# Patient Record
Sex: FEMALE | Race: BLACK OR AFRICAN AMERICAN | Employment: PART TIME | URBAN - METROPOLITAN AREA
[De-identification: names, ages, dates, MRNs, and addresses within clinical notes are randomized per-mention and may not be internally consistent; named-entity substitution may affect disease eponyms.]

---

## 2021-10-01 ENCOUNTER — APPOINTMENT (OUTPATIENT)
Dept: GENERAL RADIOLOGY | Age: 36
DRG: 137 | End: 2021-10-01
Payer: MEDICAID

## 2021-10-01 ENCOUNTER — HOSPITAL ENCOUNTER (INPATIENT)
Age: 36
LOS: 11 days | Discharge: HOME | DRG: 137 | End: 2021-10-12
Attending: EMERGENCY MEDICINE | Admitting: INTERNAL MEDICINE
Payer: MEDICAID

## 2021-10-01 DIAGNOSIS — U07.1 PNEUMONIA DUE TO COVID-19 VIRUS: Primary | ICD-10-CM

## 2021-10-01 DIAGNOSIS — R09.02 HYPOXIA: ICD-10-CM

## 2021-10-01 DIAGNOSIS — J12.82 PNEUMONIA DUE TO COVID-19 VIRUS: Primary | ICD-10-CM

## 2021-10-01 LAB
ALBUMIN SERPL-MCNC: 3 G/DL (ref 3.4–5)
ALP BLD-CCNC: 69 U/L (ref 40–129)
ALT SERPL-CCNC: 30 U/L (ref 10–40)
ANION GAP SERPL CALCULATED.3IONS-SCNC: 15 MMOL/L (ref 3–16)
ANION GAP SERPL CALCULATED.3IONS-SCNC: 16 MMOL/L (ref 3–16)
ANION GAP SERPL CALCULATED.3IONS-SCNC: 17 MMOL/L (ref 3–16)
APTT: 126 SEC (ref 26.2–38.6)
APTT: 26.6 SEC (ref 26.2–38.6)
APTT: 27.5 SEC (ref 26.2–38.6)
APTT: >248 SEC (ref 26.2–38.6)
AST SERPL-CCNC: 72 U/L (ref 15–37)
BASOPHILS ABSOLUTE: 0.1 K/UL (ref 0–0.2)
BASOPHILS RELATIVE PERCENT: 1.4 %
BILIRUB SERPL-MCNC: 0.3 MG/DL (ref 0–1)
BILIRUBIN DIRECT: <0.2 MG/DL (ref 0–0.3)
BILIRUBIN URINE: ABNORMAL
BILIRUBIN, INDIRECT: ABNORMAL MG/DL (ref 0–1)
BLOOD, URINE: ABNORMAL
BUN BLDV-MCNC: 66 MG/DL (ref 7–20)
BUN BLDV-MCNC: 70 MG/DL (ref 7–20)
BUN BLDV-MCNC: 76 MG/DL (ref 7–20)
C-REACTIVE PROTEIN: 100.1 MG/L (ref 0–5.1)
CALCIUM SERPL-MCNC: 9 MG/DL (ref 8.3–10.6)
CALCIUM SERPL-MCNC: 9 MG/DL (ref 8.3–10.6)
CALCIUM SERPL-MCNC: 9.2 MG/DL (ref 8.3–10.6)
CHLORIDE BLD-SCNC: 98 MMOL/L (ref 99–110)
CHLORIDE BLD-SCNC: 98 MMOL/L (ref 99–110)
CHLORIDE BLD-SCNC: 99 MMOL/L (ref 99–110)
CLARITY: ABNORMAL
CO2: 20 MMOL/L (ref 21–32)
CO2: 20 MMOL/L (ref 21–32)
CO2: 22 MMOL/L (ref 21–32)
COARSE CASTS, UA: ABNORMAL /LPF (ref 0–2)
COLOR: ABNORMAL
COMMENT UA: ABNORMAL
CREAT SERPL-MCNC: 4 MG/DL (ref 0.6–1.1)
CREAT SERPL-MCNC: 4.3 MG/DL (ref 0.6–1.1)
CREAT SERPL-MCNC: 4.7 MG/DL (ref 0.6–1.1)
CREATININE URINE: 508.6 MG/DL (ref 28–259)
D DIMER: 4401 NG/ML DDU (ref 0–229)
EKG ATRIAL RATE: 92 BPM
EKG DIAGNOSIS: NORMAL
EKG P AXIS: 38 DEGREES
EKG P-R INTERVAL: 136 MS
EKG Q-T INTERVAL: 348 MS
EKG QRS DURATION: 84 MS
EKG QTC CALCULATION (BAZETT): 430 MS
EKG R AXIS: -14 DEGREES
EKG T AXIS: 46 DEGREES
EKG VENTRICULAR RATE: 92 BPM
EOSINOPHILS ABSOLUTE: 0 K/UL (ref 0–0.6)
EOSINOPHILS RELATIVE PERCENT: 0.1 %
EPITHELIAL CELLS, UA: 15 /HPF (ref 0–5)
FERRITIN: 686.2 NG/ML (ref 15–150)
FIBRINOGEN: 627 MG/DL (ref 200–397)
GFR AFRICAN AMERICAN: 13
GFR AFRICAN AMERICAN: 14
GFR AFRICAN AMERICAN: 15
GFR NON-AFRICAN AMERICAN: 10
GFR NON-AFRICAN AMERICAN: 12
GFR NON-AFRICAN AMERICAN: 13
GLUCOSE BLD-MCNC: 130 MG/DL (ref 70–99)
GLUCOSE BLD-MCNC: 132 MG/DL (ref 70–99)
GLUCOSE BLD-MCNC: 144 MG/DL (ref 70–99)
GLUCOSE URINE: NEGATIVE MG/DL
HCT VFR BLD CALC: 47.6 % (ref 36–48)
HCT VFR BLD CALC: 47.6 % (ref 36–48)
HEMOGLOBIN: 16.1 G/DL (ref 12–16)
HEMOGLOBIN: 16.3 G/DL (ref 12–16)
HYALINE CASTS: ABNORMAL /LPF (ref 0–2)
INR BLD: 1.05 (ref 0.88–1.12)
KETONES, URINE: ABNORMAL MG/DL
LACTATE DEHYDROGENASE: 1191 U/L (ref 100–190)
LACTIC ACID: 1.6 MMOL/L (ref 0.4–2)
LEUKOCYTE ESTERASE, URINE: NEGATIVE
LYMPHOCYTES ABSOLUTE: 0.4 K/UL (ref 1–5.1)
LYMPHOCYTES RELATIVE PERCENT: 5 %
MCH RBC QN AUTO: 31.9 PG (ref 26–34)
MCH RBC QN AUTO: 32.1 PG (ref 26–34)
MCHC RBC AUTO-ENTMCNC: 33.8 G/DL (ref 31–36)
MCHC RBC AUTO-ENTMCNC: 34.2 G/DL (ref 31–36)
MCV RBC AUTO: 93.4 FL (ref 80–100)
MCV RBC AUTO: 95.1 FL (ref 80–100)
MICROSCOPIC EXAMINATION: YES
MONOCYTES ABSOLUTE: 0.3 K/UL (ref 0–1.3)
MONOCYTES RELATIVE PERCENT: 4.2 %
NEUTROPHILS ABSOLUTE: 6.9 K/UL (ref 1.7–7.7)
NEUTROPHILS RELATIVE PERCENT: 89.3 %
NITRITE, URINE: NEGATIVE
PDW BLD-RTO: 13.2 % (ref 12.4–15.4)
PDW BLD-RTO: 13.3 % (ref 12.4–15.4)
PH UA: 5 (ref 5–8)
PLATELET # BLD: 209 K/UL (ref 135–450)
PLATELET # BLD: 218 K/UL (ref 135–450)
PMV BLD AUTO: 9.3 FL (ref 5–10.5)
PMV BLD AUTO: 9.7 FL (ref 5–10.5)
POTASSIUM REFLEX MAGNESIUM: 4.4 MMOL/L (ref 3.5–5.1)
POTASSIUM REFLEX MAGNESIUM: 4.5 MMOL/L (ref 3.5–5.1)
POTASSIUM SERPL-SCNC: 4.7 MMOL/L (ref 3.5–5.1)
PROCALCITONIN: 0.78 NG/ML (ref 0–0.15)
PROTEIN UA: >300 MG/DL
PROTHROMBIN TIME: 11.9 SEC (ref 9.9–12.7)
RBC # BLD: 5 M/UL (ref 4–5.2)
RBC # BLD: 5.1 M/UL (ref 4–5.2)
RBC UA: 4 /HPF (ref 0–4)
SODIUM BLD-SCNC: 135 MMOL/L (ref 136–145)
SODIUM URINE: <20 MMOL/L
SPECIFIC GRAVITY UA: >1.03 (ref 1–1.03)
TOTAL PROTEIN: 7.6 G/DL (ref 6.4–8.2)
TROPONIN: <0.01 NG/ML
URINE TYPE: ABNORMAL
UROBILINOGEN, URINE: 0.2 E.U./DL
VITAMIN D 25-HYDROXY: 7.9 NG/ML
WBC # BLD: 7.7 K/UL (ref 4–11)
WBC # BLD: 7.7 K/UL (ref 4–11)
WBC UA: 16 /HPF (ref 0–5)

## 2021-10-01 PROCEDURE — 93005 ELECTROCARDIOGRAM TRACING: CPT | Performed by: EMERGENCY MEDICINE

## 2021-10-01 PROCEDURE — 71045 X-RAY EXAM CHEST 1 VIEW: CPT

## 2021-10-01 PROCEDURE — 84484 ASSAY OF TROPONIN QUANT: CPT

## 2021-10-01 PROCEDURE — 85384 FIBRINOGEN ACTIVITY: CPT

## 2021-10-01 PROCEDURE — 99223 1ST HOSP IP/OBS HIGH 75: CPT | Performed by: INTERNAL MEDICINE

## 2021-10-01 PROCEDURE — 82570 ASSAY OF URINE CREATININE: CPT

## 2021-10-01 PROCEDURE — 81001 URINALYSIS AUTO W/SCOPE: CPT

## 2021-10-01 PROCEDURE — 6360000002 HC RX W HCPCS: Performed by: INTERNAL MEDICINE

## 2021-10-01 PROCEDURE — 99284 EMERGENCY DEPT VISIT MOD MDM: CPT

## 2021-10-01 PROCEDURE — 6360000002 HC RX W HCPCS: Performed by: EMERGENCY MEDICINE

## 2021-10-01 PROCEDURE — 82728 ASSAY OF FERRITIN: CPT

## 2021-10-01 PROCEDURE — 2580000003 HC RX 258: Performed by: INTERNAL MEDICINE

## 2021-10-01 PROCEDURE — 87040 BLOOD CULTURE FOR BACTERIA: CPT

## 2021-10-01 PROCEDURE — 86140 C-REACTIVE PROTEIN: CPT

## 2021-10-01 PROCEDURE — 2060000000 HC ICU INTERMEDIATE R&B

## 2021-10-01 PROCEDURE — 36415 COLL VENOUS BLD VENIPUNCTURE: CPT

## 2021-10-01 PROCEDURE — 80048 BASIC METABOLIC PNL TOTAL CA: CPT

## 2021-10-01 PROCEDURE — 6370000000 HC RX 637 (ALT 250 FOR IP): Performed by: INTERNAL MEDICINE

## 2021-10-01 PROCEDURE — 85027 COMPLETE CBC AUTOMATED: CPT

## 2021-10-01 PROCEDURE — 80076 HEPATIC FUNCTION PANEL: CPT

## 2021-10-01 PROCEDURE — 94761 N-INVAS EAR/PLS OXIMETRY MLT: CPT

## 2021-10-01 PROCEDURE — 85379 FIBRIN DEGRADATION QUANT: CPT

## 2021-10-01 PROCEDURE — 83615 LACTATE (LD) (LDH) ENZYME: CPT

## 2021-10-01 PROCEDURE — 87449 NOS EACH ORGANISM AG IA: CPT

## 2021-10-01 PROCEDURE — 84300 ASSAY OF URINE SODIUM: CPT

## 2021-10-01 PROCEDURE — 2700000000 HC OXYGEN THERAPY PER DAY

## 2021-10-01 PROCEDURE — 96365 THER/PROPH/DIAG IV INF INIT: CPT

## 2021-10-01 PROCEDURE — U0003 INFECTIOUS AGENT DETECTION BY NUCLEIC ACID (DNA OR RNA); SEVERE ACUTE RESPIRATORY SYNDROME CORONAVIRUS 2 (SARS-COV-2) (CORONAVIRUS DISEASE [COVID-19]), AMPLIFIED PROBE TECHNIQUE, MAKING USE OF HIGH THROUGHPUT TECHNOLOGIES AS DESCRIBED BY CMS-2020-01-R: HCPCS

## 2021-10-01 PROCEDURE — 83605 ASSAY OF LACTIC ACID: CPT

## 2021-10-01 PROCEDURE — 84145 PROCALCITONIN (PCT): CPT

## 2021-10-01 PROCEDURE — U0005 INFEC AGEN DETEC AMPLI PROBE: HCPCS

## 2021-10-01 PROCEDURE — 85025 COMPLETE CBC W/AUTO DIFF WBC: CPT

## 2021-10-01 PROCEDURE — XW033H5 INTRODUCTION OF TOCILIZUMAB INTO PERIPHERAL VEIN, PERCUTANEOUS APPROACH, NEW TECHNOLOGY GROUP 5: ICD-10-PCS | Performed by: INTERNAL MEDICINE

## 2021-10-01 PROCEDURE — 93010 ELECTROCARDIOGRAM REPORT: CPT | Performed by: INTERNAL MEDICINE

## 2021-10-01 PROCEDURE — 82306 VITAMIN D 25 HYDROXY: CPT

## 2021-10-01 PROCEDURE — 85730 THROMBOPLASTIN TIME PARTIAL: CPT

## 2021-10-01 PROCEDURE — 2580000003 HC RX 258: Performed by: EMERGENCY MEDICINE

## 2021-10-01 PROCEDURE — 85610 PROTHROMBIN TIME: CPT

## 2021-10-01 RX ORDER — PROMETHAZINE HYDROCHLORIDE 25 MG/ML
6.25 INJECTION, SOLUTION INTRAMUSCULAR; INTRAVENOUS EVERY 6 HOURS PRN
Status: DISCONTINUED | OUTPATIENT
Start: 2021-10-01 | End: 2021-10-12 | Stop reason: HOSPADM

## 2021-10-01 RX ORDER — ACETAMINOPHEN 650 MG/1
650 SUPPOSITORY RECTAL EVERY 6 HOURS PRN
Status: DISCONTINUED | OUTPATIENT
Start: 2021-10-01 | End: 2021-10-12 | Stop reason: HOSPADM

## 2021-10-01 RX ORDER — HEPARIN SODIUM 1000 [USP'U]/ML
10000 INJECTION, SOLUTION INTRAVENOUS; SUBCUTANEOUS PRN
Status: DISCONTINUED | OUTPATIENT
Start: 2021-10-01 | End: 2021-10-12 | Stop reason: HOSPADM

## 2021-10-01 RX ORDER — HEPARIN SODIUM 1000 [USP'U]/ML
10000 INJECTION, SOLUTION INTRAVENOUS; SUBCUTANEOUS ONCE
Status: COMPLETED | OUTPATIENT
Start: 2021-10-01 | End: 2021-10-01

## 2021-10-01 RX ORDER — GUAIFENESIN/DEXTROMETHORPHAN 100-10MG/5
5 SYRUP ORAL EVERY 4 HOURS PRN
Status: DISCONTINUED | OUTPATIENT
Start: 2021-10-01 | End: 2021-10-12 | Stop reason: HOSPADM

## 2021-10-01 RX ORDER — ALBUTEROL SULFATE 90 UG/1
2 AEROSOL, METERED RESPIRATORY (INHALATION) EVERY 6 HOURS PRN
Status: DISCONTINUED | OUTPATIENT
Start: 2021-10-01 | End: 2021-10-07

## 2021-10-01 RX ORDER — SODIUM CHLORIDE 0.9 % (FLUSH) 0.9 %
5-40 SYRINGE (ML) INJECTION PRN
Status: DISCONTINUED | OUTPATIENT
Start: 2021-10-01 | End: 2021-10-12 | Stop reason: HOSPADM

## 2021-10-01 RX ORDER — VITAMIN B COMPLEX
2000 TABLET ORAL DAILY
Status: DISCONTINUED | OUTPATIENT
Start: 2021-10-01 | End: 2021-10-12 | Stop reason: HOSPADM

## 2021-10-01 RX ORDER — FAMOTIDINE 20 MG/1
20 TABLET, FILM COATED ORAL 2 TIMES DAILY
Status: DISCONTINUED | OUTPATIENT
Start: 2021-10-01 | End: 2021-10-03

## 2021-10-01 RX ORDER — HEPARIN SODIUM 1000 [USP'U]/ML
5000 INJECTION, SOLUTION INTRAVENOUS; SUBCUTANEOUS PRN
Status: DISCONTINUED | OUTPATIENT
Start: 2021-10-01 | End: 2021-10-12 | Stop reason: HOSPADM

## 2021-10-01 RX ORDER — HEPARIN SODIUM 10000 [USP'U]/100ML
5-30 INJECTION, SOLUTION INTRAVENOUS CONTINUOUS
Status: DISCONTINUED | OUTPATIENT
Start: 2021-10-01 | End: 2021-10-12 | Stop reason: HOSPADM

## 2021-10-01 RX ORDER — ACETAMINOPHEN 325 MG/1
650 TABLET ORAL EVERY 6 HOURS PRN
Status: DISCONTINUED | OUTPATIENT
Start: 2021-10-01 | End: 2021-10-12 | Stop reason: HOSPADM

## 2021-10-01 RX ORDER — MAGNESIUM HYDROXIDE/ALUMINUM HYDROXICE/SIMETHICONE 120; 1200; 1200 MG/30ML; MG/30ML; MG/30ML
30 SUSPENSION ORAL EVERY 6 HOURS PRN
Status: DISCONTINUED | OUTPATIENT
Start: 2021-10-01 | End: 2021-10-12 | Stop reason: HOSPADM

## 2021-10-01 RX ORDER — POLYETHYLENE GLYCOL 3350 17 G/17G
17 POWDER, FOR SOLUTION ORAL DAILY PRN
Status: DISCONTINUED | OUTPATIENT
Start: 2021-10-01 | End: 2021-10-12 | Stop reason: HOSPADM

## 2021-10-01 RX ORDER — SODIUM CHLORIDE 9 MG/ML
25 INJECTION, SOLUTION INTRAVENOUS PRN
Status: DISCONTINUED | OUTPATIENT
Start: 2021-10-01 | End: 2021-10-12 | Stop reason: HOSPADM

## 2021-10-01 RX ORDER — PROCHLORPERAZINE EDISYLATE 5 MG/ML
10 INJECTION INTRAMUSCULAR; INTRAVENOUS EVERY 6 HOURS PRN
Status: CANCELLED | OUTPATIENT
Start: 2021-10-01

## 2021-10-01 RX ORDER — SODIUM CHLORIDE 0.9 % (FLUSH) 0.9 %
5-40 SYRINGE (ML) INJECTION EVERY 12 HOURS SCHEDULED
Status: DISCONTINUED | OUTPATIENT
Start: 2021-10-01 | End: 2021-10-12 | Stop reason: HOSPADM

## 2021-10-01 RX ORDER — SODIUM CHLORIDE 9 MG/ML
INJECTION, SOLUTION INTRAVENOUS CONTINUOUS
Status: DISCONTINUED | OUTPATIENT
Start: 2021-10-01 | End: 2021-10-02

## 2021-10-01 RX ORDER — DEXAMETHASONE SODIUM PHOSPHATE 10 MG/ML
6 INJECTION, SOLUTION INTRAMUSCULAR; INTRAVENOUS DAILY
Status: DISCONTINUED | OUTPATIENT
Start: 2021-10-01 | End: 2021-10-01

## 2021-10-01 RX ADMIN — Medication 20 ML: at 19:58

## 2021-10-01 RX ADMIN — FAMOTIDINE 20 MG: 20 TABLET ORAL at 08:18

## 2021-10-01 RX ADMIN — HEPARIN SODIUM 10000 UNITS: 1000 INJECTION, SOLUTION INTRAVENOUS; SUBCUTANEOUS at 08:18

## 2021-10-01 RX ADMIN — FAMOTIDINE 20 MG: 20 TABLET ORAL at 19:57

## 2021-10-01 RX ADMIN — AZITHROMYCIN MONOHYDRATE 500 MG: 500 INJECTION, POWDER, LYOPHILIZED, FOR SOLUTION INTRAVENOUS at 18:17

## 2021-10-01 RX ADMIN — Medication 10 ML: at 08:21

## 2021-10-01 RX ADMIN — SODIUM CHLORIDE: 9 INJECTION, SOLUTION INTRAVENOUS at 07:04

## 2021-10-01 RX ADMIN — Medication 1000 MG: at 18:11

## 2021-10-01 RX ADMIN — HEPARIN SODIUM 13 UNITS/KG/HR: 10000 INJECTION, SOLUTION INTRAVENOUS at 08:33

## 2021-10-01 RX ADMIN — Medication 10 ML: at 17:48

## 2021-10-01 RX ADMIN — SODIUM CHLORIDE: 9 INJECTION, SOLUTION INTRAVENOUS at 17:51

## 2021-10-01 RX ADMIN — HEPARIN SODIUM 10 UNITS/KG/HR: 10000 INJECTION, SOLUTION INTRAVENOUS at 21:15

## 2021-10-01 RX ADMIN — Medication 2000 UNITS: at 08:18

## 2021-10-01 RX ADMIN — DEXAMETHASONE SODIUM PHOSPHATE 20 MG: 4 INJECTION, SOLUTION INTRAMUSCULAR; INTRAVENOUS at 01:52

## 2021-10-01 ASSESSMENT — PAIN SCALES - GENERAL
PAINLEVEL_OUTOF10: 0
PAINLEVEL_OUTOF10: 1

## 2021-10-01 NOTE — ED PROVIDER NOTES
Emergency Department Encounter    Patient: Akila Dangelo  MRN: [id number]  : 1985  Date of Evaluation: 10/5/2021  ED Provider:  Raul Swanson MD    Triage Chief Complaint:   Shortness of Breath (pt. with sob, cough, fever and weakness that started on .  pt. with increased SOB tonight. pt. was 84% on room air. pt. pt. arrived by [facility]. )    [place]:  Akila Dangelo is a 43 y.o. female that presents to the ER for evaluation of suspected COVID-19 with severe hypoxia on arrival she is day 12 of symptoms, nonvaccinated. Afebrile on arrival normal smell and taste, severe dyspnea and air hunger. Subjective fever at home cough diffuse myalgias. Positive sick contacts. ROS - see HPI, below listed is current ROS at time of my eval:  Unable to fully obtained given patient's clinical condition    History reviewed. No pertinent past medical history. History reviewed. No pertinent surgical history. Family History   Family history unknown: Yes     Social History     Socioeconomic History    Marital status: Single     Spouse name: Not on file    Number of children: Not on file    Years of education: Not on file    Highest education level: Not on file   Occupational History    Not on file   Tobacco Use    Smoking status: Never Smoker   Substance and Sexual Activity    Alcohol use: Not Currently    Drug use: Not on file    Sexual activity: Not on file   Other Topics Concern    Not on file   Social History Narrative    Not on file     Social Determinants of Health     Financial Resource Strain:     Difficulty of Paying Living Expenses:    Food Insecurity:     Worried About Running Out of Food in the Last Year:     [address] in the Last Year:    Transportation Needs:     Lack of Transportation (Medical):      Lack of Transportation (Non-Medical):    Physical Activity:     Days of Exercise per Week:     Minutes of Exercise per Session:    Stress:     Feeling of Stress :    Social Connections:     Frequency of Communication with Friends and Family:     Frequency of Social Gatherings with Friends and Family:     Attends [employer] Services:     Active Member of Clubs or Organizations:     Attends Club or Organization Meetings:     Marital Status:    Intimate Partner Violence:     Fear of Current or Ex-Partner:     Emotionally Abused:     Physically Abused:     Sexually Abused:      Current Facility-Administered Medications   Medication Dose Route Frequency Provider Last Rate Last Admin    sodium chloride (OCEAN, BABY AYR) 0.65 % nasal spray 1 spray  1 spray Each Nostril Q4H PRN Abdiaziz Smith MD        famotidine (PEPCID) tablet 20 mg  20 mg Oral Daily Geroldine MD Luis   20 mg at 10/04/21 0846    sodium bicarbonate 75 mEq in sodium chloride 0.45 % 1,000 mL infusion   IntraVENous Continuous Brenda Nguyen MD 75 mL/hr at 10/03/21 2058 New Bag at 10/03/21 2058    sodium chloride flush 0.9 % injection 5-40 mL  5-40 mL IntraVENous 2 times per day Shiela Hensley MD   10 mL at 10/04/21 2106    sodium chloride flush 0.9 % injection 5-40 mL  5-40 mL IntraVENous PRN Shiela Hensley MD   10 mL at 10/03/21 0606    0.9 % sodium chloride infusion  25 mL IntraVENous PRN Shiela Hensley MD        aluminum & magnesium hydroxide-simethicone (MAALOX) 200-200-20 MG/5ML suspension 30 mL  30 mL Oral Q6H PRN Shiela Hensley MD   30 mL at 10/02/21 1259    polyethylene glycol (GLYCOLAX) packet 17 g  17 g Oral Daily PRN Shiela Hensley MD        acetaminophen (TYLENOL) tablet 650 mg  650 mg Oral Q6H PRN Shiela Hensley MD        Or    acetaminophen (TYLENOL) suppository 650 mg  650 mg Rectal Q6H PRN Shiela Hensley MD        guaiFENesin-dextromethorphan (ROBITUSSIN DM) 100-10 MG/5ML syrup 5 mL  5 mL Oral Q4H PRN Shiela Hensley MD   5 mL at 10/03/21 1855    Vitamin D (CHOLECALCIFEROL) tablet 2,000 Units  2,000 Units Oral Daily Shiela Hensley MD   2,000 Units at 10/04/21 0846    this visit (if applicable):  Results for orders placed or performed during the hospital encounter of 10/01/21   Culture, Blood 1    Specimen: Blood   Result Value Ref Range    Blood Culture, Routine No Growth after 4 days of incubation. Culture, Blood 2    Specimen: Blood   Result Value Ref Range    Culture, Blood 2 No Growth after 4 days of incubation. Legionella antigen, urine    Specimen: Urine, clean catch   Result Value Ref Range    L. pneumophila Serogp 1 Ur Ag       Presumptive Negative  No Legionella pneumophila serogroup 1 antigens detected. A negative result does not exclude infection with  Legionella pneumophila serogroup 1 nor does it rule out  other microbial-caused respiratory infections or  disease caused by other serogroups of  Legionella pneumophila. Normal Range: Presumptive Negative     Strep Pneumoniae Antigen    Specimen: Urine, clean catch   Result Value Ref Range    STREP PNEUMONIAE ANTIGEN, URINE       Presumptive Negative  Presumptive negative suggests no current or recent  pneumococcal infection. Infection due to Strep pneumoniae  cannot be ruled out since the antigen present in the sample  may be below the detection limit of the test.  Normal Range:Presumptive Negative     Respiratory Panel, Molecular, with COVID-19 (Restricted: peds pts or suitable admitted adults)    Specimen: Nasopharyngeal   Result Value Ref Range    Organism SARS CoV 2  by PCR (A)     Respiratory Panel PCR       POSITIVE FOR  See additional report for complete Respiratory Pathogen Panel  This test has been authorized by FDA under an  Emergency Use Authorization (EUA). This test is only authorized for the duration of the  time of declaration that circumstances exist justifying the  authorization of the emergency use of in vitro diagnostic  testing for detection of the SARS-CoV-2 virus  and/or diagnosis of COVID-19 infection under  section 564 (b)(1) of the Act, 21 U. S.C. 283TXA-0 (b) (1),  unless the authorization is terminated or revoked sooner.   Patient Fact SettlementContracts.gl  Provider Fact SettlementContracts.  METHODOLOGY: Multiplex PCR     Respiratory Panel Film Array Report   Result Value Ref Range    Report SEE IMAGE    CBC Auto Differential   Result Value Ref Range    WBC 7.7 4.0 - 11.0 K/uL    RBC 5.10 4.00 - 5.20 M/uL    Hemoglobin 16.3 (H) 12.0 - 16.0 g/dL    Hematocrit 47.6 36.0 - 48.0 %    MCV 93.4 80.0 - 100.0 fL    MCH 31.9 26.0 - 34.0 pg    MCHC 34.2 31.0 - 36.0 g/dL    RDW 13.3 12.4 - 15.4 %    Platelets 624 429 - 228 K/uL    MPV 9.7 5.0 - 10.5 fL    Neutrophils % 89.3 %    Lymphocytes % 5.0 %    Monocytes % 4.2 %    Eosinophils % 0.1 %    Basophils % 1.4 %    Neutrophils Absolute 6.9 1.7 - 7.7 K/uL    Lymphocytes Absolute 0.4 (L) 1.0 - 5.1 K/uL    Monocytes Absolute 0.3 0.0 - 1.3 K/uL    Eosinophils Absolute 0.0 0.0 - 0.6 K/uL    Basophils Absolute 0.1 0.0 - 0.2 K/uL   Basic Metabolic Panel w/ Reflex to MG   Result Value Ref Range    Sodium 135 (L) 136 - 145 mmol/L    Potassium reflex Magnesium 4.5 3.5 - 5.1 mmol/L    Chloride 98 (L) 99 - 110 mmol/L    CO2 22 21 - 32 mmol/L    Anion Gap 15 3 - 16    Glucose 132 (H) 70 - 99 mg/dL    BUN 66 (H) 7 - 20 mg/dL    CREATININE 4.0 (H) 0.6 - 1.1 mg/dL    GFR Non-African American 13 (A) >60    GFR  15 (A) >60    Calcium 9.0 8.3 - 10.6 mg/dL   Troponin   Result Value Ref Range    Troponin <0.01 <0.01 ng/mL   C-Reactive Protein   Result Value Ref Range    .1 (H) 0.0 - 5.1 mg/L   COVID-19   Result Value Ref Range    SARS-CoV-2 Detected (A) Not detected   APTT   Result Value Ref Range    aPTT 26.6 26.2 - 38.6 sec   Ferritin   Result Value Ref Range    Ferritin 686.2 (H) 15.0 - 150.0 ng/mL   Fibrinogen   Result Value Ref Range    Fibrinogen 627 (H) 200 - 397 mg/dL   Vitamin D 25 Hydroxy   Result Value Ref Range    Vit D, 25-Hydroxy 7.9 (L) >=30 ng/mL   D-Dimer, Quantitative Result Value Ref Range    D-Dimer, Quant 4401 (H) 0 - 229 ng/mL DDU   Lactate Dehydrogenase   Result Value Ref Range    LD 1,191 (H) 100 - 190 U/L   Lactic Acid, Plasma   Result Value Ref Range    Lactic Acid 1.6 0.4 - 2.0 mmol/L   Procalcitonin   Result Value Ref Range    Procalcitonin 0.78 (H) 0.00 - 0.15 ng/mL   Protime-INR   Result Value Ref Range    Protime 11.9 9.9 - 12.7 sec    INR 1.05 0.88 - 1.12   Hepatic function panel   Result Value Ref Range    Total Protein 7.6 6.4 - 8.2 g/dL    Albumin 3.0 (L) 3.4 - 5.0 g/dL    Alkaline Phosphatase 69 40 - 129 U/L    ALT 30 10 - 40 U/L    AST 72 (H) 15 - 37 U/L    Total Bilirubin 0.3 0.0 - 1.0 mg/dL    Bilirubin, Direct <0.2 0.0 - 0.3 mg/dL    Bilirubin, Indirect see below 0.0 - 1.0 mg/dL   APTT   Result Value Ref Range    aPTT 27.5 26.2 - 38.6 sec   APTT   Result Value Ref Range    aPTT >248.0 (HH) 26.2 - 38.6 sec   Urinalysis with microscopic   Result Value Ref Range    Color, UA DK YELLOW Straw/Yellow    Clarity, UA TURBID (A) Clear    Glucose, Ur Negative Negative mg/dL    Bilirubin Urine SMALL (A) Negative    Ketones, Urine TRACE (A) Negative mg/dL    Specific Gravity, UA >1.030 1.005 - 1.030    Blood, Urine MODERATE (A) Negative    pH, UA 5.0 5.0 - 8.0    Protein, UA >300 Negative mg/dL    Urobilinogen, Urine 0.2 <2.0 E.U./dL    Nitrite, Urine Negative Negative    Leukocyte Esterase, Urine Negative Negative    Microscopic Examination YES     Urine Type Voided     Hyaline Casts, UA 6-10 (A) 0 - 2 /LPF    Coarse Casts, UA 3-5 (A) 0 - 2 /LPF    Urinalysis Comments see below     WBC, UA 16 (H) 0 - 5 /HPF    RBC, UA 4 0 - 4 /HPF    Epithelial Cells, UA 15 (H) 0 - 5 /HPF   Basic metabolic panel   Result Value Ref Range    Sodium 135 (L) 136 - 145 mmol/L    Potassium 4.7 3.5 - 5.1 mmol/L    Chloride 98 (L) 99 - 110 mmol/L    CO2 20 (L) 21 - 32 mmol/L    Anion Gap 17 (H) 3 - 16    Glucose 130 (H) 70 - 99 mg/dL    BUN 70 (H) 7 - 20 mg/dL    CREATININE 4.3 (H) 0.6 - 1.1 mg/dL    GFR Non- 12 (A) >60    GFR  14 (A) >60    Calcium 9.2 8.3 - 10.6 mg/dL   CBC   Result Value Ref Range    WBC 7.7 4.0 - 11.0 K/uL    RBC 5.00 4.00 - 5.20 M/uL    Hemoglobin 16.1 (H) 12.0 - 16.0 g/dL    Hematocrit 47.6 36.0 - 48.0 %    MCV 95.1 80.0 - 100.0 fL    MCH 32.1 26.0 - 34.0 pg    MCHC 33.8 31.0 - 36.0 g/dL    RDW 13.2 12.4 - 15.4 %    Platelets 377 788 - 017 K/uL    MPV 9.3 5.0 - 10.5 fL   Basic Metabolic Panel w/ Reflex to MG   Result Value Ref Range    Sodium 135 (L) 136 - 145 mmol/L    Potassium reflex Magnesium 4.4 3.5 - 5.1 mmol/L    Chloride 99 99 - 110 mmol/L    CO2 20 (L) 21 - 32 mmol/L    Anion Gap 16 3 - 16    Glucose 144 (H) 70 - 99 mg/dL    BUN 76 (H) 7 - 20 mg/dL    CREATININE 4.7 (H) 0.6 - 1.1 mg/dL    GFR Non-African American 10 (A) >60    GFR  13 (A) >60    Calcium 9.0 8.3 - 10.6 mg/dL   Sodium, urine, random   Result Value Ref Range    Sodium, Ur <20 Not Established mmol/L   Creatinine, Random Urine   Result Value Ref Range    Creatinine, Ur 508.6 (H) 28.0 - 259.0 mg/dL   APTT   Result Value Ref Range    aPTT 126.0 (HH) 26.2 - 38.6 sec   C-Reactive Protein   Result Value Ref Range    CRP 54.6 (H) 0.0 - 5.1 mg/L   CBC Auto Differential   Result Value Ref Range    WBC 9.5 4.0 - 11.0 K/uL    RBC 4.79 4.00 - 5.20 M/uL    Hemoglobin 15.4 12.0 - 16.0 g/dL    Hematocrit 46.1 36.0 - 48.0 %    MCV 96.3 80.0 - 100.0 fL    MCH 32.2 26.0 - 34.0 pg    MCHC 33.4 31.0 - 36.0 g/dL    RDW 13.0 12.4 - 15.4 %    Platelets 147 266 - 248 K/uL    MPV 9.4 5.0 - 10.5 fL    PLATELET SLIDE REVIEW Adequate     SLIDE REVIEW see below     Neutrophils % 87.0 %    Lymphocytes % 5.0 %    Monocytes % 8.0 %    Eosinophils % 0.0 %    Basophils % 0.0 %    Neutrophils Absolute 8.3 (H) 1.7 - 7.7 K/uL    Lymphocytes Absolute 0.5 (L) 1.0 - 5.1 K/uL    Monocytes Absolute 0.8 0.0 - 1.3 K/uL    Eosinophils Absolute 0.0 0.0 - 0.6 K/uL    Basophils Absolute 0.0 0.0 - 0.2 K/uL Polychromasia Occasional (A)    Comprehensive Metabolic Panel w/ Reflex to MG   Result Value Ref Range    Sodium 138 136 - 145 mmol/L    Potassium reflex Magnesium 4.1 3.5 - 5.1 mmol/L    Chloride 100 99 - 110 mmol/L    CO2 19 (L) 21 - 32 mmol/L    Anion Gap 19 (H) 3 - 16    Glucose 117 (H) 70 - 99 mg/dL    BUN 83 (HH) 7 - 20 mg/dL    CREATININE 5.6 (HH) 0.6 - 1.1 mg/dL    GFR Non-African American 8 (A) >60    GFR  10 (A) >60    Calcium 9.0 8.3 - 10.6 mg/dL    Total Protein 7.1 6.4 - 8.2 g/dL    Albumin 2.7 (L) 3.4 - 5.0 g/dL    Albumin/Globulin Ratio 0.6 (L) 1.1 - 2.2    Total Bilirubin <0.2 0.0 - 1.0 mg/dL    Alkaline Phosphatase 60 40 - 129 U/L    ALT 21 10 - 40 U/L    AST 39 (H) 15 - 37 U/L    Globulin 4.4 g/dL   Protime-INR   Result Value Ref Range    Protime 11.9 9.9 - 12.7 sec    INR 1.05 0.88 - 1.12   APTT   Result Value Ref Range    aPTT 77.4 (H) 26.2 - 38.6 sec   Fibrinogen   Result Value Ref Range    Fibrinogen 528 (H) 200 - 397 mg/dL   Procalcitonin   Result Value Ref Range    Procalcitonin 1.14 (H) 0.00 - 0.15 ng/mL   Lactate Dehydrogenase   Result Value Ref Range    LD 1,061 (H) 100 - 190 U/L   Ferritin   Result Value Ref Range    Ferritin 696.4 (H) 15.0 - 150.0 ng/mL   D-Dimer, Quantitative   Result Value Ref Range    D-Dimer, Quant 4680 (H) 0 - 229 ng/mL DDU   Troponin   Result Value Ref Range    Troponin <0.01 <0.01 ng/mL   Lactic Acid, Plasma   Result Value Ref Range    Lactic Acid 1.5 0.4 - 2.0 mmol/L   Vitamin D 25 Hydroxy   Result Value Ref Range    Vit D, 25-Hydroxy 6.7 (L) >=30 ng/mL   Magnesium   Result Value Ref Range    Magnesium 2.60 (H) 1.80 - 2.40 mg/dL   Phosphorus   Result Value Ref Range    Phosphorus 3.7 2.5 - 4.9 mg/dL   Basic metabolic panel   Result Value Ref Range    Sodium 137 136 - 145 mmol/L    Potassium 4.2 3.5 - 5.1 mmol/L    Chloride 101 99 - 110 mmol/L    CO2 19 (L) 21 - 32 mmol/L    Anion Gap 17 (H) 3 - 16    Glucose 120 (H) 70 - 99 mg/dL    BUN 85 (HH) 7 - 20 mg/dL    CREATININE 5.9 (HH) 0.6 - 1.1 mg/dL    GFR Non-African American 8 (A) >60    GFR  10 (A) >60    Calcium 8.6 8.3 - 10.6 mg/dL   APTT   Result Value Ref Range    aPTT 50.4 (H) 26.2 - 38.6 sec   APTT   Result Value Ref Range    aPTT >248.0 (HH) 26.2 - 38.6 sec   C-Reactive Protein   Result Value Ref Range    CRP 46.4 (H) 0.0 - 5.1 mg/L   CBC Auto Differential   Result Value Ref Range    WBC 7.2 4.0 - 11.0 K/uL    RBC 4.52 4.00 - 5.20 M/uL    Hemoglobin 14.5 12.0 - 16.0 g/dL    Hematocrit 42.2 36.0 - 48.0 %    MCV 93.3 80.0 - 100.0 fL    MCH 32.1 26.0 - 34.0 pg    MCHC 34.4 31.0 - 36.0 g/dL    RDW 13.3 12.4 - 15.4 %    Platelets 291 046 - 092 K/uL    MPV 9.5 5.0 - 10.5 fL    PLATELET SLIDE REVIEW Adequate     SLIDE REVIEW see below     Neutrophils % 76.0 %    Lymphocytes % 15.0 %    Monocytes % 6.0 %    Eosinophils % 0.0 %    Basophils % 0.0 %    Neutrophils Absolute 5.5 1.7 - 7.7 K/uL    Lymphocytes Absolute 1.3 1.0 - 5.1 K/uL    Monocytes Absolute 0.4 0.0 - 1.3 K/uL    Eosinophils Absolute 0.0 0.0 - 0.6 K/uL    Basophils Absolute 0.0 0.0 - 0.2 K/uL    Atypical Lymphocytes Relative 3 0 - 6 %    Anisocytosis Occasional (A)    Comprehensive Metabolic Panel w/ Reflex to MG   Result Value Ref Range    Sodium 139 136 - 145 mmol/L    Potassium reflex Magnesium 4.1 3.5 - 5.1 mmol/L    Chloride 101 99 - 110 mmol/L    CO2 22 21 - 32 mmol/L    Anion Gap 16 3 - 16    Glucose 117 (H) 70 - 99 mg/dL    BUN 97 (HH) 7 - 20 mg/dL    CREATININE 6.5 (HH) 0.6 - 1.1 mg/dL    GFR Non-African American 7 (A) >60    GFR  9 (A) >60    Calcium 8.8 8.3 - 10.6 mg/dL    Total Protein 6.7 6.4 - 8.2 g/dL    Albumin 2.7 (L) 3.4 - 5.0 g/dL    Albumin/Globulin Ratio 0.7 (L) 1.1 - 2.2    Total Bilirubin <0.2 0.0 - 1.0 mg/dL    Alkaline Phosphatase 60 40 - 129 U/L    ALT 17 10 - 40 U/L    AST 33 15 - 37 U/L    Globulin 4.0 g/dL   Procalcitonin   Result Value Ref Range    Procalcitonin 1.21 (H) 0.00 - 0.15 ng/mL   Magnesium   Result Value Ref Range    Magnesium 2.60 (H) 1.80 - 2.40 mg/dL   Phosphorus   Result Value Ref Range    Phosphorus 3.9 2.5 - 4.9 mg/dL   APTT   Result Value Ref Range    aPTT 102.8 (H) 26.2 - 38.6 sec   APTT   Result Value Ref Range    aPTT 59.6 (H) 26.2 - 38.6 sec   D-Dimer, Quantitative   Result Value Ref Range    D-Dimer, Quant 3001 (H) 0 - 229 ng/mL DDU   APTT   Result Value Ref Range    aPTT 106.4 (HH) 26.2 - 38.6 sec   APTT   Result Value Ref Range    aPTT 42.5 (H) 26.2 - 38.6 sec   CBC Auto Differential   Result Value Ref Range    WBC 9.9 4.0 - 11.0 K/uL    RBC 4.32 4.00 - 5.20 M/uL    Hemoglobin 13.9 12.0 - 16.0 g/dL    Hematocrit 39.8 36.0 - 48.0 %    MCV 92.1 80.0 - 100.0 fL    MCH 32.1 26.0 - 34.0 pg    MCHC 34.9 31.0 - 36.0 g/dL    RDW 13.3 12.4 - 15.4 %    Platelets 434 766 - 371 K/uL    MPV 9.3 5.0 - 10.5 fL    Neutrophils % 84.4 %    Lymphocytes % 8.6 %    Monocytes % 6.7 %    Eosinophils % 0.0 %    Basophils % 0.3 %    Neutrophils Absolute 8.4 (H) 1.7 - 7.7 K/uL    Lymphocytes Absolute 0.8 (L) 1.0 - 5.1 K/uL    Monocytes Absolute 0.7 0.0 - 1.3 K/uL    Eosinophils Absolute 0.0 0.0 - 0.6 K/uL    Basophils Absolute 0.0 0.0 - 0.2 K/uL   Comprehensive Metabolic Panel w/ Reflex to MG   Result Value Ref Range    Sodium 141 136 - 145 mmol/L    Potassium reflex Magnesium 3.8 3.5 - 5.1 mmol/L    Chloride 103 99 - 110 mmol/L    CO2 22 21 - 32 mmol/L    Anion Gap 16 3 - 16    Glucose 127 (H) 70 - 99 mg/dL     (HH) 7 - 20 mg/dL    CREATININE 5.6 (HH) 0.6 - 1.1 mg/dL    GFR Non-African American 8 (A) >60    GFR  10 (A) >60    Calcium 8.9 8.3 - 10.6 mg/dL    Total Protein 6.3 (L) 6.4 - 8.2 g/dL    Albumin 2.7 (L) 3.4 - 5.0 g/dL    Albumin/Globulin Ratio 0.8 (L) 1.1 - 2.2    Total Bilirubin <0.2 0.0 - 1.0 mg/dL    Alkaline Phosphatase 62 40 - 129 U/L    ALT 27 10 - 40 U/L    AST 57 (H) 15 - 37 U/L    Globulin 3.6 g/dL   C-Reactive Protein   Result Value Ref Range    CRP 21. 3 (H) 0.0 - 5.1 mg/L   D-Dimer, Quantitative   Result Value Ref Range    D-Dimer, Quant 2177 (H) 0 - 229 ng/mL DDU   Magnesium   Result Value Ref Range    Magnesium 2.60 (H) 1.80 - 2.40 mg/dL   Phosphorus   Result Value Ref Range    Phosphorus 4.6 2.5 - 4.9 mg/dL   APTT   Result Value Ref Range    aPTT 160.5 (HH) 26.2 - 38.6 sec   APTT   Result Value Ref Range    aPTT 67.5 (H) 26.2 - 38.6 sec   APTT   Result Value Ref Range    aPTT 43.3 (H) 26.2 - 38.6 sec   CBC Auto Differential   Result Value Ref Range    WBC 12.9 (H) 4.0 - 11.0 K/uL    RBC 4.66 4.00 - 5.20 M/uL    Hemoglobin 14.9 12.0 - 16.0 g/dL    Hematocrit 43.8 36.0 - 48.0 %    MCV 94.1 80.0 - 100.0 fL    MCH 32.1 26.0 - 34.0 pg    MCHC 34.1 31.0 - 36.0 g/dL    RDW 13.1 12.4 - 15.4 %    Platelets 017 282 - 286 K/uL    MPV 9.0 5.0 - 10.5 fL   Comprehensive Metabolic Panel w/ Reflex to MG   Result Value Ref Range    Sodium 142 136 - 145 mmol/L    Potassium reflex Magnesium 3.9 3.5 - 5.1 mmol/L    Chloride 102 99 - 110 mmol/L    CO2 24 21 - 32 mmol/L    Anion Gap 16 3 - 16    Glucose 96 70 - 99 mg/dL    BUN 95 (HH) 7 - 20 mg/dL    CREATININE 4.0 (H) 0.6 - 1.1 mg/dL    GFR Non-African American 13 (A) >60    GFR  15 (A) >60    Calcium 9.0 8.3 - 10.6 mg/dL    Total Protein 6.6 6.4 - 8.2 g/dL    Albumin 2.7 (L) 3.4 - 5.0 g/dL    Albumin/Globulin Ratio 0.7 (L) 1.1 - 2.2    Total Bilirubin 0.3 0.0 - 1.0 mg/dL    Alkaline Phosphatase 76 40 - 129 U/L    ALT 45 (H) 10 - 40 U/L    AST 86 (H) 15 - 37 U/L    Globulin 3.9 g/dL   APTT   Result Value Ref Range    aPTT 183.4 (HH) 26.2 - 38.6 sec   EKG 12 Lead   Result Value Ref Range    Ventricular Rate 92 BPM    Atrial Rate 92 BPM    P-R Interval 136 ms    QRS Duration 84 ms    Q-T Interval 348 ms    QTc Calculation (Bazett) 430 ms    P Axis 38 degrees    R Axis -14 degrees    T Axis 46 degrees    Diagnosis       Normal sinus rhythmNormal ECGConfirmed by Galileo Danielle MD, Tiara Barreto ([id number]) on 10/1/2021 3:26:47 PM Radiographs (if obtained):  Radiologist's Report Reviewed:  Multifocal opacities within both lungs suggesting multifocal pneumonia. Reported positive COVID    EKG (if obtained): (All EKG's are interpreted by myself in the absence of a cardiologist)  Sinus Rhythm normal axis normal intervals no acute ST segment abnormalities    MDM:  Patient presents ER for evaluation of suspected COVID-19 with marked hypoxia oxygen saturations of 70% on arrival requiring 10 to 15 L of oxygen to obtain oxygen saturations of 90%. Patient will be admitted hospital for hypoxia associated with Covid 19 pneumonia, further supplemental oxygen dependence and possible increasing pressurized oxygen. Total critical care time provided today was 35 minutes. This excludes seperately billable procedures and family discussion time. Critical care time provided for obtaining history, conducting a physical exam, performing and monitoring interventions, ordering, collecting and interpreting tests, and establishing medical decision-making. There was a potential for life/limb threatening pathology requiring close evaluation and intervention with concern for patient decompensation. Clinical Impression:  1. Pneumonia due to COVID-19 virus    2. Hypoxia      Disposition referral (if applicable):  No follow-up provider specified. Disposition medications (if applicable): There are no discharge medications for this patient. Comment: Please note this report has been produced using speech recognition software and may contain errors related to that system including errors in grammar, punctuation, and spelling, as well as words and phrases that may be inappropriate. Efforts were made to edit the dictations.       Wendi Quiros MD  50/49/00 [id number]

## 2021-10-01 NOTE — CONSULTS
[facility] Pulmonary and Critical Care   Consult Note      Reason for Consult: Acute hypoxemic respiratory failure, COVID-19 pneumonia  Requesting Physician: Dr. Jerzy Warren  Subjective:   Stephanie Mcmillan / HPI:                The patient is a 43 y.o. female with significant past medical history of:  History reviewed. No pertinent past medical history. Patient presents with a 3-week history of increasingly severe shortness of breath and cough. She states initially she had fevers and chills but those seem to have resolved. She is pretty short of breath and having trouble even turning from one side to the other in the bed. Past Surgical History:    History reviewed. No pertinent surgical history.   Current Medications:    Current Facility-Administered Medications: sodium chloride flush 0.9 % injection 5-40 mL, 5-40 mL, IntraVENous, 2 times per day  sodium chloride flush 0.9 % injection 5-40 mL, 5-40 mL, IntraVENous, PRN  0.9 % sodium chloride infusion, 25 mL, IntraVENous, PRN  famotidine (PEPCID) tablet 20 mg, 20 mg, Oral, BID  aluminum & magnesium hydroxide-simethicone (MAALOX) 200-200-20 MG/5ML suspension 30 mL, 30 mL, Oral, Q6H PRN  polyethylene glycol (GLYCOLAX) packet 17 g, 17 g, Oral, Daily PRN  acetaminophen (TYLENOL) tablet 650 mg, 650 mg, Oral, Q6H PRN **OR** acetaminophen (TYLENOL) suppository 650 mg, 650 mg, Rectal, Q6H PRN  guaiFENesin-dextromethorphan (ROBITUSSIN DM) 100-10 MG/5ML syrup 5 mL, 5 mL, Oral, Q4H PRN  Vitamin D (CHOLECALCIFEROL) tablet 2,000 Units, 2,000 Units, Oral, Daily  albuterol sulfate  (90 Base) MCG/ACT inhaler 2 puff, 2 puff, Inhalation, Q6H PRN  promethazine (PHENERGAN) injection 6.25 mg, 6.25 mg, IntraMUSCular, Q6H PRN  0.9 % sodium chloride infusion, , IntraVENous, Continuous  heparin (porcine) injection 10,000 Units, 10,000 Units, IntraVENous, PRN  heparin (porcine) injection 5,000 Units, 5,000 Units, IntraVENous, PRN  heparin 25,000 units in dextrose 5% 250 mL (premix) infusion, 5-30 Units/kg/hr, IntraVENous, Continuous  [START ON 10/2/2021] dexamethasone (DECADRON) 20 mg in sodium chloride 0.9 % IVPB, 20 mg, IntraVENous, Daily  tocilizumab (ACTEMRA) 1,200 mg in sodium chloride 0.9 % 100 mL IVPB, 8 mg/kg, IntraVENous, Once  cefTRIAXone (ROCEPHIN) 1000 mg in sterile water 10 mL IV syringe, 1,000 mg, IntraVENous, Q24H  azithromycin (ZITHROMAX) 500 mg in D5W 250ml Vial Mate, 500 mg, IntraVENous, Q24H    Allergies   Allergen Reactions    Shellfish-Derived Products Anaphylaxis    Strawberry (Diagnostic) Anaphylaxis       Social History:    TOBACCO:   reports that she has never smoked. She does not have any smokeless tobacco history on file. ETOH:   reports previous alcohol use. Patient currently lives independently  Environmental/chemical exposure: None known    Family History:       Family history unknown: Yes     REVIEW OF SYSTEMS:    CONSTITUTIONAL:  negative for fevers, chills, diaphoresis, activity change, appetite change, fatigue, night sweats and unexpected weight change.    EYES:  negative for blurred vision, eye discharge, visual disturbance and icterus  HEENT:  negative for hearing loss, tinnitus, ear drainage, sinus pressure, nasal congestion, epistaxis and snoring  RESPIRATORY:  See HPI  CARDIOVASCULAR:  negative for chest pain, palpitations, exertional chest pressure/discomfort, edema, syncope  GASTROINTESTINAL:  negative for nausea, vomiting, diarrhea, constipation, blood in stool and abdominal pain  GENITOURINARY:  negative for frequency, dysuria, urinary incontinence, decreased urine volume, and hematuria  HEMATOLOGIC/LYMPHATIC:  negative for easy bruising, bleeding and lymphadenopathy  ALLERGIC/IMMUNOLOGIC:  negative for recurrent infections, angioedema, anaphylaxis and drug reactions  ENDOCRINE:  negative for weight changes and diabetic symptoms including polyuria, polydipsia and polyphagia  MUSCULOSKELETAL:  negative for  pain, joint swelling, decreased range of input(s): PHART, HNM2NLP, PO2ART, CAQ2VMN, [id number], BEART in the last 72 hours. Procalcitonin  Recent Labs     10/01/21  0150   PROCAL 0.78*       No results found for: BNP  Lab Results   Component Value Date    TROPONINI <0.01 10/01/2021           Radiology Review:  All pertinent images / reports were reviewed as a part of this visit. Assessment:     1. Acute hypoxemic respiratory failure  2. COVID-19 pneumonia  3. Acute kidney injury      Plan:     I have reviewed laboratories, medical records and images for this visit  Chest x-ray reveals impressive multifocal infiltrate. Patient is worsening hypoxemic respiratory failure with increasing need for supplemental oxygen. Procalcitonin is elevated  She does not have a leukocytosis  Covid PCR is pending  This looks most like Covid pneumonia  She is on Decadron 20 mg daily  Creatinine is too high to give remdesivir  Give her a dose of Actemra  Her procalcitonin is elevated in the presence of renal failure but will go ahead and give her azithromycin and Rocephin until the sole source itself out.   She looks pretty sick

## 2021-10-01 NOTE — H&P
Hospital Medicine History and Physical    10/1/2021    Date of Admission: 10/1/2021    Date of Service: Pt seen/examined on 10/1/2021 and admitted to inpatient. Assessment/plan:  1. COVID-19 pneumonia (in unvaccinated patient), suspected. Continue IV Decadron. Patient is currently outside of window for treatment with remdesivir. Robitussin for cough. 2. Acute respiratory failure with hypoxia. Likely secondary to underlying COVID-19 infection. Patient has significantly elevated D-dimer; however, unable to undergo CT-PE protocol due to acute kidney injury. Will start full dose anticoagulation with heparin. Continue supplemental oxygen with plan to wean as tolerated. Monitor pulse oximeter closely. 3. Acute kidney injury. Likely secondary to GI losses/diarrhea. Continue intravenous fluid. Avoid nephrotoxic medications. Consult has been initiated to nephrology to assist with management. 4. Significant elevated D-dimer greater than 4000. Likely secondary to underlying COVID-19. Unable to undergo CT-PE protocol due to STEVE. Currently on full dose heparin infusion as noted above. 5. Acute gastroenteritis, likely viral, likely secondary to COVID-19 infection. Check GI bacterial pathogen. Monitor stool count closely; if significant, consider starting Imodium. Continue intravenous fluids, antiemetics. 6. Elevated procalcitonin level. Low utility in the setting of underlying acute kidney injury. My suspicion for bacterial pneumonia is low; will not start antibiotics. 7. Other comorbidities: Obesity due to excess calories. Activities: Up with assist  Prophylaxis: Heparin infusion  Code status: Full code    ==========================================================  Chief complaint:  Chief Complaint   Patient presents with    Shortness of Breath     pt. with sob, cough, fever and weakness that started on 9/19.  pt. with increased SOB tonight. pt. was 84% on room air.  pt. pt. arrived by [employer]. History of Presenting Illness: This is a pleasant 43 y.o. female who is unvaccinated for COVID-19, with history of obesity due to excess calories, who presents to the emergency room with complaints of cough, fever, chills, shortness of breath, diarrhea, generalized body aches, generalized weakness, ongoing since 9/19/2021. She was noted to be hypoxic with oxygen saturation of 84% on room air, requiring as much as 5 L/min supplemental oxygen at the time of my evaluation in the emergency room. She does not have chest pain. Chest x-ray is consistent with multifocal opacities in both lung bases. She has no leukocytosis. Abnormal labs include sodium of 135, BUN 66, creatinine 4.0, LDH 1191, calcitonin level of 0.78, D-dimer 4401, fibrinogen of 627. Past Medical History:  History reviewed. No pertinent past medical history. Past Surgical History:  History reviewed. No pertinent surgical history. Medications (prior to admission):  Prior to Admission medications    Not on File       Allergy(ies):  Patient has no known allergies. Social History:  TOBACCO:  reports that she has never smoked. She does not have any smokeless tobacco history on file. ETOH:  reports previous alcohol use. Family History:      Family history unknown: Yes       Review of Systems:  Pertinent positives are listed in HPI. At least 10-point ROS reviewed and were negative. Vitals and physical examination:  /71   Pulse 99   Temp 99.1 °F (37.3 °C) (Oral)   Resp 18   SpO2 95%   Gen/overall appearance: Not in acute distress. Alert. Oriented x3. Head: Normocephalic, atraumatic  Eyes: EOMI, good acuity  ENT: Oral mucosa dry  Neck: No JVD, thyromegaly  CVS: Nml S1S2, no MRG. Tachycardic. Pulm: Crackles in lung bases. No wheezes. Gastrointestinal: Soft, NT/ND, +BS  Musculoskeletal: No edema. Warm  Neuro: No focal deficit. Moves extremity spontaneously. Psychiatry: Appropriate affect.  Not agitated. Skin: Warm, dry with normal turgor. No rash  Capillary refill: Brisk,< 3 seconds   Peripheral Pulses: +2 palpable, equal bilaterally       Labs/imaging/EKG:  CBC:   Recent Labs     10/01/21  0150   WBC 7.7   HGB 16.3*        BMP:    Recent Labs     10/01/21  0150   *   K 4.5   CL 98*   CO2 22   BUN 66*   CREATININE 4.0*   GLUCOSE 132*       XR CHEST 1 VIEW    Result Date: 10/1/2021  EXAMINATION: ONE XRAY VIEW OF THE CHEST 10/1/2021 1:27 am COMPARISON: None. HISTORY: ORDERING SYSTEM PROVIDED HISTORY: sob, covid, TECHNOLOGIST PROVIDED HISTORY: Reason for exam:->sob, covid, Reason for Exam: sob, covid, Acuity: Acute Type of Exam: Initial FINDINGS: Multifocal opacities are present within both lungs including the periphery. No sign of pleural effusion or pneumothorax. Cardiac silhouette is exaggerated by the technique. Central vessels are not well evaluated. Large body habitus. No fracture or intrathoracic lines are seen. Multifocal opacities within both lungs suggesting multifocal pneumonia. Reported positive COVID. EKG: Normal sinus rhythm, rate 93 beats per minutes. No acute ST/T changes. QTc 430. I reviewed EKG. Discussed with ER provider. Thank you No primary care provider on file.  for the opportunity to be involved in this patient's care.    -----------------------------  Ainsley Ken MD  [facility] hospitalist

## 2021-10-01 NOTE — CARE COORDINATION
Discharge planning note:    Chart reviewed and it appears that patient has minimal needs for discharge at this time. Discussed with patients nurse and requested that case management be notified if discharge needs are identified. Currently requiring 3.5 L O2, Heparin gtt & IV Decadron. Being followed by Nephrology for STEVE    12% re-admission risk    No PCP listed - will need one at dc    No insurance listed - Financial Counselor to see her. Case management will continue to follow progress and update discharge plan as needed.     Shameka Martin RN BSN  Case Management  [phone]

## 2021-10-01 NOTE — PROGRESS NOTES
Bill placed with Nursing Student Mo using sterile technique. 100 ml returned and urine specimen sent to lab. Pt also noted to have vaginal bleeding. Pt states she has very irregular periods and can sometimes bleed for 1 month. She said she is being followed by GYN out patient.

## 2021-10-01 NOTE — PROGRESS NOTES
Made hospitalist aware of vaginal bleeding since the patient is on a heparin gtt, ill pass on for nurses to keep an eye on.

## 2021-10-01 NOTE — PROGRESS NOTES
Shift assessment completed. Routine vitals stable. Scheduled medications given. Patient is awake, alert and oriented. Respirations are easy and unlabored. Patient does not appear to be in distress, resting comfortably at this time. Call light within reach. Steri strips used on nasal canula to hold in place.

## 2021-10-01 NOTE — CONSULTS
Office : [phone]     Fax :[phone]       Nephrology Consult Note      Patient's Name: Amy Robles  11:52 AM  10/1/2021    Reason for Consult:  Samantha Bustos      Requesting Physician:  Dr. Anthony St    Chief Complaint:    Chief Complaint   Patient presents with    Shortness of Breath     pt. with sob, cough, fever and weakness that started on 9/19.  pt. with increased SOB tonight. pt. was 84% on room air. pt. pt. arrived by [employer]. History of Present iIlness:      Amy Robles is a 43 y.o. female with  history of obesity due to excess calories, who presents to the emergency room with complaints of cough, fever, chills, shortness of breath, diarrhea, generalized body aches, generalized weakness, ongoing since 9/19/2021. She was noted to be hypoxic with oxygen saturation of 84% on room air, requiring as much as 5 L/min supplemental oxygen at the time of my evaluation in the emergency room. She does not have chest pain. Chest x-ray is consistent with multifocal opacities in both lung bases. She has no leukocytosis. Abnormal labs include sodium of 135, BUN 66, creatinine 4.0, LDH 1191, calcitonin level of 0.78, D-dimer 4401, fibrinogen of 627. Has not voided since last night. No intake/output data recorded. History reviewed. No pertinent past medical history. History reviewed. No pertinent surgical history. Family History   Family history unknown: Yes        reports that she has never smoked. She does not have any smokeless tobacco history on file. She reports previous alcohol use.         Allergies:  Shellfish-derived products and Strawberry (diagnostic)    Current Medications:    sodium chloride flush 0.9 % injection 5-40 mL, 2 times per day  sodium chloride flush 0.9 % injection 5-40 mL, PRN  0.9 % sodium chloride infusion, PRN  famotidine (PEPCID) tablet 20 mg, BID  aluminum & magnesium hydroxide-simethicone (MAALOX) 200-200-20 MG/5ML suspension 30 mL, Q6H PRN  polyethylene glycol (GLYCOLAX) packet 17 g, Daily PRN  acetaminophen (TYLENOL) tablet 650 mg, Q6H PRN   Or  acetaminophen (TYLENOL) suppository 650 mg, Q6H PRN  guaiFENesin-dextromethorphan (ROBITUSSIN DM) 100-10 MG/5ML syrup 5 mL, Q4H PRN  Vitamin D (CHOLECALCIFEROL) tablet 2,000 Units, Daily  albuterol sulfate  (90 Base) MCG/ACT inhaler 2 puff, Q6H PRN  promethazine (PHENERGAN) injection 6.25 mg, Q6H PRN  0.9 % sodium chloride infusion, Continuous  heparin (porcine) injection 10,000 Units, PRN  heparin (porcine) injection 5,000 Units, PRN  heparin 25,000 units in dextrose 5% 250 mL (premix) infusion, Continuous  [START ON 10/2/2021] dexamethasone (DECADRON) 20 mg in sodium chloride 0.9 % IVPB, Daily        Review of Systems:   14 point ROS obtained but were negative except mentioned in HPI      Physical exam:     Vitals:  /81   Pulse 89   Temp 98.6 °F (37 °C) (Oral)   Resp 18   Ht 5' 6\" (1.676 m)   Wt (!) 339 lb (153.8 kg)   SpO2 95%   BMI 54.72 kg/m²   Constitutional:  OAA X3 NAD  Skin: no rash, turgor wnl  Heent:  eomi, mmm  Neck: no bruits or jvd noted  Cardiovascular:  S1, S2 without m/r/g  Respiratory: decreased air entry b/l bases   Abdomen:  +bs, soft, nt, nd  Ext: 1 +  lower extremity edema  Psychiatric: mood and affect appropriate  Musculoskeletal:  Rom, muscular strength intact    Labs:  CBC:   Recent Labs     10/01/21  0150   WBC 7.7   HGB 16.3*        BMP:    Recent Labs     10/01/21  0150   *   K 4.5   CL 98*   CO2 22   BUN 66*   CREATININE 4.0*   GLUCOSE 132*     Ca/Mg/Phos:   Recent Labs     10/01/21  0150   CALCIUM 9.0     Hepatic:   Recent Labs     10/01/21  0437   AST 72*   ALT 30   BILITOT 0.3   ALKPHOS 69     Troponin:   Recent Labs 10/01/21  0150   TROPONINI <0.01     BNP: No results for input(s): BNP in the last 72 hours. Lipids: No results for input(s): CHOL, TRIG, HDL, LDLCALC, LABVLDL in the last 72 hours. ABGs: No results for input(s): PHART, PO2ART, SHU2OAB in the last 72 hours. INR:   Recent Labs     10/01/21  0151   INR 1.05     UA:No results for input(s): Delwin Minion, GLUCOSEU, BILIRUBINUR, Frankey Raker, BLOODU, PHUR, PROTEINU, UROBILINOGEN, NITRU, LEUKOCYTESUR, Erica Corti in the last 72 hours. Urine Microscopic: No results for input(s): LABCAST, BACTERIA, COMU, HYALCAST, WBCUA, RBCUA, EPIU in the last 72 hours. Urine Culture: No results for input(s): LABURIN in the last 72 hours. Urine Chemistry: No results for input(s): Volney Ly, PROTEINUR, NAUR in the last 72 hours. IMAGING:  XR CHEST 1 VIEW   Final Result   Multifocal opacities within both lungs suggesting multifocal pneumonia. Reported positive COVID. Assessment/Plan :      1. STEVE   2/2 ATN from hypotension/ sepsis   Recommend to dose adjust all medications  based on renal functions  Maintain SBP> 90 mmHg   Daily weights   AVOID NSAIDs  Avoid Nephrotoxins  Monitor Intake/Output  Call if significant decrease in urine output     2. Hypotension 2/2 sepsis   Continue iv fluids     3. Hypoxia 2/2 most likley covid 19 pneumonia   On dexamethasone   On 5 L oxygen       4. Acid- base disorder. Metabolic acidosis     5. Electrolytes. monitor       Insert adele   D/w RN   Rpt HECTOR           D/w primary team      Thank you for allowing us to participate in care of Sarah Méndez         Electronically signed by: Diane Chamorro MD, 10/1/2021, 11:52 AM      [facility] [address]  Office : [phone]  Fax :[phone]

## 2021-10-02 ENCOUNTER — APPOINTMENT (OUTPATIENT)
Dept: ULTRASOUND IMAGING | Age: 36
DRG: 137 | End: 2021-10-02
Payer: MEDICAID

## 2021-10-02 LAB
A/G RATIO: 0.6 (ref 1.1–2.2)
ALBUMIN SERPL-MCNC: 2.7 G/DL (ref 3.4–5)
ALP BLD-CCNC: 60 U/L (ref 40–129)
ALT SERPL-CCNC: 21 U/L (ref 10–40)
ANION GAP SERPL CALCULATED.3IONS-SCNC: 17 MMOL/L (ref 3–16)
ANION GAP SERPL CALCULATED.3IONS-SCNC: 19 MMOL/L (ref 3–16)
APTT: 102.8 SEC (ref 26.2–38.6)
APTT: 50.4 SEC (ref 26.2–38.6)
APTT: 77.4 SEC (ref 26.2–38.6)
APTT: >248 SEC (ref 26.2–38.6)
AST SERPL-CCNC: 39 U/L (ref 15–37)
BASOPHILS ABSOLUTE: 0 K/UL (ref 0–0.2)
BASOPHILS RELATIVE PERCENT: 0 %
BILIRUB SERPL-MCNC: <0.2 MG/DL (ref 0–1)
BUN BLDV-MCNC: 83 MG/DL (ref 7–20)
BUN BLDV-MCNC: 85 MG/DL (ref 7–20)
C-REACTIVE PROTEIN: 54.6 MG/L (ref 0–5.1)
CALCIUM SERPL-MCNC: 8.6 MG/DL (ref 8.3–10.6)
CALCIUM SERPL-MCNC: 9 MG/DL (ref 8.3–10.6)
CHLORIDE BLD-SCNC: 100 MMOL/L (ref 99–110)
CHLORIDE BLD-SCNC: 101 MMOL/L (ref 99–110)
CO2: 19 MMOL/L (ref 21–32)
CO2: 19 MMOL/L (ref 21–32)
CREAT SERPL-MCNC: 5.6 MG/DL (ref 0.6–1.1)
CREAT SERPL-MCNC: 5.9 MG/DL (ref 0.6–1.1)
D DIMER: 4680 NG/ML DDU (ref 0–229)
EOSINOPHILS ABSOLUTE: 0 K/UL (ref 0–0.6)
EOSINOPHILS RELATIVE PERCENT: 0 %
FERRITIN: 696.4 NG/ML (ref 15–150)
FIBRINOGEN: 528 MG/DL (ref 200–397)
GFR AFRICAN AMERICAN: 10
GFR AFRICAN AMERICAN: 10
GFR NON-AFRICAN AMERICAN: 8
GFR NON-AFRICAN AMERICAN: 8
GLOBULIN: 4.4 G/DL
GLUCOSE BLD-MCNC: 117 MG/DL (ref 70–99)
GLUCOSE BLD-MCNC: 120 MG/DL (ref 70–99)
HCT VFR BLD CALC: 46.1 % (ref 36–48)
HEMOGLOBIN: 15.4 G/DL (ref 12–16)
INR BLD: 1.05 (ref 0.88–1.12)
L. PNEUMOPHILA SEROGP 1 UR AG: NORMAL
LACTATE DEHYDROGENASE: 1061 U/L (ref 100–190)
LACTIC ACID: 1.5 MMOL/L (ref 0.4–2)
LYMPHOCYTES ABSOLUTE: 0.5 K/UL (ref 1–5.1)
LYMPHOCYTES RELATIVE PERCENT: 5 %
MAGNESIUM: 2.6 MG/DL (ref 1.8–2.4)
MCH RBC QN AUTO: 32.2 PG (ref 26–34)
MCHC RBC AUTO-ENTMCNC: 33.4 G/DL (ref 31–36)
MCV RBC AUTO: 96.3 FL (ref 80–100)
MONOCYTES ABSOLUTE: 0.8 K/UL (ref 0–1.3)
MONOCYTES RELATIVE PERCENT: 8 %
NEUTROPHILS ABSOLUTE: 8.3 K/UL (ref 1.7–7.7)
NEUTROPHILS RELATIVE PERCENT: 87 %
PDW BLD-RTO: 13 % (ref 12.4–15.4)
PHOSPHORUS: 3.7 MG/DL (ref 2.5–4.9)
PLATELET # BLD: 222 K/UL (ref 135–450)
PLATELET SLIDE REVIEW: ADEQUATE
PMV BLD AUTO: 9.4 FL (ref 5–10.5)
POLYCHROMASIA: ABNORMAL
POTASSIUM REFLEX MAGNESIUM: 4.1 MMOL/L (ref 3.5–5.1)
POTASSIUM SERPL-SCNC: 4.2 MMOL/L (ref 3.5–5.1)
PROCALCITONIN: 1.14 NG/ML (ref 0–0.15)
PROTHROMBIN TIME: 11.9 SEC (ref 9.9–12.7)
RBC # BLD: 4.79 M/UL (ref 4–5.2)
SARS-COV-2: DETECTED
SLIDE REVIEW: ABNORMAL
SODIUM BLD-SCNC: 137 MMOL/L (ref 136–145)
SODIUM BLD-SCNC: 138 MMOL/L (ref 136–145)
STREP PNEUMONIAE ANTIGEN, URINE: NORMAL
TOTAL PROTEIN: 7.1 G/DL (ref 6.4–8.2)
TROPONIN: <0.01 NG/ML
VITAMIN D 25-HYDROXY: 6.7 NG/ML
WBC # BLD: 9.5 K/UL (ref 4–11)

## 2021-10-02 PROCEDURE — 94761 N-INVAS EAR/PLS OXIMETRY MLT: CPT

## 2021-10-02 PROCEDURE — 0202U NFCT DS 22 TRGT SARS-COV-2: CPT

## 2021-10-02 PROCEDURE — 84145 PROCALCITONIN (PCT): CPT

## 2021-10-02 PROCEDURE — 82728 ASSAY OF FERRITIN: CPT

## 2021-10-02 PROCEDURE — 84484 ASSAY OF TROPONIN QUANT: CPT

## 2021-10-02 PROCEDURE — 99233 SBSQ HOSP IP/OBS HIGH 50: CPT | Performed by: INTERNAL MEDICINE

## 2021-10-02 PROCEDURE — 99223 1ST HOSP IP/OBS HIGH 75: CPT | Performed by: INTERNAL MEDICINE

## 2021-10-02 PROCEDURE — 85610 PROTHROMBIN TIME: CPT

## 2021-10-02 PROCEDURE — 2580000003 HC RX 258: Performed by: INTERNAL MEDICINE

## 2021-10-02 PROCEDURE — 85025 COMPLETE CBC W/AUTO DIFF WBC: CPT

## 2021-10-02 PROCEDURE — 83615 LACTATE (LD) (LDH) ENZYME: CPT

## 2021-10-02 PROCEDURE — 86140 C-REACTIVE PROTEIN: CPT

## 2021-10-02 PROCEDURE — 85730 THROMBOPLASTIN TIME PARTIAL: CPT

## 2021-10-02 PROCEDURE — 85384 FIBRINOGEN ACTIVITY: CPT

## 2021-10-02 PROCEDURE — 6360000002 HC RX W HCPCS: Performed by: INTERNAL MEDICINE

## 2021-10-02 PROCEDURE — 84100 ASSAY OF PHOSPHORUS: CPT

## 2021-10-02 PROCEDURE — 80053 COMPREHEN METABOLIC PANEL: CPT

## 2021-10-02 PROCEDURE — 85379 FIBRIN DEGRADATION QUANT: CPT

## 2021-10-02 PROCEDURE — 2060000000 HC ICU INTERMEDIATE R&B

## 2021-10-02 PROCEDURE — 2700000000 HC OXYGEN THERAPY PER DAY

## 2021-10-02 PROCEDURE — 36415 COLL VENOUS BLD VENIPUNCTURE: CPT

## 2021-10-02 PROCEDURE — 2500000003 HC RX 250 WO HCPCS: Performed by: INTERNAL MEDICINE

## 2021-10-02 PROCEDURE — 83605 ASSAY OF LACTIC ACID: CPT

## 2021-10-02 PROCEDURE — 76770 US EXAM ABDO BACK WALL COMP: CPT

## 2021-10-02 PROCEDURE — 6370000000 HC RX 637 (ALT 250 FOR IP): Performed by: INTERNAL MEDICINE

## 2021-10-02 PROCEDURE — 83735 ASSAY OF MAGNESIUM: CPT

## 2021-10-02 PROCEDURE — 82306 VITAMIN D 25 HYDROXY: CPT

## 2021-10-02 RX ORDER — FUROSEMIDE 10 MG/ML
60 INJECTION INTRAMUSCULAR; INTRAVENOUS ONCE
Status: COMPLETED | OUTPATIENT
Start: 2021-10-02 | End: 2021-10-02

## 2021-10-02 RX ADMIN — Medication 2000 UNITS: at 08:04

## 2021-10-02 RX ADMIN — SODIUM BICARBONATE: 84 INJECTION, SOLUTION INTRAVENOUS at 14:56

## 2021-10-02 RX ADMIN — Medication 10 ML: at 21:08

## 2021-10-02 RX ADMIN — Medication 1000 MG: at 17:15

## 2021-10-02 RX ADMIN — FUROSEMIDE 60 MG: 10 INJECTION, SOLUTION INTRAMUSCULAR; INTRAVENOUS at 15:49

## 2021-10-02 RX ADMIN — HEPARIN SODIUM 10 UNITS/KG/HR: 10000 INJECTION, SOLUTION INTRAVENOUS at 08:15

## 2021-10-02 RX ADMIN — FAMOTIDINE 20 MG: 20 TABLET ORAL at 21:08

## 2021-10-02 RX ADMIN — TOCILIZUMAB 810 MG: 180 INJECTION, SOLUTION SUBCUTANEOUS at 13:01

## 2021-10-02 RX ADMIN — DEXAMETHASONE SODIUM PHOSPHATE 20 MG: 4 INJECTION, SOLUTION INTRA-ARTICULAR; INTRALESIONAL; INTRAMUSCULAR; INTRAVENOUS; SOFT TISSUE at 10:04

## 2021-10-02 RX ADMIN — Medication 10 ML: at 10:00

## 2021-10-02 RX ADMIN — SODIUM CHLORIDE: 9 INJECTION, SOLUTION INTRAVENOUS at 06:22

## 2021-10-02 RX ADMIN — HEPARIN SODIUM 5000 UNITS: 1000 INJECTION INTRAVENOUS; SUBCUTANEOUS at 11:03

## 2021-10-02 RX ADMIN — ALUMINUM HYDROXIDE, MAGNESIUM HYDROXIDE, AND SIMETHICONE 30 ML: 200; 200; 20 SUSPENSION ORAL at 12:59

## 2021-10-02 RX ADMIN — FAMOTIDINE 20 MG: 20 TABLET ORAL at 08:11

## 2021-10-02 RX ADMIN — AZITHROMYCIN MONOHYDRATE 500 MG: 500 INJECTION, POWDER, LYOPHILIZED, FOR SOLUTION INTRAVENOUS at 17:25

## 2021-10-02 ASSESSMENT — PAIN SCALES - GENERAL
PAINLEVEL_OUTOF10: 0

## 2021-10-02 NOTE — FLOWSHEET NOTE
Patient's oxygen increased to 8L at this time due to desaturations. Will continue to monitor.         10/02/21 0108   Oxygen Therapy   SpO2 90 %   Pulse Oximeter Device Mode Continuous   Pulse Oximeter Device Location Finger   O2 Device High flow nasal cannula   O2 Flow Rate (L/min) 8 L/min

## 2021-10-02 NOTE — PLAN OF CARE
Problem: Skin Integrity:  Goal: Will show no infection signs and symptoms  Description: Will show no infection signs and symptoms  Outcome: Ongoing     Problem: Skin Integrity:  Goal: Absence of new skin breakdown  Description: Absence of new skin breakdown  Outcome: Ongoing     Problem: Falls - Risk of:  Goal: Will remain free from falls  Description: Will remain free from falls  Outcome: Ongoing   Pt in bed, fall precaution on place, call light within a reach.    Problem: Gas Exchange - Impaired  Goal: Absence of hypoxia  Outcome: Ongoing   On 10L via HF NC   Problem: Fatigue  Goal: Verbalize increase energy and improved vitality  Outcome: Ongoing

## 2021-10-02 NOTE — PROGRESS NOTES
Pt A&O x4, VSS , increase oxygen to 10 L due to desaturation . Resting in bed. Bill in place with low urine out put. Will continue to monitor. 1111 aPTT 50.4, gave 5 mL of bolus and increase heparin drip to 12 units/kg/hours, will recheck at 1715.

## 2021-10-02 NOTE — CONSULTS
PULMONARY AND CRITICAL CARE CONSULTATION NOTE    CONSULTING PHYSICIAN:      REASON FOR CONSULT:   Chief Complaint   Patient presents with    Shortness of Breath     pt. with sob, cough, fever and weakness that started on 9/19.  pt. with increased SOB tonight. pt. was 84% on room air. pt. pt. arrived by [employer]. DATE OF CONSULT: 10/2/2021    HISTORY OF PRESENT ILLNESS: 43y.o. year old female with history of morbid obesity who presented to the ER with complaints of cough, fever, chills and shortness of breath associated with diarrhea and generalized body aches. Patient was noted to be hypoxic in the ER. Currently requiring up to 10 L/min oxygen. She is a Covid rule out. Also noted to have acute kidney injury. Noted to have elevated CRP of 100 with elevated D-dimer. Patient has been initiated on Decadron as well as heparin drip. Remdesivir could not be initiated because of acute kidney injury. REVIEW OF SYSTEMS:   CONSTITUTIONAL SYMPTOMS: The patient denies fever, fatigue, night sweats, weight loss or weight gain. HEENT: No vision changes. No tinnitus, Denies sinus pain. No hoarseness, or dysphagia. NECK: Patient denies swelling in the neck. CARDIOVASCULAR: Denies chest pain, palpitation, syncope. RESPIRATORY: See above. GASTROINTESTINAL: Denies nausea, abdominal pain or change in bowel function. GENITOURINARY: Denies obstructive symptoms. No history of incontinence. BREASTS: No masses or lumps in the breasts. SKIN: No rashes or itching. MUSCULOSKELETAL: Denies weakness or bone pain. NEUROLOGICAL: No headaches or seizures. PSYCHIATRIC: Denies mood swings or depression. ENDOCRINE: Denies heat or cold intolerance or excessive thirst.  HEMATOLOGIC/LYMPHATIC: Denies easy bruising or lymph node swelling. ALLERGIC/IMMUNOLOGIC: No environmental allergies. PAST MEDICAL HISTORY: History reviewed. No pertinent past medical history.     PAST SURGICAL HISTORY: History reviewed. No pertinent surgical history. SOCIAL HISTORY:   Social History     Tobacco Use    Smoking status: Never Smoker   Substance Use Topics    Alcohol use: Not Currently    Drug use: Not on file       FAMILY HISTORY:   Family History   Family history unknown: Yes       MEDICATIONS:     No current facility-administered medications on file prior to encounter. No current outpatient medications on file prior to encounter.  sodium chloride flush  5-40 mL IntraVENous 2 times per day    famotidine  20 mg Oral BID    Vitamin D  2,000 Units Oral Daily    dexamethasone  20 mg IntraVENous Daily    cefTRIAXone (ROCEPHIN) IV  1,000 mg IntraVENous Q24H    azithromycin  500 mg IntraVENous Q24H      IV infusion builder 75 mL/hr at 10/02/21 1456    sodium chloride      heparin (PORCINE) Infusion 12 Units/kg/hr (10/02/21 1105)     sodium chloride flush, sodium chloride, aluminum & magnesium hydroxide-simethicone, polyethylene glycol, acetaminophen **OR** acetaminophen, guaiFENesin-dextromethorphan, albuterol sulfate HFA, promethazine, heparin (porcine), heparin (porcine)    ALLERGIES:   Allergies as of 10/01/2021 - Fully Reviewed 10/01/2021   Allergen Reaction Noted    Shellfish-derived products Anaphylaxis 10/01/2021    [place] (diagnostic) Anaphylaxis 10/01/2021      OBJECTIVE:   height is 5' 6\" (1.676 m) and weight is 350 lb 12.8 oz (159.1 kg) (abnormal). Her oral temperature is 98.1 °F (36.7 °C). Her blood pressure is 109/77 and her pulse is 80. Her respiration is 22 and oxygen saturation is 90%. I/O this shift:  In: 120 [P.O.:120]  Out: 75 [Urine:75]     PHYSICAL EXAM:  CONSTITUTIONAL: She is a 43y.o.-year-old who appears her stated age. She is alert and oriented x 3 and in no acute distress. Morbid obesity  HEENT: PERRL. No scleral icterus. No thrush, atraumatic, normocephalic. NECK: Supple, without cervical or supraclavicular lymphadenopathy:  CARDIOVASCULAR: S1 S2 RRR.  Without of Actemra. Elevated D-dimer of 4680. Continue heparin drip in the setting of STEVE for hypercoagulable state associated with COVID-19. Encourage side/prone positioning in the bed as tolerated. Out of bed in chair. Thank you for your consultation. We will continue to follow the patient. Garo Graham MD  Pulmonary Critical Care and Sleep Medicine  10/2/2021, 4:11 PM    This note was completed using dragon medical speech recognition software. Grammatical errors, random word insertions, pronoun errors and incomplete sentences are occasional consequences of this technology due to software limitations. If there are questions or concerns about the content of this note of information contained within the body of this dictation they should be addressed with the provider for clarification.

## 2021-10-02 NOTE — PROGRESS NOTES
Once  cefTRIAXone (ROCEPHIN) 1000 mg in sterile water 10 mL IV syringe, 1,000 mg, IntraVENous, Q24H  azithromycin (ZITHROMAX) 500 mg in D5W 250ml Vial Mate, 500 mg, IntraVENous, Q24H  Physical    VITALS:  /78   Pulse 80   Temp 97.6 °F (36.4 °C) (Oral)   Resp 18   Ht 5' 6\" (1.676 m)   Wt (!) 350 lb 12.8 oz (159.1 kg) Comment: with 1 sheet 2 pillows. bed @ 10  SpO2 91%   BMI 56.62 kg/m²     Gen/overall appearance: Not in acute distress. Alert. Oriented x3. Head: Normocephalic, atraumatic  Eyes: EOMI, good acuity  ENT: Oral mucosa dry  Neck: No JVD, thyromegaly  CVS: Nml S1S2, no MRG. Tachycardic. Pulm: Crackles in lung bases. No wheezes. Gastrointestinal: Soft, NT/ND, +BS  Musculoskeletal: No edema. Warm  Neuro: No focal deficit. Moves extremity spontaneously. Psychiatry: Appropriate affect. Not agitated. Skin: Warm, dry with normal turgor. No rash  Capillary refill: Brisk,< 3 seconds   Peripheral Pulses: +2 palpable, equal bilaterally        Data    XR CHEST 1 VIEW     Result Date: 10/1/2021  EXAMINATION: ONE XRAY VIEW OF THE CHEST 10/1/2021 1:27 am COMPARISON: None. HISTORY: ORDERING SYSTEM PROVIDED HISTORY: sob, covid, TECHNOLOGIST PROVIDED HISTORY: Reason for exam:->sob, covid, Reason for Exam: sob, covid, Acuity: Acute Type of Exam: Initial FINDINGS: Multifocal opacities are present within both lungs including the periphery. No sign of pleural effusion or pneumothorax. Cardiac silhouette is exaggerated by the technique. Central vessels are not well evaluated. Large body habitus. No fracture or intrathoracic lines are seen.      Multifocal opacities within both lungs suggesting multifocal pneumonia. Reported positive COVID. ASSESSMENT AND PLAN    1. COVID-19 pneumonia (in unvaccinated patient), suspected. Continue IV Decadron. Patient is currently outside of window for treatment with remdesivir. Robitussin for cough as needed. 2. Acute respiratory failure with hypoxia.   Likely secondary to underlying COVID-19 infection. Patient has significantly elevated D-dimer; however, unable to undergo CT-PE protocol due to acute kidney injury. Will cont full dose anticoagulation with heparin. Continue supplemental oxygen with plan to wean as tolerated. Monitor pulse oximeter closely. 3. Acute kidney injury. Likely secondary to GI losses/diarrhea. Continue intravenous fluid. Avoid nephrotoxic medications. Renal US grossly normal. Nephrology to assist with management. 4. Significant elevated D-dimer greater than 4000. Likely secondary to underlying COVID-19. Unable to undergo CT-PE protocol due to STEVE. Currently on full dose heparin infusion as noted above. 5. Acute gastroenteritis, likely viral, likely secondary to COVID-19 infection. Check GI bacterial pathogen. Monitor stool count closely; if significant, consider starting Imodium. Continue intravenous fluids, antiemetics. 6. Elevated procalcitonin level. Low utility in the setting of underlying acute kidney injury. My suspicion for bacterial pneumonia is low; will not start antibiotics.   7. Other comorbidities: Obesity due to excess calories.     Activities: Up with assist  Prophylaxis: Heparin infusion  Code status: Full code

## 2021-10-02 NOTE — PROGRESS NOTES
day  sodium chloride flush 0.9 % injection 5-40 mL, PRN  0.9 % sodium chloride infusion, PRN  famotidine (PEPCID) tablet 20 mg, BID  aluminum & magnesium hydroxide-simethicone (MAALOX) 200-200-20 MG/5ML suspension 30 mL, Q6H PRN  polyethylene glycol (GLYCOLAX) packet 17 g, Daily PRN  acetaminophen (TYLENOL) tablet 650 mg, Q6H PRN   Or  acetaminophen (TYLENOL) suppository 650 mg, Q6H PRN  guaiFENesin-dextromethorphan (ROBITUSSIN DM) 100-10 MG/5ML syrup 5 mL, Q4H PRN  Vitamin D (CHOLECALCIFEROL) tablet 2,000 Units, Daily  albuterol sulfate  (90 Base) MCG/ACT inhaler 2 puff, Q6H PRN  promethazine (PHENERGAN) injection 6.25 mg, Q6H PRN  heparin (porcine) injection 10,000 Units, PRN  heparin (porcine) injection 5,000 Units, PRN  heparin 25,000 units in dextrose 5% 250 mL (premix) infusion, Continuous  dexamethasone (DECADRON) 20 mg in sodium chloride 0.9 % IVPB, Daily  tocilizumab (ACTEMRA) 1,200 mg in sodium chloride 0.9 % 100 mL IVPB, Once  cefTRIAXone (ROCEPHIN) 1000 mg in sterile water 10 mL IV syringe, Q24H  azithromycin (ZITHROMAX) 500 mg in D5W 250ml Vial Mate, Q24H          Physical exam:     Vitals:  /85   Pulse 93   Temp 97.6 °F (36.4 °C) (Oral)   Resp 20   Ht 5' 6\" (1.676 m)   Wt (!) 350 lb 12.8 oz (159.1 kg) Comment: with 1 sheet 2 pillows.  bed @ 10  SpO2 90%   BMI 56.62 kg/m²   Constitutional:  OAA X3 NAD  Skin: no rash, turgor wnl  Heent:  eomi, mmm  Neck: no bruits or jvd noted  Cardiovascular:  S1, S2 without m/r/g  Respiratory: decreased air entry b/l bases   Abdomen:  +bs, soft, nt, nd  Ext: mild   lower extremity edema  Psychiatric: mood and affect appropriate  Musculoskeletal:  Rom, muscular strength intact    Labs:  CBC:   Recent Labs     10/01/21  0150 10/01/21  0633 10/02/21  0320   WBC 7.7 7.7 9.5   HGB 16.3* 16.1* 15.4    209 222     BMP:    Recent Labs     10/01/21  0633 10/01/21  1424 10/02/21  0321   * 135* 138   K 4.7 4.4 4.1   CL 98* 99 100   CO2 20* 20* 19* BUN 70* 76* 83*   CREATININE 4.3* 4.7* 5.6*   GLUCOSE 130* 144* 117*     Ca/Mg/Phos:   Recent Labs     10/01/21  0633 10/01/21  1424 10/02/21  0321   CALCIUM 9.2 9.0 9.0   MG  --   --  2.60*   PHOS  --   --  3.7     Hepatic:   Recent Labs     10/01/21  0437 10/02/21  0321   AST 72* 39*   ALT 30 21   BILITOT 0.3 <0.2   ALKPHOS 69 60     Troponin:   Recent Labs     10/01/21  0150 10/02/21  0321   TROPONINI <0.01 <0.01     BNP: No results for input(s): BNP in the last 72 hours. Lipids: No results for input(s): CHOL, TRIG, HDL, LDLCALC, LABVLDL in the last 72 hours. ABGs: No results for input(s): PHART, PO2ART, TZG0TSS in the last 72 hours. INR:   Recent Labs     10/01/21  0151 10/02/21  0320   INR 1.05 1.05     UA:  Recent Labs     10/01/21  1351   COLORU DK YELLOW   CLARITYU TURBID*   GLUCOSEU Negative   BILIRUBINUR SMALL*   KETUA TRACE*   SPECGRAV >1.030   BLOODU MODERATE*   PHUR 5.0   PROTEINU >300   UROBILINOGEN 0.2   NITRU Negative   LEUKOCYTESUR Negative   LABMICR YES   URINETYPE Voided      Urine Microscopic:   Recent Labs     10/01/21  1351   COMU see below   HYALCAST 6-10*   WBCUA 16*   RBCUA 4   EPIU 15*     Urine Culture: No results for input(s): LABURIN in the last 72 hours. Urine Chemistry:   Recent Labs     10/01/21  1351   LABCREA 508.6*   NAUR <20                IMAGING:  XR CHEST 1 VIEW   Final Result   Multifocal opacities within both lungs suggesting multifocal pneumonia. Reported positive COVID. US RENAL COMPLETE    (Results Pending)         Assessment/Plan :      1. STEVE   Has sever ATN   2/2 ATN from hypotension/ sepsis . Will get renal USG stat to r/o any obstruction     Recommend to dose adjust all medications  based on renal functions  Maintain SBP> 90 mmHg   Daily weights   AVOID NSAIDs  Avoid Nephrotoxins  Monitor Intake/Output  Call if significant decrease in urine output     2.  Hypotension 2/2 sepsis   Improved   Because of increased oxygen requirement will have to be careful with iv fluids   Give gentle       3. Hypoxia 2/2 most likley covid 19 pneumonia   On dexamethasone   On 10 L oxygen   Pulmonology seeing   Got actemra  COVID PCR pending   CXR multifocal pneumonia     4. Acid- base disorder. Metabolic acidosis   Will add sodium bicarbonate to iv fluids     5. Electrolytes. monitor     6. Vitamin D defeciency. On vitamin D supplements       D/w her . If renal function continue to worsen then may temporarily need dialysis.    As of now no indication for emergent RRT               D/w primary team      Thank you for allowing us to participate in care of Lona Nick         Electronically signed by: Marlena Velez MD, 10/2/2021, 10:33 AM      [facility] [address]  Office : [phone]  Fax :[phone]

## 2021-10-03 LAB
A/G RATIO: 0.7 (ref 1.1–2.2)
ALBUMIN SERPL-MCNC: 2.7 G/DL (ref 3.4–5)
ALP BLD-CCNC: 60 U/L (ref 40–129)
ALT SERPL-CCNC: 17 U/L (ref 10–40)
ANION GAP SERPL CALCULATED.3IONS-SCNC: 16 MMOL/L (ref 3–16)
ANISOCYTOSIS: ABNORMAL
APTT: 106.4 SEC (ref 26.2–38.6)
APTT: 42.5 SEC (ref 26.2–38.6)
APTT: 59.6 SEC (ref 26.2–38.6)
AST SERPL-CCNC: 33 U/L (ref 15–37)
ATYPICAL LYMPHOCYTE RELATIVE PERCENT: 3 % (ref 0–6)
BASOPHILS ABSOLUTE: 0 K/UL (ref 0–0.2)
BASOPHILS RELATIVE PERCENT: 0 %
BILIRUB SERPL-MCNC: <0.2 MG/DL (ref 0–1)
BUN BLDV-MCNC: 97 MG/DL (ref 7–20)
C-REACTIVE PROTEIN: 46.4 MG/L (ref 0–5.1)
CALCIUM SERPL-MCNC: 8.8 MG/DL (ref 8.3–10.6)
CHLORIDE BLD-SCNC: 101 MMOL/L (ref 99–110)
CO2: 22 MMOL/L (ref 21–32)
CREAT SERPL-MCNC: 6.5 MG/DL (ref 0.6–1.1)
D DIMER: 3001 NG/ML DDU (ref 0–229)
EOSINOPHILS ABSOLUTE: 0 K/UL (ref 0–0.6)
EOSINOPHILS RELATIVE PERCENT: 0 %
GFR AFRICAN AMERICAN: 9
GFR NON-AFRICAN AMERICAN: 7
GLOBULIN: 4 G/DL
GLUCOSE BLD-MCNC: 117 MG/DL (ref 70–99)
HCT VFR BLD CALC: 42.2 % (ref 36–48)
HEMOGLOBIN: 14.5 G/DL (ref 12–16)
LYMPHOCYTES ABSOLUTE: 1.3 K/UL (ref 1–5.1)
LYMPHOCYTES RELATIVE PERCENT: 15 %
MAGNESIUM: 2.6 MG/DL (ref 1.8–2.4)
MCH RBC QN AUTO: 32.1 PG (ref 26–34)
MCHC RBC AUTO-ENTMCNC: 34.4 G/DL (ref 31–36)
MCV RBC AUTO: 93.3 FL (ref 80–100)
MONOCYTES ABSOLUTE: 0.4 K/UL (ref 0–1.3)
MONOCYTES RELATIVE PERCENT: 6 %
NEUTROPHILS ABSOLUTE: 5.5 K/UL (ref 1.7–7.7)
NEUTROPHILS RELATIVE PERCENT: 76 %
ORGANISM: ABNORMAL
PDW BLD-RTO: 13.3 % (ref 12.4–15.4)
PHOSPHORUS: 3.9 MG/DL (ref 2.5–4.9)
PLATELET # BLD: 261 K/UL (ref 135–450)
PLATELET SLIDE REVIEW: ADEQUATE
PMV BLD AUTO: 9.5 FL (ref 5–10.5)
POTASSIUM REFLEX MAGNESIUM: 4.1 MMOL/L (ref 3.5–5.1)
PROCALCITONIN: 1.21 NG/ML (ref 0–0.15)
RBC # BLD: 4.52 M/UL (ref 4–5.2)
REPORT: NORMAL
RESPIRATORY PANEL PCR: ABNORMAL
SLIDE REVIEW: ABNORMAL
SODIUM BLD-SCNC: 139 MMOL/L (ref 136–145)
TOTAL PROTEIN: 6.7 G/DL (ref 6.4–8.2)
WBC # BLD: 7.2 K/UL (ref 4–11)

## 2021-10-03 PROCEDURE — 2580000003 HC RX 258: Performed by: INTERNAL MEDICINE

## 2021-10-03 PROCEDURE — 86140 C-REACTIVE PROTEIN: CPT

## 2021-10-03 PROCEDURE — 2500000003 HC RX 250 WO HCPCS: Performed by: INTERNAL MEDICINE

## 2021-10-03 PROCEDURE — 84100 ASSAY OF PHOSPHORUS: CPT

## 2021-10-03 PROCEDURE — 2060000000 HC ICU INTERMEDIATE R&B

## 2021-10-03 PROCEDURE — 94761 N-INVAS EAR/PLS OXIMETRY MLT: CPT

## 2021-10-03 PROCEDURE — 85730 THROMBOPLASTIN TIME PARTIAL: CPT

## 2021-10-03 PROCEDURE — 85379 FIBRIN DEGRADATION QUANT: CPT

## 2021-10-03 PROCEDURE — 99233 SBSQ HOSP IP/OBS HIGH 50: CPT | Performed by: INTERNAL MEDICINE

## 2021-10-03 PROCEDURE — 36415 COLL VENOUS BLD VENIPUNCTURE: CPT

## 2021-10-03 PROCEDURE — 80053 COMPREHEN METABOLIC PANEL: CPT

## 2021-10-03 PROCEDURE — 84145 PROCALCITONIN (PCT): CPT

## 2021-10-03 PROCEDURE — 6370000000 HC RX 637 (ALT 250 FOR IP): Performed by: INTERNAL MEDICINE

## 2021-10-03 PROCEDURE — 6360000002 HC RX W HCPCS: Performed by: INTERNAL MEDICINE

## 2021-10-03 PROCEDURE — 83735 ASSAY OF MAGNESIUM: CPT

## 2021-10-03 PROCEDURE — 2700000000 HC OXYGEN THERAPY PER DAY

## 2021-10-03 PROCEDURE — 85025 COMPLETE CBC W/AUTO DIFF WBC: CPT

## 2021-10-03 RX ORDER — FAMOTIDINE 20 MG/1
20 TABLET, FILM COATED ORAL DAILY
Status: DISCONTINUED | OUTPATIENT
Start: 2021-10-03 | End: 2021-10-09

## 2021-10-03 RX ADMIN — GUAIFENESIN SYRUP AND DEXTROMETHORPHAN 5 ML: 100; 10 SYRUP ORAL at 18:55

## 2021-10-03 RX ADMIN — SODIUM BICARBONATE: 84 INJECTION, SOLUTION INTRAVENOUS at 20:58

## 2021-10-03 RX ADMIN — DEXAMETHASONE SODIUM PHOSPHATE 20 MG: 4 INJECTION, SOLUTION INTRA-ARTICULAR; INTRALESIONAL; INTRAMUSCULAR; INTRAVENOUS; SOFT TISSUE at 11:16

## 2021-10-03 RX ADMIN — Medication 10 ML: at 06:06

## 2021-10-03 RX ADMIN — HEPARIN SODIUM 10000 UNITS: 1000 INJECTION INTRAVENOUS; SUBCUTANEOUS at 21:52

## 2021-10-03 RX ADMIN — SODIUM BICARBONATE: 84 INJECTION, SOLUTION INTRAVENOUS at 04:24

## 2021-10-03 RX ADMIN — HEPARIN SODIUM 5000 UNITS: 1000 INJECTION INTRAVENOUS; SUBCUTANEOUS at 06:03

## 2021-10-03 RX ADMIN — Medication 10 ML: at 21:05

## 2021-10-03 RX ADMIN — HEPARIN SODIUM 8 UNITS/KG/HR: 10000 INJECTION, SOLUTION INTRAVENOUS at 06:00

## 2021-10-03 RX ADMIN — HEPARIN SODIUM 9 UNITS/KG/HR: 10000 INJECTION, SOLUTION INTRAVENOUS at 21:48

## 2021-10-03 ASSESSMENT — PAIN SCALES - GENERAL
PAINLEVEL_OUTOF10: 0
PAINLEVEL_OUTOF10: 0

## 2021-10-03 NOTE — FLOWSHEET NOTE
Patient's head to toe assessment complete at this time. Routine VSS. Pt resting comfortably in bed with respirations even and unlabored. Currently on 10L HFNC to maintain saturations >90%. Pt is awake, alert and oriented x4. Angulo catheter in place, draining gigi/brown colored urine. Minimal output from angulo, nephrology aware. All nightly medications given per MAR. Tania care and angulo care provided. Pt did have small BM. New pad placed for vaginal bleeding. All questions answered and POC reviewed with patient. No other needs expressed, will continue to monitor. Patient attempted to get up and use restroom. Patient made it to side of bed, but was unable to stand. Pt was very SOB and weak. Fall precautions in place: bed locked and in lowest position, bed alarm on, 2/4 side rails raised, non-slip socks on, call light and overhead table within reach, patient knows when to appropriately call out for help.         10/02/21 2054   Vital Signs   Temp 98.2 °F (36.8 °C)   Temp Source Oral   Pulse 81   Heart Rate Source Monitor   Resp 20   BP (!) 127/90   BP Location Left lower arm   MAP (mmHg) 102   Patient Position Semi fowlers   Level of Consciousness Alert (0)   MEWS Score 1   Patient Currently in Pain Denies   Pain Assessment   Pain Assessment 0-10   Pain Level 0   Oxygen Therapy   SpO2 93 %   Pulse Oximeter Device Mode Continuous   Pulse Oximeter Device Location Foot   O2 Device High flow nasal cannula   O2 Flow Rate (L/min) 10 L/min

## 2021-10-03 NOTE — PROGRESS NOTES
Office : [phone]     Fax :[phone]       Nephrology progress  Note      Patient's Name: Yudith Bonilla  9:18 AM  10/3/2021    Reason for Consult:  leeroy      Chief Complaint:    Chief Complaint   Patient presents with    Shortness of Breath     pt. with sob, cough, fever and weakness that started on 9/19.  pt. with increased SOB tonight. pt. was 84% on room air. pt. pt. arrived by [facility]. History of Present iIlness:      Yudith Bonilla is a 43 y.o. female with  history of obesity due to excess calories, who presents to the emergency room with complaints of cough, fever, chills, shortness of breath, diarrhea, generalized body aches, generalized weakness, ongoing since 9/19/2021. She was noted to be hypoxic with oxygen saturation of 84% on room air, requiring as much as 5 L/min supplemental oxygen at the time of my evaluation in the emergency room. She does not have chest pain. Chest x-ray is consistent with multifocal opacities in both lung bases. She has no leukocytosis. Abnormal labs include sodium of 135, BUN 66, creatinine 4.0, LDH 1191, calcitonin level of 0.78, D-dimer 4401, fibrinogen of 627. Interval hx     Renal function worsened overnight   UOP low   On 15 L oxygen per NC   No uremic symptoms         I/O last 3 completed shifts: In: 0 [P.O.:340]  Out: 400 [Urine:400]        Family History   Family history unknown: Yes        reports that she has never smoked. She does not have any smokeless tobacco history on file. She reports previous alcohol use.         Allergies:  Shellfish-derived products and Strawberry (diagnostic)    Current Medications:    famotidine (PEPCID) tablet 20 mg, Daily  sodium bicarbonate 75 mEq in sodium chloride 0.45 % 1,000 mL infusion, Continuous  sodium chloride flush 0.9 % injection 5-40 mL, 2 times per day  sodium chloride flush 0.9 % injection 5-40 mL, PRN  0.9 % sodium chloride infusion, PRN  aluminum & magnesium hydroxide-simethicone (MAALOX) 200-200-20 MG/5ML suspension 30 mL, Q6H PRN  polyethylene glycol (GLYCOLAX) packet 17 g, Daily PRN  acetaminophen (TYLENOL) tablet 650 mg, Q6H PRN   Or  acetaminophen (TYLENOL) suppository 650 mg, Q6H PRN  guaiFENesin-dextromethorphan (ROBITUSSIN DM) 100-10 MG/5ML syrup 5 mL, Q4H PRN  Vitamin D (CHOLECALCIFEROL) tablet 2,000 Units, Daily  albuterol sulfate  (90 Base) MCG/ACT inhaler 2 puff, Q6H PRN  promethazine (PHENERGAN) injection 6.25 mg, Q6H PRN  heparin (porcine) injection 10,000 Units, PRN  heparin (porcine) injection 5,000 Units, PRN  heparin 25,000 units in dextrose 5% 250 mL (premix) infusion, Continuous  dexamethasone (DECADRON) 20 mg in sodium chloride 0.9 % IVPB, Daily  cefTRIAXone (ROCEPHIN) 1000 mg in sterile water 10 mL IV syringe, Q24H  azithromycin (ZITHROMAX) 500 mg in D5W 250ml Vial Mate, Q24H          Physical exam:     Vitals:  BP (!) 128/93   Pulse 73   Temp 97.7 °F (36.5 °C) (Oral)   Resp 20   Ht 5' 6\" (1.676 m)   Wt (!) 349 lb 2 oz (158.4 kg)   SpO2 91%   BMI 56.35 kg/m²   Constitutional:  OAA X3 NAD  Skin: no rash, turgor wnl  Heent:  eomi, mmm  Neck: no bruits or jvd noted  Cardiovascular:  S1, S2 without m/r/g  Respiratory: decreased air entry b/l bases   Abdomen:  +bs, soft, nt, nd  Ext: mild   lower extremity edema  Psychiatric: mood and affect appropriate  Musculoskeletal:  Rom, muscular strength intact    Labs:  CBC:   Recent Labs     10/01/21  0633 10/02/21  0320 10/03/21  0500   WBC 7.7 9.5 7.2   HGB 16.1* 15.4 14.5    222 261     BMP:    Recent Labs     10/02/21  0321 10/02/21  1006 10/03/21  0500    137 139   K 4.1 4.2 4.1    101 101   CO2 19* 19* 22   BUN 83* 85* 97*   CREATININE 5.6* 5.9* 6.5*   GLUCOSE 117* 120* 117* Ca/Mg/Phos:   Recent Labs     10/02/21  0321 10/02/21  1006 10/03/21  0500   CALCIUM 9.0 8.6 8.8   MG 2.60*  --  2.60*   PHOS 3.7  --  3.9     Hepatic:   Recent Labs     10/01/21  0437 10/02/21  0321 10/03/21  0500   AST 72* 39* 33   ALT 30 21 17   BILITOT 0.3 <0.2 <0.2   ALKPHOS 69 60 60     Troponin:   Recent Labs     10/01/21  0150 10/02/21  0321   TROPONINI <0.01 <0.01     BNP: No results for input(s): BNP in the last 72 hours. Lipids: No results for input(s): CHOL, TRIG, HDL, LDLCALC, LABVLDL in the last 72 hours. ABGs: No results for input(s): PHART, PO2ART, PDW2INJ in the last 72 hours. INR:   Recent Labs     10/01/21  0151 10/02/21  0320   INR 1.05 1.05     UA:  Recent Labs     10/01/21  1351   COLORU DK YELLOW   CLARITYU TURBID*   GLUCOSEU Negative   BILIRUBINUR SMALL*   KETUA TRACE*   SPECGRAV >1.030   BLOODU MODERATE*   PHUR 5.0   PROTEINU >300   UROBILINOGEN 0.2   NITRU Negative   LEUKOCYTESUR Negative   LABMICR YES   URINETYPE Voided      Urine Microscopic:   Recent Labs     10/01/21  1351   COMU see below   HYALCAST 6-10*   WBCUA 16*   RBCUA 4   EPIU 15*     Urine Culture: No results for input(s): LABURIN in the last 72 hours. Urine Chemistry:   Recent Labs     10/01/21  1351   LABCREA 508.6*   NAUR <20                IMAGING:  US RENAL COMPLETE   Final Result   Very limited visualization of the left kidney, grossly unremarkable, no   evidence of hydronephrosis. Unremarkable right kidney. Collapsed bladder around a Bill catheter. XR CHEST 1 VIEW   Final Result   Multifocal opacities within both lungs suggesting multifocal pneumonia. Reported positive COVID. Assessment/Plan :      1. STEVE   Has severe ATN   Slight improvement in UOP but still in oliguric range   2/2 ATN from hypotension/ sepsis .     renal USG  - no hydronephrosis       Recommend to dose adjust all medications  based on renal functions  Maintain SBP> 90 mmHg   Daily weights   AVOID NSAIDs  Avoid Nephrotoxins  Monitor Intake/Output  Call if significant decrease in urine output     2. Hypotension 2/2 sepsis   Improved   Because of increased oxygen requirement will have to be careful with iv fluids   Give gentle       3. Hypoxia 2/2 most likley covid 19 pneumonia   On dexamethasone   On 10 L oxygen   Pulmonology seeing   Got actemra  COVID PCR pending   CXR multifocal pneumonia     4. Acid- base disorder. Metabolic acidosis   Will add sodium bicarbonate to iv fluids     5. Electrolytes. monitor     6. Vitamin D defeciency. On vitamin D supplements       D/w her . If renal function continue to worsen then  temporarily may need dialysis.    As of now no indication for emergent RRT               D/w primary team      Thank you for allowing us to participate in care of Milagro Rob         Electronically signed by: Sadia Prater MD, 10/3/2021, [address]      [facility] [address]  Office : [phone]  Fax :[phone]

## 2021-10-03 NOTE — PROGRESS NOTES
Department of Internal Medicine  General Internal Medicine  Progress Note  Chief complaint:       Chief Complaint   Patient presents with    Shortness of Breath       pt. with sob, cough, fever and weakness that started on 9/19.  pt. with increased SOB tonight. pt. was 84% on room air. pt. pt. arrived by [employer]. SUBJECTIVE:    On 15 L HFNC. Met with pt in her room. Offered no complaints. No acute events overnight.      OBJECTIVE      Medications    Current Facility-Administered Medications: famotidine (PEPCID) tablet 20 mg, 20 mg, Oral, Daily  sodium bicarbonate 75 mEq in sodium chloride 0.45 % 1,000 mL infusion, , IntraVENous, Continuous  sodium chloride flush 0.9 % injection 5-40 mL, 5-40 mL, IntraVENous, 2 times per day  sodium chloride flush 0.9 % injection 5-40 mL, 5-40 mL, IntraVENous, PRN  0.9 % sodium chloride infusion, 25 mL, IntraVENous, PRN  aluminum & magnesium hydroxide-simethicone (MAALOX) 200-200-20 MG/5ML suspension 30 mL, 30 mL, Oral, Q6H PRN  polyethylene glycol (GLYCOLAX) packet 17 g, 17 g, Oral, Daily PRN  acetaminophen (TYLENOL) tablet 650 mg, 650 mg, Oral, Q6H PRN **OR** acetaminophen (TYLENOL) suppository 650 mg, 650 mg, Rectal, Q6H PRN  guaiFENesin-dextromethorphan (ROBITUSSIN DM) 100-10 MG/5ML syrup 5 mL, 5 mL, Oral, Q4H PRN  Vitamin D (CHOLECALCIFEROL) tablet 2,000 Units, 2,000 Units, Oral, Daily  albuterol sulfate  (90 Base) MCG/ACT inhaler 2 puff, 2 puff, Inhalation, Q6H PRN  promethazine (PHENERGAN) injection 6.25 mg, 6.25 mg, IntraMUSCular, Q6H PRN  heparin (porcine) injection 10,000 Units, 10,000 Units, IntraVENous, PRN  heparin (porcine) injection 5,000 Units, 5,000 Units, IntraVENous, PRN  heparin 25,000 units in dextrose 5% 250 mL (premix) infusion, 5-30 Units/kg/hr, IntraVENous, Continuous  dexamethasone (DECADRON) 20 mg in sodium chloride 0.9 % IVPB, 20 mg, IntraVENous, Daily  cefTRIAXone (ROCEPHIN) 1000 mg in sterile water 10 mL IV syringe, 1,000 mg, IntraVENous, Q24H  azithromycin (ZITHROMAX) 500 mg in D5W 250ml Vial Mate, 500 mg, IntraVENous, Q24H  Physical    VITALS:  BP (!) 128/93   Pulse 73   Temp 97.7 °F (36.5 °C) (Oral)   Resp 20   Ht 5' 6\" (1.676 m)   Wt (!) 349 lb 2 oz (158.4 kg)   SpO2 92%   BMI 56.35 kg/m²     Gen/overall appearance: obese AAF, not in distress, Oriented x3. CVS: RRR, S1 S2 heard, no MRG  Resp: decreased bibasilar breath sounds. No wheezes. Gastrointestinal: Soft, NT/ND, audible BS  Musculoskeletal: No edema. Warm  Neuro: No focal deficit. Moves extremity spontaneously. Psychiatry: Appropriate affect. Not agitated. Skin: Warm, dry with normal turgor. No rash  Capillary refill: Brisk,< 3 seconds   Peripheral Pulses: +2 palpable, equal bilaterally        Data    XR CHEST 1 VIEW     Result Date: 10/1/2021  EXAMINATION: ONE XRAY VIEW OF THE CHEST 10/1/2021 1:27 am COMPARISON: None. HISTORY: ORDERING SYSTEM PROVIDED HISTORY: sob, covid, TECHNOLOGIST PROVIDED HISTORY: Reason for exam:->sob, covid, Reason for Exam: sob, covid, Acuity: Acute Type of Exam: Initial FINDINGS: Multifocal opacities are present within both lungs including the periphery. No sign of pleural effusion or pneumothorax. Cardiac silhouette is exaggerated by the technique. Central vessels are not well evaluated. Large body habitus. No fracture or intrathoracic lines are seen.      Multifocal opacities within both lungs suggesting multifocal pneumonia. Reported positive COVID. ASSESSMENT AND PLAN      COVID-19 pneumonia (in unvaccinated patient)  Tested positive   Continue IV Decadron. Renal failure, no Remdesivir  Robitussin for cough as needed. O2 supplementation    Acute respiratory failure with hypoxia. Likely secondary to underlying COVID-19 infection. Patient has significantly elevated D-dimer; however, unable to undergo CT-PE protocol due to acute kidney injury. Will cont full dose anticoagulation with heparin.    Continue supplemental oxygen with plan to wean as tolerated. Acute kidney injury. Likely ATN due to secondary to GI losses/diarrhea. Continue intravenous fluid. Renal US grossly normal.   Sr creat continues to trend up; 6.3 sr creat today  UO of 400 ml in 24 hrs  Nephrology consulted  No need for emergent HD today    Significant elevated D-dimer greater than 4000. Likely secondary to underlying COVID-19. Unable to undergo CT-PE protocol due to STEVE. Currently on full dose heparin infusion as noted above. Acute gastroenteritis  likely viral, likely secondary to COVID-19 infection. Check GI bacterial pathogen. Monitor stool count closely; if significant, consider starting Imodium. Continue intravenous fluids, antiemetics. Elevated procalcitonin level. Low utility in the setting of underlying acute kidney injury. suspicion for bacterial pneumonia is low; will not start antibiotics.     Other comorbidities: Obesity due to excess calories.     Activities: Up with assist  Prophylaxis: Heparin infusion  Code status: Full code  Dispo: renal failure, COVID-19    Dr. Randal Patel MD

## 2021-10-03 NOTE — PROGRESS NOTES
FYI sent to Dr. Phi Dallas: Patient's BUN 97 and Creat 6.5 this am. Most recent /93. K+ 4.1 this am. Pt has had 250ml urine output overnight. No weight gain overnight.     [id number] Message received from Dr. Phi Dallas: Thanks. I will see her room. Keep her npo in case I have to start hd     0728 NPO orders placed. 3174 Patient made aware of NPO status and possibility of starting hemodialysis today. All questions answered and POC reviewed with patient.

## 2021-10-03 NOTE — PROGRESS NOTES
This nurse called and gave updates to pt's mom, Wanda Heath. Answered all questions. Let her know that we don't know how long pt will be here in hospital at this point. Will continue to monitor pt. Pt's O2 is 95% on 13L.

## 2021-10-03 NOTE — FLOWSHEET NOTE
Oxygen increased to 12L HFNC to maintain oxygenation >90%. Will continue to monitor.        10/02/21 2123   Oxygen Therapy   SpO2 91 %   Pulse Oximeter Device Mode Continuous   Pulse Oximeter Device Location Toe   O2 Device High flow nasal cannula   O2 Flow Rate (L/min) 12 L/min

## 2021-10-03 NOTE — PROGRESS NOTES
0400 aPTT is 59.6. Half bolus given and heparin drip increased by 2 units. Now infusing at 8 units/kg/hr, or 12.3 ml/hour. Next aPTT draw at 1200, order placed per protocol.

## 2021-10-03 NOTE — PLAN OF CARE
Problem: Breathing Pattern - Ineffective:  Goal: Ability to achieve and maintain a regular respiratory rate will improve  Description: Ability to achieve and maintain a regular respiratory rate will improve  Outcome: Ongoing     Problem: Skin Integrity:  Goal: Will show no infection signs and symptoms  Description: Will show no infection signs and symptoms  Outcome: Ongoing  Goal: Absence of new skin breakdown  Description: Absence of new skin breakdown  Outcome: Ongoing     Problem: Falls - Risk of:  Goal: Will remain free from falls  Description: Will remain free from falls  Outcome: Ongoing  Goal: Absence of physical injury  Description: Absence of physical injury  Outcome: Ongoing     Problem: Airway Clearance - Ineffective  Goal: Achieve or maintain patent airway  Outcome: Ongoing     Problem: Gas Exchange - Impaired  Goal: Absence of hypoxia  Outcome: Ongoing  Goal: Promote optimal lung function  Outcome: Ongoing     Problem: Breathing Pattern - Ineffective  Goal: Ability to achieve and maintain a regular respiratory rate  Outcome: Ongoing     Problem:  Body Temperature -  Risk of, Imbalanced  Goal: Ability to maintain a body temperature within defined limits  Outcome: Ongoing  Goal: Will regain or maintain usual level of consciousness  Outcome: Ongoing  Goal: Complications related to the disease process, condition or treatment will be avoided or minimized  Outcome: Ongoing     Problem: Isolation Precautions - Risk of Spread of Infection  Goal: Prevent transmission of infection  Outcome: Ongoing     Problem: Nutrition Deficits  Goal: Optimize nutritional status  Outcome: Ongoing     Problem: Risk for Fluid Volume Deficit  Goal: Maintain normal heart rhythm  Outcome: Ongoing  Goal: Maintain absence of muscle cramping  Outcome: Ongoing  Goal: Maintain normal serum potassium, sodium, calcium, phosphorus, and pH  Outcome: Ongoing     Problem: Loneliness or Risk for Loneliness  Goal: Demonstrate positive use of

## 2021-10-04 LAB
A/G RATIO: 0.8 (ref 1.1–2.2)
ALBUMIN SERPL-MCNC: 2.7 G/DL (ref 3.4–5)
ALP BLD-CCNC: 62 U/L (ref 40–129)
ALT SERPL-CCNC: 27 U/L (ref 10–40)
ANION GAP SERPL CALCULATED.3IONS-SCNC: 16 MMOL/L (ref 3–16)
APTT: 160.5 SEC (ref 26.2–38.6)
APTT: 43.3 SEC (ref 26.2–38.6)
APTT: 67.5 SEC (ref 26.2–38.6)
AST SERPL-CCNC: 57 U/L (ref 15–37)
BASOPHILS ABSOLUTE: 0 K/UL (ref 0–0.2)
BASOPHILS RELATIVE PERCENT: 0.3 %
BILIRUB SERPL-MCNC: <0.2 MG/DL (ref 0–1)
BUN BLDV-MCNC: 100 MG/DL (ref 7–20)
C-REACTIVE PROTEIN: 21.3 MG/L (ref 0–5.1)
CALCIUM SERPL-MCNC: 8.9 MG/DL (ref 8.3–10.6)
CHLORIDE BLD-SCNC: 103 MMOL/L (ref 99–110)
CO2: 22 MMOL/L (ref 21–32)
CREAT SERPL-MCNC: 5.6 MG/DL (ref 0.6–1.1)
D DIMER: 2177 NG/ML DDU (ref 0–229)
EOSINOPHILS ABSOLUTE: 0 K/UL (ref 0–0.6)
EOSINOPHILS RELATIVE PERCENT: 0 %
GFR AFRICAN AMERICAN: 10
GFR NON-AFRICAN AMERICAN: 8
GLOBULIN: 3.6 G/DL
GLUCOSE BLD-MCNC: 127 MG/DL (ref 70–99)
HCT VFR BLD CALC: 39.8 % (ref 36–48)
HEMOGLOBIN: 13.9 G/DL (ref 12–16)
LYMPHOCYTES ABSOLUTE: 0.8 K/UL (ref 1–5.1)
LYMPHOCYTES RELATIVE PERCENT: 8.6 %
MAGNESIUM: 2.6 MG/DL (ref 1.8–2.4)
MCH RBC QN AUTO: 32.1 PG (ref 26–34)
MCHC RBC AUTO-ENTMCNC: 34.9 G/DL (ref 31–36)
MCV RBC AUTO: 92.1 FL (ref 80–100)
MONOCYTES ABSOLUTE: 0.7 K/UL (ref 0–1.3)
MONOCYTES RELATIVE PERCENT: 6.7 %
NEUTROPHILS ABSOLUTE: 8.4 K/UL (ref 1.7–7.7)
NEUTROPHILS RELATIVE PERCENT: 84.4 %
PDW BLD-RTO: 13.3 % (ref 12.4–15.4)
PHOSPHORUS: 4.6 MG/DL (ref 2.5–4.9)
PLATELET # BLD: 296 K/UL (ref 135–450)
PMV BLD AUTO: 9.3 FL (ref 5–10.5)
POTASSIUM REFLEX MAGNESIUM: 3.8 MMOL/L (ref 3.5–5.1)
RBC # BLD: 4.32 M/UL (ref 4–5.2)
SODIUM BLD-SCNC: 141 MMOL/L (ref 136–145)
TOTAL PROTEIN: 6.3 G/DL (ref 6.4–8.2)
WBC # BLD: 9.9 K/UL (ref 4–11)

## 2021-10-04 PROCEDURE — 84100 ASSAY OF PHOSPHORUS: CPT

## 2021-10-04 PROCEDURE — 85379 FIBRIN DEGRADATION QUANT: CPT

## 2021-10-04 PROCEDURE — 6360000002 HC RX W HCPCS: Performed by: INTERNAL MEDICINE

## 2021-10-04 PROCEDURE — 85025 COMPLETE CBC W/AUTO DIFF WBC: CPT

## 2021-10-04 PROCEDURE — 36415 COLL VENOUS BLD VENIPUNCTURE: CPT

## 2021-10-04 PROCEDURE — 6370000000 HC RX 637 (ALT 250 FOR IP): Performed by: INTERNAL MEDICINE

## 2021-10-04 PROCEDURE — 99233 SBSQ HOSP IP/OBS HIGH 50: CPT | Performed by: INTERNAL MEDICINE

## 2021-10-04 PROCEDURE — 2580000003 HC RX 258: Performed by: INTERNAL MEDICINE

## 2021-10-04 PROCEDURE — 85730 THROMBOPLASTIN TIME PARTIAL: CPT

## 2021-10-04 PROCEDURE — 86140 C-REACTIVE PROTEIN: CPT

## 2021-10-04 PROCEDURE — 87505 NFCT AGENT DETECTION GI: CPT

## 2021-10-04 PROCEDURE — 94761 N-INVAS EAR/PLS OXIMETRY MLT: CPT

## 2021-10-04 PROCEDURE — 2060000000 HC ICU INTERMEDIATE R&B

## 2021-10-04 PROCEDURE — 80053 COMPREHEN METABOLIC PANEL: CPT

## 2021-10-04 PROCEDURE — 2700000000 HC OXYGEN THERAPY PER DAY

## 2021-10-04 PROCEDURE — 83735 ASSAY OF MAGNESIUM: CPT

## 2021-10-04 RX ADMIN — Medication 10 ML: at 21:06

## 2021-10-04 RX ADMIN — Medication 2000 UNITS: at 08:46

## 2021-10-04 RX ADMIN — DEXAMETHASONE SODIUM PHOSPHATE 20 MG: 4 INJECTION, SOLUTION INTRA-ARTICULAR; INTRALESIONAL; INTRAMUSCULAR; INTRAVENOUS; SOFT TISSUE at 09:51

## 2021-10-04 RX ADMIN — HEPARIN SODIUM 9 UNITS/KG/HR: 10000 INJECTION, SOLUTION INTRAVENOUS at 06:44

## 2021-10-04 RX ADMIN — FAMOTIDINE 20 MG: 20 TABLET, FILM COATED ORAL at 08:46

## 2021-10-04 RX ADMIN — Medication 10 ML: at 08:46

## 2021-10-04 RX ADMIN — HEPARIN SODIUM 10000 UNITS: 1000 INJECTION INTRAVENOUS; SUBCUTANEOUS at 20:58

## 2021-10-04 ASSESSMENT — PAIN SCALES - GENERAL
PAINLEVEL_OUTOF10: 0

## 2021-10-04 NOTE — PROGRESS NOTES
Department of Internal Medicine  General Internal Medicine  Progress Note  Chief complaint:       Chief Complaint   Patient presents with    Shortness of Breath       pt. with sob, cough, fever and weakness that started on 9/19.  pt. with increased SOB tonight. pt. was 84% on room air. pt. pt. arrived by [facility]. Hospital course: Patient is a 49-year-old morbidly obese -American female, unvaccinated for COVID-19, who presented to ED with complaints of shortness of breath, cough, fevers, chills, diarrhea, generalized body aches for a week. Patient tested positive for COVID-19. Respiratory status worsened after admission. Currently on 11 L O2 via nasal cannula. On admission, patient was noted to have renal failure likely secondary to ATN with peak serum creatinine of 6.5. Did not require hemodialysis. Renal function improving, nonoliguric. Serum creatinine 5.6 today. SUBJECTIVE: O2 requirements down from 15L to 11L via high flow nasal cannula. Urine output of 1150 mL last 24 hours. Patient expressed dissatisfaction with nursing care. Offers no other complaints.   Wanting to go home at the earliest.     OBJECTIVE      Medications    Current Facility-Administered Medications: famotidine (PEPCID) tablet 20 mg, 20 mg, Oral, Daily  sodium bicarbonate 75 mEq in sodium chloride 0.45 % 1,000 mL infusion, , IntraVENous, Continuous  sodium chloride flush 0.9 % injection 5-40 mL, 5-40 mL, IntraVENous, 2 times per day  sodium chloride flush 0.9 % injection 5-40 mL, 5-40 mL, IntraVENous, PRN  0.9 % sodium chloride infusion, 25 mL, IntraVENous, PRN  aluminum & magnesium hydroxide-simethicone (MAALOX) 200-200-20 MG/5ML suspension 30 mL, 30 mL, Oral, Q6H PRN  polyethylene glycol (GLYCOLAX) packet 17 g, 17 g, Oral, Daily PRN  acetaminophen (TYLENOL) tablet 650 mg, 650 mg, Oral, Q6H PRN **OR** acetaminophen (TYLENOL) suppository 650 mg, 650 mg, Rectal, Q6H PRN  guaiFENesin-dextromethorphan (ROBITUSSIN DM) 100-10 MG/5ML syrup 5 mL, 5 mL, Oral, Q4H PRN  Vitamin D (CHOLECALCIFEROL) tablet 2,000 Units, 2,000 Units, Oral, Daily  albuterol sulfate  (90 Base) MCG/ACT inhaler 2 puff, 2 puff, Inhalation, Q6H PRN  promethazine (PHENERGAN) injection 6.25 mg, 6.25 mg, IntraMUSCular, Q6H PRN  heparin (porcine) injection 10,000 Units, 10,000 Units, IntraVENous, PRN  heparin (porcine) injection 5,000 Units, 5,000 Units, IntraVENous, PRN  heparin 25,000 units in dextrose 5% 250 mL (premix) infusion, 5-30 Units/kg/hr, IntraVENous, Continuous  dexamethasone (DECADRON) 20 mg in sodium chloride 0.9 % IVPB, 20 mg, IntraVENous, Daily  Physical    VITALS:  /89   Pulse 78   Temp 97.3 °F (36.3 °C) (Oral)   Resp 18   Ht 5' 6\" (1.676 m)   Wt (!) 349 lb 2 oz (158.4 kg)   SpO2 91%   BMI 56.35 kg/m²     Gen/overall appearance: obese AAF, not in distress, oriented x3. CVS: RRR, S1 S2 heard, no MRG  Resp: decreased bibasilar breath sounds. No wheezes. Gastrointestinal: Soft, NT/ND, audible BS  Musculoskeletal: No edema. Warm  Neuro: No focal deficit. Moves extremity spontaneously. Psychiatry: Appropriate affect. Not agitated. Skin: Warm, dry with normal turgor. No rash  Capillary refill: Brisk,< 3 seconds   Peripheral Pulses: +2 palpable, equal bilaterally      Data    XR CHEST 1 VIEW     Result Date: 10/1/2021  EXAMINATION: ONE XRAY VIEW OF THE CHEST 10/1/2021 1:27 am COMPARISON: None. HISTORY: ORDERING SYSTEM PROVIDED HISTORY: sob, covid, TECHNOLOGIST PROVIDED HISTORY: Reason for exam:->sob, covid, Reason for Exam: sob, covid, Acuity: Acute Type of Exam: Initial FINDINGS: Multifocal opacities are present within both lungs including the periphery. No sign of pleural effusion or pneumothorax. Cardiac silhouette is exaggerated by the technique. Central vessels are not well evaluated. Large body habitus.   No fracture or intrathoracic lines are seen.      Multifocal opacities within both lungs suggesting multifocal pneumonia. Reported positive COVID. ASSESSMENT AND PLAN      COVID-19 pneumonia (in unvaccinated patient)  Tested positive   Continue IV Decadron 20 mg IV daily, day 4 of steroids   Renal failure, no Remdesivir  Received a dose of IV Actemra on 10/2/2021  Robitussin for cough as needed. D-dimer remains elevated at 4680  O2 supplementation  Supportive care    Acute respiratory failure with hypoxia. Likely secondary to underlying COVID-19 infection. Patient has significantly elevated D-dimer; however, unable to undergo CT-PE protocol due to acute kidney injury. Will continue full dose anticoagulation with heparin. Continue supplemental oxygen with plan to wean as tolerated. Acute kidney injury. Likely ATN secondary to GI losses/diarrhea. Renal US grossly normal.   Sr creat from 6.3 yesterday to 5.6 today  Improving urine output. 1150 mL in last 24 hours  Nephrology consulted  No need for emergent HD today  Continue IV maintenance fluids    Significant elevated D-dimer greater than 4000. Likely secondary to underlying COVID-19. Unable to undergo CT-PE protocol due to STEVE  Currently on full dose heparin infusion as noted above. Will need CT chest to rule out PE or a VQ scan when pt is more stable/renal function improves    Acute gastroenteritis  likely secondary to COVID-19 infection. Continue intravenous fluids, antiemetics. Elevated procalcitonin level. Low utility in the setting of underlying acute kidney injury. suspicion for bacterial pneumonia is low; no antibiotics.     Other comorbidities: Obesity due to excess calories.     Activities: Up with assist  Prophylaxis: Heparin infusion  Code status: Full code  Dispo: renal failure, COVID-19, likely 2-3 days    Dr. Beni Chappell MD

## 2021-10-04 NOTE — PROGRESS NOTES
Continuous  sodium chloride flush 0.9 % injection 5-40 mL, 2 times per day  sodium chloride flush 0.9 % injection 5-40 mL, PRN  0.9 % sodium chloride infusion, PRN  aluminum & magnesium hydroxide-simethicone (MAALOX) 200-200-20 MG/5ML suspension 30 mL, Q6H PRN  polyethylene glycol (GLYCOLAX) packet 17 g, Daily PRN  acetaminophen (TYLENOL) tablet 650 mg, Q6H PRN   Or  acetaminophen (TYLENOL) suppository 650 mg, Q6H PRN  guaiFENesin-dextromethorphan (ROBITUSSIN DM) 100-10 MG/5ML syrup 5 mL, Q4H PRN  Vitamin D (CHOLECALCIFEROL) tablet 2,000 Units, Daily  albuterol sulfate  (90 Base) MCG/ACT inhaler 2 puff, Q6H PRN  promethazine (PHENERGAN) injection 6.25 mg, Q6H PRN  heparin (porcine) injection 10,000 Units, PRN  heparin (porcine) injection 5,000 Units, PRN  heparin 25,000 units in dextrose 5% 250 mL (premix) infusion, Continuous  dexamethasone (DECADRON) 20 mg in sodium chloride 0.9 % IVPB, Daily          Physical exam:     Vitals:  BP (!) 135/91   Pulse 84   Temp 98.3 °F (36.8 °C) (Oral)   Resp 18   Ht 5' 6\" (1.676 m)   Wt (!) 349 lb 2 oz (158.4 kg)   SpO2 92%   BMI 56.35 kg/m²   Constitutional:  OAA X3 NAD  Skin: no rash, turgor wnl  Heent:  eomi, mmm  Neck: no bruits or jvd noted  Cardiovascular:  S1, S2 without m/r/g  Respiratory: decreased air entry b/l bases   Abdomen:  +bs, soft, nt, nd  Ext: mild   lower extremity edema  Psychiatric: mood and affect appropriate  Musculoskeletal:  Rom, muscular strength intact    Labs:  CBC:   Recent Labs     10/02/21  0320 10/03/21  0500 10/04/21  0525   WBC 9.5 7.2 9.9   HGB 15.4 14.5 13.9    261 296     BMP:    Recent Labs     10/02/21  1006 10/03/21  0500 10/04/21  0525    139 141   K 4.2 4.1 3.8    101 103   CO2 19* 22 22   BUN 85* 97* 100*   CREATININE 5.9* 6.5* 5.6*   GLUCOSE 120* 117* 127*     Ca/Mg/Phos:   Recent Labs     10/02/21  0321 10/02/21  0321 10/02/21  1006 10/03/21  0500 10/04/21  0525   CALCIUM 9.0   < > 8.6 8.8 8.9   MG 2.60*  --   --  2.60* 2.60*   PHOS 3.7  --   --  3.9 4.6    < > = values in this interval not displayed. Hepatic:   Recent Labs     10/02/21  0321 10/03/21  0500 10/04/21  0525   AST 39* 33 57*   ALT 21 17 27   BILITOT <0.2 <0.2 <0.2   ALKPHOS 60 60 62     Troponin:   Recent Labs     10/02/21  0321   TROPONINI <0.01     BNP: No results for input(s): BNP in the last 72 hours. Lipids: No results for input(s): CHOL, TRIG, HDL, LDLCALC, LABVLDL in the last 72 hours. ABGs: No results for input(s): PHART, PO2ART, FMG6TDD in the last 72 hours. INR:   Recent Labs     10/02/21  0320   INR 1.05     UA:  Recent Labs     10/01/21  1351   COLORU DK YELLOW   CLARITYU TURBID*   GLUCOSEU Negative   BILIRUBINUR SMALL*   KETUA TRACE*   SPECGRAV >1.030   BLOODU MODERATE*   PHUR 5.0   PROTEINU >300   UROBILINOGEN 0.2   NITRU Negative   LEUKOCYTESUR Negative   LABMICR YES   URINETYPE Voided      Urine Microscopic:   Recent Labs     10/01/21  1351   COMU see below   HYALCAST 6-10*   WBCUA 16*   RBCUA 4   EPIU 15*     Urine Culture: No results for input(s): LABURIN in the last 72 hours. Urine Chemistry:   Recent Labs     10/01/21  1351   LABCREA 508.6*   NAUR <20                IMAGING:  US RENAL COMPLETE   Final Result   Very limited visualization of the left kidney, grossly unremarkable, no   evidence of hydronephrosis. Unremarkable right kidney. Collapsed bladder around a Bill catheter. XR CHEST 1 VIEW   Final Result   Multifocal opacities within both lungs suggesting multifocal pneumonia. Reported positive COVID. Assessment/Plan :      1. STEVE   Has severe ATN   UOP improved overnight   ATN recovering   2/2 ATN from hypotension/ sepsis .   Gentle fluids   renal USG  - no hydronephrosis       Recommend to dose adjust all medications  based on renal functions  Maintain SBP> 90 mmHg   Daily weights   AVOID NSAIDs  Avoid Nephrotoxins  Monitor Intake/Output  Call if significant decrease in urine output     2. Hypotension 2/2 sepsis   Improved     3. Hypoxia 2/2  covid 19 pneumonia   On dexamethasone   On 15 L oxygen   Pulmonology seeing   Got actemra       4. Acid- base disorder. Metabolic acidosis   Will add sodium bicarbonate to iv fluids     5. Electrolytes. monitor     6. Vitamin D defeciency.  On vitamin D supplements             Thank you for allowing us to participate in care of Vanessa Huang         Electronically signed by: Charlene Link MD, 10/4/2021, 8:10 AM      [facility] [id number]  Office : [phone]  Fax :[phone]

## 2021-10-04 NOTE — PROGRESS NOTES
PULMONARY AND CRITICAL CARE MEDICINE PROGRESS NOTE      SUBJECTIVE: Patient is on 11 L nasal cannula. Saturating well. No respiratory distress. Creatinine is improving. Inflammatory markers are decreasing. REVIEW OF SYSTEMS:   CONSTITUTIONAL SYMPTOMS: The patient denies fever, fatigue, night sweats, weight loss or weight gain. HEENT: No vision changes. No tinnitus, Denies sinus pain. No hoarseness, or dysphagia. CARDIOVASCULAR: Denies chest pain, palpitation, syncope. RESPIRATORY: Denies shortness of breath or cough. GASTROINTESTINAL: Denies nausea, abdominal pain or change in bowel function. SKIN: No rashes or itching. MUSCULOSKELETAL: Denies weakness or bone pain. NEUROLOGICAL: No headaches or seizures. MEDICATIONS:      famotidine  20 mg Oral Daily    sodium chloride flush  5-40 mL IntraVENous 2 times per day    Vitamin D  2,000 Units Oral Daily    dexamethasone  20 mg IntraVENous Daily      IV infusion builder 75 mL/hr at 10/03/21 2058    sodium chloride      heparin (PORCINE) Infusion 6 Units/kg/hr (10/04/21 0738)     sodium chloride, sodium chloride flush, sodium chloride, aluminum & magnesium hydroxide-simethicone, polyethylene glycol, acetaminophen **OR** acetaminophen, guaiFENesin-dextromethorphan, albuterol sulfate HFA, promethazine, heparin (porcine), heparin (porcine)     ALLERGIES:   Allergies as of 10/01/2021 - Fully Reviewed 10/01/2021   Allergen Reaction Noted    Shellfish-derived products Anaphylaxis 10/01/2021    [place] (diagnostic) Anaphylaxis 10/01/2021        OBJECTIVE:   height is 5' 6\" (1.676 m) and weight is 349 lb 2 oz (158.4 kg) (abnormal). Her oral temperature is 97.8 °F (36.6 °C). Her blood pressure is 126/89 and her pulse is 72. Her respiration is 18 and oxygen saturation is 92%. I/O this shift:  In: 61 [P.O.:60]  Out: 250 [Urine:250]     PHYSICAL EXAM:  CONSTITUTIONAL: She is a 43y.o.-year-old who appears her stated age.  She is alert and Patient has acute hypoxic respiratory failure secondary to COVID-19 pneumonia. Requiring up to 11 L/min oxygen. Titrate FiO2 for saturation of 90 to 92%. Continue Decadron 20 mg IV daily. Could not get remdesivir due to acute kidney injury. Received 1 dose of Actemra. CRP is decreasing, 21  Elevated D-dimer of 4680. Continue heparin drip in the setting of STEVE for hypercoagulable state associated with COVID-19. D-dimer is decreasing. Creatinine is slowly improving. Good urine output. Encourage side/prone positioning in the bed as tolerated. Out of bed in chair.       Wily Fermin MD  Pulmonary Critical Care and Sleep Medicine  10/4/2021, 1:14 PM    This note was completed using dragon medical speech recognition software. Grammatical errors, random word insertions, pronoun errors and incomplete sentences are occasional consequences of this technology due to software limitations. If there are questions or concerns about the content of this note of information contained within the body of this dictation they should be addressed with the provider for clarification.

## 2021-10-04 NOTE — CARE COORDINATION
Discharge planning note:    Requiring 11 L O2 today, IV Decadron, Heparin gtt. PT/OT pending    No insurance. Not eligible for Medicaid.     Tia Alvarez RN BSN  Case Management  [phone]

## 2021-10-05 LAB
A/G RATIO: 0.7 (ref 1.1–2.2)
ALBUMIN SERPL-MCNC: 2.7 G/DL (ref 3.4–5)
ALP BLD-CCNC: 76 U/L (ref 40–129)
ALT SERPL-CCNC: 45 U/L (ref 10–40)
ANION GAP SERPL CALCULATED.3IONS-SCNC: 16 MMOL/L (ref 3–16)
APTT: 183.4 SEC (ref 26.2–38.6)
APTT: 70.1 SEC (ref 26.2–38.6)
APTT: 81.7 SEC (ref 26.2–38.6)
AST SERPL-CCNC: 86 U/L (ref 15–37)
BANDED NEUTROPHILS RELATIVE PERCENT: 3 % (ref 0–7)
BASOPHILS ABSOLUTE: 0 K/UL (ref 0–0.2)
BASOPHILS RELATIVE PERCENT: 0 %
BILIRUB SERPL-MCNC: 0.3 MG/DL (ref 0–1)
BLOOD CULTURE, ROUTINE: NORMAL
BUN BLDV-MCNC: 95 MG/DL (ref 7–20)
CALCIUM SERPL-MCNC: 9 MG/DL (ref 8.3–10.6)
CHLORIDE BLD-SCNC: 102 MMOL/L (ref 99–110)
CO2: 24 MMOL/L (ref 21–32)
CREAT SERPL-MCNC: 4 MG/DL (ref 0.6–1.1)
CULTURE, BLOOD 2: NORMAL
EOSINOPHILS ABSOLUTE: 0 K/UL (ref 0–0.6)
EOSINOPHILS RELATIVE PERCENT: 0 %
GFR AFRICAN AMERICAN: 15
GFR NON-AFRICAN AMERICAN: 13
GI BACTERIAL PATHOGENS BY PCR: NORMAL
GLOBULIN: 3.9 G/DL
GLUCOSE BLD-MCNC: 96 MG/DL (ref 70–99)
HCT VFR BLD CALC: 43.8 % (ref 36–48)
HEMOGLOBIN: 14.9 G/DL (ref 12–16)
LYMPHOCYTES ABSOLUTE: 1 K/UL (ref 1–5.1)
LYMPHOCYTES RELATIVE PERCENT: 8 %
MCH RBC QN AUTO: 32.1 PG (ref 26–34)
MCHC RBC AUTO-ENTMCNC: 34.1 G/DL (ref 31–36)
MCV RBC AUTO: 94.1 FL (ref 80–100)
METAMYELOCYTES RELATIVE PERCENT: 1 %
MONOCYTES ABSOLUTE: 0.5 K/UL (ref 0–1.3)
MONOCYTES RELATIVE PERCENT: 4 %
NEUTROPHILS ABSOLUTE: 11.4 K/UL (ref 1.7–7.7)
NEUTROPHILS RELATIVE PERCENT: 84 %
PDW BLD-RTO: 13.1 % (ref 12.4–15.4)
PLATELET # BLD: 347 K/UL (ref 135–450)
PMV BLD AUTO: 9 FL (ref 5–10.5)
POTASSIUM REFLEX MAGNESIUM: 3.9 MMOL/L (ref 3.5–5.1)
RBC # BLD: 4.66 M/UL (ref 4–5.2)
RBC # BLD: NORMAL 10*6/UL
SODIUM BLD-SCNC: 142 MMOL/L (ref 136–145)
TOTAL PROTEIN: 6.6 G/DL (ref 6.4–8.2)
WBC # BLD: 12.9 K/UL (ref 4–11)

## 2021-10-05 PROCEDURE — 99233 SBSQ HOSP IP/OBS HIGH 50: CPT | Performed by: INTERNAL MEDICINE

## 2021-10-05 PROCEDURE — 36415 COLL VENOUS BLD VENIPUNCTURE: CPT

## 2021-10-05 PROCEDURE — 6370000000 HC RX 637 (ALT 250 FOR IP): Performed by: INTERNAL MEDICINE

## 2021-10-05 PROCEDURE — 97166 OT EVAL MOD COMPLEX 45 MIN: CPT

## 2021-10-05 PROCEDURE — 85025 COMPLETE CBC W/AUTO DIFF WBC: CPT

## 2021-10-05 PROCEDURE — 94761 N-INVAS EAR/PLS OXIMETRY MLT: CPT

## 2021-10-05 PROCEDURE — 97530 THERAPEUTIC ACTIVITIES: CPT

## 2021-10-05 PROCEDURE — 97162 PT EVAL MOD COMPLEX 30 MIN: CPT

## 2021-10-05 PROCEDURE — 2060000000 HC ICU INTERMEDIATE R&B

## 2021-10-05 PROCEDURE — 97535 SELF CARE MNGMENT TRAINING: CPT

## 2021-10-05 PROCEDURE — 6360000002 HC RX W HCPCS: Performed by: INTERNAL MEDICINE

## 2021-10-05 PROCEDURE — 2580000003 HC RX 258: Performed by: INTERNAL MEDICINE

## 2021-10-05 PROCEDURE — 85730 THROMBOPLASTIN TIME PARTIAL: CPT

## 2021-10-05 PROCEDURE — 80053 COMPREHEN METABOLIC PANEL: CPT

## 2021-10-05 PROCEDURE — 2700000000 HC OXYGEN THERAPY PER DAY

## 2021-10-05 RX ORDER — SODIUM CHLORIDE 9 MG/ML
1000 INJECTION, SOLUTION INTRAVENOUS CONTINUOUS
Status: DISCONTINUED | OUTPATIENT
Start: 2021-10-05 | End: 2021-10-07

## 2021-10-05 RX ADMIN — FAMOTIDINE 20 MG: 20 TABLET, FILM COATED ORAL at 09:53

## 2021-10-05 RX ADMIN — DEXAMETHASONE SODIUM PHOSPHATE 20 MG: 4 INJECTION, SOLUTION INTRA-ARTICULAR; INTRALESIONAL; INTRAMUSCULAR; INTRAVENOUS; SOFT TISSUE at 15:52

## 2021-10-05 RX ADMIN — Medication 10 ML: at 09:54

## 2021-10-05 RX ADMIN — HEPARIN SODIUM 7 UNITS/KG/HR: 10000 INJECTION, SOLUTION INTRAVENOUS at 05:30

## 2021-10-05 RX ADMIN — ACETAMINOPHEN 650 MG: 325 TABLET ORAL at 09:53

## 2021-10-05 RX ADMIN — SODIUM CHLORIDE 1000 ML: 9 INJECTION, SOLUTION INTRAVENOUS at 15:45

## 2021-10-05 RX ADMIN — Medication 2000 UNITS: at 09:53

## 2021-10-05 ASSESSMENT — PAIN SCALES - GENERAL
PAINLEVEL_OUTOF10: 0
PAINLEVEL_OUTOF10: 1
PAINLEVEL_OUTOF10: 0
PAINLEVEL_OUTOF10: 0

## 2021-10-05 NOTE — PROGRESS NOTES
PULMONARY AND CRITICAL CARE MEDICINE PROGRESS NOTE      SUBJECTIVE: Remains on 11 L/min O2. No respiratory distress. Creatinine is improving. REVIEW OF SYSTEMS:    CONSTITUTIONAL SYMPTOMS: The patient denies fever, fatigue, night sweats, weight loss or weight gain. HEENT: No vision changes. No tinnitus, Denies sinus pain. No hoarseness, or dysphagia. CARDIOVASCULAR: Denies chest pain, palpitation, syncope. RESPIRATORY: Denies shortness of breath or cough. GASTROINTESTINAL: Denies nausea, abdominal pain or change in bowel function. SKIN: No rashes or itching. MUSCULOSKELETAL: Denies weakness or bone pain. NEUROLOGICAL: No headaches or seizures. MEDICATIONS:      famotidine  20 mg Oral Daily    sodium chloride flush  5-40 mL IntraVENous 2 times per day    Vitamin D  2,000 Units Oral Daily    dexamethasone  20 mg IntraVENous Daily      sodium chloride      sodium chloride      heparin (PORCINE) Infusion 7 Units/kg/hr (10/05/21 1140)     sodium chloride, sodium chloride flush, sodium chloride, aluminum & magnesium hydroxide-simethicone, polyethylene glycol, acetaminophen **OR** acetaminophen, guaiFENesin-dextromethorphan, albuterol sulfate HFA, promethazine, heparin (porcine), heparin (porcine)     ALLERGIES:   Allergies as of 10/01/2021 - Fully Reviewed 10/01/2021   Allergen Reaction Noted    Shellfish-derived products Anaphylaxis 10/01/2021    [place] (diagnostic) Anaphylaxis 10/01/2021        OBJECTIVE:   height is 5' 6\" (1.676 m) and weight is 349 lb 2 oz (158.4 kg) (abnormal). Her axillary temperature is 98.2 °F (36.8 °C). Her blood pressure is 137/81 and her pulse is 74. Her respiration is 18 and oxygen saturation is 90%. I/O this shift:  In: 120 [P.O.:120]  Out: -      PHYSICAL EXAM:  CONSTITUTIONAL: She is a 43y.o.-year-old who appears her stated age. She is alert and oriented x 3 and in no acute distress. Morbidly obese  CARDIOVASCULAR: S1 S2 RRR.  Without murmer  RESPIRATORY & CHEST: Lungs are clear to auscultation and percussion. No wheezing, no crackles. Good air movement  GASTROINTESTINAL & ABDOMEN: Soft, nontender, positive bowel sounds in all quadrants, non-distended, without hepatosplenomegaly. GENITOURINARY: Deferred. MUSCULOSKELETAL: No tenderness to palpation of the axial skeleton. There is no clubbing. No cyanosis. No edema of the lower extremities. SKIN OF BODY: No rash or jaundice. PSYCHIATRIC EVALUATION: Normal affect. Patient answers questions appropriately. HEMATOLOGIC/LYMPHATIC/ IMMUNOLOGIC: No palpable lymphadenopathy. NEUROLOGIC: Alert and oriented x 3. Groslly non-focal. Motor strength is 5+/5 in all muscle groups. The patient has a normal sensorium globally. LABS:   Lab Results   Component Value Date    WBC 12.9 (H) 10/05/2021    HGB 14.9 10/05/2021    HCT 43.8 10/05/2021     10/05/2021    ALT 45 (H) 10/05/2021    AST 86 (H) 10/05/2021     10/05/2021    K 3.9 10/05/2021     10/05/2021    CREATININE 4.0 (H) 10/05/2021    BUN 95 (HH) 10/05/2021    CO2 24 10/05/2021    INR 1.05 10/02/2021       Lab Results   Component Value Date    GLUCOSE 96 10/05/2021    CALCIUM 9.0 10/05/2021     10/05/2021    K 3.9 10/05/2021    CO2 24 10/05/2021     10/05/2021    BUN 95 (HH) 10/05/2021    CREATININE 4.0 (H) 10/05/2021       Lab Results   Component Value Date    GLUCOSE 96 10/05/2021    CALCIUM 9.0 10/05/2021     10/05/2021    K 3.9 10/05/2021    CO2 24 10/05/2021     10/05/2021    BUN 95 (HH) 10/05/2021    CREATININE 4.0 (H) 10/05/2021       IMAGING:  I reviewed the chest x-ray from 10/1/2021 and my interpretation is as follows.  Bilateral multifocal infiltrates noted.        IMPRESSION:   COVID-19 pneumonia  Acute hypoxic respiratory failure  Acute kidney injury  Morbid obesity        RECOMMENDATION:   Patient has acute hypoxic respiratory failure secondary to COVID-19 pneumonia.     Requiring up to 11 L/min oxygen. Titrate FiO2 for saturation of 90 to 92%. Continue Decadron 20 mg IV daily, day 4.  Could not get remdesivir due to acute kidney injury. Received 1 dose of Actemra. CRP is decreasing, 21  Elevated D-dimer of 4680.  Continue heparin drip in the setting of STEVE for hypercoagulable state associated with COVID-19. D-dimer is decreasing. Creatinine is slowly improving. Good urine output. Encourage side/prone positioning in the bed as tolerated.  Out of bed in chair.       Esha Mast MD  Pulmonary Critical Care and Sleep Medicine  10/5/2021, 2:21 PM    This note was completed using dragon medical speech recognition software. Grammatical errors, random word insertions, pronoun errors and incomplete sentences are occasional consequences of this technology due to software limitations. If there are questions or concerns about the content of this note of information contained within the body of this dictation they should be addressed with the provider for clarification.

## 2021-10-05 NOTE — PROGRESS NOTES
Patient had hayde care, diaper changed, new pad placed for vaginal bleeding, Warm blanket given to pt for comfort. Patient repositioned.  Will continue to monitor

## 2021-10-05 NOTE — PROGRESS NOTES
Patient refused her dinner tray stating she didn't like the food, offered turkey sandwich and ginger ale. This RN encourage patient she can call dietary AM to order for breakfast of her choice since she is on regular diet.  Menu list provided

## 2021-10-05 NOTE — PROGRESS NOTES
APTT 183.4, heparin infusion held for an hour and decrease by 3 units/kg/hr. Now infusing at 7 unit/kg/hr. Next aPTT due at 1130.  Will continue to monitor

## 2021-10-05 NOTE — CARE COORDINATION
CM called pt and discussed [facility] outpt clinic and follow up appt will be on discharge instructions. Pt verbalized understanding. CM discussed therapy recommendations and pt wants to go home ( OT 16/24, PT 17/24). She states she will be able to call someone on day of d/c to pick her up. Pt may need home oxygen and hc, pt is pending medicaid.     Harriet Ashley RN, BSN  [phone]

## 2021-10-05 NOTE — PROGRESS NOTES
Occupational Therapy   Occupational Therapy Initial Assessment  Date: 10/5/2021   Patient Name: Jill Montanez  MRN: [id number]     : 1985    Date of Service: 10/5/2021    Discharge Recommendations: Jill Montanez scored a 16/24 on the AM-PAC ADL Inpatient form. Current research shows that an AM-PAC score of 17 or less is typically not associated with a discharge to the patient's home setting. Based on the patient's AM-PAC score and their current ADL deficits, it is recommended that the patient have 3-5 sessions per week of Occupational Therapy at d/c to increase the patient's independence. Please see assessment section for further patient specific details. If patient discharges prior to next session this note will serve as a discharge summary. Please see below for the latest assessment towards goals. OT Equipment Recommendations  Other: defer to next level of care    Assessment   Performance deficits / Impairments: Decreased functional mobility ; Decreased endurance;Decreased ADL status; Decreased high-level IADLs  Assessment: Pt is limited in the above areas impacting independence in functional mobility and ADLs. Pt would benefit from skilled inpatient OT services to address these deficits. Treatment Diagnosis: Decreased functional status secondary to PNA due to COVID-19  Prognosis: Good  Decision Making: Medium Complexity  OT Education: OT Role;Plan of Care;Transfer Training;Energy Conservation  Patient Education: magali, d/c, pursed lip breathing- pt verbalized understanding  REQUIRES OT FOLLOW UP: Yes  Activity Tolerance  Activity Tolerance: Patient limited by fatigue;Treatment limited secondary to medical complications (free text)  Activity Tolerance: Desat to 82% following stand step transfer with recovery to 85% after 1 minute and 90% after ~8 minutes with pursed lip breathing.  Pt anxious and hyperventilating following transfer and desat with therapeutic use of self utilized to calm and instruct in pursed lip breathing. Desat to 85% following removal of oxygen for nasal care with recovery to 90% after ~4 minutes. Safety Devices  Safety Devices in place: Yes  Type of devices: All fall risk precautions in place; Left in chair;Call light within reach;Nurse notified; Chair alarm in place  Restraints  Initially in place: No           Patient Diagnosis(es): The primary encounter diagnosis was Pneumonia due to COVID-19 virus. A diagnosis of Hypoxia was also pertinent to this visit. has no past medical history on file. has no past surgical history on file. Treatment Diagnosis: Decreased functional status secondary to PNA due to COVID-19      Restrictions  Restrictions/Precautions  Restrictions/Precautions: Fall Risk, General Precautions, Isolation (HIGH FALL RISK, Droplet Plus Isolation)  Required Braces or Orthoses?: No  Position Activity Restriction  Other position/activity restrictions: From home with COVID. Subjective   General  Chart Reviewed: Yes  Patient assessed for rehabilitation services?: Yes  Family / Caregiver Present: No  Diagnosis: PNA due to COVID-19  Subjective  Subjective: Pt supine in bed upon arrival; agreeable to eval with encouragement. Patient Currently in Pain: Denies  Vital Signs  Patient Currently in Pain: Denies  Social/Functional History  Social/Functional History  Lives With: Alone  Type of Home: House ([facility])  Home Layout: Two level, Bed/Bath upstairs (full flight of stairs to bedroom/bathroom with RHR)  Home Access: Level entry  Bathroom Shower/Tub: Tub/Shower unit  Bathroom Toilet: Handicap height (standard on the first floor; [place] toilet on second floor)  ADL Assistance: Independent  Homemaking Assistance: Independent  Ambulation Assistance: Independent  Transfer Assistance: Independent  Active : Yes  Occupation: Full time employment  Type of occupation: taxes  Leisure & Hobbies: deshawn  Additional Comments: Pt reports no recent falls.        Objective   Vision: Impaired  Vision Exceptions: Wears glasses for distance (for driving)  Hearing: Within functional limits    Orientation  Overall Orientation Status: Impaired  Orientation Level: Oriented to place; Disoriented to time;Oriented to situation;Oriented to person  Observation/Palpation  Posture: Fair (rounded shoulders)  Observation: angulo, IV, nasal cannula  Balance  Sitting Balance: Supervision  Standing Balance: Contact guard assistance  Standing Balance  Time: ~2 minutes  Activity: transfer  Comment: no device  Functional Mobility  Functional - Mobility Device: No device  Activity: Other (~4 steps to chair)  Assist Level: Contact guard assistance  Functional Mobility Comments: limited due to fatigue and respiratory status  ADL  Grooming: Minimal assistance;Setup (washed face with wipes, nasal care due to dryness)  Toileting: Dependent/Total (angulo catheter)  Additional Comments: Pt declined additional ADLs due to fatigue. Tone RUE  RUE Tone: Normotonic  Tone LUE  LUE Tone: Normotonic  Coordination  Movements Are Fluid And Coordinated: Yes  Coordination and Movement description: Decreased speed     Bed mobility  Supine to Sit: Stand by assistance (HOB raised, required increased time to perform)  Scooting: Stand by assistance  Transfers  Stand Step Transfers: Contact guard assistance  Sit to stand: Contact guard assistance  Stand to sit: Contact guard assistance  Transfer Comments: EOB>recliner     Cognition  Overall Cognitive Status: WFL  Following Commands:  Follows one step commands with increased time  Attention Span: Difficulty dividing attention  Cognition Comment: increased time required for most actions  Perception  Overall Perceptual Status: WFL     Sensation  Overall Sensation Status: WFL        LUE AROM (degrees)  LUE AROM : WFL  Left Hand AROM (degrees)  Left Hand AROM: WFL  RUE AROM (degrees)  RUE AROM : WFL  Right Hand AROM (degrees)  Right Hand AROM: WFL  LUE Strength  Gross LUE Strength: WFL  RUE Strength  Gross RUE Strength: WFL                   Plan   Plan  Times per week: 3-5  Specific instructions for Next Treatment: cotx for ambulation  Current Treatment Recommendations: Functional Mobility Training, Endurance Training, Self-Care / ADL    G-Code     OutComes Score                                                  AM-PAC Score        AM-PAC Inpatient Daily Activity Raw Score: 16 (10/05/21 1151)  AM-PAC Inpatient ADL T-Scale Score : 35.96 (10/05/21 1151)  ADL Inpatient CMS 0-100% Score: 53.32 (10/05/21 1151)  ADL Inpatient CMS G-Code Modifier : CK (10/05/21 1151)    Goals  Short term goals  Time Frame for Short term goals: d/c  Short term goal 1: Pt will complete functional transfer with supervision and SpO2 remaining above 90%. Short term goal 2: Pt will complete functional mobility with supervision and SpO2 remaining above 90%. Short term goal 3: Pt will complete bed mobility mod I. Short term goal 4: Pt will complete UB ADLs with supervision and SpO2 remaining above 90%. Short term goal 5: Pt will complete LB ADLs with supervision and SpO2 remaining above 90%.   Patient Goals   Patient goals : \"get better\"       Therapy Time   Individual Concurrent Group Co-treatment   Time In 3670         Time Out 6931         Minutes 64         Timed Code Treatment Minutes: 150 [facility], . [facility] 126

## 2021-10-05 NOTE — DISCHARGE INSTR - COC
Continuity of Care Form    Patient Name: Kj Jaramillo   :  1985  MRN:  [id number]    Admit date:  10/1/2021  Discharge date:  ***    Code Status Order: Full Code   Advance Directives:     Admitting Physician:  Saud Rehman MD  PCP: No primary care provider on file. Discharging Nurse: [facility] Unit/Room#: 9UR-6956/1758-60  Discharging Unit Phone Number: ***    Emergency Contact:   Extended Emergency Contact Information  Primary Emergency Contact: Pradeep Rdztieraarcelia, [address] Phone: [phone]  Relation: Parent  Preferred language: English   needed? No    Past Surgical History:  History reviewed. No pertinent surgical history. Immunization History: There is no immunization history on file for this patient.     Active Problems:  Patient Active Problem List   Diagnosis Code    Pneumonia due to COVID-19 virus U07.1, J12.82    STEVE (acute kidney injury) ([facility] [address] N17.9    Hypotension due to hypovolemia I95.89, E86.1    Morbid obesity ([facility] [address] E66.01       Isolation/Infection:   Isolation          Droplet Plus        Patient Infection Status     Infection Onset Added Last Indicated Last Indicated By Review Planned Expiration Resolved Resolved By    COVID-19 10/01/21 10/02/21 10/02/21 Respiratory Panel, Molecular, with COVID-19 (Restricted: peds pts or suitable admitted adults) 10/10/21 10/16/21      Resolved    COVID-19 Rule Out 10/02/21 10/02/21 10/02/21 Respiratory Panel, Molecular, with COVID-19 (Restricted: peds pts or suitable admitted adults) (Ordered)   10/03/21 Nahomi Lowe RN    C-diff Rule Out 10/01/21 10/01/21 10/04/21 GI Bacterial Pathogens By PCR (Ordered)   10/03/21 Nahomi Lowe RN    COVID-19 Rule Out 10/01/21 10/01/21 10/01/21 COVID-19 (Ordered)   10/02/21 Rule-Out Test Resulted          Nurse Assessment:  Last Vital Signs: /82   Pulse 70   Temp 97.3 °F (36.3 °C) (Oral)   Resp 18   Ht 5' 6\" (1.676 m)   Wt (!) 349 lb 2 oz (158.4 kg)   SpO2 92%   BMI 56.35 kg/m²     Last documented pain score (0-10 scale): Pain Level: 0  Last Weight:   Wt Readings from Last 1 Encounters:   10/03/21 (!) 349 lb 2 oz (158.4 kg)     Mental Status:  {IP PT MENTAL STATUS:}    IV Access:  { ANNEL IV ACCESS:611250328}    Nursing Mobility/ADLs:  Walking   {CHP DME UTYJ:432879521}  Transfer  {CHP DME FWEV:633613856}  Bathing  {CHP DME CSZD:639299940}  Dressing  {CHP DME GSRC:044845020}  Toileting  {CHP DME EDBR:317045367}  Feeding  {P DME XTGM:132769197}  Med Admin  {P DME GGFY:661977276}  Med Delivery   { ANNEL MED Delivery:180605264}    Wound Care Documentation and Therapy:        Elimination:  Continence:   · Bowel: {YES / AY:35560}  · Bladder: {YES / NN:73255}  Urinary Catheter: {Urinary Catheter:153345346}   Colostomy/Ileostomy/Ileal Conduit: {YES / IK:83553}       Date of Last BM: ***    Intake/Output Summary (Last 24 hours) at 10/5/2021 1535  Last data filed at 10/5/2021 1438  Gross per 24 hour   Intake 1459.47 ml   Output 2250 ml   Net -790.53 ml     I/O last 3 completed shifts: In: 1459.5 [P.O.:1200; I.V.:209.5;  IV Piggyback:50]  Out: 2250 [Urine:2250]    Safety Concerns:     [employer] Safety Concerns:203529752}    Impairments/Disabilities:      [employer] Impairments/Disabilities:898982666}    Nutrition Therapy:  Current Nutrition Therapy:   [employer] Diet List:507888923}    Routes of Feeding: {P DME Other Feedings:599338495}  Liquids: {Slp liquid thickness:45533}  Daily Fluid Restriction: {CHP DME Yes amt example:732260459}  Last Modified Barium Swallow with Video (Video Swallowing Test): {Done Not Done JNAX:817136063}    Treatments at the Time of Hospital Discharge:   Respiratory Treatments: ***  Oxygen Therapy:  {Therapy; copd oxygen:46942}  Ventilator:    { CC Vent GWQT:810125948}    Rehab Therapies: {THERAPEUTIC INTERVENTION:7041968585}  Weight Bearing Status/Restrictions: [address] Weight Bearin}  Other Medical Equipment (for information only, NOT a DME order): {EQUIPMENT:057928678}  Other Treatments: ***    Patient's personal belongings (please select all that are sent with patient):  {CHP DME Belongings:164072034}    RN SIGNATURE:  {Esignature:550710096}    CASE MANAGEMENT/SOCIAL WORK SECTION    Inpatient Status Date: 10/1/2021    Readmission Risk Assessment Score:  Readmission Risk              Risk of Unplanned Readmission:  14             A FOLLOW UP APPOINTMENT HAS BEEN MADE FOR YOU AT [facility] [address] Tuesday October 19TH AT 10:15 AM WITH  [facility]  PHONE: [phone]    Discharging to Facility/ Agency   · Name:   · Address:  · Phone:  · Fax:    Dialysis Facility (if applicable)   · Name:  · Address:  · Dialysis Schedule:  · Phone:  · Fax:    / signature: Bluford Libman, RN, BSN  [phone]       PHYSICIAN SECTION    Prognosis: {Prognosis:9530085656}    Condition at Discharge: [address] [place] [address] Patient Condition:579474257}    Rehab Potential (if transferring to Rehab): {Prognosis:3006565025}    Recommended Labs or Other Treatments After Discharge: ***    Physician Certification: I certify the above information and transfer of Ernie Yoo  is necessary for the continuing treatment of the diagnosis listed and that she requires {Admit to Appropriate Level of Care:72062} for {GREATER/LESS:735904121} 30 days.      Update Admission H&P: {CHP DME Changes in YMUAO:057820774}    PHYSICIAN SIGNATURE:  {Esignature:407865497}

## 2021-10-05 NOTE — PROGRESS NOTES
Office : [phone]     Fax :[phone]       Nephrology progress  Note      Patient's Name: Mary Farooq  8:30 AM  10/5/2021    Reason for Consult:  leeroy      Chief Complaint:    Chief Complaint   Patient presents with    Shortness of Breath     pt. with sob, cough, fever and weakness that started on 9/19.  pt. with increased SOB tonight. pt. was 84% on room air. pt. pt. arrived by [place]. History of Present iIlness:      Mary Farooq is a 43 y.o. female with  history of obesity due to excess calories, who presents to the emergency room with complaints of cough, fever, chills, shortness of breath, diarrhea, generalized body aches, generalized weakness, ongoing since 9/19/2021. She was noted to be hypoxic with oxygen saturation of 84% on room air, requiring as much as 5 L/min supplemental oxygen at the time of my evaluation in the emergency room. She does not have chest pain. Chest x-ray is consistent with multifocal opacities in both lung bases. She has no leukocytosis. Abnormal labs include sodium of 135, BUN 66, creatinine 4.0, LDH 1191, calcitonin level of 0.78, D-dimer 4401, fibrinogen of 627. Interval hx       No fever   UOP improved   Creatinine levels better   No edema      On 11 liter oxygen per NC     I/O last 3 completed shifts: In: 1519.5 [P.O.:1260; I.V.:209.5;  IV Piggyback:50]  Out: 1900 [Urine:1900]          Current Medications:    sodium chloride (OCEAN, BABY AYR) 0.65 % nasal spray 1 spray, Q4H PRN  famotidine (PEPCID) tablet 20 mg, Daily  sodium bicarbonate 75 mEq in sodium chloride 0.45 % 1,000 mL infusion, Continuous  sodium chloride flush 0.9 % injection 5-40 mL, 2 times per day  sodium chloride flush 0.9 % injection 5-40 mL, PRN  0.9 % sodium chloride infusion, PRN  aluminum & magnesium hydroxide-simethicone (MAALOX) 200-200-20 MG/5ML suspension 30 mL, Q6H PRN  polyethylene glycol (GLYCOLAX) packet 17 g, Daily PRN  acetaminophen (TYLENOL) tablet 650 mg, Q6H PRN   Or  acetaminophen (TYLENOL) suppository 650 mg, Q6H PRN  guaiFENesin-dextromethorphan (ROBITUSSIN DM) 100-10 MG/5ML syrup 5 mL, Q4H PRN  Vitamin D (CHOLECALCIFEROL) tablet 2,000 Units, Daily  albuterol sulfate  (90 Base) MCG/ACT inhaler 2 puff, Q6H PRN  promethazine (PHENERGAN) injection 6.25 mg, Q6H PRN  heparin (porcine) injection 10,000 Units, PRN  heparin (porcine) injection 5,000 Units, PRN  heparin 25,000 units in dextrose 5% 250 mL (premix) infusion, Continuous  dexamethasone (DECADRON) 20 mg in sodium chloride 0.9 % IVPB, Daily          Physical exam:     Vitals:  /87   Pulse 83   Temp 98.3 °F (36.8 °C) (Axillary)   Resp 18   Ht 5' 6\" (1.676 m)   Wt (!) 349 lb 2 oz (158.4 kg)   SpO2 93%   BMI 56.35 kg/m²   Constitutional:  OAA X3 NAD  Skin: no rash, turgor wnl  Heent:  eomi, mmm  Neck: no bruits or jvd noted  Cardiovascular:  S1, S2 without m/r/g  Respiratory: decreased air entry b/l bases   Abdomen:  +bs, soft, nt, nd  Ext: mild   lower extremity edema    Labs:  CBC:   Recent Labs     10/03/21  0500 10/04/21  0525 10/05/21  0549   WBC 7.2 9.9 12.9*   HGB 14.5 13.9 14.9    296 347     BMP:    Recent Labs     10/03/21  0500 10/04/21  0525 10/05/21  0549    141 142   K 4.1 3.8 3.9    103 102   CO2 22 22 24   BUN 97* 100* 95*   CREATININE 6.5* 5.6* 4.0*   GLUCOSE 117* 127* 96     Ca/Mg/Phos:   Recent Labs     10/03/21  0500 10/04/21  0525 10/05/21  0549   CALCIUM 8.8 8.9 9.0   MG 2.60* 2.60*  --    PHOS 3.9 4.6  --      Hepatic:   Recent Labs     10/03/21  0500 10/04/21  0525 10/05/21  0549   AST 33 57* 86*   ALT 17 27 45*   BILITOT <0.2 <0.2 0.3   ALKPHOS 60 62 76     Troponin:   No results for input(s): TROPONINI in the last 72 hours.   BNP: No results for input(s): BNP in the last 72 hours. Lipids: No results for input(s): CHOL, TRIG, HDL, LDLCALC, LABVLDL in the last 72 hours. ABGs: No results for input(s): PHART, PO2ART, BDT9MDX in the last 72 hours. INR:   No results for input(s): INR in the last 72 hours. UA:  No results for input(s): Leafy Alstrom, GLUCOSEU, BILIRUBINUR, KETUA, SPECGRAV, BLOODU, PHUR, PROTEINU, UROBILINOGEN, NITRU, LEUKOCYTESUR, Randine Friar in the last 72 hours. Urine Microscopic:   No results for input(s): LABCAST, BACTERIA, COMU, HYALCAST, WBCUA, RBCUA, EPIU in the last 72 hours. Urine Culture: No results for input(s): LABURIN in the last 72 hours. Urine Chemistry:   No results for input(s): Meet Feliciano, PROTEINUR, NAUR in the last 72 hours. IMAGING:  US RENAL COMPLETE   Final Result   Very limited visualization of the left kidney, grossly unremarkable, no   evidence of hydronephrosis. Unremarkable right kidney. Collapsed bladder around a Bill catheter. XR CHEST 1 VIEW   Final Result   Multifocal opacities within both lungs suggesting multifocal pneumonia. Reported positive COVID. Assessment/Plan :      1. STEVE   Has severe ATN   UOP improved overnight   ATN recovering   2/2 ATN from hypotension/ sepsis . Gentle fluids       renal USG  - no hydronephrosis       Recommend to dose adjust all medications  based on renal functions  Maintain SBP> 90 mmHg   Daily weights   AVOID NSAIDs  Avoid Nephrotoxins  Monitor Intake/Output  Call if significant decrease in urine output     2. Hypotension 2/2 sepsis   Improved     3. Hypoxia 2/2  covid 19 pneumonia   On dexamethasone   On 11 L oxygen   Pulmonology seeing   Got actemra       4. Acid- base disorder. Metabolic acidosis   Resolved   DC bicarbonate in iv fluids     5. Electrolytes. monitor     6. Vitamin D defeciency.  On vitamin D supplements             Thank you for allowing us to participate in care of Lei Wilson Electronically signed by: Betsey Soler MD, 10/5/2021, 8:30 AM      [facility] [address]  Office : [phone]  Fax :[phone]

## 2021-10-05 NOTE — PROGRESS NOTES
ptt 81.7 no change heparin continues 7units/Kg/hr pt up in chair with therapy call light in reach pt aware of new order for midline ok with nephrology.

## 2021-10-05 NOTE — PROGRESS NOTES
Pt incontinent of loose stool cleaned up assisted into bed per pt request. Denies any pain linens and gown changed pt remains pleasant and calm.  Call light in reach sat 93 on 7L high flow

## 2021-10-05 NOTE — PROGRESS NOTES
Pt denies any pain VSS remains 11L high flow. Pt encouraged to get out of bed refused at this time. Pt stated she is repositioning self while in bed. Fresh ice and water given. Pt pleasant calm resting call light in reach prn tylenol given.  Pt compliant with IS heparin drip per order

## 2021-10-05 NOTE — PLAN OF CARE
Problem: Breathing Pattern - Ineffective:  Goal: Ability to achieve and maintain a regular respiratory rate will improve  Description: Ability to achieve and maintain a regular respiratory rate will improve  Outcome: Ongoing     Problem: Skin Integrity:  Goal: Will show no infection signs and symptoms  Description: Will show no infection signs and symptoms  Outcome: Ongoing  Goal: Absence of new skin breakdown  Description: Absence of new skin breakdown  Outcome: Ongoing     Problem: Falls - Risk of:  Goal: Will remain free from falls  Description: Will remain free from falls  Outcome: Ongoing  Goal: Absence of physical injury  Description: Absence of physical injury  Outcome: Ongoing     Problem: Airway Clearance - Ineffective  Goal: Achieve or maintain patent airway  Outcome: Ongoing     Problem: Gas Exchange - Impaired  Goal: Absence of hypoxia  Outcome: Ongoing  Goal: Promote optimal lung function  Outcome: Ongoing     Problem: Breathing Pattern - Ineffective  Goal: Ability to achieve and maintain a regular respiratory rate  Outcome: Ongoing     Problem:  Body Temperature -  Risk of, Imbalanced  Goal: Ability to maintain a body temperature within defined limits  Outcome: Ongoing  Goal: Will regain or maintain usual level of consciousness  Outcome: Ongoing  Goal: Complications related to the disease process, condition or treatment will be avoided or minimized  Outcome: Ongoing     Problem: Isolation Precautions - Risk of Spread of Infection  Goal: Prevent transmission of infection  Outcome: Ongoing     Problem: Nutrition Deficits  Goal: Optimize nutritional status  Outcome: Ongoing     Problem: Risk for Fluid Volume Deficit  Goal: Maintain normal heart rhythm  Outcome: Ongoing  Goal: Maintain absence of muscle cramping  Outcome: Ongoing  Goal: Maintain normal serum potassium, sodium, calcium, phosphorus, and pH  Outcome: Ongoing     Problem: Loneliness or Risk for Loneliness  Goal: Demonstrate positive use of time alone when socialization is not possible  Outcome: Ongoing     Problem: Fatigue  Goal: Verbalize increase energy and improved vitality  Outcome: Ongoing     Problem: Patient Education: Go to Patient Education Activity  Goal: Patient/Family Education  Outcome: Ongoing

## 2021-10-05 NOTE — PROGRESS NOTES
Physical Therapy    Facility/Department: [address] ONCOLOGY  Initial Assessment    NAME: Ernie Yoo  : 1985  MRN: [id number]    Date of Service: 10/5/2021    Discharge Recommendations: Ernie Yoo scored a 17/24 on the AM-PAC short mobility form. Current research shows that an AM-PAC score of 17 or less is typically not associated with a discharge to the patient's home setting. Based on the patient's AM-PAC score and their current functional mobility deficits, it is recommended that the patient have 3-5 sessions per week of Physical Therapy at d/c to increase the patient's independence. Please see assessment section for further patient specific details. If patient discharges prior to next session this note will serve as a discharge summary. Please see below for the latest assessment towards goals. PT Equipment Recommendations  Equipment Needed: No    Assessment   Body structures, Functions, Activity limitations: Decreased strength;Decreased endurance;Decreased balance  Assessment: Pt presents with impaired functional strength and endurance and decreased standing balance impairing her ability to perform functional mobility safely and independently. Pt with PLOF of independence and currently requires SBA/CGA for mobility and unable to ambulate beyond 3'. Pt requiring 11L O2 at this time and desatting to low/mid 80s with mobility and requiring increased time for recovery. Pt would benefit from acute PT services to address deficits and facilitate return to PLOF. Treatment Diagnosis: impaired gait, transfers, and balance  Prognosis: Good  Decision Making: Medium Complexity  Clinical Presentation: evolving  PT Education: Goals;PT Role;Plan of Care;Energy Conservation;Transfer Training;General Safety;Orientation; Functional Mobility Training  Patient Education: PLB, d/c recommendations--pt verbalizing understanding  Barriers to Learning: none  REQUIRES PT FOLLOW UP: Yes  Activity Tolerance  Activity Tolerance: Patient limited by endurance; Patient limited by fatigue  Activity Tolerance: required increased time to perform all tasks during session and increased time for SpO2 recovery. Patient Diagnosis(es): The primary encounter diagnosis was Pneumonia due to COVID-19 virus. A diagnosis of Hypoxia was also pertinent to this visit. has no past medical history on file. has no past surgical history on file. Restrictions  Restrictions/Precautions  Restrictions/Precautions: Fall Risk, General Precautions, Isolation (HIGH FALL RISK, Droplet Plus Isolation)  Required Braces or Orthoses?: No  Position Activity Restriction  Other position/activity restrictions: From home with COVID. Vision/Hearing  Vision: Impaired  Vision Exceptions: Wears glasses for distance (for driving)  Hearing: Within functional limits       Subjective  General  Chart Reviewed: Yes  Patient assessed for rehabilitation services?: Yes  Response To Previous Treatment: Not applicable  Family / Caregiver Present: No  Diagnosis: PNA due to COVID-19  Follows Commands: Within Functional Limits  General Comment  Comments: Pt supine in bed upon arrival.  Subjective  Subjective: Pt agreeable to PT/OT eval, reporting no pain at this time.   Pain Screening  Patient Currently in Pain: Denies  Vital Signs  Patient Currently in Pain: Denies       Orientation  Orientation  Overall Orientation Status: Impaired  Orientation Level: Oriented to place;Oriented to situation;Oriented to person;Disoriented to time     Social/Functional History  Social/Functional History  Lives With: Alone  Type of Home: House (townhouse)  Home Layout: Two level, Bed/Bath upstairs (full flight of stairs to bedroom/bathroom with RHR)  Home Access: Level entry  Bathroom Shower/Tub: Tub/Shower unit  Bathroom Toilet: Handicap height (standard on the first floor; [facility] toilet on second floor)  ADL Assistance: Independent  Homemaking Assistance: Independent  Ambulation Assistance: Independent  Transfer Assistance: Independent  Active : Yes  Occupation: Full time employment  Type of occupation: taxes  Leisure & Hobbies: deshawn  Additional Comments: Pt reports no recent falls. Objective  AROM RLE (degrees)  RLE AROM: WFL  AROM LLE (degrees)  LLE AROM : WFL  Strength RLE  Strength RLE: WFL  Comment: grossly 3+/5  Strength LLE  Strength LLE: WFL  Comment: grossly 3+/5  Tone RLE  RLE Tone: Normotonic  Tone LLE  LLE Tone: Normotonic  Motor Control  Gross Motor?: WFL  Sensation  Overall Sensation Status: WFL  Bed mobility  Supine to Sit: Stand by assistance (HOB raised, required increased time to perform)  Scooting: Stand by assistance  Transfers  Sit to Stand: Contact guard assistance  Stand to sit: Contact guard assistance  Bed to Chair: Contact guard assistance  Comment: SpO2 dropping to 82% on 11L O2 during transfer, requiring ~12-15 minutes for recovery to 90%. Ambulation  Ambulation?: Yes  Ambulation 1  Surface: level tile  Device: No Device  Other Apparatus: O2 (11L)  Assistance: Contact guard assistance  Quality of Gait: wide Lyudmila, increased medial-lateral sway, decreased B step lengths/heights  Distance: 3'  Comments: Pt performed stand step transfer EOB>recliner, no LOB. SpO2 dropping as documented above, requiring increased time for recovery.   Stairs/Curb  Stairs?: No     Balance  Posture: Fair  Sitting - Static: Good  Sitting - Dynamic: Good (supervision seated at EOB)  Standing - Static: Fair (CGA standing without UE support)  Standing - Dynamic: Fair (CGA for transfers and limited ambulation)        Plan   Plan  Times per week: 3-5x  Times per day: Daily  Current Treatment Recommendations: Strengthening, Balance Training, Functional Mobility Training, Transfer Training, Gait Training, Stair training, Endurance Training, Neuromuscular Re-education, Positioning, Modalities, Equipment Evaluation, Education, & procurement, Patient/Caregiver Education & Training, Safety Education & Training, Home Exercise Program  Safety Devices  Type of devices:  All fall risk precautions in place, Call light within reach, Chair alarm in place, Gait belt, Patient at risk for falls, Nurse notified, Left in chair  Restraints  Initially in place: No    G-Code       OutComes Score                   AM-PAC Score  AM-PAC Inpatient Mobility Raw Score : 17 (10/05/21 1150)  AM-PAC Inpatient T-Scale Score : 42.13 (10/05/21 1150)  Mobility Inpatient CMS 0-100% Score: 50.57 (10/05/21 1150)  Mobility Inpatient CMS G-Code Modifier : CK (10/05/21 1150)          Goals  Short term goals  Time Frame for Short term goals: upon d/c  Short term goal 1: Pt will perform bed mobility MOD I  Short term goal 2: Pt will perform transfers with LRAD and supervision  Short term goal 3: Pt will ambulate 22' with LRAD and CGA  Patient Goals   Patient goals : pt did not state       Therapy Time   Individual Concurrent Group Co-treatment   Time In 1030         Time Out 1134         Minutes 64              Timed Code Treatment Minutes:   49    Total Treatment Minutes:  11 [facility], [address] [place] [address] [id number]

## 2021-10-05 NOTE — PROGRESS NOTES
Rounded on patient, patient requesting to be changed because she had a BM, this RN checked pt and she was dry. Patient repositioned in bed.  Will continue to monitor

## 2021-10-06 LAB
A/G RATIO: 0.9 (ref 1.1–2.2)
ALBUMIN SERPL-MCNC: 2.9 G/DL (ref 3.4–5)
ALP BLD-CCNC: 79 U/L (ref 40–129)
ALT SERPL-CCNC: 60 U/L (ref 10–40)
ANION GAP SERPL CALCULATED.3IONS-SCNC: 12 MMOL/L (ref 3–16)
APTT: 103.1 SEC (ref 26.2–38.6)
APTT: 56.7 SEC (ref 26.2–38.6)
AST SERPL-CCNC: 86 U/L (ref 15–37)
BANDED NEUTROPHILS RELATIVE PERCENT: 7 % (ref 0–7)
BASOPHILS ABSOLUTE: 0 K/UL (ref 0–0.2)
BASOPHILS RELATIVE PERCENT: 0 %
BILIRUB SERPL-MCNC: 0.3 MG/DL (ref 0–1)
BUN BLDV-MCNC: 72 MG/DL (ref 7–20)
C-REACTIVE PROTEIN: 7 MG/L (ref 0–5.1)
CALCIUM SERPL-MCNC: 9.2 MG/DL (ref 8.3–10.6)
CHLORIDE BLD-SCNC: 107 MMOL/L (ref 99–110)
CO2: 26 MMOL/L (ref 21–32)
CREAT SERPL-MCNC: 2.4 MG/DL (ref 0.6–1.1)
D DIMER: 1970 NG/ML DDU (ref 0–229)
EOSINOPHILS ABSOLUTE: 0.1 K/UL (ref 0–0.6)
EOSINOPHILS RELATIVE PERCENT: 1 %
GFR AFRICAN AMERICAN: 27
GFR NON-AFRICAN AMERICAN: 23
GLOBULIN: 3.4 G/DL
GLUCOSE BLD-MCNC: 122 MG/DL (ref 70–99)
HCT VFR BLD CALC: 41.1 % (ref 36–48)
HEMOGLOBIN: 14 G/DL (ref 12–16)
LYMPHOCYTES ABSOLUTE: 1.5 K/UL (ref 1–5.1)
LYMPHOCYTES RELATIVE PERCENT: 11 %
MCH RBC QN AUTO: 31.8 PG (ref 26–34)
MCHC RBC AUTO-ENTMCNC: 33.9 G/DL (ref 31–36)
MCV RBC AUTO: 93.6 FL (ref 80–100)
METAMYELOCYTES RELATIVE PERCENT: 1 %
MONOCYTES ABSOLUTE: 0.4 K/UL (ref 0–1.3)
MONOCYTES RELATIVE PERCENT: 3 %
NEUTROPHILS ABSOLUTE: 11.6 K/UL (ref 1.7–7.7)
NEUTROPHILS RELATIVE PERCENT: 77 %
PDW BLD-RTO: 13 % (ref 12.4–15.4)
PLATELET # BLD: 401 K/UL (ref 135–450)
PMV BLD AUTO: 8.6 FL (ref 5–10.5)
POTASSIUM REFLEX MAGNESIUM: 4.5 MMOL/L (ref 3.5–5.1)
RBC # BLD: 4.4 M/UL (ref 4–5.2)
SODIUM BLD-SCNC: 145 MMOL/L (ref 136–145)
TOTAL PROTEIN: 6.3 G/DL (ref 6.4–8.2)
WBC # BLD: 13.7 K/UL (ref 4–11)

## 2021-10-06 PROCEDURE — 97530 THERAPEUTIC ACTIVITIES: CPT

## 2021-10-06 PROCEDURE — 85730 THROMBOPLASTIN TIME PARTIAL: CPT

## 2021-10-06 PROCEDURE — 36410 VNPNXR 3YR/> PHY/QHP DX/THER: CPT

## 2021-10-06 PROCEDURE — 99233 SBSQ HOSP IP/OBS HIGH 50: CPT | Performed by: INTERNAL MEDICINE

## 2021-10-06 PROCEDURE — C1751 CATH, INF, PER/CENT/MIDLINE: HCPCS

## 2021-10-06 PROCEDURE — 02HV33Z INSERTION OF INFUSION DEVICE INTO SUPERIOR VENA CAVA, PERCUTANEOUS APPROACH: ICD-10-PCS | Performed by: INTERNAL MEDICINE

## 2021-10-06 PROCEDURE — 6360000002 HC RX W HCPCS: Performed by: INTERNAL MEDICINE

## 2021-10-06 PROCEDURE — 2580000003 HC RX 258: Performed by: INTERNAL MEDICINE

## 2021-10-06 PROCEDURE — 6370000000 HC RX 637 (ALT 250 FOR IP): Performed by: INTERNAL MEDICINE

## 2021-10-06 PROCEDURE — 36415 COLL VENOUS BLD VENIPUNCTURE: CPT

## 2021-10-06 PROCEDURE — 2060000000 HC ICU INTERMEDIATE R&B

## 2021-10-06 PROCEDURE — 80053 COMPREHEN METABOLIC PANEL: CPT

## 2021-10-06 PROCEDURE — 85379 FIBRIN DEGRADATION QUANT: CPT

## 2021-10-06 PROCEDURE — 85025 COMPLETE CBC W/AUTO DIFF WBC: CPT

## 2021-10-06 PROCEDURE — 2700000000 HC OXYGEN THERAPY PER DAY

## 2021-10-06 PROCEDURE — 86140 C-REACTIVE PROTEIN: CPT

## 2021-10-06 PROCEDURE — 94761 N-INVAS EAR/PLS OXIMETRY MLT: CPT

## 2021-10-06 RX ADMIN — GUAIFENESIN SYRUP AND DEXTROMETHORPHAN 5 ML: 100; 10 SYRUP ORAL at 21:37

## 2021-10-06 RX ADMIN — DEXAMETHASONE SODIUM PHOSPHATE 20 MG: 4 INJECTION, SOLUTION INTRA-ARTICULAR; INTRALESIONAL; INTRAMUSCULAR; INTRAVENOUS; SOFT TISSUE at 10:51

## 2021-10-06 RX ADMIN — HEPARIN SODIUM 5000 UNITS: 1000 INJECTION INTRAVENOUS; SUBCUTANEOUS at 08:07

## 2021-10-06 RX ADMIN — SODIUM CHLORIDE 1000 ML: 9 INJECTION, SOLUTION INTRAVENOUS at 21:33

## 2021-10-06 RX ADMIN — Medication 10 ML: at 07:57

## 2021-10-06 RX ADMIN — Medication 2000 UNITS: at 07:55

## 2021-10-06 RX ADMIN — GUAIFENESIN SYRUP AND DEXTROMETHORPHAN 5 ML: 100; 10 SYRUP ORAL at 03:52

## 2021-10-06 RX ADMIN — FAMOTIDINE 20 MG: 20 TABLET, FILM COATED ORAL at 07:56

## 2021-10-06 RX ADMIN — SODIUM CHLORIDE 1000 ML: 9 INJECTION, SOLUTION INTRAVENOUS at 06:09

## 2021-10-06 RX ADMIN — HEPARIN SODIUM 7 UNITS/KG/HR: 10000 INJECTION, SOLUTION INTRAVENOUS at 06:08

## 2021-10-06 ASSESSMENT — PAIN SCALES - GENERAL
PAINLEVEL_OUTOF10: 0

## 2021-10-06 NOTE — PROGRESS NOTES
Physical Therapy  Facility/Department: [address] ONCOLOGY  Daily Treatment Note  NAME: Vidal Mahajan  : 1985  MRN: [id number]    Date of Service: 10/6/2021    Discharge Recommendations:  3-5 sessions per week   PT Equipment Recommendations  Equipment Needed:  (will continue to assess, potentitally bariatric RW)    Vidal Mahajan scored a 17/24 on the AM-PAC short mobility form. Current research shows that an AM-PAC score of 17 or less is typically not associated with a discharge to the patient's home setting. Based on the patient's AM-PAC score and their current functional mobility deficits, it is recommended that the patient have 3-5 sessions per week of Physical Therapy at d/c to increase the patient's independence. Please see assessment section for further patient specific details. If patient discharges prior to next session this note will serve as a discharge summary. Please see below for the latest assessment towards goals. Assessment   Body structures, Functions, Activity limitations: Decreased strength;Decreased endurance;Decreased balance  Assessment: Pt contiues to present limited due to impaired tolerance to activity requiring extended rest breaks with all mobility on 10L O2. Pt will continue to benefit from skilled PT services to progress independence with mobility and activity tolerance. Treatment Diagnosis: impaired gait, transfers, and balance  Prognosis: Good  PT Education: Goals;PT Role;Plan of Care;Energy Conservation;Transfer Training;General Safety;Orientation; Functional Mobility Training  Patient Education: PLB, d/c recommendations--pt verbalizing understanding  REQUIRES PT FOLLOW UP: Yes  Activity Tolerance  Activity Tolerance: Patient limited by endurance; Patient limited by fatigue  Activity Tolerance: required increased time to perform all tasks during session and increased time for SpO2 recovery on 10L O2     Patient Diagnosis(es): The primary encounter diagnosis was Pneumonia due to COVID-19 virus. A diagnosis of Hypoxia was also pertinent to this visit. has no past medical history on file. has no past surgical history on file. Restrictions  Restrictions/Precautions  Restrictions/Precautions: Fall Risk, General Precautions, Isolation  Required Braces or Orthoses?: No  Position Activity Restriction  Other position/activity restrictions: 43 y.o. female with  history of obesity, who presents to the emergency room with complaints of cough, fever, chills, shortness of breath, diarrhea, generalized body aches, generalized weakness, ongoing since 9/19/2021. She was noted to be hypoxic with oxygen saturation of 84% on room air, requiring as much as 5 L/min supplemental oxygen at the time of my evaluation in the emergency room. Subjective   General  Chart Reviewed: Yes  Response To Previous Treatment: Patient with no complaints from previous session. Family / Caregiver Present: No  Subjective  Subjective: Pt agreeable to PT/OT tx session, denies pain.  Pt on 10L O2 upon PT arrival  General Comment  Comments: Pt supine in bed upon arrival.  Pain Screening  Patient Currently in Pain: Denies  Vital Signs  Patient Currently in Pain: Denies       Cognition   Cognition  Overall Cognitive Status: WFL  Cognition Comment: increased time required for most actions  Objective   Bed mobility  Supine to Sit: Stand by assistance (HOB elevated - increased time to recover SPO2 once seated EOB)  Sit to Supine: Stand by assistance  Scooting: Stand by assistance  Transfers  Sit to Stand: Contact guard assistance (up to bariatric RW x 2 reps)  Stand to sit: Contact guard assistance  Bed to Chair: Contact guard assistance (via stand step with use of bariatric RW)  Comment: SPO2 declines to 84-85% on 10L O2 while transferring, requires ~2 minutes to recover to 88-92%  Ambulation  Ambulation?: Yes  Ambulation 1  Surface: level tile  Device:  (bariatric RW)  Other Apparatus: O2 (10L)  Assistance: Contact guard assistance  Quality of Gait: wide Lyudmila, increased medial-lateral sway, decreased B step lengths/heights  Distance: 3'  Comments: Pt performed stand step transfer EOB>recliner, no LOB. SpO2 dropping to 85%, requiring increased time for recovery. Stairs/Curb  Stairs?: No     Balance  Posture: Fair  Sitting - Static: Good (independent)  Sitting - Dynamic: Good (independent)  Standing - Static: Fair (CGA with UE support on RW)  Standing - Dynamic: Fair (CGA with UE support on RW)                        AM-PAC Score  AM-PAC Inpatient Mobility Raw Score : 17 (10/06/21 1301)  AM-PAC Inpatient T-Scale Score : 42.13 (10/06/21 1301)  Mobility Inpatient CMS 0-100% Score: 50.57 (10/06/21 1301)  Mobility Inpatient CMS G-Code Modifier : CK (10/06/21 1301)          Goals  Short term goals  Time Frame for Short term goals: upon d/c (no goals met 10/6)  Short term goal 1: Pt will perform bed mobility MOD I  Short term goal 2: Pt will perform transfers with LRAD and supervision  Short term goal 3: Pt will ambulate 22' with LRAD and CGA  Patient Goals   Patient goals : pt did not state    Plan    Plan  Times per week: 3-5x  Times per day: Daily  Current Treatment Recommendations: Strengthening, Balance Training, Functional Mobility Training, Transfer Training, Gait Training, Stair training, Endurance Training, Neuromuscular Re-education, Positioning, Modalities, Equipment Evaluation, Education, & procurement, Patient/Caregiver Education & Training, Safety Education & Training, Home Exercise Program  Safety Devices  Type of devices:  All fall risk precautions in place, Call light within reach, Chair alarm in place, Gait belt, Patient at risk for falls, Nurse notified, Left in chair  Restraints  Initially in place: No     Therapy Time   Individual Concurrent Group Co-treatment   Time In       0950   Time Out       1044   Minutes       54         Timed Code Treatment Minutes:  54 minutes    Total Treatment Minutes:  54 fabi Joseph, PT     Carlo Dominguez, DPT [id number]

## 2021-10-06 NOTE — PROGRESS NOTES
Hospitalist Progress Note      PCP: No primary care provider on file. Date of Admission: 10/1/2021    Chief Complaint: SOB    Hospital Course: Patient is a 55-year-old morbidly obese -American female, unvaccinated for COVID-19, who presented to ED with complaints of shortness of breath, cough, fevers, chills, diarrhea, generalized body aches for a week. Patient tested positive for COVID-19. Respiratory status worsened after admission. Currently on 11 L O2 via nasal cannula. On admission, patient was noted to have renal failure likely secondary to ATN with peak serum creatinine of 6.5. Did not require hemodialysis. Renal function improving, nonoliguric. Subjective: Doing better breathing wise. Continues on liters. On heparin drip. Denies any leg pain. Medications:  Reviewed    Infusion Medications    sodium chloride 1,000 mL (10/05/21 4225)    sodium chloride      heparin (PORCINE) Infusion 7 Units/kg/hr (10/05/21 1140)     Scheduled Medications    famotidine  20 mg Oral Daily    sodium chloride flush  5-40 mL IntraVENous 2 times per day    Vitamin D  2,000 Units Oral Daily    dexamethasone  20 mg IntraVENous Daily     PRN Meds: sodium chloride, sodium chloride flush, sodium chloride, aluminum & magnesium hydroxide-simethicone, polyethylene glycol, acetaminophen **OR** acetaminophen, guaiFENesin-dextromethorphan, albuterol sulfate HFA, promethazine, heparin (porcine), heparin (porcine)      Intake/Output Summary (Last 24 hours) at 10/5/2021 2029  Last data filed at 10/5/2021 1950  Gross per 24 hour   Intake 1459.47 ml   Output 2320 ml   Net -860.53 ml       Physical Exam Performed:    BP (!) 129/97   Pulse 63   Temp 98.4 °F (36.9 °C) (Axillary)   Resp 20   Ht 5' 6\" (1.676 m)   Wt (!) 349 lb 2 oz (158.4 kg)   SpO2 96%   BMI 56.35 kg/m²     General appearance: No apparent distress, appears stated age and cooperative. HEENT: Pupils equal, round, and reactive to light. Conjunctivae/corneas clear. Neck: Supple, with full range of motion. No jugular venous distention. Trachea midline. Respiratory:  Normal respiratory effort. Clear to auscultation, bilaterally without Rales/Wheezes/Rhonchi. Cardiovascular: Regular rate and rhythm with normal S1/S2 without murmurs, rubs or gallops. Abdomen: Soft, non-tender, non-distended with normal bowel sounds. Musculoskeletal: No clubbing, cyanosis or edema bilaterally. Full range of motion without deformity. Skin: Skin color, texture, turgor normal.  No rashes or lesions. Neurologic:  Neurovascularly intact without any focal sensory/motor deficits. Cranial nerves: II-XII intact, grossly non-focal.  Psychiatric: Alert and oriented, thought content appropriate, normal insight  Capillary Refill: Brisk,3 seconds, normal   Peripheral Pulses: +2 palpable, equal bilaterally       Labs:   Recent Labs     10/03/21  0500 10/04/21  0525 10/05/21  0549   WBC 7.2 9.9 12.9*   HGB 14.5 13.9 14.9   HCT 42.2 39.8 43.8    296 347     Recent Labs     10/03/21  0500 10/04/21  0525 10/05/21  0549    141 142   K 4.1 3.8 3.9    103 102   CO2 22 22 24   BUN 97* 100* 95*   CREATININE 6.5* 5.6* 4.0*   CALCIUM 8.8 8.9 9.0   PHOS 3.9 4.6  --      Recent Labs     10/03/21  0500 10/04/21  0525 10/05/21  0549   AST 33 57* 86*   ALT 17 27 45*   BILITOT <0.2 <0.2 0.3   ALKPHOS 60 62 76     No results for input(s): INR in the last 72 hours. No results for input(s): Rhae Childes in the last 72 hours. Urinalysis:      Lab Results   Component Value Date    NITRU Negative 10/01/2021    WBCUA 16 10/01/2021    RBCUA 4 10/01/2021    BLOODU MODERATE 10/01/2021    SPECGRAV >1.030 10/01/2021    GLUCOSEU Negative 10/01/2021       Radiology:  US RENAL COMPLETE   Final Result   Very limited visualization of the left kidney, grossly unremarkable, no   evidence of hydronephrosis. Unremarkable right kidney. Collapsed bladder around a Bill catheter. XR CHEST 1 VIEW   Final Result   Multifocal opacities within both lungs suggesting multifocal pneumonia. Reported positive COVID. Assessment/Plan:    Active Hospital Problems    Diagnosis     Pneumonia due to COVID-19 virus [U07.1, J12.82]     STEVE (acute kidney injury) ([facility] [address] [N17.9]     Hypotension due to hypovolemia [I95.89, E86.1]     Morbid obesity ([facility] [address] [E66.01]      COVID-19 pneumonia:  not vaccinated. On IV Decadron 20 mg - day 5. Remdesivir not given due to renal failure. Actemra given on 10/2/2021. D-dimer elevated. On IV heparin drip. CTPA not done as she has acute renal failure. Will consider VQ scan versus leg Dopplers. Acute respiratory failure: Secondary to COVID-19 pneumonia. needs a CTPA versus VQ scan. On 11 L currently. Continue oxygen as needed. Acute renal failure: Secondary to ATN . Nonoliguric. Nephrology on board. Ultrasound kidneys normal.  Creatinine improving and down to 4 today continue management per nephrology. Morbid obesity: BMI 56.35    DVT Prophylaxis: heparin   Diet: ADULT DIET;  Regular; Low Potassium (Less than 3000 mg/day)  Code Status: Full Code    PT/OT Eval Status: not needed    Dispo - inpt 3-4 days    Ezio Navarro MD

## 2021-10-06 NOTE — PROGRESS NOTES
PULMONARY AND CRITICAL CARE MEDICINE PROGRESS NOTE      SUBJECTIVE: Patient is on 7 to 10 L/min FiO2. Saturating well. No respiratory distress. Creatinine is improving. REVIEW OF SYSTEMS:    CONSTITUTIONAL SYMPTOMS: The patient denies fever, fatigue, night sweats, weight loss or weight gain. HEENT: No vision changes. No tinnitus, Denies sinus pain. No hoarseness, or dysphagia. CARDIOVASCULAR: Denies chest pain, palpitation, syncope. RESPIRATORY: Denies shortness of breath or cough. GASTROINTESTINAL: Denies nausea, abdominal pain or change in bowel function. SKIN: No rashes or itching. MUSCULOSKELETAL: Denies weakness or bone pain. NEUROLOGICAL: No headaches or seizures. MEDICATIONS:      famotidine  20 mg Oral Daily    sodium chloride flush  5-40 mL IntraVENous 2 times per day    Vitamin D  2,000 Units Oral Daily    dexamethasone  20 mg IntraVENous Daily      sodium chloride 1,000 mL (10/06/21 0609)    sodium chloride      heparin (PORCINE) Infusion 9 Units/kg/hr (10/06/21 0815)     sodium chloride, sodium chloride flush, sodium chloride, aluminum & magnesium hydroxide-simethicone, polyethylene glycol, acetaminophen **OR** acetaminophen, guaiFENesin-dextromethorphan, albuterol sulfate HFA, promethazine, heparin (porcine), heparin (porcine)     ALLERGIES:   Allergies as of 10/01/2021 - Fully Reviewed 10/01/2021   Allergen Reaction Noted    Shellfish-derived products Anaphylaxis 10/01/2021    Zavalla (diagnostic) Anaphylaxis 10/01/2021        OBJECTIVE:   height is 5' 6\" (1.676 m) and weight is 349 lb 2 oz (158.4 kg) (abnormal). Her oral temperature is 98 °F (36.7 °C). Her blood pressure is 139/94 (abnormal) and her pulse is 90. Her respiration is 20 and oxygen saturation is 93%. I/O this shift:  In: -   Out: 500 [Urine:500]     PHYSICAL EXAM:  CONSTITUTIONAL: She is a 43y.o.-year-old who appears her stated age. She is alert and oriented x 3 and in no acute distress. to COVID-19 pneumonia.    Requiring up to 7-10 L/min oxygen.  Titrate FiO2 for saturation of 90 to 92%.    Continue Decadron 20 mg IV daily, day 5.  Could not get remdesivir due to acute kidney injury. Received 1 dose of Actemra.  CRP is decreasing,7.  Elevated D-dimer of 4680.  Continue heparin drip in the setting of STEVE for hypercoagulable state associated with COVID-19.  D-dimer is decreasing. Creatinine is improving.  Good urine output. Encourage side/prone positioning in the bed as tolerated.  Out of bed in chair.         Beth Maldonado MD  Pulmonary Critical Care and Sleep Medicine  10/6/2021, 9:52 AM    This note was completed using dragon medical speech recognition software. Grammatical errors, random word insertions, pronoun errors and incomplete sentences are occasional consequences of this technology due to software limitations. If there are questions or concerns about the content of this note of information contained within the body of this dictation they should be addressed with the provider for clarification.

## 2021-10-06 NOTE — PROGRESS NOTES
Occupational Therapy  Facility/Department: [address] ONCOLOGY  Daily Treatment Note  NAME: Fabián Sutherland  : 1985  MRN: [id number]    Date of Service: 10/6/2021    Discharge Recommendations:    Fabián Sutherland scored a 16/24 on the AM-PAC ADL Inpatient form. Current research shows that an AM-PAC score of 17 or less is typically not associated with a discharge to the patient's home setting. Based on the patient's AM-PAC score and their current ADL deficits, it is recommended that the patient have 3-5 sessions per week of Occupational Therapy at d/c to increase the patient's independence. Please see assessment section for further patient specific details. If patient discharges prior to next session this note will serve as a discharge summary. Please see below for the latest assessment towards goals. Assessment   Performance deficits / Impairments: Decreased functional mobility ; Decreased endurance;Decreased ADL status; Decreased high-level IADLs  Assessment: Pt is limited by the deficits listed above impacting independence, functional mobility and ADLs - pt would benefit fro skilled OT services to address these deficits and increase safety and independence  Treatment Diagnosis: Decreased functional status secondary to PNA due to COVID-19  Prognosis: Good  OT Education: OT Role;Plan of Care;Transfer Training;Energy Conservation  Patient Education: eval, d/c, pursed lip breathing- pt verbalized understanding  REQUIRES OT FOLLOW UP: Yes  Activity Tolerance  Activity Tolerance: Treatment limited secondary to medical complications (free text); Patient limited by fatigue  Activity Tolerance: pt desat to low 80s with exertion (supine to sit, sit to stand and stand step transfer to recliner from EOB) recovered to 88-89% with pursed lip breathing  Safety Devices  Safety Devices in place: Yes  Type of devices: All fall risk precautions in place; Left in chair;Call light within reach; Chair alarm in between all activities. Provided with ice for arm due to complaint of chronic burning pain at IV site    Cotx treatment for activity tolerance and monitoring of 02. Plan   Plan  Times per week: 3-5  Specific instructions for Next Treatment: cotx for ambulation  Current Treatment Recommendations: Functional Mobility Training, Endurance Training, Self-Care / ADL    AM-PAC Score        AM-PAC Inpatient Daily Activity Raw Score: 16 (10/06/21 1116)  AM-PAC Inpatient ADL T-Scale Score : 35.96 (10/06/21 1116)  ADL Inpatient CMS 0-100% Score: 53.32 (10/06/21 1116)  ADL Inpatient CMS G-Code Modifier : CK (10/06/21 1116)    Goals  Short term goals  Time Frame for Short term goals: d/c  Short term goal 1: Pt will complete functional transfer with supervision and SpO2 remaining above 90%. - O2 below 89% 10/06  Short term goal 2: Pt will complete functional mobility with supervision and SpO2 remaining above 90%. - not addressed this date  Short term goal 3: Pt will complete bed mobility mod I. - SBA 10/06  Short term goal 4: Pt will complete UB ADLs with supervision and SpO2 remaining above 90%. - not addressed this date  Short term goal 5: Pt will complete LB ADLs with supervision and SpO2 remaining above 90%. - not addressed this date  Patient Goals   Patient goals : \"get better\"       Therapy Time   Individual Concurrent Group Co-treatment   Time In       [facility] 30   Time Out       [address] [place], [place]    Itzel Junie Orr supervision and direction [place] OTR/ESTEE [id number], 10/6/2021, 12:03 PM

## 2021-10-06 NOTE — PROGRESS NOTES
Supple, with full range of motion. No jugular venous distention. Trachea midline. Respiratory:  Normal respiratory effort. Clear to auscultation, bilaterally without Rales/Wheezes/Rhonchi. Cardiovascular: Regular rate and rhythm with normal S1/S2 without murmurs, rubs or gallops. Abdomen: Soft, non-tender, non-distended with normal bowel sounds. Musculoskeletal: No clubbing, cyanosis or edema bilaterally. Full range of motion without deformity. Skin: Skin color, texture, turgor normal.  No rashes or lesions. Neurologic:  Neurovascularly intact without any focal sensory/motor deficits. Cranial nerves: II-XII intact, grossly non-focal.  Psychiatric: Alert and oriented, thought content appropriate, normal insight  Capillary Refill: Brisk,3 seconds, normal   Peripheral Pulses: +2 palpable, equal bilaterally       Labs:   Recent Labs     10/04/21  0525 10/05/21  0549 10/06/21  0615   WBC 9.9 12.9* 13.7*   HGB 13.9 14.9 14.0   HCT 39.8 43.8 41.1    347 401     Recent Labs     10/04/21  0525 10/05/21  0549 10/06/21  0615    142 145   K 3.8 3.9 4.5    102 107   CO2 22 24 26   * 95* 72*   CREATININE 5.6* 4.0* 2.4*   CALCIUM 8.9 9.0 9.2   PHOS 4.6  --   --      Recent Labs     10/04/21  0525 10/05/21  0549 10/06/21  0615   AST 57* 86* 86*   ALT 27 45* 60*   BILITOT <0.2 0.3 0.3   ALKPHOS 62 76 79     No results for input(s): INR in the last 72 hours. No results for input(s): Cherre Gash in the last 72 hours. Urinalysis:      Lab Results   Component Value Date    NITRU Negative 10/01/2021    WBCUA 16 10/01/2021    RBCUA 4 10/01/2021    BLOODU MODERATE 10/01/2021    SPECGRAV >1.030 10/01/2021    GLUCOSEU Negative 10/01/2021       Radiology:  US RENAL COMPLETE   Final Result   Very limited visualization of the left kidney, grossly unremarkable, no   evidence of hydronephrosis. Unremarkable right kidney. Collapsed bladder around a Bill catheter.          XR CHEST 1 VIEW Final Result   Multifocal opacities within both lungs suggesting multifocal pneumonia. Reported positive COVID. VL Extremity Venous Bilateral    (Results Pending)           Assessment/Plan:    Active Hospital Problems    Diagnosis     Pneumonia due to COVID-19 virus [U07.1, J12.82]     STEVE (acute kidney injury) ([facility] [address] [N17.9]     Hypotension due to hypovolemia [I95.89, E86.1]     Morbid obesity ([facility] [address] [E66.01]      COVID-19 pneumonia:  not vaccinated. On IV Decadron 20 mg - day 5. Remdesivir not given due to renal failure. Actemra given on 10/2/2021. D-dimer elevated. On IV heparin drip. CTPA not done as she has acute renal failure. Will consider VQ scan. Will get leg Dopplers. Acute respiratory failure: Secondary to COVID-19 pneumonia. needs a CTPA versus VQ scan. On 10 L oxygen. Wean as tolerated. Acute renal failure: Secondary to ATN . Nonoliguric. Nephrology on board. Ultrasound kidneys normal.  Creatinine improving and down to 2.4 today . continue management per nephrology. Morbid obesity: BMI 56.35    DVT Prophylaxis: heparin   Diet: ADULT DIET;  Regular; Low Potassium (Less than 3000 mg/day)  Code Status: Full Code    PT/OT Eval Status: not needed    Dispo - inpt 3-4 days    Lucio Negrete MD

## 2021-10-06 NOTE — PROGRESS NOTES
Breakfast tray brought to patient. Assessment complete. VSS. Patient on 5l o2, incresed to 7L satting 89-90%. aPTT 56.7, half bolus heparin given and increased rate by 2 unit. Heparin now running at 9 unit/kg/hr. Patient resting in bed. Respirations even and easy. Call light in reach. Fall precautions in place. No needs expressed at this time.

## 2021-10-06 NOTE — PROGRESS NOTES
PICC nurse called this RN asking about the order for midline. From PICC nurse, need nephrology to clarify before they can put a midline.  This RN explain to patient that we cannot get the midline tonight, will clarify with nephrology in AM

## 2021-10-06 NOTE — PROCEDURES
(POWER) MIDLINE PLACEMENT:  Preprocedure & timeout done w primary RN VANE; +Midline order verified; +nephrology clearance obtained from Dr. Gama Cerrato wo arm restrictions; no difficulty accessing L BASILIC vein; a 68BQ midline catheter is placed and advanced wo difficulty or complications; +line is patent w brisk blood return and flushes wo resistance; reported same and ok to use MIDLINE to primary RN; pt tolerated procedure very well; she will remain in bed at rest 30min post procedure in order to promote hemostasis to the insertion site and she agrees to do so; pt is left in a position of comfort w siderails up x2, call light in reach and bed is locked in lowest position    NURSES:  *please replace all existing IV tubing with new IV tubing prior to using the MIDLINE for current IV infusions. *please refrain from obtaining BPs in the LEFT arm. All of the above may be sources of infection or damage to the MIDLINE and/or insertion site.     NELSY Nielsen RN, BSN, Prasanth Mcallister.

## 2021-10-06 NOTE — PLAN OF CARE
Problem: Breathing Pattern - Ineffective:  Goal: Ability to achieve and maintain a regular respiratory rate will improve  Description: Ability to achieve and maintain a regular respiratory rate will improve  Outcome: Ongoing     Problem: Skin Integrity:  Goal: Will show no infection signs and symptoms  Description: Will show no infection signs and symptoms  Outcome: Ongoing  Goal: Absence of new skin breakdown  Description: Absence of new skin breakdown  Outcome: Ongoing     Problem: Falls - Risk of:  Goal: Will remain free from falls  Description: Will remain free from falls  Outcome: Ongoing  Goal: Absence of physical injury  Description: Absence of physical injury  Outcome: Ongoing     Problem: Airway Clearance - Ineffective  Goal: Achieve or maintain patent airway  Outcome: Ongoing     Problem: Gas Exchange - Impaired  Goal: Absence of hypoxia  Outcome: Ongoing  Goal: Promote optimal lung function  Outcome: Ongoing     Problem:  Body Temperature -  Risk of, Imbalanced  Goal: Ability to maintain a body temperature within defined limits  Outcome: Ongoing  Goal: Will regain or maintain usual level of consciousness  Outcome: Ongoing  Goal: Complications related to the disease process, condition or treatment will be avoided or minimized  Outcome: Ongoing     Problem: Isolation Precautions - Risk of Spread of Infection  Goal: Prevent transmission of infection  Outcome: Ongoing     Problem: Nutrition Deficits  Goal: Optimize nutritional status  Outcome: Ongoing     Problem: Risk for Fluid Volume Deficit  Goal: Maintain normal heart rhythm  Outcome: Ongoing  Goal: Maintain absence of muscle cramping  Outcome: Ongoing  Goal: Maintain normal serum potassium, sodium, calcium, phosphorus, and pH  Outcome: Ongoing     Problem: Loneliness or Risk for Loneliness  Goal: Demonstrate positive use of time alone when socialization is not possible  Outcome: Ongoing     Problem: Fatigue  Goal: Verbalize increase energy and improved vitality  Outcome: Ongoing     Problem: Patient Education: Go to Patient Education Activity  Goal: Patient/Family Education  Outcome: Ongoing     Problem: Musculor/Skeletal Functional Status  Goal: Highest potential functional level  Outcome: Ongoing  Goal: Absence of falls  Outcome: Ongoing

## 2021-10-06 NOTE — PROGRESS NOTES
Office : [phone]     Fax :[phone]       Nephrology progress  Note      Patient's Name: Mary Farooq  11:22 AM  10/6/2021    Reason for Consult:  leeroy      Chief Complaint:    Chief Complaint   Patient presents with    Shortness of Breath     pt. with sob, cough, fever and weakness that started on 9/19.  pt. with increased SOB tonight. pt. was 84% on room air. pt. pt. arrived by [employer]. History of Present iIlness:      Mary Farooq is a 43 y.o. female with  history of obesity due to excess calories, who presents to the emergency room with complaints of cough, fever, chills, shortness of breath, diarrhea, generalized body aches, generalized weakness, ongoing since 9/19/2021. She was noted to be hypoxic with oxygen saturation of 84% on room air, requiring as much as 5 L/min supplemental oxygen at the time of my evaluation in the emergency room. She does not have chest pain. Chest x-ray is consistent with multifocal opacities in both lung bases. She has no leukocytosis. Abnormal labs include sodium of 135, BUN 66, creatinine 4.0, LDH 1191, calcitonin level of 0.78, D-dimer 4401, fibrinogen of 627. Interval hx       No fever   UOP improved   Creatinine levels much  better   No edema      On 1o liter oxygen per NC     I/O last 3 completed shifts: In: 2260 [P.O.:1240;  I.V.:1020]  Out: 2520 [Urine:2520]          Current Medications:    0.9 % sodium chloride infusion, Continuous  sodium chloride (OCEAN, BABY AYR) 0.65 % nasal spray 1 spray, Q4H PRN  famotidine (PEPCID) tablet 20 mg, Daily  sodium chloride flush 0.9 % injection 5-40 mL, 2 times per day  sodium chloride flush 0.9 % injection 5-40 mL, PRN  0.9 % sodium chloride infusion, PRN  aluminum & magnesium hydroxide-simethicone (MAALOX) 700-977-36 MG/5ML suspension 30 mL, Q6H PRN  polyethylene glycol (GLYCOLAX) packet 17 g, Daily PRN  acetaminophen (TYLENOL) tablet 650 mg, Q6H PRN   Or  acetaminophen (TYLENOL) suppository 650 mg, Q6H PRN  guaiFENesin-dextromethorphan (ROBITUSSIN DM) 100-10 MG/5ML syrup 5 mL, Q4H PRN  Vitamin D (CHOLECALCIFEROL) tablet 2,000 Units, Daily  albuterol sulfate  (90 Base) MCG/ACT inhaler 2 puff, Q6H PRN  promethazine (PHENERGAN) injection 6.25 mg, Q6H PRN  heparin (porcine) injection 10,000 Units, PRN  heparin (porcine) injection 5,000 Units, PRN  heparin 25,000 units in dextrose 5% 250 mL (premix) infusion, Continuous  dexamethasone (DECADRON) 20 mg in sodium chloride 0.9 % IVPB, Daily          Physical exam:     Vitals:  BP (!) 139/94   Pulse 90   Temp 98 °F (36.7 °C) (Oral)   Resp 20   Ht 5' 6\" (1.676 m)   Wt (!) 349 lb 2 oz (158.4 kg)   SpO2 93%   BMI 56.35 kg/m²   Constitutional:  OAA X3 NAD  Skin: no rash, turgor wnl  Heent:  eomi, mmm  Neck: no bruits or jvd noted  Cardiovascular:  S1, S2 without m/r/g  Respiratory: decreased air entry b/l bases   Abdomen:  +bs, soft, nt, nd  Ext: mild   lower extremity edema    Labs:  CBC:   Recent Labs     10/04/21  0525 10/05/21  0549 10/06/21  0615   WBC 9.9 12.9* 13.7*   HGB 13.9 14.9 14.0    347 401     BMP:    Recent Labs     10/04/21  0525 10/05/21  0549 10/06/21  0615    142 145   K 3.8 3.9 4.5    102 107   CO2 22 24 26   * 95* 72*   CREATININE 5.6* 4.0* 2.4*   GLUCOSE 127* 96 122*     Ca/Mg/Phos:   Recent Labs     10/04/21  0525 10/05/21  0549 10/06/21  0615   CALCIUM 8.9 9.0 9.2   MG 2.60*  --   --    PHOS 4.6  --   --      Hepatic:   Recent Labs     10/04/21  0525 10/05/21  0549 10/06/21  0615   AST 57* 86* 86*   ALT 27 45* 60*   BILITOT <0.2 0.3 0.3   ALKPHOS 62 76 79     Troponin:   No results for input(s): TROPONINI in the last 72 hours.   BNP: No results for input(s): BNP in the last 72 hours. Lipids: No results for input(s): CHOL, TRIG, HDL, LDLCALC, LABVLDL in the last 72 hours. ABGs: No results for input(s): PHART, PO2ART, EIG1ASF in the last 72 hours. INR:   No results for input(s): INR in the last 72 hours. UA:  No results for input(s): Elnita Rom, GLUCOSEU, BILIRUBINUR, KETUA, SPECGRAV, BLOODU, PHUR, PROTEINU, UROBILINOGEN, NITRU, LEUKOCYTESUR, [place] in the last 72 hours. Urine Microscopic:   No results for input(s): LABCAST, BACTERIA, COMU, HYALCAST, WBCUA, RBCUA, EPIU in the last 72 hours. Urine Culture: No results for input(s): LABURIN in the last 72 hours. Urine Chemistry:   No results for input(s): Valene Roa, PROTEINUR, NAUR in the last 72 hours. IMAGING:  US RENAL COMPLETE   Final Result   Very limited visualization of the left kidney, grossly unremarkable, no   evidence of hydronephrosis. Unremarkable right kidney. Collapsed bladder around a Bill catheter. XR CHEST 1 VIEW   Final Result   Multifocal opacities within both lungs suggesting multifocal pneumonia. Reported positive COVID. Assessment/Plan :      1. STEVE   Has severe ATN   UOP improved overnight   ATN recovering   2/2 ATN from hypotension/ sepsis . Gentle fluids       renal USG  - no hydronephrosis       Recommend to dose adjust all medications  based on renal functions  Maintain SBP> 90 mmHg   Daily weights   AVOID NSAIDs  Avoid Nephrotoxins  Monitor Intake/Output  Call if significant decrease in urine output     2. Hypotension 2/2 sepsis   Improved     3. Hypoxia 2/2  covid 19 pneumonia   On dexamethasone   On 10 L oxygen   Pulmonology seeing   Got actemra       4. Acid- base disorder. Metabolic acidosis   Resolved   DC bicarbonate in iv fluids     5. Electrolytes. monitor     6. Vitamin D defeciency.  On vitamin D supplements             Thank you for allowing us to participate in care of Toya Boxer         Electronically signed by: Laury Carlson MD, 10/6/2021, 11:22 AM      [facility] [address]  Office : [phone]  Fax :[phone]

## 2021-10-06 NOTE — PROGRESS NOTES
Patient cleaned, diaper and bed pad changed. New pad placed for vaginal bleeding. Patient repositioned in bed.  Will continue to monitor

## 2021-10-06 NOTE — PROGRESS NOTES
Message sent to hosp  Patient admitted for pneumonia due to covid. Patient has an order for a midline tomorrow due to difficulty getting a peripheral IV. However patient has a heparin drip running in a peripheral and she is been complaining that is hurting. I called the PICC nurse and she said she need clarification from nephrology before it can be inserted because her creatinine is 4 and BUN is 90. Please can you clarify so we can get the midline for the heparin drip.  Thank you

## 2021-10-07 LAB
ANION GAP SERPL CALCULATED.3IONS-SCNC: 12 MMOL/L (ref 3–16)
APTT: 109.1 SEC (ref 26.2–38.6)
APTT: 40.3 SEC (ref 26.2–38.6)
APTT: 58.7 SEC (ref 26.2–38.6)
BUN BLDV-MCNC: 45 MG/DL (ref 7–20)
CALCIUM SERPL-MCNC: 8.8 MG/DL (ref 8.3–10.6)
CHLORIDE BLD-SCNC: 110 MMOL/L (ref 99–110)
CO2: 25 MMOL/L (ref 21–32)
CREAT SERPL-MCNC: 1.7 MG/DL (ref 0.6–1.1)
GFR AFRICAN AMERICAN: 41
GFR NON-AFRICAN AMERICAN: 34
GLUCOSE BLD-MCNC: 130 MG/DL (ref 70–99)
HCT VFR BLD CALC: 40.7 % (ref 36–48)
HEMOGLOBIN: 13.9 G/DL (ref 12–16)
POTASSIUM SERPL-SCNC: 3.6 MMOL/L (ref 3.5–5.1)
SODIUM BLD-SCNC: 147 MMOL/L (ref 136–145)

## 2021-10-07 PROCEDURE — 6370000000 HC RX 637 (ALT 250 FOR IP): Performed by: INTERNAL MEDICINE

## 2021-10-07 PROCEDURE — 6360000002 HC RX W HCPCS: Performed by: INTERNAL MEDICINE

## 2021-10-07 PROCEDURE — 85018 HEMOGLOBIN: CPT

## 2021-10-07 PROCEDURE — 2060000000 HC ICU INTERMEDIATE R&B

## 2021-10-07 PROCEDURE — 85014 HEMATOCRIT: CPT

## 2021-10-07 PROCEDURE — 80048 BASIC METABOLIC PNL TOTAL CA: CPT

## 2021-10-07 PROCEDURE — 93970 EXTREMITY STUDY: CPT

## 2021-10-07 PROCEDURE — 99233 SBSQ HOSP IP/OBS HIGH 50: CPT | Performed by: INTERNAL MEDICINE

## 2021-10-07 PROCEDURE — 2580000003 HC RX 258: Performed by: INTERNAL MEDICINE

## 2021-10-07 PROCEDURE — 94761 N-INVAS EAR/PLS OXIMETRY MLT: CPT

## 2021-10-07 PROCEDURE — 85730 THROMBOPLASTIN TIME PARTIAL: CPT

## 2021-10-07 PROCEDURE — 2700000000 HC OXYGEN THERAPY PER DAY

## 2021-10-07 RX ORDER — ALBUTEROL SULFATE 90 UG/1
2 AEROSOL, METERED RESPIRATORY (INHALATION) EVERY 6 HOURS PRN
Status: DISCONTINUED | OUTPATIENT
Start: 2021-10-07 | End: 2021-10-12 | Stop reason: HOSPADM

## 2021-10-07 RX ORDER — ALBUTEROL SULFATE 90 UG/1
2 AEROSOL, METERED RESPIRATORY (INHALATION) 4 TIMES DAILY
Status: DISCONTINUED | OUTPATIENT
Start: 2021-10-07 | End: 2021-10-12 | Stop reason: HOSPADM

## 2021-10-07 RX ADMIN — HEPARIN SODIUM 9 UNITS/KG/HR: 10000 INJECTION, SOLUTION INTRAVENOUS at 01:43

## 2021-10-07 RX ADMIN — Medication 10 ML: at 19:45

## 2021-10-07 RX ADMIN — HEPARIN SODIUM 10 UNITS/KG/HR: 10000 INJECTION, SOLUTION INTRAVENOUS at 22:54

## 2021-10-07 RX ADMIN — SODIUM CHLORIDE 25 ML: 9 INJECTION, SOLUTION INTRAVENOUS at 14:19

## 2021-10-07 RX ADMIN — FAMOTIDINE 20 MG: 20 TABLET, FILM COATED ORAL at 08:58

## 2021-10-07 RX ADMIN — Medication 2000 UNITS: at 08:58

## 2021-10-07 RX ADMIN — HEPARIN SODIUM 10000 UNITS: 1000 INJECTION INTRAVENOUS; SUBCUTANEOUS at 22:51

## 2021-10-07 RX ADMIN — GUAIFENESIN SYRUP AND DEXTROMETHORPHAN 5 ML: 100; 10 SYRUP ORAL at 09:56

## 2021-10-07 RX ADMIN — Medication 10 ML: at 08:58

## 2021-10-07 RX ADMIN — HEPARIN SODIUM 5000 UNITS: 1000 INJECTION INTRAVENOUS; SUBCUTANEOUS at 01:41

## 2021-10-07 RX ADMIN — DEXAMETHASONE SODIUM PHOSPHATE 20 MG: 4 INJECTION, SOLUTION INTRA-ARTICULAR; INTRALESIONAL; INTRAMUSCULAR; INTRAVENOUS; SOFT TISSUE at 14:20

## 2021-10-07 ASSESSMENT — PAIN SCALES - GENERAL
PAINLEVEL_OUTOF10: 0
PAINLEVEL_OUTOF10: 0

## 2021-10-07 NOTE — CARE COORDINATION
Pt currently on 9 liters of oxygen. Unable to receive Remdesivir d/t renal failure. Pt had venous dopplers of lower extremities done. CM will continue to follow for d/c plan.     Jaylin Manning RN, BSN  [phone]

## 2021-10-07 NOTE — PROGRESS NOTES
Comprehensive Nutrition Assessment    Type and Reason for Visit:  Initial    Nutrition Recommendations/Plan:   Begin Nepro BID    Nutrition Assessment:  LOS nutrition assessment, day 6 of admission. Pt on regular, low K+ diet. Limited PO intake has been recorded. Attempted to call into pt's room, no answer. Will provide Nepro BID and follow for tolerance and PO intake at meals. Malnutrition Assessment:  Malnutrition Status:  No malnutrition    Context:  Acute Illness         Estimated Daily Nutrient Needs:  Energy (kcal):  1264 - 1738; Weight Used for Energy Requirements:  Current (158 kg)     Protein (g):  118; Weight Used for Protein Requirements:  Ideal (2g/59kg)        Fluid (ml/day):   ; Method Used for Fluid Requirements:  1 ml/kcal      Nutrition Related Findings:  10/6 BM; generalized trace edema; Na+ 147, glucose 130      Wounds:  None       Current Nutrition Therapies:    ADULT DIET; Regular; Low Potassium (Less than 3000 mg/day)    Anthropometric Measures:  · Height: 5' 6\" (167.6 cm)  · Current Body Weight: 349 lb (158.3 kg)   · Admission Body Weight: 350 lb (158.8 kg)    · Ideal Body Weight: 130 lbs; % Ideal Body Weight 268.5 %   · BMI: 56.4  · BMI Categories: Obese Class 3 (BMI 40.0 or greater)       Nutrition Diagnosis:   · Inadequate oral intake related to impaired respiratory function as evidenced by intake 0-25%      Nutrition Interventions:   Food and/or Nutrient Delivery:  Continue Current Diet, Start Oral Nutrition Supplement  Nutrition Education/Counseling:  Education not indicated   Coordination of Nutrition Care:  Continue to monitor while inpatient    Goals:  PO intake 50% or greater       Nutrition Monitoring and Evaluation:   Behavioral-Environmental Outcomes:  None Identified   Food/Nutrient Intake Outcomes:  Food and Nutrient Intake, Supplement Intake  Physical Signs/Symptoms Outcomes:  None Identified     Discharge Planning:     Too soon to determine     Electronically signed by Ramona Whitley RD, LD on 10/7/21 at 12:07 PM EDT  Contact: [phone]

## 2021-10-07 NOTE — PROGRESS NOTES
Office : [phone]     Fax :[phone]       Nephrology progress  Note      Patient's Name: Michelle Osorio  11:14 AM  10/7/2021    Reason for Consult:  leeroy      Chief Complaint:    Chief Complaint   Patient presents with    Shortness of Breath     pt. with sob, cough, fever and weakness that started on 9/19.  pt. with increased SOB tonight. pt. was 84% on room air. pt. pt. arrived by [employer]. History of Present iIlness:      Michelle Osorio is a 43 y.o. female with  history of obesity due to excess calories, who presents to the emergency room with complaints of cough, fever, chills, shortness of breath, diarrhea, generalized body aches, generalized weakness, ongoing since 9/19/2021. She was noted to be hypoxic with oxygen saturation of 84% on room air, requiring as much as 5 L/min supplemental oxygen at the time of my evaluation in the emergency room. She does not have chest pain. Chest x-ray is consistent with multifocal opacities in both lung bases. She has no leukocytosis. Abnormal labs include sodium of 135, BUN 66, creatinine 4.0, LDH 1191, calcitonin level of 0.78, D-dimer 4401, fibrinogen of 627.     Interval hx       No fever   UOP improved   Creatinine levels much  better   No edema      On 10 liter oxygen per NC     I/O last 3 completed shifts:  In: -   Out: 1000 [Urine:1000]          Current Medications:    0.9 % sodium chloride infusion, Continuous  sodium chloride (OCEAN, BABY AYR) 0.65 % nasal spray 1 spray, Q4H PRN  famotidine (PEPCID) tablet 20 mg, Daily  sodium chloride flush 0.9 % injection 5-40 mL, 2 times per day  sodium chloride flush 0.9 % injection 5-40 mL, PRN  0.9 % sodium chloride infusion, PRN  aluminum & magnesium hydroxide-simethicone PHART, PO2ART, CCF7PHL in the last 72 hours. INR:   No results for input(s): INR in the last 72 hours. UA:  No results for input(s): Delene Belt, GLUCOSEU, BILIRUBINUR, KETUA, SPECGRAV, BLOODU, PHUR, PROTEINU, UROBILINOGEN, NITRU, LEUKOCYTESUR, Sondra Room in the last 72 hours. Urine Microscopic:   No results for input(s): LABCAST, BACTERIA, COMU, HYALCAST, WBCUA, RBCUA, EPIU in the last 72 hours. Urine Culture: No results for input(s): LABURIN in the last 72 hours. Urine Chemistry:   No results for input(s): Manette Finders, PROTEINUR, NAUR in the last 72 hours. IMAGING:  VL Extremity Venous Bilateral         US RENAL COMPLETE   Final Result   Very limited visualization of the left kidney, grossly unremarkable, no   evidence of hydronephrosis. Unremarkable right kidney. Collapsed bladder around a Bill catheter. XR CHEST 1 VIEW   Final Result   Multifocal opacities within both lungs suggesting multifocal pneumonia. Reported positive COVID. Assessment/Plan :      1. STEVE   Has severe ATN   UOP improved overnight   ATN recovering   2/2 ATN from hypotension/ sepsis . DC Normal saline        renal USG  - no hydronephrosis       Recommend to dose adjust all medications  based on renal functions  Maintain SBP> 90 mmHg   Daily weights   AVOID NSAIDs  Avoid Nephrotoxins  Monitor Intake/Output  Call if significant decrease in urine output     2. Hypotension 2/2 sepsis   Improved     3. Hypoxia 2/2  covid 19 pneumonia   On dexamethasone   On 10 L oxygen   Pulmonology seeing   Got actemra       4. Acid- base disorder. Metabolic acidosis   Resolved       5. Electrolytes. monitor     6. Vitamin D defeciency.  On vitamin D supplements             Thank you for allowing us to participate in care of Douglas Murray         Electronically signed by: Daniel Caal MD, 10/7/2021, 11:14 AM      [facility] [address]  Office : [phone]  Fax :[phone]

## 2021-10-07 NOTE — PROGRESS NOTES
Hospitalist Progress Note      PCP: No primary care provider on file. Date of Admission: 10/1/2021    Chief Complaint: SOB    Hospital Course: Patient is a 58-year-old morbidly obese -American female, unvaccinated for COVID-19, who presented to ED with complaints of shortness of breath, cough, fevers, chills, diarrhea, generalized body aches for a week. Patient tested positive for COVID-19. Respiratory status worsened after admission. Currently on 11 L O2 via nasal cannula. On admission, patient was noted to have renal failure likely secondary to ATN with peak serum creatinine of 6.5. Did not require hemodialysis. Renal function improving, nonoliguric. Subjective: Doing okay. Remains on 10 L oxygen. Denies any new complaints. Not doing much physical activity or spirometry. Medications:  Reviewed    Infusion Medications    sodium chloride 25 mL (10/07/21 2949)    heparin (PORCINE) Infusion 6 Units/kg/hr (10/07/21 1445)     Scheduled Medications    famotidine  20 mg Oral Daily    sodium chloride flush  5-40 mL IntraVENous 2 times per day    Vitamin D  2,000 Units Oral Daily    dexamethasone  20 mg IntraVENous Daily     PRN Meds: sodium chloride, sodium chloride flush, sodium chloride, aluminum & magnesium hydroxide-simethicone, polyethylene glycol, acetaminophen **OR** acetaminophen, guaiFENesin-dextromethorphan, albuterol sulfate HFA, promethazine, heparin (porcine), heparin (porcine)      Intake/Output Summary (Last 24 hours) at 10/7/2021 1632  Last data filed at 10/7/2021 1402  Gross per 24 hour   Intake --   Output 2100 ml   Net -2100 ml       Physical Exam Performed:    /74   Pulse 73   Temp 98.2 °F (36.8 °C) (Axillary)   Resp 20   Ht 5' 6\" (1.676 m)   Wt (!) 349 lb 2 oz (158.4 kg)   SpO2 90%   BMI 56.35 kg/m²     General appearance: No apparent distress, appears stated age and cooperative. HEENT: Pupils equal, round, and reactive to light. Conjunctivae/corneas clear. Neck: Supple, with full range of motion. No jugular venous distention. Trachea midline. Respiratory:  Normal respiratory effort. Clear to auscultation, bilaterally without Rales/Wheezes/Rhonchi. Cardiovascular: Regular rate and rhythm with normal S1/S2 without murmurs, rubs or gallops. Abdomen: Soft, non-tender, non-distended with normal bowel sounds. Musculoskeletal: No clubbing, cyanosis or edema bilaterally. Full range of motion without deformity. Skin: Skin color, texture, turgor normal.  No rashes or lesions. Neurologic:  Neurovascularly intact without any focal sensory/motor deficits. Cranial nerves: II-XII intact, grossly non-focal.  Psychiatric: Alert and oriented, thought content appropriate, normal insight  Capillary Refill: Brisk,3 seconds, normal   Peripheral Pulses: +2 palpable, equal bilaterally       Labs:   Recent Labs     10/05/21  0549 10/06/21  0615   WBC 12.9* 13.7*   HGB 14.9 14.0   HCT 43.8 41.1    401     Recent Labs     10/05/21  0549 10/06/21  0615 10/07/21  0915    145 147*   K 3.9 4.5 3.6    107 110   CO2 24 26 25   BUN 95* 72* 45*   CREATININE 4.0* 2.4* 1.7*   CALCIUM 9.0 9.2 8.8     Recent Labs     10/05/21  0549 10/06/21  0615   AST 86* 86*   ALT 45* 60*   BILITOT 0.3 0.3   ALKPHOS 76 79     No results for input(s): INR in the last 72 hours. No results for input(s): Shen Underwood in the last 72 hours. Urinalysis:      Lab Results   Component Value Date    NITRU Negative 10/01/2021    WBCUA 16 10/01/2021    RBCUA 4 10/01/2021    BLOODU MODERATE 10/01/2021    SPECGRAV >1.030 10/01/2021    GLUCOSEU Negative 10/01/2021       Radiology:  VL Extremity Venous Bilateral         US RENAL COMPLETE   Final Result   Very limited visualization of the left kidney, grossly unremarkable, no   evidence of hydronephrosis. Unremarkable right kidney. Collapsed bladder around a Bill catheter.          XR CHEST 1 VIEW   Final Result Multifocal opacities within both lungs suggesting multifocal pneumonia. Reported positive COVID. Assessment/Plan:    Active Hospital Problems    Diagnosis     Pneumonia due to COVID-19 virus [U07.1, J12.82]     STEVE (acute kidney injury) ([facility] [address] [N17.9]     Hypotension due to hypovolemia [I95.89, E86.1]     Morbid obesity ([facility] [address] [E66.01]      COVID-19 pneumonia:  not vaccinated. On IV Decadron 20 mg - day 6. Remdesivir not given due to renal failure. Actemra given on 10/2/2021. D-dimer elevated. On IV heparin drip. CTPA not done as she has acute renal failure. Will consider VQ scan. Leg Dopplers negative for DVT    Acute respiratory failure: Secondary to COVID-19 pneumonia. needs a CTPA versus VQ scan. On 10 L oxygen. Wean as tolerated. Acute renal failure: Secondary to ATN . Nonoliguric. Nephrology on board. Ultrasound kidneys normal.  Creatinine improving and down to 1.7 today. Of IV fluids. Continue to monitor closely. Morbid obesity: BMI 56.35    DVT Prophylaxis: heparin   Diet: ADULT DIET; Regular; Low Potassium (Less than 3000 mg/day)  Adult Oral Nutrition Supplement;  Renal Oral Supplement  Code Status: Full Code    PT/OT Eval Status: not needed    Dispo - inpt 2 days  at least    Lucio Negrete MD

## 2021-10-07 NOTE — PROGRESS NOTES
PULMONARY AND CRITICAL CARE MEDICINE PROGRESS NOTE      SUBJECTIVE: Patient remains on 10 L/min O2. No acute events overnight. REVIEW OF SYSTEMS:  CONSTITUTIONAL SYMPTOMS: The patient denies fever, fatigue, night sweats, weight loss or weight gain. HEENT: No vision changes. No tinnitus, Denies sinus pain. No hoarseness, or dysphagia. CARDIOVASCULAR: Denies chest pain, palpitation, syncope. RESPIRATORY: Denies shortness of breath or cough. GASTROINTESTINAL: Denies nausea, abdominal pain or change in bowel function. SKIN: No rashes or itching. MUSCULOSKELETAL: Denies weakness or bone pain. NEUROLOGICAL: No headaches or seizures. MEDICATIONS:      famotidine  20 mg Oral Daily    sodium chloride flush  5-40 mL IntraVENous 2 times per day    Vitamin D  2,000 Units Oral Daily    dexamethasone  20 mg IntraVENous Daily      sodium chloride 25 mL (10/07/21 1419)    heparin (PORCINE) Infusion 6 Units/kg/hr (10/07/21 1445)     sodium chloride, sodium chloride flush, sodium chloride, aluminum & magnesium hydroxide-simethicone, polyethylene glycol, acetaminophen **OR** acetaminophen, guaiFENesin-dextromethorphan, albuterol sulfate HFA, promethazine, heparin (porcine), heparin (porcine)     ALLERGIES:   Allergies as of 10/01/2021 - Fully Reviewed 10/01/2021   Allergen Reaction Noted    Shellfish-derived products Anaphylaxis 10/01/2021    Coyle (diagnostic) Anaphylaxis 10/01/2021        OBJECTIVE:   height is 5' 6\" (1.676 m) and weight is 349 lb 2 oz (158.4 kg) (abnormal). Her axillary temperature is 98.2 °F (36.8 °C). Her blood pressure is 113/74 and her pulse is 73. Her respiration is 20 and oxygen saturation is 90%. No intake/output data recorded. PHYSICAL EXAM:  CONSTITUTIONAL: She is a 43y.o.-year-old who appears her stated age. She is alert and oriented x 3 and in no acute distress. Morbidly obese  CARDIOVASCULAR: S1 S2 RRR.  Without murmer  RESPIRATORY & CHEST: Lungs are clear to auscultation and percussion. No wheezing, no crackles. Good air movement  GASTROINTESTINAL & ABDOMEN: Soft, nontender, positive bowel sounds in all quadrants, non-distended, without hepatosplenomegaly. GENITOURINARY: Deferred. MUSCULOSKELETAL: No tenderness to palpation of the axial skeleton. There is no clubbing. No cyanosis. No edema of the lower extremities. SKIN OF BODY: No rash or jaundice. PSYCHIATRIC EVALUATION: Normal affect. Patient answers questions appropriately. HEMATOLOGIC/LYMPHATIC/ IMMUNOLOGIC: No palpable lymphadenopathy. NEUROLOGIC: Alert and oriented x 3. Groslly non-focal. Motor strength is 5+/5 in all muscle groups. The patient has a normal sensorium globally.       LABS:   Lab Results   Component Value Date    WBC 13.7 (H) 10/06/2021    HGB 14.0 10/06/2021    HCT 41.1 10/06/2021     10/06/2021    ALT 60 (H) 10/06/2021    AST 86 (H) 10/06/2021     (H) 10/07/2021    K 3.6 10/07/2021     10/07/2021    CREATININE 1.7 (H) 10/07/2021    BUN 45 (H) 10/07/2021    CO2 25 10/07/2021    INR 1.05 10/02/2021       Lab Results   Component Value Date    GLUCOSE 130 (H) 10/07/2021    CALCIUM 8.8 10/07/2021     (H) 10/07/2021    K 3.6 10/07/2021    CO2 25 10/07/2021     10/07/2021    BUN 45 (H) 10/07/2021    CREATININE 1.7 (H) 10/07/2021       Lab Results   Component Value Date    GLUCOSE 130 (H) 10/07/2021    CALCIUM 8.8 10/07/2021     (H) 10/07/2021    K 3.6 10/07/2021    CO2 25 10/07/2021     10/07/2021    BUN 45 (H) 10/07/2021    CREATININE 1.7 (H) 10/07/2021       IMAGING:  I reviewed the chest x-ray from 10/1/2021 and my interpretation is as follows.  Bilateral multifocal infiltrates noted.        IMPRESSION:   COVID-19 pneumonia  Acute hypoxic respiratory failure  Acute kidney injury  Morbid obesity        RECOMMENDATION:   Patient has acute hypoxic respiratory failure secondary to COVID-19 pneumonia.    Requiring up to 7-10 L/min oxygen.  Titrate FiO2 for saturation of 90 to 92%.    Continue Decadron 20 mg IV daily, day 6.  Could not get remdesivir due to acute kidney injury. Received 1 dose of Actemra.  CRP is decreasing,7.  Elevated D-dimer of 4680.  Continue heparin drip in the setting of STEVE for hypercoagulable state associated with COVID-19.  D-dimer is decreasing. Creatinine is improving.  Good urine output. Encourage side/prone positioning in the bed as tolerated.  Out of bed in chair.       Magaly Eller MD  Pulmonary Critical Care and Sleep Medicine  10/7/2021, 3:46 PM    This note was completed using dragon medical speech recognition software. Grammatical errors, random word insertions, pronoun errors and incomplete sentences are occasional consequences of this technology due to software limitations. If there are questions or concerns about the content of this note of information contained within the body of this dictation they should be addressed with the provider for clarification.

## 2021-10-07 NOTE — PROGRESS NOTES
Message sent to Dr. Familia Méndez, \"Pt on heparin drip. Just emptied out her angulo and the output was red. Output was yellow this AM.\" Waiting response.

## 2021-10-07 NOTE — PROGRESS NOTES
Assessment complete. VSS. Pt on 10L HFNC satting 90-92%. Patient resting in bed. Respirations even and easy. Call light in reach. Fall precautions in place. Pt coughing and requesting cough syrup. APTT and BMP labs annie. Will continue to monitor.

## 2021-10-07 NOTE — PROGRESS NOTES
Critical aptt of 109.1. Currently holding heparin for one hour and then will decrease rate by 3 unit/kg/hr.

## 2021-10-07 NOTE — PROGRESS NOTES
PTT 58.7 sec. Half bolus given . Heparin gtt increased to 9 units/kg/hr . Pt request ice pack. Denies any other needs at this time. Call light in reach will continue to monitor.

## 2021-10-08 LAB
ANION GAP SERPL CALCULATED.3IONS-SCNC: 12 MMOL/L (ref 3–16)
APTT: 142.6 SEC (ref 26.2–38.6)
APTT: 50.6 SEC (ref 26.2–38.6)
APTT: >248 SEC (ref 26.2–38.6)
BUN BLDV-MCNC: 28 MG/DL (ref 7–20)
CALCIUM SERPL-MCNC: 8.8 MG/DL (ref 8.3–10.6)
CHLORIDE BLD-SCNC: 107 MMOL/L (ref 99–110)
CO2: 26 MMOL/L (ref 21–32)
CREAT SERPL-MCNC: 1.5 MG/DL (ref 0.6–1.1)
GFR AFRICAN AMERICAN: 47
GFR NON-AFRICAN AMERICAN: 39
GLUCOSE BLD-MCNC: 127 MG/DL (ref 70–99)
POTASSIUM SERPL-SCNC: 3.4 MMOL/L (ref 3.5–5.1)
SODIUM BLD-SCNC: 145 MMOL/L (ref 136–145)

## 2021-10-08 PROCEDURE — 6360000002 HC RX W HCPCS: Performed by: INTERNAL MEDICINE

## 2021-10-08 PROCEDURE — 6370000000 HC RX 637 (ALT 250 FOR IP): Performed by: INTERNAL MEDICINE

## 2021-10-08 PROCEDURE — 99232 SBSQ HOSP IP/OBS MODERATE 35: CPT | Performed by: INTERNAL MEDICINE

## 2021-10-08 PROCEDURE — 94640 AIRWAY INHALATION TREATMENT: CPT

## 2021-10-08 PROCEDURE — 2700000000 HC OXYGEN THERAPY PER DAY

## 2021-10-08 PROCEDURE — 94150 VITAL CAPACITY TEST: CPT

## 2021-10-08 PROCEDURE — 99233 SBSQ HOSP IP/OBS HIGH 50: CPT | Performed by: INTERNAL MEDICINE

## 2021-10-08 PROCEDURE — 97530 THERAPEUTIC ACTIVITIES: CPT

## 2021-10-08 PROCEDURE — 80048 BASIC METABOLIC PNL TOTAL CA: CPT

## 2021-10-08 PROCEDURE — 94761 N-INVAS EAR/PLS OXIMETRY MLT: CPT

## 2021-10-08 PROCEDURE — 2060000000 HC ICU INTERMEDIATE R&B

## 2021-10-08 PROCEDURE — 85730 THROMBOPLASTIN TIME PARTIAL: CPT

## 2021-10-08 PROCEDURE — 2580000003 HC RX 258: Performed by: INTERNAL MEDICINE

## 2021-10-08 RX ORDER — DEXAMETHASONE SODIUM PHOSPHATE 10 MG/ML
10 INJECTION, SOLUTION INTRAMUSCULAR; INTRAVENOUS DAILY
Status: COMPLETED | OUTPATIENT
Start: 2021-10-09 | End: 2021-10-10

## 2021-10-08 RX ADMIN — Medication 2 PUFF: at 08:18

## 2021-10-08 RX ADMIN — DEXAMETHASONE SODIUM PHOSPHATE 20 MG: 4 INJECTION, SOLUTION INTRA-ARTICULAR; INTRALESIONAL; INTRAMUSCULAR; INTRAVENOUS; SOFT TISSUE at 12:00

## 2021-10-08 RX ADMIN — HEPARIN SODIUM 5000 UNITS: 1000 INJECTION INTRAVENOUS; SUBCUTANEOUS at 12:16

## 2021-10-08 RX ADMIN — Medication 2 PUFF: at 16:22

## 2021-10-08 RX ADMIN — Medication 2 PUFF: at 20:44

## 2021-10-08 RX ADMIN — Medication 10 ML: at 10:11

## 2021-10-08 RX ADMIN — Medication 10 ML: at 20:13

## 2021-10-08 RX ADMIN — GUAIFENESIN SYRUP AND DEXTROMETHORPHAN 5 ML: 100; 10 SYRUP ORAL at 10:20

## 2021-10-08 RX ADMIN — HEPARIN SODIUM 10 UNITS/KG/HR: 10000 INJECTION, SOLUTION INTRAVENOUS at 02:46

## 2021-10-08 RX ADMIN — FAMOTIDINE 20 MG: 20 TABLET, FILM COATED ORAL at 10:11

## 2021-10-08 RX ADMIN — Medication 2000 UNITS: at 10:11

## 2021-10-08 RX ADMIN — GUAIFENESIN SYRUP AND DEXTROMETHORPHAN 5 ML: 100; 10 SYRUP ORAL at 02:34

## 2021-10-08 RX ADMIN — GUAIFENESIN SYRUP AND DEXTROMETHORPHAN 5 ML: 100; 10 SYRUP ORAL at 20:17

## 2021-10-08 ASSESSMENT — PAIN SCALES - GENERAL
PAINLEVEL_OUTOF10: 0

## 2021-10-08 NOTE — CONSULTS
[facility] Consult Note  Inpatient Setting - [facility]    Provider: Marlys Villalba MD MD Patient ID:  Admission Date: 10/1/2021 Name: Michele Simmonds  OR Date: n/a MRN: [id number]   Patient Location: [id number] : 1985  Attending: Lucio Negrete MD Date of Service: 10/8/2021  PCP: No primary care provider on file. Diagnoses:  1. Pneumonia due to COVID-19 virus    2. Hypoxia    3. Gross hematuria  4. Morbid obesity with BMI 56     Assessment/Plan:  Urine color has cleared. Currently yellow. Nothing further for now. Please call with further questions. All the patients questions were answered in detail. She understands the plan as listed above. CC:   Chief Complaint   Patient presents with    Shortness of Breath     pt. with sob, cough, fever and weakness that started on .  pt. with increased SOB tonight. pt. was 84% on room air. pt. pt. arrived by [facility]. HPI: Michele Simmonds is a 43 y.o. female. I saw the patient today for gross hematuria, and associated symptoms related to COVID infection. She has also been on blood thinners, which complicates the issue. Her symptoms have been present for since a Bill catheter was placed. Symptoms relieved by IV fluid hydration and aggravated by Bill catheter placement. She has tried the following treatments: She has no prior  history. She is not a smoker. She is otherwise moderate risk for adverse pathology given her young age. Past Medical History:   She has no past medical history on file. Past Surgical History:  She has no past surgical history on file. Allergies: Allergies   Allergen Reactions    Shellfish-Derived Products Anaphylaxis    Strawberry (Diagnostic) Anaphylaxis       Social History:  She reports that she has never smoked.  She does not have any smokeless tobacco history on file. She reports previous alcohol use. Family History:  Family history is unknown by patient. Medications:   Scheduled Meds:   albuterol sulfate HFA  2 puff Inhalation 4x daily    famotidine  20 mg Oral Daily    sodium chloride flush  5-40 mL IntraVENous 2 times per day    Vitamin D  2,000 Units Oral Daily    dexamethasone  20 mg IntraVENous Daily     Continuous Infusions:   sodium chloride 25 mL (10/07/21 1419)    heparin (PORCINE) Infusion Stopped (10/08/21 0646)     PRN Meds:albuterol sulfate HFA, sodium chloride, sodium chloride flush, sodium chloride, aluminum & magnesium hydroxide-simethicone, polyethylene glycol, acetaminophen **OR** acetaminophen, guaiFENesin-dextromethorphan, promethazine, heparin (porcine), heparin (porcine)    Review of Systems:  Constitutional: Negative for fever    Genitourinary: see HPI  Eyes: negative for sudden change in vision  EENT: no complaints  Cardiovascular: Negative for chest pain  Respiratory: + for shortness of breath  Gastrointestinal: Negative for nausea  Musculoskeletal: Negative for back pain   Neurological: Negative for weakness  Psychiatric: Negative for anxiety  Integumentary: Negative for rashes or adenopathy     Physical Exam:  Vitals:    10/08/21 1107   BP: 103/78   Pulse: 110   Resp: 20   Temp: 98.1 °F (36.7 °C)   SpO2: 90%     Constitutional: NAD, well-developed, well-nourished. Cardiovascular: Regular rate. No peripheral edema  Respiratory: Respirations are even and non-labored. No audible breath sounds. Genitourinary: Bill catheter in place with clear yellow urine in the tubing. Abdomen: Soft. No distension, tenderness, hernias, masses or guarding. No CVA tenderness. No hernias appreciated. Liver and spleen appear normal  Psychiatric: A + O x 3, normal affect. Insight appears intact. Skin: Pink, warm and dry. No rashes on face and arms.     Labs:  Lab Results   Component Value Date    WBC 13.7 (H) 10/06/2021    HGB 13.9 10/07/2021    HCT 40.7 10/07/2021    MCV 93.6 10/06/2021     10/06/2021     Lab Results   Component Value Date    CREATININE 1.5 (H) 10/08/2021    BUN 28 (H) 10/08/2021     10/08/2021    K 3.4 (L) 10/08/2021     10/08/2021    CO2 26 10/08/2021       Imaging:   Ultrasound negative October 2.   Just admittedly this was a limited study based on her BMI of 64    Crescencio Thomas MD, MD  10/8/2021

## 2021-10-08 NOTE — PROGRESS NOTES
Hospitalist Progress Note      PCP: No primary care provider on file. Date of Admission: 10/1/2021    Chief Complaint: SOB    Hospital Course: Patient is a 22-year-old morbidly obese -American female, unvaccinated for COVID-19, who presented to ED with complaints of shortness of breath, cough, fevers, chills, diarrhea, generalized body aches for a week. Patient tested positive for COVID-19. Respiratory status worsened after admission and needed up to 11 L oxygen. Treating with Decadron. Remdesivir not given due to the acute kidney injury. Actemra given. On heparin drip due to high D-dimer. CTPA not done due to acute renal failure. Nephrology and pulmonology on board. Overall improving. Subjective: Not doing much physical activity. Was on 10 L this morning. Stressed the importance of using spirometer and proning this morning. Discussed with nursing as well. Worked with physical therapy this morning.     Medications:  Reviewed    Infusion Medications    sodium chloride 25 mL (10/07/21 1419)    heparin (PORCINE) Infusion 12 Units/kg/hr (10/08/21 1204)     Scheduled Medications    [START ON 10/9/2021] dexamethasone  10 mg IntraVENous Daily    albuterol sulfate HFA  2 puff Inhalation 4x daily    famotidine  20 mg Oral Daily    sodium chloride flush  5-40 mL IntraVENous 2 times per day    Vitamin D  2,000 Units Oral Daily     PRN Meds: albuterol sulfate HFA, sodium chloride, sodium chloride flush, sodium chloride, aluminum & magnesium hydroxide-simethicone, polyethylene glycol, acetaminophen **OR** acetaminophen, guaiFENesin-dextromethorphan, promethazine, heparin (porcine), heparin (porcine)      Intake/Output Summary (Last 24 hours) at 10/8/2021 1619  Last data filed at 10/8/2021 1229  Gross per 24 hour   Intake 240 ml   Output 2750 ml   Net -2510 ml       Physical Exam Performed:    /88   Pulse 77   Temp 98.1 °F (36.7 °C) (Oral)   Resp 20   Ht 5' 6\" (1.676 m)   Wt (!) 349 lb 2 oz (158.4 kg)   SpO2 90%   BMI 56.35 kg/m²     General appearance: No acute distress. , appears stated age and cooperative. HEENT: Pupils equal, round, and reactive to light. Conjunctivae/corneas clear. Neck: Supple, with full range of motion. No jugular venous distention. Trachea midline. Respiratory:  Normal respiratory effort. Clear to auscultation, bilaterally without Rales/Wheezes/Rhonchi. Cardiovascular: Regular rate and rhythm with normal S1/S2 without murmurs, rubs or gallops. Abdomen: Soft, non-tender, non-distended with normal bowel sounds. Musculoskeletal: No clubbing, cyanosis or edema bilaterally. Full range of motion without deformity. Skin: Skin color, texture, turgor normal.  No rashes or lesions. Neurologic:  Neurovascularly intact without any focal sensory/motor deficits. Cranial nerves: II-XII intact, grossly non-focal.  Psychiatric: Alert and oriented, thought content appropriate, normal insight  Capillary Refill: Brisk,3 seconds, normal   Peripheral Pulses: +2 palpable, equal bilaterally       Labs:   Recent Labs     10/06/21  0615 10/07/21  1955   WBC 13.7*  --    HGB 14.0 13.9   HCT 41.1 40.7     --      Recent Labs     10/06/21  0615 10/07/21  0915 10/08/21  1010    147* 145   K 4.5 3.6 3.4*    110 107   CO2 26 25 26   BUN 72* 45* 28*   CREATININE 2.4* 1.7* 1.5*   CALCIUM 9.2 8.8 8.8     Recent Labs     10/06/21  0615   AST 86*   ALT 60*   BILITOT 0.3   ALKPHOS 79     No results for input(s): INR in the last 72 hours. No results for input(s): Rhae Childes in the last 72 hours.     Urinalysis:      Lab Results   Component Value Date    NITRU Negative 10/01/2021    WBCUA 16 10/01/2021    RBCUA 4 10/01/2021    BLOODU MODERATE 10/01/2021    SPECGRAV >1.030 10/01/2021    GLUCOSEU Negative 10/01/2021       Radiology:  VL Extremity Venous Bilateral   Final Result      US RENAL COMPLETE   Final Result   Very limited visualization of the left kidney, grossly unremarkable, no   evidence of hydronephrosis. Unremarkable right kidney. Collapsed bladder around a Bill catheter. XR CHEST 1 VIEW   Final Result   Multifocal opacities within both lungs suggesting multifocal pneumonia. Reported positive COVID. Assessment/Plan:    Active Hospital Problems    Diagnosis     Pneumonia due to COVID-19 virus [U07.1, J12.82]     STEVE (acute kidney injury) ([facility] [address] [N17.9]     Hypotension due to hypovolemia [I95.89, E86.1]     Morbid obesity ([facility] [address] [E66.01]      COVID-19 pneumonia:  not vaccinated. Treated with Decadron ARDS dosing. Now on Decadron 10 mg.   Remdesivir not given due to renal failure. Actemra given on 10/2/2021. D-dimer elevated. On IV heparin drip. CTPA not done as she has acute renal failure. Leg Dopplers negative for DVT. Can consider VQ scan or discharging with anticoagulants. Acute respiratory failure: Secondary to COVID-19 pneumonia. needs VQ scan. Finally able to wean oxygen to 5 L this afternoon. Continue spirometry. Increased activity. Continue to wean oxygen down as tolerated. Acute renal failure: Secondary to ATN . Nonoliguric. Nephrology on board. Initially treatedwith IV fluids. Ultrasound kidneys normal.  Creatinine improving and down to 1. 5 today. We will remove Bill catheter. Morbid obesity: BMI 56.35    DVT Prophylaxis: heparin   Diet: ADULT DIET; Regular; Low Potassium (Less than 3000 mg/day)  Adult Oral Nutrition Supplement;  Renal Oral Supplement  Code Status: Full Code    PT/OT Eval Status: not needed    Dispo - inpt 1-2 more days    Raiza Marshall MD

## 2021-10-08 NOTE — PROGRESS NOTES
Physical Therapy  Facility/Department: [address] ONCOLOGY  Daily Treatment Note  NAME: Leanne Le  : 1985  MRN: [id number]    Date of Service: 10/8/2021    Discharge Recommendations:  3-5 sessions per week   PT Equipment Recommendations  Equipment Needed:  (will continue to assess, potentially bariatric RW)    Leanne Le scored a 17/24 on the AM-PAC short mobility form. Current research shows that an AM-PAC score of 17 or less is typically not associated with a discharge to the patient's home setting. Based on the patient's AM-PAC score and their current functional mobility deficits, it is recommended that the patient have 3-5 sessions per week of Physical Therapy at d/c to increase the patient's independence. Please see assessment section for further patient specific details. If patient discharges prior to next session this note will serve as a discharge summary. Please see below for the latest assessment towards goals. Assessment   Body structures, Functions, Activity limitations: Decreased strength;Decreased endurance;Decreased balance  Assessment: Pt cotninues to present with limitations in activity due to decreased activity tolerance and apprehension regarding lack of oxygenation with mobility. Pt tolerates treatment session fairly well on 10L O2, will continue to benefit from progression of activity to assist return to functional independence. Treatment Diagnosis: impaired gait, transfers, and balance  Prognosis: Good  PT Education: Goals;PT Role;Plan of Care;Energy Conservation;Transfer Training;General Safety;Orientation; Functional Mobility Training  Patient Education: PLB, d/c recommendations, increasing mobility--pt verbalizing understanding. During session MD present and encourages use of BSC and weaning off of catheter. Pt expresses concern regarding size of bariatric BSC and states that she is unsure if she is comfortable utilizing this device.   REQUIRES PT FOLLOW UP: Yes  Activity Tolerance  Activity Tolerance: required increased time to perform all tasks during session and increased time for SpO2 recovery on 10L O2. Pt recurrently has to repeated sup<>sit transfers due to anxiety with feelings of SOB when in sitting position     Patient Diagnosis(es): The primary encounter diagnosis was Pneumonia due to COVID-19 virus. A diagnosis of Hypoxia was also pertinent to this visit. has no past medical history on file. has no past surgical history on file. Restrictions  Restrictions/Precautions  Restrictions/Precautions: Fall Risk, General Precautions, Isolation  Required Braces or Orthoses?: No  Position Activity Restriction  Other position/activity restrictions: 43 y.o. female with  history of obesity, who presents to the emergency room with complaints of cough, fever, chills, shortness of breath, diarrhea, generalized body aches, generalized weakness, ongoing since 9/19/2021. She was noted to be hypoxic with oxygen saturation of 84% on room air, requiring as much as 5 L/min supplemental oxygen at the time of my evaluation in the emergency room. Subjective   General  Chart Reviewed: Yes  Response To Previous Treatment: Patient with no complaints from previous session. Family / Caregiver Present: No  Subjective  Subjective: Pt agreeable to PT tx session, states that she has been waiting for therapy to return and denies pain.  Pt on 10L O2 upon PT arrival  General Comment  Comments: Pt supine in bed upon arrival.  Pain Screening  Patient Currently in Pain: Denies  Vital Signs  Patient Currently in Pain: Denies       Cognition   Cognition  Attention Span: Difficulty dividing attention  Cognition Comment: increased time required for most actions, intermittently demonstrates self limiting behavior requiring encouragement to continue engaging in tx session  Objective   Bed mobility  Supine to Sit: Stand by assistance (HOB elevated, increased time to recover)  Sit to Supine: Stand by assistance  Scooting: Stand by assistance  Comment: sup<>sit transfer complete ~5 times during session  Transfers  Sit to Stand: Contact guard assistance (up to bariatric RW x 2 reps)  Stand to sit: Contact guard assistance  Bed to Chair: Contact guard assistance (via stand step with use of bariatric RW)  Comment: SPO2 declines to 84-85% on 10L O2 while transferring, requires ~2 minutes to recover to 88-92%. Pt symptomatic of SOB noting dizziness and lightheadness, hyperventilates with cues to slow breathing and perform PLB        Balance  Posture: Fair  Sitting - Static: Good (independent)  Sitting - Dynamic: Good (independent)  Standing - Static: Fair (CGA with UE support on RW)  Standing - Dynamic: Fair (CGA with UE support on RW)  Comments: tolerates sitting EOB ~15 minutes while assisting with personal care and educating on breathing pattenrs            Comment: Extensive time spent educating on importance of PLB, importance of increasing mobility. Pt verbalizes and able to demonstrate understanding of breathing technique with SPO2 reading 88-92%.            AM-PAC Score  AM-PAC Inpatient Mobility Raw Score : 17 (10/08/21 1244)  AM-PAC Inpatient T-Scale Score : 42.13 (10/08/21 1244)  Mobility Inpatient CMS 0-100% Score: 50.57 (10/08/21 1244)  Mobility Inpatient CMS G-Code Modifier : CK (10/08/21 1244)          Goals  Short term goals  Time Frame for Short term goals: upon d/c (no goals met 10/8)  Short term goal 1: Pt will perform bed mobility MOD I  Short term goal 2: Pt will perform transfers with LRAD and supervision  Short term goal 3: Pt will ambulate 22' with LRAD and CGA  Patient Goals   Patient goals : pt did not state    Plan    Plan  Times per week: 3-5x  Times per day: Daily  Current Treatment Recommendations: Strengthening, Balance Training, Functional Mobility Training, Transfer Training, Gait Training, Stair training, Endurance Training, Neuromuscular Re-education, Positioning, Modalities, Equipment Evaluation, Education, & procurement, Patient/Caregiver Education & Training, Safety Education & Training, Home Exercise Program  Safety Devices  Type of devices:  All fall risk precautions in place, Call light within reach, Chair alarm in place, Gait belt, Patient at risk for falls, Nurse notified, Left in chair  Restraints  Initially in place: No     Therapy Time   Individual Concurrent Group Co-treatment   Time In 0940         Time Out 1055         Minutes 75               Timed Code Treatment Minutes:  75 minutes    Total Treatment Minutes:  75 minutes    [address] [facility] [address], [address] , [place] [id number]

## 2021-10-08 NOTE — PROGRESS NOTES
ptt 50.6 1/2 bolus given and increase by 2unit/kg/hr running at 12 un/kg/hr at this time. Prn given for cough. Pt up in chair urine per angulo 800ml clear yellow.  Pt encouraged to move

## 2021-10-08 NOTE — PROGRESS NOTES
Office : [phone]     Fax :[phone]       Nephrology progress  Note      Patient's Name: Sean Sims  11:33 AM  10/8/2021    Reason for Consult:  leeroy      Chief Complaint:    Chief Complaint   Patient presents with    Shortness of Breath     pt. with sob, cough, fever and weakness that started on 9/19.  pt. with increased SOB tonight. pt. was 84% on room air. pt. pt. arrived by [employer]. History of Present iIlness:      Sean Sims is a 43 y.o. female with  history of obesity due to excess calories, who presents to the emergency room with complaints of cough, fever, chills, shortness of breath, diarrhea, generalized body aches, generalized weakness, ongoing since 9/19/2021. She was noted to be hypoxic with oxygen saturation of 84% on room air, requiring as much as 5 L/min supplemental oxygen at the time of my evaluation in the emergency room. She does not have chest pain. Chest x-ray is consistent with multifocal opacities in both lung bases. She has no leukocytosis. Abnormal labs include sodium of 135, BUN 66, creatinine 4.0, LDH 1191, calcitonin level of 0.78, D-dimer 4401, fibrinogen of 627. Interval hx       No fever   UOP improved   Creatinine levels much  better   No edema      On 10 liter oxygen per NC     I/O last 3 completed shifts:   In: 360 [P.O.:360]  Out: 3550 [Urine:3550]          Current Medications:    albuterol sulfate  (90 Base) MCG/ACT inhaler 2 puff, 4x daily  albuterol sulfate  (90 Base) MCG/ACT inhaler 2 puff, Q6H PRN  sodium chloride (OCEAN, BABY AYR) 0.65 % nasal spray 1 spray, Q4H PRN  famotidine (PEPCID) tablet 20 mg, Daily  sodium chloride flush 0.9 % injection 5-40 mL, 2 times per day  sodium chloride flush 0.9 % injection 5-40 mL, PRN  0.9 % sodium chloride infusion, PRN  aluminum & magnesium hydroxide-simethicone (MAALOX) 200-200-20 MG/5ML suspension 30 mL, Q6H PRN  polyethylene glycol (GLYCOLAX) packet 17 g, Daily PRN  acetaminophen (TYLENOL) tablet 650 mg, Q6H PRN   Or  acetaminophen (TYLENOL) suppository 650 mg, Q6H PRN  guaiFENesin-dextromethorphan (ROBITUSSIN DM) 100-10 MG/5ML syrup 5 mL, Q4H PRN  Vitamin D (CHOLECALCIFEROL) tablet 2,000 Units, Daily  promethazine (PHENERGAN) injection 6.25 mg, Q6H PRN  heparin (porcine) injection 10,000 Units, PRN  heparin (porcine) injection 5,000 Units, PRN  heparin 25,000 units in dextrose 5% 250 mL (premix) infusion, Continuous  dexamethasone (DECADRON) 20 mg in sodium chloride 0.9 % IVPB, Daily          Physical exam:     Vitals:  /78   Pulse 110   Temp 98.1 °F (36.7 °C) (Oral)   Resp 20   Ht 5' 6\" (1.676 m)   Wt (!) 349 lb 2 oz (158.4 kg)   SpO2 90%   BMI 56.35 kg/m²   Constitutional:  OAA X3 NAD  Skin: no rash, turgor wnl  Heent:  eomi, mmm  Neck: no bruits or jvd noted  Cardiovascular:  S1, S2 without m/r/g  Respiratory: decreased air entry b/l bases   Abdomen:  +bs, soft, nt, nd  Ext: mild   lower extremity edema    Labs:  CBC:   Recent Labs     10/06/21  0615 10/07/21  1955   WBC 13.7*  --    HGB 14.0 13.9     --      BMP:    Recent Labs     10/06/21  0615 10/07/21  0915 10/08/21  1010    147* 145   K 4.5 3.6 3.4*    110 107   CO2 26 25 26   BUN 72* 45* 28*   CREATININE 2.4* 1.7* 1.5*   GLUCOSE 122* 130* 127*     Ca/Mg/Phos:   Recent Labs     10/06/21  0615 10/07/21  0915 10/08/21  1010   CALCIUM 9.2 8.8 8.8     Hepatic:   Recent Labs     10/06/21  0615   AST 86*   ALT 60*   BILITOT 0.3   ALKPHOS 79     Troponin:   No results for input(s): TROPONINI in the last 72 hours. BNP: No results for input(s): BNP in the last 72 hours. Lipids: No results for input(s): CHOL, TRIG, HDL, LDLCALC, LABVLDL in the last 72 hours.   ABGs: No results for input(s): PHART, PO2ART, AJO3NVB in the last 72 hours. INR:   No results for input(s): INR in the last 72 hours. UA:  No results for input(s): Delwin Minion, GLUCOSEU, BILIRUBINUR, KETUA, SPECGRAV, BLOODU, PHUR, PROTEINU, UROBILINOGEN, NITRU, LEUKOCYTESUR, Erica Corti in the last 72 hours. Urine Microscopic:   No results for input(s): LABCAST, BACTERIA, COMU, HYALCAST, WBCUA, RBCUA, EPIU in the last 72 hours. Urine Culture: No results for input(s): LABURIN in the last 72 hours. Urine Chemistry:   No results for input(s): Volney Ly, PROTEINUR, NAUR in the last 72 hours. IMAGING:  VL Extremity Venous Bilateral   Final Result      US RENAL COMPLETE   Final Result   Very limited visualization of the left kidney, grossly unremarkable, no   evidence of hydronephrosis. Unremarkable right kidney. Collapsed bladder around a Bill catheter. XR CHEST 1 VIEW   Final Result   Multifocal opacities within both lungs suggesting multifocal pneumonia. Reported positive COVID. Assessment/Plan :      1. STEVE   Has severe ATN   UOP improved overnight   ATN recovering   2/2 ATN from hypotension/ sepsis . DC Normal saline    renal USG  - no hydronephrosis       2. Hypotension 2/2 sepsis   Improved     3. Hypoxia 2/2  covid 19 pneumonia   On dexamethasone   On 7 L oxygen   Pulmonology seeing   Got actemra       4. Acid- base disorder. Metabolic acidosis   Resolved       5. Electrolytes. monitor     6. Vitamin D defeciency.  On vitamin D supplements     Recommend to dose adjust all medications  based on renal functions  Maintain SBP> 90 mmHg   Daily weights   AVOID NSAIDs  Avoid Nephrotoxins  Monitor Intake/Output  Call if significant decrease in urine output         Thank you for allowing us to participate in care of Ernie Yoo         Electronically signed by: Diane Chamorro MD, 10/8/2021, 11:33 AM      [facility] [address]  Office : [phone]  Fax

## 2021-10-08 NOTE — PROGRESS NOTES
PULMONARY AND CRITICAL CARE MEDICINE PROGRESS NOTE      SUBJECTIVE: Patient has been weaned down to 5 L nasal cannula. Saturating well. Denies any complaints. Creatinine is improving. Hematuria today. REVIEW OF SYSTEMS:   CONSTITUTIONAL SYMPTOMS: The patient denies fever, fatigue, night sweats, weight loss or weight gain. HEENT: No vision changes. No tinnitus, Denies sinus pain. No hoarseness, or dysphagia. CARDIOVASCULAR: Denies chest pain, palpitation, syncope. RESPIRATORY: Denies shortness of breath or cough. GASTROINTESTINAL: Denies nausea, abdominal pain or change in bowel function. SKIN: No rashes or itching. MUSCULOSKELETAL: Denies weakness or bone pain. NEUROLOGICAL: No headaches or seizures. MEDICATIONS:      albuterol sulfate HFA  2 puff Inhalation 4x daily    famotidine  20 mg Oral Daily    sodium chloride flush  5-40 mL IntraVENous 2 times per day    Vitamin D  2,000 Units Oral Daily    dexamethasone  20 mg IntraVENous Daily      sodium chloride 25 mL (10/07/21 1419)    heparin (PORCINE) Infusion 12 Units/kg/hr (10/08/21 1204)     albuterol sulfate HFA, sodium chloride, sodium chloride flush, sodium chloride, aluminum & magnesium hydroxide-simethicone, polyethylene glycol, acetaminophen **OR** acetaminophen, guaiFENesin-dextromethorphan, promethazine, heparin (porcine), heparin (porcine)     ALLERGIES:   Allergies as of 10/01/2021 - Fully Reviewed 10/01/2021   Allergen Reaction Noted    Shellfish-derived products Anaphylaxis 10/01/2021    [place] (diagnostic) Anaphylaxis 10/01/2021        OBJECTIVE:   height is 5' 6\" (1.676 m) and weight is 349 lb 2 oz (158.4 kg) (abnormal). Her oral temperature is 98.1 °F (36.7 °C). Her blood pressure is 103/78 and her pulse is 110. Her respiration is 20 and oxygen saturation is 92%. I/O this shift:  In: 240 [P.O.:240]  Out: 800 [Urine:800]     PHYSICAL EXAM:  CONSTITUTIONAL: She is a 43y.o.-year-old who appears her stated age. She is alert and oriented x 3 and in no acute distress. Morbidly obese  CARDIOVASCULAR: S1 S2 RRR. Without murmer  RESPIRATORY & CHEST: Lungs are clear to auscultation and percussion. No wheezing, no crackles. Good air movement  GASTROINTESTINAL & ABDOMEN: Soft, nontender, positive bowel sounds in all quadrants, non-distended, without hepatosplenomegaly. GENITOURINARY: Deferred. MUSCULOSKELETAL: No tenderness to palpation of the axial skeleton. There is no clubbing. No cyanosis. No edema of the lower extremities. SKIN OF BODY: No rash or jaundice. PSYCHIATRIC EVALUATION: Normal affect. Patient answers questions appropriately. HEMATOLOGIC/LYMPHATIC/ IMMUNOLOGIC: No palpable lymphadenopathy. NEUROLOGIC: Alert and oriented x 3. Groslly non-focal. Motor strength is 5+/5 in all muscle groups. The patient has a normal sensorium globally.       LABS:   Lab Results   Component Value Date    WBC 13.7 (H) 10/06/2021    HGB 13.9 10/07/2021    HCT 40.7 10/07/2021     10/06/2021    ALT 60 (H) 10/06/2021    AST 86 (H) 10/06/2021     10/08/2021    K 3.4 (L) 10/08/2021     10/08/2021    CREATININE 1.5 (H) 10/08/2021    BUN 28 (H) 10/08/2021    CO2 26 10/08/2021    INR 1.05 10/02/2021       Lab Results   Component Value Date    GLUCOSE 127 (H) 10/08/2021    CALCIUM 8.8 10/08/2021     10/08/2021    K 3.4 (L) 10/08/2021    CO2 26 10/08/2021     10/08/2021    BUN 28 (H) 10/08/2021    CREATININE 1.5 (H) 10/08/2021       Lab Results   Component Value Date    GLUCOSE 127 (H) 10/08/2021    CALCIUM 8.8 10/08/2021     10/08/2021    K 3.4 (L) 10/08/2021    CO2 26 10/08/2021     10/08/2021    BUN 28 (H) 10/08/2021    CREATININE 1.5 (H) 10/08/2021          IMAGING:  I reviewed the chest x-ray from 10/1/2021 and my interpretation is as follows.  Bilateral multifocal infiltrates noted.        IMPRESSION:   COVID-19 pneumonia  Acute hypoxic respiratory failure  Acute kidney injury  Morbid obesity        RECOMMENDATION:   Patient has acute hypoxic respiratory failure secondary to COVID-19 pneumonia.    FiO2 has been weaned down to 5 L nasal cannula.  Titrate FiO2 for saturation of 90 to 92%.    Continue Decadron 20 mg IV daily, day 7.  Could not get remdesivir due to acute kidney injury. Received 1 dose of Actemra.  CRP is decreasing,7.  Decadron will be decreased to 10 mg IV daily from tomorrow onwards. Elevated D-dimer of 4680.  Continue heparin drip in the setting of STEVE for hypercoagulable state associated with COVID-19.  D-dimer is decreasing. Creatinine is improving.  Good urine output. Encourage side/prone positioning in the bed as tolerated.  Out of bed in chair. Patient may need home O2 evaluation upon discharge. Pulmonary will sign off. Freeman Carpenter MD  Pulmonary Critical Care and Sleep Medicine  10/8/2021, 2:18 PM    This note was completed using dragon medical speech recognition software. Grammatical errors, random word insertions, pronoun errors and incomplete sentences are occasional consequences of this technology due to software limitations. If there are questions or concerns about the content of this note of information contained within the body of this dictation they should be addressed with the provider for clarification.

## 2021-10-09 LAB
ANION GAP SERPL CALCULATED.3IONS-SCNC: 11 MMOL/L (ref 3–16)
APTT: 83.7 SEC (ref 26.2–38.6)
APTT: 87.8 SEC (ref 26.2–38.6)
BUN BLDV-MCNC: 19 MG/DL (ref 7–20)
CALCIUM SERPL-MCNC: 8.8 MG/DL (ref 8.3–10.6)
CHLORIDE BLD-SCNC: 107 MMOL/L (ref 99–110)
CO2: 26 MMOL/L (ref 21–32)
CREAT SERPL-MCNC: 1.3 MG/DL (ref 0.6–1.1)
GFR AFRICAN AMERICAN: 55
GFR NON-AFRICAN AMERICAN: 46
GLUCOSE BLD-MCNC: 105 MG/DL (ref 70–99)
POTASSIUM SERPL-SCNC: 3.9 MMOL/L (ref 3.5–5.1)
SODIUM BLD-SCNC: 144 MMOL/L (ref 136–145)

## 2021-10-09 PROCEDURE — 94761 N-INVAS EAR/PLS OXIMETRY MLT: CPT

## 2021-10-09 PROCEDURE — 2700000000 HC OXYGEN THERAPY PER DAY

## 2021-10-09 PROCEDURE — 6360000002 HC RX W HCPCS: Performed by: INTERNAL MEDICINE

## 2021-10-09 PROCEDURE — 6370000000 HC RX 637 (ALT 250 FOR IP): Performed by: INTERNAL MEDICINE

## 2021-10-09 PROCEDURE — 2060000000 HC ICU INTERMEDIATE R&B

## 2021-10-09 PROCEDURE — 99232 SBSQ HOSP IP/OBS MODERATE 35: CPT | Performed by: HOSPITALIST

## 2021-10-09 PROCEDURE — 94640 AIRWAY INHALATION TREATMENT: CPT

## 2021-10-09 PROCEDURE — 36592 COLLECT BLOOD FROM PICC: CPT

## 2021-10-09 PROCEDURE — 2580000003 HC RX 258: Performed by: INTERNAL MEDICINE

## 2021-10-09 PROCEDURE — 80048 BASIC METABOLIC PNL TOTAL CA: CPT

## 2021-10-09 PROCEDURE — 85730 THROMBOPLASTIN TIME PARTIAL: CPT

## 2021-10-09 RX ORDER — FAMOTIDINE 20 MG/1
20 TABLET, FILM COATED ORAL 2 TIMES DAILY
Status: DISCONTINUED | OUTPATIENT
Start: 2021-10-09 | End: 2021-10-12 | Stop reason: HOSPADM

## 2021-10-09 RX ADMIN — FAMOTIDINE 20 MG: 20 TABLET, FILM COATED ORAL at 20:21

## 2021-10-09 RX ADMIN — FAMOTIDINE 20 MG: 20 TABLET, FILM COATED ORAL at 08:58

## 2021-10-09 RX ADMIN — Medication 2 PUFF: at 16:05

## 2021-10-09 RX ADMIN — HEPARIN SODIUM 9 UNITS/KG/HR: 10000 INJECTION, SOLUTION INTRAVENOUS at 22:41

## 2021-10-09 RX ADMIN — GUAIFENESIN SYRUP AND DEXTROMETHORPHAN 5 ML: 100; 10 SYRUP ORAL at 08:58

## 2021-10-09 RX ADMIN — GUAIFENESIN SYRUP AND DEXTROMETHORPHAN 5 ML: 100; 10 SYRUP ORAL at 13:57

## 2021-10-09 RX ADMIN — DEXAMETHASONE SODIUM PHOSPHATE 10 MG: 10 INJECTION, SOLUTION INTRAMUSCULAR; INTRAVENOUS at 08:59

## 2021-10-09 RX ADMIN — Medication 10 ML: at 08:59

## 2021-10-09 RX ADMIN — Medication 2 PUFF: at 20:02

## 2021-10-09 RX ADMIN — Medication 2000 UNITS: at 08:58

## 2021-10-09 RX ADMIN — HEPARIN SODIUM 9 UNITS/KG/HR: 10000 INJECTION, SOLUTION INTRAVENOUS at 01:25

## 2021-10-09 RX ADMIN — GUAIFENESIN SYRUP AND DEXTROMETHORPHAN 5 ML: 100; 10 SYRUP ORAL at 20:26

## 2021-10-09 RX ADMIN — Medication 10 ML: at 20:21

## 2021-10-09 ASSESSMENT — PAIN SCALES - GENERAL: PAINLEVEL_OUTOF10: 0

## 2021-10-09 NOTE — PROGRESS NOTES
Patients head to toe assessment completed. Vital signs WNL. Bed alarm engaged, call light within reach. Scheduled medications given per MAR. Patient denies any pain at the moment. Robitussin given for cough. Patient resting in bed. Will continue to monitor.

## 2021-10-09 NOTE — PLAN OF CARE
Problem: Breathing Pattern - Ineffective:  Goal: Ability to achieve and maintain a regular respiratory rate will improve  Description: Ability to achieve and maintain a regular respiratory rate will improve  Outcome: Ongoing     Problem: Skin Integrity:  Goal: Will show no infection signs and symptoms  Description: Will show no infection signs and symptoms  Outcome: Ongoing  Goal: Absence of new skin breakdown  Description: Absence of new skin breakdown  Outcome: Ongoing     Problem: Falls - Risk of:  Goal: Will remain free from falls  Description: Will remain free from falls  Outcome: Ongoing  Goal: Absence of physical injury  Description: Absence of physical injury  Outcome: Ongoing     Problem: Airway Clearance - Ineffective  Goal: Achieve or maintain patent airway  Outcome: Ongoing     Problem: Gas Exchange - Impaired  Goal: Absence of hypoxia  Outcome: Ongoing  Goal: Promote optimal lung function  Outcome: Ongoing     Problem: Breathing Pattern - Ineffective  Goal: Ability to achieve and maintain a regular respiratory rate  Outcome: Ongoing     Problem:  Body Temperature -  Risk of, Imbalanced  Goal: Ability to maintain a body temperature within defined limits  Outcome: Ongoing  Goal: Will regain or maintain usual level of consciousness  Outcome: Ongoing  Goal: Complications related to the disease process, condition or treatment will be avoided or minimized  Outcome: Ongoing     Problem: Isolation Precautions - Risk of Spread of Infection  Goal: Prevent transmission of infection  Outcome: Ongoing     Problem: Nutrition Deficits  Goal: Optimize nutritional status  Outcome: Ongoing     Problem: Risk for Fluid Volume Deficit  Goal: Maintain normal heart rhythm  Outcome: Ongoing  Goal: Maintain absence of muscle cramping  Outcome: Ongoing  Goal: Maintain normal serum potassium, sodium, calcium, phosphorus, and pH  Outcome: Ongoing     Problem: Loneliness or Risk for Loneliness  Goal: Demonstrate positive use of time alone when socialization is not possible  Outcome: Ongoing     Problem: Fatigue  Goal: Verbalize increase energy and improved vitality  Outcome: Ongoing     Problem: Patient Education: Go to Patient Education Activity  Goal: Patient/Family Education  Outcome: Ongoing     Problem: Musculor/Skeletal Functional Status  Goal: Highest potential functional level  Outcome: Ongoing  Goal: Absence of falls  Outcome: Ongoing     Problem: Nutrition  Goal: Optimal nutrition therapy  Outcome: Ongoing

## 2021-10-09 NOTE — PROGRESS NOTES
Hospitalist Progress Note      PCP: No primary care provider on file. Date of Admission: 10/1/2021    Chief Complaint: SOB    Hospital Course: Patient is a 25-year-old morbidly obese -American female, unvaccinated for COVID-19, who presented to ED with complaints of shortness of breath, cough, fevers, chills, diarrhea, generalized body aches for a week. Patient tested positive for COVID-19. Respiratory status worsened after admission and needed up to 11 L oxygen. Treating with Decadron. Remdesivir not given due to the acute kidney injury. Actemra given. On heparin drip due to high D-dimer. CTPA not done due to acute renal failure. Nephrology and pulmonology on board. Serum creatinine from peak of 6.5, down to 1.3 today. O2 requirements down to 5 L via nasal cannula. Overall improving. Subjective: Patient seen and examined at bedside. O2 requirements down to 5 L via nasal cannula. Remains on heparin drip for anticoagulation. Patient request to be discharged home at the earliest.  Explained the need to transition to oral anticoagulation, rule out PE prior to discharge.     Medications:  Reviewed    Infusion Medications    sodium chloride 25 mL (10/07/21 1419)    heparin (PORCINE) Infusion 9 Units/kg/hr (10/09/21 0125)     Scheduled Medications    famotidine  20 mg Oral BID    dexamethasone  10 mg IntraVENous Daily    albuterol sulfate HFA  2 puff Inhalation 4x daily    sodium chloride flush  5-40 mL IntraVENous 2 times per day    Vitamin D  2,000 Units Oral Daily     PRN Meds: albuterol sulfate HFA, sodium chloride, sodium chloride flush, sodium chloride, aluminum & magnesium hydroxide-simethicone, polyethylene glycol, acetaminophen **OR** acetaminophen, guaiFENesin-dextromethorphan, promethazine, heparin (porcine), heparin (porcine)      Intake/Output Summary (Last 24 hours) at 10/9/2021 1022  Last data filed at 10/9/2021 0637  Gross per 24 hour   Intake 2987 ml   Output 2050 ml Net 937 ml       Physical Exam Performed:    /79   Pulse 103   Temp 98.8 °F (37.1 °C) (Axillary)   Resp 20   Ht 5' 6\" (1.676 m)   Wt (!) 350 lb 6.4 oz (158.9 kg)   SpO2 91%   BMI 56.56 kg/m²     Gen/overall appearance: obese AAF, not in distress, oriented x3. CVS: RRR, S1 S2 heard, no MRG  Resp: decreased bibasilar breath sounds.  No wheezes. Gastrointestinal: Soft, NT/ND, audible BS  Musculoskeletal: No edema. Warm  Neuro: No focal deficit. Moves extremity spontaneously. Psychiatry: Appropriate affect. Not agitated. Skin: Warm, dry with normal turgor. No rash  Capillary refill: Brisk,< 3 seconds   Peripheral Pulses: +2 palpable, equal bilaterally     Labs:   Recent Labs     10/07/21  1955   HGB 13.9   HCT 40.7     Recent Labs     10/07/21  0915 10/08/21  1010 10/09/21  0414   * 145 144   K 3.6 3.4* 3.9    107 107   CO2 25 26 26   BUN 45* 28* 19   CREATININE 1.7* 1.5* 1.3*   CALCIUM 8.8 8.8 8.8     No results for input(s): AST, ALT, BILIDIR, BILITOT, ALKPHOS in the last 72 hours. No results for input(s): INR in the last 72 hours. No results for input(s): Liz [place] in the last 72 hours. Urinalysis:      Lab Results   Component Value Date    NITRU Negative 10/01/2021    WBCUA 16 10/01/2021    RBCUA 4 10/01/2021    BLOODU MODERATE 10/01/2021    SPECGRAV >1.030 10/01/2021    GLUCOSEU Negative 10/01/2021       Radiology:  VL Extremity Venous Bilateral   Final Result      US RENAL COMPLETE   Final Result   Very limited visualization of the left kidney, grossly unremarkable, no   evidence of hydronephrosis. Unremarkable right kidney. Collapsed bladder around a Bill catheter. XR CHEST 1 VIEW   Final Result   Multifocal opacities within both lungs suggesting multifocal pneumonia. Reported positive COVID.                  Assessment/Plan:    Active Hospital Problems    Diagnosis     Pneumonia due to COVID-19 virus [U07.1, J12.82]     STEVE (acute kidney injury) ([facility] [N17.9]     Hypotension due to hypovolemia [I95.89, E86.1]     Morbid obesity ([facility] [E66.01]      COVID-19 pneumonia:    Pt is not vaccinated. Treated with Decadron ARDS dosing. Now on Decadron 10 mg iv qd. Remdesivir not given due to renal failure. Actemra given on 10/2/2021. D-dimer >4000 on admission. On IV heparin drip. CTPA not done as she has acute renal failure. Leg Dopplers negative for DVT. VQ scan vs CTPA in 1-2 days prior to discharge. Acute respiratory failure:   Secondary to COVID-19 pneumonia.e possible too. Needs CTPA or VQ scan to rule out PE. O2 requirement down to 5 L this afternoon. Continue spirometry. Increased activity, OOB to chair. Continue to wean oxygen down as tolerated. Acute renal failure  Secondary to ATN . Nonoliguric. Nephrology on board. Initially treatedwith IV fluids. Ultrasound kidneys normal.    Creatinine improving and down to 1.5 today. Removed Bill catheter on 10/8. Hematuria resolved. Morbid obesity: BMI 56.35    DVT Prophylaxis: heparin   Diet: ADULT DIET; Regular; Low Potassium (Less than 3000 mg/day)  Adult Oral Nutrition Supplement;  Renal Oral Supplement  Code Status: Full Code    PT/OT Eval Status: recommending SNF    Dispo - inpt 1-2 more days    Elysia Fernández MD

## 2021-10-09 NOTE — PROGRESS NOTES
Incentive Spirometry education and demonstration completed by Respiratory Therapy Yes      Response to education: Excellent     Teaching Time: 5 minutes    Minimum Predicted Vital Capacity -593 mL. Patient's Actual Vital Capacity - 750 mL. Turning over to Nursing for routine follow-up Yes. Comments: pt. Performed IS very well.     Electronically signed by Cheng Zhang RCP on 10/9/2021 at 6:14 AM

## 2021-10-09 NOTE — PROGRESS NOTES
Office : [phone]     Fax :[phone]       Nephrology progress  Note      Patient's Name: France Perez  3:06 PM  10/9/2021    Reason for Consult:  leeroy      Chief Complaint:    Chief Complaint   Patient presents with    Shortness of Breath     pt. with sob, cough, fever and weakness that started on 9/19.  pt. with increased SOB tonight. pt. was 84% on room air. pt. pt. arrived by [employer]. History of Present iIlness:      France Perez is a 43 y.o. female with  history of obesity due to excess calories, who presents to the emergency room with complaints of cough, fever, chills, shortness of breath, diarrhea, generalized body aches, generalized weakness, ongoing since 9/19/2021. She was noted to be hypoxic with oxygen saturation of 84% on room air, requiring as much as 5 L/min supplemental oxygen at the time of my evaluation in the emergency room. She does not have chest pain. Chest x-ray is consistent with multifocal opacities in both lung bases. She has no leukocytosis. Abnormal labs include sodium of 135, BUN 66, creatinine 4.0, LDH 1191, calcitonin level of 0.78, D-dimer 4401, fibrinogen of 627. Interval hx     Serum cr trending down   No fever   UOP improved   Creatinine levels much  better   No edema      On 5  liter oxygen per NC     I/O last 3 completed shifts:   In: 2987 [I.V.:2987]  Out: 1600 [Urine:1600]          Current Medications:    famotidine (PEPCID) tablet 20 mg, BID  dexamethasone (PF) (DECADRON) injection 10 mg, Daily  albuterol sulfate  (90 Base) MCG/ACT inhaler 2 puff, 4x daily  albuterol sulfate  (90 Base) MCG/ACT inhaler 2 puff, Q6H PRN  sodium chloride (OCEAN, BABY AYR) 0.65 % nasal spray 1 spray, Q4H PRN  sodium chloride flush 0.9 % injection 5-40 mL, 2 times per day  sodium chloride flush 0.9 % injection 5-40 mL, PRN  0.9 % sodium chloride infusion, PRN  aluminum & magnesium hydroxide-simethicone (MAALOX) 200-200-20 MG/5ML suspension 30 mL, Q6H PRN  polyethylene glycol (GLYCOLAX) packet 17 g, Daily PRN  acetaminophen (TYLENOL) tablet 650 mg, Q6H PRN   Or  acetaminophen (TYLENOL) suppository 650 mg, Q6H PRN  guaiFENesin-dextromethorphan (ROBITUSSIN DM) 100-10 MG/5ML syrup 5 mL, Q4H PRN  Vitamin D (CHOLECALCIFEROL) tablet 2,000 Units, Daily  promethazine (PHENERGAN) injection 6.25 mg, Q6H PRN  heparin (porcine) injection 10,000 Units, PRN  heparin (porcine) injection 5,000 Units, PRN  heparin 25,000 units in dextrose 5% 250 mL (premix) infusion, Continuous          Physical exam:     Vitals:  /89   Pulse 104   Temp 97.4 °F (36.3 °C) (Axillary)   Resp 22   Ht 5' 6\" (1.676 m)   Wt (!) 350 lb 6.4 oz (158.9 kg)   SpO2 92%   BMI 56.56 kg/m²   Constitutional:  OAA X3 NAD  Skin: no rash, turgor wnl  Heent:  eomi, mmm  Neck: no bruits or jvd noted  Cardiovascular:  S1, S2 without m/r/g  Respiratory: decreased air entry b/l bases   Abdomen:  +bs, soft, nt, nd  Ext: mild   lower extremity edema    Labs:  CBC:   Recent Labs     10/07/21  1955   HGB 13.9     BMP:    Recent Labs     10/07/21  0915 10/08/21  1010 10/09/21  0414   * 145 144   K 3.6 3.4* 3.9    107 107   CO2 25 26 26   BUN 45* 28* 19   CREATININE 1.7* 1.5* 1.3*   GLUCOSE 130* 127* 105*     Ca/Mg/Phos:   Recent Labs     10/07/21  0915 10/08/21  1010 10/09/21  0414   CALCIUM 8.8 8.8 8.8     Hepatic:   No results for input(s): AST, ALT, ALB, BILITOT, ALKPHOS in the last 72 hours. Troponin:   No results for input(s): TROPONINI in the last 72 hours. BNP: No results for input(s): BNP in the last 72 hours. Lipids: No results for input(s): CHOL, TRIG, HDL, LDLCALC, LABVLDL in the last 72 hours.   ABGs: No results for input(s): PHART, PO2ART, ADF3CEA in the last 72 hours.  INR:   No results for input(s): INR in the last 72 hours. UA:  No results for input(s): Grabiel [place], GLUCOSEU, BILIRUBINUR, KETUA, SPECGRAV, BLOODU, PHUR, PROTEINU, UROBILINOGEN, NITRU, LEUKOCYTESUR, Kinga Solum in the last 72 hours. Urine Microscopic:   No results for input(s): LABCAST, BACTERIA, COMU, HYALCAST, WBCUA, RBCUA, EPIU in the last 72 hours. Urine Culture: No results for input(s): LABURIN in the last 72 hours. Urine Chemistry:   No results for input(s): Kathy Anatoliy, PROTEINUR, NAUR in the last 72 hours. IMAGING:  VL Extremity Venous Bilateral   Final Result      US RENAL COMPLETE   Final Result   Very limited visualization of the left kidney, grossly unremarkable, no   evidence of hydronephrosis. Unremarkable right kidney. Collapsed bladder around a Bill catheter. XR CHEST 1 VIEW   Final Result   Multifocal opacities within both lungs suggesting multifocal pneumonia. Reported positive COVID. Assessment/Plan :      1. STEVE   Has severe ATN   UOP improved overnight   ATN recovering   2/2 ATN from hypotension/ sepsis . DC Normal saline    renal USG  - no hydronephrosis     STEVE improving      2. Hypotension 2/2 sepsis   Improved     3. Hypoxia 2/2  covid 19 pneumonia   On dexamethasone   On 5 L oxygen   Pulmonology seeing   Got actemra       4. Acid- base disorder. Metabolic acidosis   Resolved       5. Electrolytes. monitor     6. Vitamin D defeciency.  On vitamin D supplements     Recommend to dose adjust all medications  based on renal functions  Maintain SBP> 90 mmHg   Daily weights   AVOID NSAIDs  Avoid Nephrotoxins  Monitor Intake/Output  Call if significant decrease in urine output         Thank you for allowing us to participate in care of France Perez         Electronically signed by: Melba Miranda MD, 10/9/2021, 3:06 PM      [facility]  [address]  Office : [phone]  Fax :[phone]

## 2021-10-09 NOTE — PROGRESS NOTES
Heparin gtts decrease to 9 units/kg/hr. Next aPTT due at 0300. Patient deny any need at this time.  Will continue to monitor

## 2021-10-09 NOTE — CARE COORDINATION
Per nurse, patient noted that writer could speak to mother re: discharge plans. Spoke to Shade Díaz ([phone]) and informed her that the current therapy recommendation is rehab at discharge as patient currently has a 17 AM-PAC score. Shade Díaz did state that patient lives alone. At this time, insurance is listed as pending Medicaid and that will limit the facility choices for discharge as well as patients age and bariatric status. Also discussed option of patient going home with home care and O2, if needed at discharge. Provided a few facilities to consider([facility], [address] [place] [address]). Informed Shade Díaz that the options were not all inclusive, they were facilities that are known to accept patients with pending Medicaid. Case management is able to inquire to any facility they may be interested in as to wether or not they are able to accept a patient if they are currently Medicaid pending. Call back number provided for any questions or concerns. Shade Díaz stated she will discuss discharge plans with the patient.

## 2021-10-10 ENCOUNTER — APPOINTMENT (OUTPATIENT)
Dept: GENERAL RADIOLOGY | Age: 36
DRG: 137 | End: 2021-10-10
Payer: MEDICAID

## 2021-10-10 LAB
ALBUMIN SERPL-MCNC: 3.1 G/DL (ref 3.4–5)
ALP BLD-CCNC: 81 U/L (ref 40–129)
ALT SERPL-CCNC: 71 U/L (ref 10–40)
ANION GAP SERPL CALCULATED.3IONS-SCNC: 13 MMOL/L (ref 3–16)
ANISOCYTOSIS: ABNORMAL
APTT: 87.5 SEC (ref 26.2–38.6)
AST SERPL-CCNC: 58 U/L (ref 15–37)
BANDED NEUTROPHILS RELATIVE PERCENT: 4 % (ref 0–7)
BASOPHILS ABSOLUTE: 0 K/UL (ref 0–0.2)
BASOPHILS RELATIVE PERCENT: 0 %
BILIRUB SERPL-MCNC: 0.3 MG/DL (ref 0–1)
BILIRUBIN DIRECT: <0.2 MG/DL (ref 0–0.3)
BILIRUBIN, INDIRECT: ABNORMAL MG/DL (ref 0–1)
BUN BLDV-MCNC: 18 MG/DL (ref 7–20)
CALCIUM SERPL-MCNC: 8.8 MG/DL (ref 8.3–10.6)
CHLORIDE BLD-SCNC: 104 MMOL/L (ref 99–110)
CO2: 26 MMOL/L (ref 21–32)
CREAT SERPL-MCNC: 1.2 MG/DL (ref 0.6–1.1)
EOSINOPHILS ABSOLUTE: 0 K/UL (ref 0–0.6)
EOSINOPHILS RELATIVE PERCENT: 0 %
GFR AFRICAN AMERICAN: >60
GFR NON-AFRICAN AMERICAN: 50
GLUCOSE BLD-MCNC: 97 MG/DL (ref 70–99)
HCT VFR BLD CALC: 38.8 % (ref 36–48)
HEMOGLOBIN: 13.1 G/DL (ref 12–16)
LYMPHOCYTES ABSOLUTE: 1.5 K/UL (ref 1–5.1)
LYMPHOCYTES RELATIVE PERCENT: 6 %
MCH RBC QN AUTO: 32.3 PG (ref 26–34)
MCHC RBC AUTO-ENTMCNC: 33.8 G/DL (ref 31–36)
MCV RBC AUTO: 95.4 FL (ref 80–100)
MONOCYTES ABSOLUTE: 2 K/UL (ref 0–1.3)
MONOCYTES RELATIVE PERCENT: 8 %
NEUTROPHILS ABSOLUTE: 21.6 K/UL (ref 1.7–7.7)
NEUTROPHILS RELATIVE PERCENT: 82 %
PDW BLD-RTO: 13.1 % (ref 12.4–15.4)
PLATELET # BLD: 388 K/UL (ref 135–450)
PLATELET SLIDE REVIEW: ADEQUATE
PMV BLD AUTO: 8.2 FL (ref 5–10.5)
POLYCHROMASIA: ABNORMAL
POTASSIUM SERPL-SCNC: 3.6 MMOL/L (ref 3.5–5.1)
PROCALCITONIN: 0.1 NG/ML (ref 0–0.15)
RBC # BLD: 4.06 M/UL (ref 4–5.2)
SLIDE REVIEW: ABNORMAL
SODIUM BLD-SCNC: 143 MMOL/L (ref 136–145)
TOTAL PROTEIN: 6 G/DL (ref 6.4–8.2)
WBC # BLD: 25.1 K/UL (ref 4–11)

## 2021-10-10 PROCEDURE — 6370000000 HC RX 637 (ALT 250 FOR IP): Performed by: INTERNAL MEDICINE

## 2021-10-10 PROCEDURE — 80048 BASIC METABOLIC PNL TOTAL CA: CPT

## 2021-10-10 PROCEDURE — 99232 SBSQ HOSP IP/OBS MODERATE 35: CPT | Performed by: HOSPITALIST

## 2021-10-10 PROCEDURE — 94640 AIRWAY INHALATION TREATMENT: CPT

## 2021-10-10 PROCEDURE — 2700000000 HC OXYGEN THERAPY PER DAY

## 2021-10-10 PROCEDURE — 36415 COLL VENOUS BLD VENIPUNCTURE: CPT

## 2021-10-10 PROCEDURE — 2580000003 HC RX 258: Performed by: INTERNAL MEDICINE

## 2021-10-10 PROCEDURE — 85730 THROMBOPLASTIN TIME PARTIAL: CPT

## 2021-10-10 PROCEDURE — 94761 N-INVAS EAR/PLS OXIMETRY MLT: CPT

## 2021-10-10 PROCEDURE — 84145 PROCALCITONIN (PCT): CPT

## 2021-10-10 PROCEDURE — 71045 X-RAY EXAM CHEST 1 VIEW: CPT

## 2021-10-10 PROCEDURE — 85025 COMPLETE CBC W/AUTO DIFF WBC: CPT

## 2021-10-10 PROCEDURE — 2060000000 HC ICU INTERMEDIATE R&B

## 2021-10-10 PROCEDURE — 6360000002 HC RX W HCPCS: Performed by: INTERNAL MEDICINE

## 2021-10-10 PROCEDURE — 80076 HEPATIC FUNCTION PANEL: CPT

## 2021-10-10 RX ADMIN — FAMOTIDINE 20 MG: 20 TABLET, FILM COATED ORAL at 09:13

## 2021-10-10 RX ADMIN — Medication 10 ML: at 23:59

## 2021-10-10 RX ADMIN — Medication 2 PUFF: at 20:10

## 2021-10-10 RX ADMIN — Medication 10 ML: at 09:13

## 2021-10-10 RX ADMIN — GUAIFENESIN SYRUP AND DEXTROMETHORPHAN 5 ML: 100; 10 SYRUP ORAL at 11:00

## 2021-10-10 RX ADMIN — GUAIFENESIN SYRUP AND DEXTROMETHORPHAN 5 ML: 100; 10 SYRUP ORAL at 22:03

## 2021-10-10 RX ADMIN — Medication 2 PUFF: at 13:49

## 2021-10-10 RX ADMIN — FAMOTIDINE 20 MG: 20 TABLET, FILM COATED ORAL at 21:45

## 2021-10-10 RX ADMIN — Medication 2000 UNITS: at 09:13

## 2021-10-10 RX ADMIN — Medication 2 PUFF: at 09:23

## 2021-10-10 RX ADMIN — Medication 10 ML: at 21:45

## 2021-10-10 RX ADMIN — DEXAMETHASONE SODIUM PHOSPHATE 10 MG: 10 INJECTION, SOLUTION INTRAMUSCULAR; INTRAVENOUS at 09:13

## 2021-10-10 ASSESSMENT — PAIN SCALES - GENERAL
PAINLEVEL_OUTOF10: 0
PAINLEVEL_OUTOF10: 0
PAINLEVEL_OUTOF10: 1
PAINLEVEL_OUTOF10: 0

## 2021-10-10 NOTE — PROGRESS NOTES
Patients head to toe assessment completed. Vital signs WNL. Sat 86% on 5L. Patient now on 7L, sat 90%, call light within reach. Scheduled medications given per MAR. Patient denies any pain at the moment. Patient resting in bed. Will continue to monitor.

## 2021-10-10 NOTE — PROGRESS NOTES
Patient had one loose BM, hayde care, bed pad changed. Patient repositioned in bed. Sat 96% on 6L.  Will continue to monitor

## 2021-10-10 NOTE — PROGRESS NOTES
Rounded on patient, vitals signs WNL. Patient had a BM, hayde care, bed pad and diaper changed. Patient requested to be transferred to the chair. Patient up to the chair with no issue. Warm blanket provided.  Will continue to monitor

## 2021-10-10 NOTE — PROGRESS NOTES
Office : [phone]     Fax :[phone]       Nephrology progress  Note      Patient's Name: France Perez  1:56 PM  10/10/2021    Reason for Consult:  leeroy      Chief Complaint:    Chief Complaint   Patient presents with    Shortness of Breath     pt. with sob, cough, fever and weakness that started on 9/19.  pt. with increased SOB tonight. pt. was 84% on room air. pt. pt. arrived by [employer]. History of Present iIlness:      France Perez is a 43 y.o. female with  history of obesity due to excess calories, who presents to the emergency room with complaints of cough, fever, chills, shortness of breath, diarrhea, generalized body aches, generalized weakness, ongoing since 9/19/2021. She was noted to be hypoxic with oxygen saturation of 84% on room air, requiring as much as 5 L/min supplemental oxygen at the time of my evaluation in the emergency room. She does not have chest pain. Chest x-ray is consistent with multifocal opacities in both lung bases. She has no leukocytosis. Abnormal labs include sodium of 135, BUN 66, creatinine 4.0, LDH 1191, calcitonin level of 0.78, D-dimer 4401, fibrinogen of 627.     Interval hx     Serum cr trending down   No fever   UOP improved   Creatinine levels much  better   No edema      On 7   liter oxygen per NC     I/O last 3 completed shifts:  In: -   Out: 200 [Urine:200]          Current Medications:    perflutren lipid microspheres (DEFINITY) injection 1.65 mg, ONCE PRN  famotidine (PEPCID) tablet 20 mg, BID  albuterol sulfate  (90 Base) MCG/ACT inhaler 2 puff, 4x daily  albuterol sulfate  (90 Base) MCG/ACT inhaler 2 puff, Q6H PRN  sodium chloride (OCEAN, BABY AYR) 0.65 % nasal spray 1 spray, Q4H PRN  sodium chloride flush 0.9 % injection 5-40 mL, 2 times per day  sodium chloride flush 0.9 % injection 5-40 mL, PRN  0.9 % sodium chloride infusion, PRN  aluminum & magnesium hydroxide-simethicone (MAALOX) 200-200-20 MG/5ML suspension 30 mL, Q6H PRN  polyethylene glycol (GLYCOLAX) packet 17 g, Daily PRN  acetaminophen (TYLENOL) tablet 650 mg, Q6H PRN   Or  acetaminophen (TYLENOL) suppository 650 mg, Q6H PRN  guaiFENesin-dextromethorphan (ROBITUSSIN DM) 100-10 MG/5ML syrup 5 mL, Q4H PRN  Vitamin D (CHOLECALCIFEROL) tablet 2,000 Units, Daily  promethazine (PHENERGAN) injection 6.25 mg, Q6H PRN  heparin (porcine) injection 10,000 Units, PRN  heparin (porcine) injection 5,000 Units, PRN  heparin 25,000 units in dextrose 5% 250 mL (premix) infusion, Continuous          Physical exam:     Vitals:  BP 95/69   Pulse 97   Temp 98.4 °F (36.9 °C) (Axillary)   Resp 18   Ht 5' 6\" (1.676 m)   Wt (!) 350 lb 6.4 oz (158.9 kg)   SpO2 94%   BMI 56.56 kg/m²   Constitutional:  OAA X3 NAD  Skin: no rash, turgor wnl  Heent:  eomi, mmm  Neck: no bruits or jvd noted  Cardiovascular:  S1, S2 without m/r/g  Respiratory: decreased air entry b/l bases   Abdomen:  +bs, soft, nt, nd  Ext: mild   lower extremity edema    Labs:  CBC:   Recent Labs     10/07/21  1955 10/10/21  0440   WBC  --  25.1*   HGB 13.9 13.1   PLT  --  388     BMP:    Recent Labs     10/08/21  1010 10/09/21  0414 10/10/21  0440    144 143   K 3.4* 3.9 3.6    107 104   CO2 26 26 26   BUN 28* 19 18   CREATININE 1.5* 1.3* 1.2*   GLUCOSE 127* 105* 97     Ca/Mg/Phos:   Recent Labs     10/08/21  1010 10/09/21  0414 10/10/21  0440   CALCIUM 8.8 8.8 8.8     Hepatic:   Recent Labs     10/10/21  0440   AST 58*   ALT 71*   BILITOT 0.3   ALKPHOS 81     Troponin:   No results for input(s): TROPONINI in the last 72 hours. BNP: No results for input(s): BNP in the last 72 hours. Lipids: No results for input(s): CHOL, TRIG, HDL, LDLCALC, LABVLDL in the last 72 hours.   ABGs: No results for input(s): PHART, PO2ART, SHR9DGU in the last 72 hours. INR:   No results for input(s): INR in the last 72 hours. UA:  No results for input(s): Grabiel R[place]al, GLUCOSEU, BILIRUBINUR, KETUA, SPECGRAV, BLOODU, PHUR, PROTEINU, UROBILINOGEN, NITRU, LEUKOCYTESUR, Kinga Solum in the last 72 hours. Urine Microscopic:   No results for input(s): LABCAST, BACTERIA, COMU, HYALCAST, WBCUA, RBCUA, EPIU in the last 72 hours. Urine Culture: No results for input(s): LABURIN in the last 72 hours. Urine Chemistry:   No results for input(s): Kathy Anatoliy, PROTEINUR, NAUR in the last 72 hours. IMAGING:  XR CHEST PORTABLE   Final Result   Persistent bilateral airspace opacities, not significantly changed         VL Extremity Venous Bilateral   Final Result      US RENAL COMPLETE   Final Result   Very limited visualization of the left kidney, grossly unremarkable, no   evidence of hydronephrosis. Unremarkable right kidney. Collapsed bladder around a Bill catheter. XR CHEST 1 VIEW   Final Result   Multifocal opacities within both lungs suggesting multifocal pneumonia. Reported positive COVID. Assessment/Plan :      1. STEVE   Has severe ATN   UOP improved overnight   ATN recovering   2/2 ATN from hypotension/ sepsis . DC Normal saline    renal USG  - no hydronephrosis     STEVE improved    Lasix prn for volume overload       2. Hypotension 2/2 sepsis   Improved     3. Hypoxia 2/2  covid 19 pneumonia   On dexamethasone   On 5 L oxygen   Pulmonology seeing   Got actemra       4. Acid- base disorder. Metabolic acidosis   Resolved       5. Electrolytes. monitor     6. Vitamin D defeciency.  On vitamin D supplements     Recommend to dose adjust all medications  based on renal functions  Maintain SBP> 90 mmHg   Daily weights   AVOID NSAIDs  Avoid Nephrotoxins  Monitor Intake/Output  Call if significant decrease in urine output         Thank you for allowing us to participate in care of Radha Lizz         Electronically signed by: Renetta Louise MD, 10/10/2021, 1:56 PM      [facility] [address]  Office : [phone]  Fax :[phone]

## 2021-10-10 NOTE — PROGRESS NOTES
Interventional Radiology Brief Post Procedure Note    Procedure: lung nodule biopsy    Proceduralist: Raymond Ford MD    Assistant: IR Fellow Physician, Alex Aguilar MD and None    Time Out: Prior to the start of the procedure and with procedural staff participation, I verbally confirmed the patient s identity using two indicators, relevant allergies, that the procedure was appropriate and matched the consent or emergent situation, and that the correct equipment/implants were available. Immediately prior to starting the procedure I conducted the Time Out with the procedural staff and re-confirmed the patient s name, procedure, and site/side. (The Joint Commission universal protocol was followed.)  Yes        Sedation: IR Nurse Monitored Care   Post Procedure Summary:  Prior to the start of the procedure and with procedural staff participation, I verbally confirmed the patient s identity using two indicators, relevant allergies, that the procedure was appropriate and matched the consent or emergent situation, and that the correct equipment/implants were available. Immediately prior to starting the procedure I conducted the Time Out with the procedural staff and re-confirmed the patient s name, procedure, and site/side. (The Joint Commission universal protocol was followed.)  Yes       Sedatives: Fentanyl and Midazolam (Versed)    Vital signs, airway and pulse oximetry were monitored and remained stable throughout the procedure and sedation was maintained until the procedure was complete.  The patient was monitored by staff until sedation discharge criteria were met.    Patient tolerance: Patient tolerated the procedure well with no immediate complications.    Time of sedation in minutes: 30 Minutes minutes from beginning to end of physician one to one monitoring.          Findings: Lung biopsy without complication    Estimated Blood Loss: Minimal    Fluoroscopy Time:  minute(s)    SPECIMENS: Core needle biopsy  appearance: obese AAF, not in distress, oriented x3. CVS: RRR, S1 S2 heard, no MRG  Resp: decreased bibasilar breath sounds.  No wheezes. Gastrointestinal: Soft, NT/ND, audible BS  Musculoskeletal: No edema. Warm  Neuro: No focal deficit. Moves extremity spontaneously. Psychiatry: Appropriate affect. Not agitated. Skin: Warm, dry with normal turgor. No rash  Capillary refill: Brisk,< 3 seconds   Peripheral Pulses: +2 palpable, equal bilaterally     Labs:   Recent Labs     10/07/21  1955 10/10/21  0440   WBC  --  25.1*   HGB 13.9 13.1   HCT 40.7 38.8   PLT  --  388     Recent Labs     10/08/21  1010 10/09/21  0414 10/10/21  0440    144 143   K 3.4* 3.9 3.6    107 104   CO2 26 26 26   BUN 28* 19 18   CREATININE 1.5* 1.3* 1.2*   CALCIUM 8.8 8.8 8.8     Recent Labs     10/10/21  0440   AST 58*   ALT 71*   BILIDIR <0.2   BILITOT 0.3   ALKPHOS 81     No results for input(s): INR in the last 72 hours. No results for input(s): Dariela Highman in the last 72 hours. Urinalysis:      Lab Results   Component Value Date    NITRU Negative 10/01/2021    WBCUA 16 10/01/2021    RBCUA 4 10/01/2021    BLOODU MODERATE 10/01/2021    SPECGRAV >1.030 10/01/2021    GLUCOSEU Negative 10/01/2021       Radiology:  VL Extremity Venous Bilateral   Final Result      US RENAL COMPLETE   Final Result   Very limited visualization of the left kidney, grossly unremarkable, no   evidence of hydronephrosis. Unremarkable right kidney. Collapsed bladder around a Bill catheter. XR CHEST 1 VIEW   Final Result   Multifocal opacities within both lungs suggesting multifocal pneumonia. Reported positive COVID. Assessment/Plan:    Active Hospital Problems    Diagnosis     Pneumonia due to COVID-19 virus [U07.1, J12.82]     STEVE (acute kidney injury) ([facility] [address] [N17.9]     Hypotension due to hypovolemia [I95.89, E86.1]     Morbid obesity ([facility] [address] [E66.01]      COVID-19 pneumonia:    Pt is not vaccinated. specimens sent for pathological analysis    Complications: 1. None     Condition: Stable    Plan: Bedrest for 1 hour, then cxr to evaluate for pneumothorax.    Comments: See dictated procedure note for full details.    Trey Aguilar MD         Treated with Decadron ARDS dosing. Now on Decadron 10 mg iv qd. Remdesivir not given due to renal failure. Actemra dose given on 10/2/2021. D-dimer >4000 on admission. On IV heparin drip for possible PE in the setting of renal failure    Switched to oral Eliquis bid  CTPA not done as she has acute renal failure. Leg Dopplers negative for DVT. VQ scan vs CTPA in am, if ok with nephrology to rule out PE    Acute respiratory failure:   Secondary to COVID-19 pneumonia. Needs CTPA or VQ scan to rule out PE. O2 requirement at 7L today   Continue incentive spirometry. Increased activity, OOB to chair. Continue to wean oxygen down as tolerated. Acute renal failure  Secondary to ATN . Nonoliguric. Nephrology on board. Initially treatedwith IV fluids. Ultrasound kidneys normal.    Creatinine improving and down to 1.2 today. Removed Bill catheter on 10/8. Hematuria resolved. Morbid obesity: BMI 56.35    DVT Prophylaxis: heparin   Diet: ADULT DIET; Regular; Low Potassium (Less than 3000 mg/day)  Adult Oral Nutrition Supplement; Renal Oral Supplement  Code Status: Full Code    PT/OT Eval Status: recommending SNF.  Pt is agreeable    Dispo - inpt 1-2 more days    Randal Patel MD

## 2021-10-11 ENCOUNTER — APPOINTMENT (OUTPATIENT)
Dept: NUCLEAR MEDICINE | Age: 36
DRG: 137 | End: 2021-10-11
Payer: MEDICAID

## 2021-10-11 LAB
ANION GAP SERPL CALCULATED.3IONS-SCNC: 9 MMOL/L (ref 3–16)
APTT: 79.5 SEC (ref 26.2–38.6)
BUN BLDV-MCNC: 18 MG/DL (ref 7–20)
C-REACTIVE PROTEIN: <3 MG/L (ref 0–5.1)
CALCIUM SERPL-MCNC: 8.8 MG/DL (ref 8.3–10.6)
CHLORIDE BLD-SCNC: 106 MMOL/L (ref 99–110)
CO2: 28 MMOL/L (ref 21–32)
CREAT SERPL-MCNC: 1.1 MG/DL (ref 0.6–1.1)
D DIMER: 824 NG/ML DDU (ref 0–229)
GFR AFRICAN AMERICAN: >60
GFR NON-AFRICAN AMERICAN: 55
GLUCOSE BLD-MCNC: 94 MG/DL (ref 70–99)
LV EF: 68 %
LVEF MODALITY: NORMAL
POTASSIUM SERPL-SCNC: 3.9 MMOL/L (ref 3.5–5.1)
SODIUM BLD-SCNC: 143 MMOL/L (ref 136–145)

## 2021-10-11 PROCEDURE — 85379 FIBRIN DEGRADATION QUANT: CPT

## 2021-10-11 PROCEDURE — 2580000003 HC RX 258: Performed by: INTERNAL MEDICINE

## 2021-10-11 PROCEDURE — 93306 TTE W/DOPPLER COMPLETE: CPT

## 2021-10-11 PROCEDURE — 99232 SBSQ HOSP IP/OBS MODERATE 35: CPT | Performed by: INTERNAL MEDICINE

## 2021-10-11 PROCEDURE — 78582 LUNG VENTILAT&PERFUS IMAGING: CPT

## 2021-10-11 PROCEDURE — 94680 O2 UPTK RST&XERS DIR SIMPLE: CPT

## 2021-10-11 PROCEDURE — 86140 C-REACTIVE PROTEIN: CPT

## 2021-10-11 PROCEDURE — A9558 XE133 XENON 10MCI: HCPCS | Performed by: HOSPITALIST

## 2021-10-11 PROCEDURE — 2580000003 HC RX 258: Performed by: HOSPITALIST

## 2021-10-11 PROCEDURE — 2060000000 HC ICU INTERMEDIATE R&B

## 2021-10-11 PROCEDURE — 97530 THERAPEUTIC ACTIVITIES: CPT

## 2021-10-11 PROCEDURE — 94640 AIRWAY INHALATION TREATMENT: CPT

## 2021-10-11 PROCEDURE — 80048 BASIC METABOLIC PNL TOTAL CA: CPT

## 2021-10-11 PROCEDURE — 3430000000 HC RX DIAGNOSTIC RADIOPHARMACEUTICAL: Performed by: HOSPITALIST

## 2021-10-11 PROCEDURE — 6370000000 HC RX 637 (ALT 250 FOR IP): Performed by: INTERNAL MEDICINE

## 2021-10-11 PROCEDURE — 85730 THROMBOPLASTIN TIME PARTIAL: CPT

## 2021-10-11 PROCEDURE — 2700000000 HC OXYGEN THERAPY PER DAY

## 2021-10-11 PROCEDURE — A9540 TC99M MAA: HCPCS | Performed by: HOSPITALIST

## 2021-10-11 PROCEDURE — 94761 N-INVAS EAR/PLS OXIMETRY MLT: CPT

## 2021-10-11 RX ORDER — SODIUM CHLORIDE 0.9 % (FLUSH) 0.9 %
5-40 SYRINGE (ML) INJECTION 2 TIMES DAILY
Status: DISCONTINUED | OUTPATIENT
Start: 2021-10-11 | End: 2021-10-12 | Stop reason: HOSPADM

## 2021-10-11 RX ORDER — GUAIFENESIN/DEXTROMETHORPHAN 100-10MG/5
5 SYRUP ORAL EVERY 4 HOURS PRN
Qty: 120 ML | Refills: 0 | Status: ON HOLD | OUTPATIENT
Start: 2021-10-11 | End: 2021-10-23 | Stop reason: HOSPADM

## 2021-10-11 RX ORDER — XENON XE-133 10 MCI/1
10.88 GAS RESPIRATORY (INHALATION)
Status: COMPLETED | OUTPATIENT
Start: 2021-10-11 | End: 2021-10-11

## 2021-10-11 RX ORDER — ALBUTEROL SULFATE 90 UG/1
2 AEROSOL, METERED RESPIRATORY (INHALATION) 4 TIMES DAILY
Qty: 18 G | Refills: 3 | Status: SHIPPED | OUTPATIENT
Start: 2021-10-11 | End: 2022-04-04 | Stop reason: SDUPTHER

## 2021-10-11 RX ADMIN — Medication 2 PUFF: at 20:50

## 2021-10-11 RX ADMIN — ACETAMINOPHEN 650 MG: 325 TABLET ORAL at 09:18

## 2021-10-11 RX ADMIN — FAMOTIDINE 20 MG: 20 TABLET, FILM COATED ORAL at 09:18

## 2021-10-11 RX ADMIN — GUAIFENESIN SYRUP AND DEXTROMETHORPHAN 5 ML: 100; 10 SYRUP ORAL at 03:10

## 2021-10-11 RX ADMIN — GUAIFENESIN SYRUP AND DEXTROMETHORPHAN 5 ML: 100; 10 SYRUP ORAL at 16:18

## 2021-10-11 RX ADMIN — GUAIFENESIN SYRUP AND DEXTROMETHORPHAN 5 ML: 100; 10 SYRUP ORAL at 20:41

## 2021-10-11 RX ADMIN — Medication 4.34 MILLICURIE: at 14:17

## 2021-10-11 RX ADMIN — Medication 2 PUFF: at 16:40

## 2021-10-11 RX ADMIN — XENON XE-133 10.88 MILLICURIE: 10 GAS RESPIRATORY (INHALATION) at 14:17

## 2021-10-11 RX ADMIN — Medication 10 ML: at 09:18

## 2021-10-11 RX ADMIN — Medication 10 ML: at 15:48

## 2021-10-11 RX ADMIN — Medication 10 ML: at 20:41

## 2021-10-11 RX ADMIN — Medication 2 PUFF: at 10:55

## 2021-10-11 RX ADMIN — GUAIFENESIN SYRUP AND DEXTROMETHORPHAN 5 ML: 100; 10 SYRUP ORAL at 09:37

## 2021-10-11 RX ADMIN — Medication 2000 UNITS: at 09:17

## 2021-10-11 RX ADMIN — FAMOTIDINE 20 MG: 20 TABLET, FILM COATED ORAL at 20:41

## 2021-10-11 RX ADMIN — Medication 10 ML: at 14:17

## 2021-10-11 ASSESSMENT — PAIN SCALES - GENERAL
PAINLEVEL_OUTOF10: 0

## 2021-10-11 NOTE — PROGRESS NOTES
aPTT this morning was therapeutic at 79.5. No bolus/no change to heparin drip. Next aPTT 10/12/2021 @ 0600.

## 2021-10-11 NOTE — PROGRESS NOTES
Office : [phone]     Fax :[phone]       Nephrology progress  Note      Patient's Name: Sharan Jackman  3:32 PM  10/11/2021    Reason for Consult:  leeroy      Chief Complaint:    Chief Complaint   Patient presents with    Shortness of Breath     pt. with sob, cough, fever and weakness that started on 9/19.  pt. with increased SOB tonight. pt. was 84% on room air. pt. pt. arrived by [place]. History of Present iIlness:      Sharan Jackman is a 43 y.o. female with  history of obesity due to excess calories, who presents to the emergency room with complaints of cough, fever, chills, shortness of breath, diarrhea, generalized body aches, generalized weakness, ongoing since 9/19/2021. She was noted to be hypoxic with oxygen saturation of 84% on room air, requiring as much as 5 L/min supplemental oxygen at the time of my evaluation in the emergency room. She does not have chest pain. Chest x-ray is consistent with multifocal opacities in both lung bases. She has no leukocytosis. Abnormal labs include sodium of 135, BUN 66, creatinine 4.0, LDH 1191, calcitonin level of 0.78, D-dimer 4401, fibrinogen of 627. Interval hx     Serum cr trending down   No fever   UOP improved   Creatinine levels much  better   No edema      On 7   liter oxygen per NC     I/O last 3 completed shifts: In: 740 [P.O.:730;  I.V.:10]  Out: 500 [Urine:500]          Current Medications:    sodium chloride flush 0.9 % injection 5-40 mL, BID  perflutren lipid microspheres (DEFINITY) injection 1.65 mg, ONCE PRN  famotidine (PEPCID) tablet 20 mg, BID  albuterol sulfate  (90 Base) MCG/ACT inhaler 2 puff, 4x daily  albuterol sulfate  (90 Base) MCG/ACT inhaler 2 puff, Q6H PRN  sodium chloride (OCEAN, BABY AYR) 0.65 % nasal spray 1 spray, Q4H PRN  sodium chloride flush 0.9 % injection 5-40 mL, 2 times per day  sodium chloride flush 0.9 % injection 5-40 mL, PRN  0.9 % sodium chloride infusion, PRN  aluminum & magnesium hydroxide-simethicone (MAALOX) 200-200-20 MG/5ML suspension 30 mL, Q6H PRN  polyethylene glycol (GLYCOLAX) packet 17 g, Daily PRN  acetaminophen (TYLENOL) tablet 650 mg, Q6H PRN   Or  acetaminophen (TYLENOL) suppository 650 mg, Q6H PRN  guaiFENesin-dextromethorphan (ROBITUSSIN DM) 100-10 MG/5ML syrup 5 mL, Q4H PRN  Vitamin D (CHOLECALCIFEROL) tablet 2,000 Units, Daily  promethazine (PHENERGAN) injection 6.25 mg, Q6H PRN  heparin (porcine) injection 10,000 Units, PRN  heparin (porcine) injection 5,000 Units, PRN  heparin 25,000 units in dextrose 5% 250 mL (premix) infusion, Continuous          Physical exam:     Vitals:  /83   Pulse 98   Temp 98 °F (36.7 °C) (Oral)   Resp 16   Ht 5' 6\" (1.676 m)   Wt (!) 348 lb 2 oz (157.9 kg)   SpO2 95%   BMI 56.19 kg/m²   Constitutional:  OAA X3 NAD  Skin: no rash, turgor wnl  Heent:  eomi, mmm  Neck: no bruits or jvd noted  Cardiovascular:  S1, S2 without m/r/g  Respiratory: decreased air entry b/l bases   Abdomen:  +bs, soft, nt, nd  Ext: mild   lower extremity edema    Labs:  CBC:   Recent Labs     10/10/21  0440   WBC 25.1*   HGB 13.1        BMP:    Recent Labs     10/09/21  0414 10/10/21  0440 10/11/21  0443    143 143   K 3.9 3.6 3.9    104 106   CO2 26 26 28   BUN 19 18 18   CREATININE 1.3* 1.2* 1.1   GLUCOSE 105* 97 94     Ca/Mg/Phos:   Recent Labs     10/09/21  0414 10/10/21  0440 10/11/21  0443   CALCIUM 8.8 8.8 8.8     Hepatic:   Recent Labs     10/10/21  0440   AST 58*   ALT 71*   BILITOT 0.3   ALKPHOS 81     Troponin:   No results for input(s): TROPONINI in the last 72 hours. BNP: No results for input(s): BNP in the last 72 hours.   Lipids: No results for input(s): CHOL, TRIG, HDL, LDLCALC, LABVLDL in the last 72 hours. ABGs: No results for input(s): PHART, PO2ART, WZC4CVM in the last 72 hours. INR:   No results for input(s): INR in the last 72 hours. UA:  No results for input(s): Javier Ruffing, GLUCOSEU, BILIRUBINUR, KETUA, SPECGRAV, BLOODU, PHUR, PROTEINU, UROBILINOGEN, NITRU, LEUKOCYTESUR, Cassandria Liu in the last 72 hours. Urine Microscopic:   No results for input(s): LABCAST, BACTERIA, COMU, HYALCAST, WBCUA, RBCUA, EPIU in the last 72 hours. Urine Culture: No results for input(s): LABURIN in the last 72 hours. Urine Chemistry:   No results for input(s): Paul Meter, PROTEINUR, NAUR in the last 72 hours. IMAGING:  NM LUNG VENT/PERFUSION (VQ)   Final Result   Low probability for pulmonary embolism. XR CHEST PORTABLE   Final Result   Persistent bilateral airspace opacities, not significantly changed         VL Extremity Venous Bilateral   Final Result      US RENAL COMPLETE   Final Result   Very limited visualization of the left kidney, grossly unremarkable, no   evidence of hydronephrosis. Unremarkable right kidney. Collapsed bladder around a Bill catheter. XR CHEST 1 VIEW   Final Result   Multifocal opacities within both lungs suggesting multifocal pneumonia. Reported positive COVID. Assessment/Plan :      1. STEVE   Had severe ATN   UOP improved   ATN recovering   2/2 ATN from hypotension/ sepsis . renal USG  - no hydronephrosis       2. Hypotension 2/2 sepsis   Improved     3. Hypoxia 2/2  covid 19 pneumonia   On dexamethasone   On 3 L oxygen   Pulmonology seeing   Got actemra       4. Acid- base disorder. Metabolic acidosis   Resolved       5. Electrolytes. monitor     6. Vitamin D defeciency.  On vitamin D supplements     Recommend to dose adjust all medications  based on renal functions  Maintain SBP> 90 mmHg   Daily weights   AVOID NSAIDs  Avoid Nephrotoxins  Monitor Intake/Output  Call if significant decrease in urine output     Will sign off.  Call as needed     Thank you for allowing us to participate in care of Sveta Robert         Electronically signed by: Nato Schneider MD, 10/11/2021, 3:32 PM      [facility] [address]  Office : [phone]  Fax :[phone]

## 2021-10-11 NOTE — PROGRESS NOTES
CLINICAL PHARMACY NOTE: MEDS TO BEDS    Total # of Prescriptions Filled: 2   The following medications were delivered to the patient:  · Albuterol   · Tussin DM    Additional Documentation:    Delivered to Nurse Maritza Purdy RN-signed  Troy Martinez CPhT

## 2021-10-11 NOTE — PROGRESS NOTES
INR therapeutic this am no change new order for home o2 eval pt on 3L nc sat 92%. OP Pharmacy delivered meds to room.  Pt aware

## 2021-10-11 NOTE — FLOWSHEET NOTE
Patient's head to toe assessment complete at this time. Routine VSS. Pt resting comfortably in bed with respirations even and unlabored. Currently on 6L supplemental oxygen to maintain saturations >90%. Pt is awake, alert and oriented. All nightly medications given per MAR. No other needs expressed, will continue to monitor. All questions answered and POC reviewed with patient. Pt [facility]. Tania care provided. Pt assisted back to bed. New purewick placed.      Fall precautions in place: bed locked and in lowest position, bed alarm on, 2/4 side rails raised, non-slip socks on, call light and overhead table within reach, patient knows when to appropriately call out for help.       10/10/21 2143   Vital Signs   Temp 98 °F (36.7 °C)   Temp Source Axillary   Pulse 93   Heart Rate Source Monitor   Resp 18   BP (!) 125/93   BP Location Left lower arm   MAP (mmHg) 104   Patient Position Semi fowlers   Level of Consciousness Alert (0)   MEWS Score 1   Patient Currently in Pain Denies   Pain Assessment   Pain Assessment 0-10   Pain Level 0   Oxygen Therapy   SpO2 93 %   Pulse Oximeter Device Mode Continuous   Pulse Oximeter Device Location Finger   O2 Device High flow nasal cannula   O2 Flow Rate (L/min) 6 L/min

## 2021-10-11 NOTE — PLAN OF CARE
Problem: Breathing Pattern - Ineffective:  Goal: Ability to achieve and maintain a regular respiratory rate will improve  Description: Ability to achieve and maintain a regular respiratory rate will improve  Outcome: Ongoing     Problem: Skin Integrity:  Goal: Will show no infection signs and symptoms  Description: Will show no infection signs and symptoms  Outcome: Ongoing  Goal: Absence of new skin breakdown  Description: Absence of new skin breakdown  Outcome: Ongoing     Problem: Falls - Risk of:  Goal: Will remain free from falls  Description: Will remain free from falls  Outcome: Ongoing  Note: Fall precautions in place: bed locked and in lowest position, bed alarm on, 2/4 side rails raised, non-slip socks on, call light and overhead table within reach, patient knows when to appropriately call out for help. Goal: Absence of physical injury  Description: Absence of physical injury  Outcome: Ongoing     Problem: Airway Clearance - Ineffective  Goal: Achieve or maintain patent airway  Outcome: Ongoing     Problem: Gas Exchange - Impaired  Goal: Absence of hypoxia  Outcome: Ongoing  Goal: Promote optimal lung function  Outcome: Ongoing     Problem: Breathing Pattern - Ineffective  Goal: Ability to achieve and maintain a regular respiratory rate  Outcome: Ongoing     Problem:  Body Temperature -  Risk of, Imbalanced  Goal: Ability to maintain a body temperature within defined limits  Outcome: Ongoing  Goal: Will regain or maintain usual level of consciousness  Outcome: Ongoing  Goal: Complications related to the disease process, condition or treatment will be avoided or minimized  Outcome: Ongoing     Problem: Isolation Precautions - Risk of Spread of Infection  Goal: Prevent transmission of infection  Outcome: Ongoing     Problem: Nutrition Deficits  Goal: Optimize nutritional status  Outcome: Ongoing     Problem: Risk for Fluid Volume Deficit  Goal: Maintain normal heart rhythm  Outcome: Ongoing  Goal: Maintain absence of muscle cramping  Outcome: Ongoing  Goal: Maintain normal serum potassium, sodium, calcium, phosphorus, and pH  Outcome: Ongoing     Problem: Loneliness or Risk for Loneliness  Goal: Demonstrate positive use of time alone when socialization is not possible  Outcome: Ongoing     Problem: Fatigue  Goal: Verbalize increase energy and improved vitality  Outcome: Ongoing     Problem: Patient Education: Go to Patient Education Activity  Goal: Patient/Family Education  Outcome: Ongoing     Problem: Musculor/Skeletal Functional Status  Goal: Highest potential functional level  Outcome: Ongoing  Goal: Absence of falls  Outcome: Ongoing     Problem: Nutrition  Goal: Optimal nutrition therapy  Outcome: Ongoing

## 2021-10-11 NOTE — PROGRESS NOTES
Occupational Therapy  Facility/Department: [address] ONCOLOGY  Daily Treatment Note and Discharge Summary    NAME: Beryle Caro  : 1985  MRN: [id number]    Date of Service: 10/11/2021    Discharge Recommendations:    Beryle Caro scored a 23/24 on the AM-PAC ADL Inpatient form. Current research shows that an AM-PAC score of 18 or greater is typically associated with a discharge to the patient's home setting. Based on the patient's AM-PAC score, and their current ADL status, at this time, no further OT is recommended at discharge as patient has  no current deficits which would warrant therapy intervention. Pt with limited endurance but demo good energy conservation techniques including seated rest breaks and PLB when needed. Recommend patient returns to prior setting with prior services. OT Equipment Recommendations  Equipment Needed: Yes  Mobility Devices: ADL Assistive Devices  ADL Assistive Devices: Shower Chair with back    Assessment   Performance deficits / Impairments: Decreased functional mobility ; Decreased endurance;Decreased high-level IADLs;Decreased ADL status  Assessment: Pt is currently functioning slightly below occupational baseline and demo the deficits listed above. Pt is currently completing ambulation, transfers, and ADLs at MOD I level and is limited by decreased endurance but good safety awareness on when rest breaks necessary. Pt would no longer benefit from OT services at this time. Recommend pt d/c home with assist PRN.   Treatment Diagnosis: Decreased functional status secondary to PNA due to COVID-19  Prognosis: Good  OT Education: OT Role;Plan of Care;Transfer Training;Energy Conservation;Equipment;ADL Adaptive Strategies  Patient Education: PLB, use of shower chair- pt veralized understanding  REQUIRES OT FOLLOW UP: Yes  Activity Tolerance  Activity Tolerance: Patient Tolerated treatment well;Patient limited by fatigue  Activity Tolerance: desat to 84% with ambulation requiring increase from 3L to 5L to recover to above 90%. Pt decreased back to 3L at EOS and above 90%. Safety Devices  Safety Devices in place: Yes  Type of devices: All fall risk precautions in place;Call light within reach; Left in bed;Nurse notified         Patient Diagnosis(es): The primary encounter diagnosis was Pneumonia due to COVID-19 virus. A diagnosis of Hypoxia was also pertinent to this visit. has no past medical history on file. has no past surgical history on file. Restrictions  Restrictions/Precautions  Restrictions/Precautions: Fall Risk, General Precautions, Isolation  Required Braces or Orthoses?: No  Position Activity Restriction  Other position/activity restrictions: 43 y.o. female with  history of obesity, who presents to the emergency room with complaints of cough, fever, chills, shortness of breath, diarrhea, generalized body aches, generalized weakness, ongoing since 9/19/2021. She was noted to be hypoxic with oxygen saturation of 84% on room air, requiring as much as 5 L/min supplemental oxygen at the time of my evaluation in the emergency room.     Subjective   General  Chart Reviewed: Yes  Patient assessed for rehabilitation services?: Yes  Family / Caregiver Present: No  Diagnosis: PNA due to COVID-19  Subjective  Subjective: Pt supine in bed upon arrival, pleasant and agreeable to OT/PT treatment session  Vital Signs  Patient Currently in Pain: Denies     Orientation  Orientation  Overall Orientation Status: Within Normal Limits    Objective    ADL  Grooming: Setup (completed oral care while seated in bed with setup)  Additional Comments: Pt declined additional ADLs this date  Balance  Sitting Balance: Independent  Standing Balance: Modified independent   Standing Balance  Time: 5-7 minutes total  Activity: functional mobility/transfers  Comment: RW, no LOB, decreased standing tolerance d/t fatigue but recovers with seated rest break  Functional Mobility  Functional - Mobility Device: No device  Activity: Other  Assist Level: Modified independent   Functional Mobility Comments: Pt ambulated 15' in room with no device at MOD I level. Pt's 02 at 84% requiring increase in 02 from 3L to 5L and  4-5 minute rest break to recover. Bed mobility  Supine to Sit: Modified independent  Sit to Supine: Modified independent  Scooting: Modified independent  Comment: HOB elevated  Transfers  Sit to stand: Modified independent  Stand to sit: Modified independent  Cognition  Overall Cognitive Status: WFL  Perception  Overall Perceptual Status: [facility]        Plan   Plan  Times per week: 3-5  Specific instructions for Next Treatment: cotx for ambulation  Current Treatment Recommendations: Functional Mobility Training, Endurance Training, Self-Care / ADL  Plan Comment: No skilled OT services recommended at this time, pt functioning at IND level and limited by decreased endurance but aware of when seated rest breaks needed. G-Code     OutComes Score                                                  AM-PAC Score        AM-[facility] Inpatient Daily Activity Raw Score: 23 (10/11/21 1151)  AM-PAC Inpatient ADL T-Scale Score : 51.12 (10/11/21 1151)  ADL Inpatient CMS 0-100% Score: 15.86 (10/11/21 1151)  ADL Inpatient CMS G-Code Modifier : CI (10/11/21 1151)    Goals  Short term goals  Time Frame for Short term goals: d/c  Short term goal 1: Pt will complete functional transfer with supervision and SpO2 remaining above 90%. - O2 below 89% but recovers to above 90% with seated rest break  Short term goal 2: Pt will complete functional mobility with supervision and SpO2 remaining above 90%. -GOAL MET  Short term goal 3: Pt will complete bed mobility mod I. - GOAL MET  Short term goal 4: Pt will complete UB ADLs with supervision and SpO2 remaining above 90%. - GOAL MET  Short term goal 5: Pt will complete LB ADLs with supervision and SpO2 remaining above 90%.  - GOAL MET  Patient Goals   Patient goals : \"get better\"

## 2021-10-11 NOTE — PROGRESS NOTES
[facility] PROGRESS NOTE    10/11/2021 10:31 AM        Name: Kat Smith . Admitted: 10/1/2021  Primary Care Provider: No primary care provider on file. (Tel: None)                        Subjective:  . No acute events overnight. Resting well. Pain control. Diet ok. Labs reviewed  Denies any chest pain sob.      Reviewed interval ancillary notes    Current Medications  perflutren lipid microspheres (DEFINITY) injection 1.65 mg, ONCE PRN  famotidine (PEPCID) tablet 20 mg, BID  albuterol sulfate  (90 Base) MCG/ACT inhaler 2 puff, 4x daily  albuterol sulfate  (90 Base) MCG/ACT inhaler 2 puff, Q6H PRN  sodium chloride (OCEAN, BABY AYR) 0.65 % nasal spray 1 spray, Q4H PRN  sodium chloride flush 0.9 % injection 5-40 mL, 2 times per day  sodium chloride flush 0.9 % injection 5-40 mL, PRN  0.9 % sodium chloride infusion, PRN  aluminum & magnesium hydroxide-simethicone (MAALOX) 200-200-20 MG/5ML suspension 30 mL, Q6H PRN  polyethylene glycol (GLYCOLAX) packet 17 g, Daily PRN  acetaminophen (TYLENOL) tablet 650 mg, Q6H PRN   Or  acetaminophen (TYLENOL) suppository 650 mg, Q6H PRN  guaiFENesin-dextromethorphan (ROBITUSSIN DM) 100-10 MG/5ML syrup 5 mL, Q4H PRN  Vitamin D (CHOLECALCIFEROL) tablet 2,000 Units, Daily  promethazine (PHENERGAN) injection 6.25 mg, Q6H PRN  heparin (porcine) injection 10,000 Units, PRN  heparin (porcine) injection 5,000 Units, PRN  heparin 25,000 units in dextrose 5% 250 mL (premix) infusion, Continuous        Objective:  /83   Pulse 98   Temp 98 °F (36.7 °C) (Oral)   Resp 16   Ht 5' 6\" (1.676 m)   Wt (!) 348 lb 2 oz (157.9 kg)   SpO2 95%   BMI 56.19 kg/m²     Intake/Output Summary (Last 24 hours) at 10/11/2021 1031  Last data filed at 10/11/2021 0911  Gross per 24 hour   Intake 500 ml   Output 500 ml   Net 0 ml      Wt Readings from Last 3 Encounters:   10/11/21 (!) 348 lb 2 oz (157.9 kg)       General appearance:  Appears comfortable  Eyes: Sclera clear. Pupils equal.  ENT: Moist oral mucosa. Trachea midline, no adenopathy. Cardiovascular: Regular rhythm, normal S1, S2. No murmur. No edema in lower extremities  Respiratory: Not using accessory muscles. Good inspiratory effort. Clear to auscultation bilaterally, no wheeze or crackles. GI: Abdomen soft, no tenderness, not distended, normal bowel sounds  Musculoskeletal: No cyanosis in digits, neck supple  Neurology: CN 2-12 grossly intact. No speech or motor deficits  Psych: Normal affect. Alert and oriented in time, place and person  Skin: Warm, dry, normal turgor    Labs and Tests:  CBC:   Recent Labs     10/10/21  0440   WBC 25.1*   HGB 13.1        BMP:    Recent Labs     10/09/21  0414 10/10/21  0440 10/11/21  0443    143 143   K 3.9 3.6 3.9    104 106   CO2 26 26 28   BUN 19 18 18   CREATININE 1.3* 1.2* 1.1   GLUCOSE 105* 97 94     Hepatic:   Recent Labs     10/10/21  0440   AST 58*   ALT 71*   BILITOT 0.3   ALKPHOS 81       Discussed care with family and patient             Spent 30  minutes with patient and family at bedside and on unit reviewing medical records and labs, spent greater than 50% time counseling patient and family on diagnosis and plan   Problem List  Active Problems:    Pneumonia due to COVID-19 virus    STEVE (acute kidney injury) ([facility] [address]    Hypotension due to hypovolemia    Morbid obesity ([facility] [address]  Resolved Problems:    * No resolved hospital problems. *       Assessment & Plan:   1. Acute hypoxic respiratory failure  -Secondary to COVID-19.  -Oxygen requirements down to 3 L now.  -Ambulating well without any dyspnea  -We have ordered VQ scan due to elevated D-dimer to rule out PE  -Remains on heparin drip  -If VQ scan is negative for PE low probability.   Plan on discharging patient on 1 month of NOAC for high risk for clotting    Home oxygen      Acute kidney injury resolved    Diet: ADULT DIET; Regular; Low Potassium (Less than 3000 mg/day)  Adult Oral Nutrition Supplement;  Renal Oral Supplement  Code:Full Code  DVT PPX mae Brady MD   10/11/2021 10:31 AM

## 2021-10-11 NOTE — PROGRESS NOTES
Pt denies any pain aware of VQ scan today and plan of care. Pt encouraged to get out of bed and ambulate with no effect.  Prn given for cough resting call light in reach VSS weaned to 3L sat 92%

## 2021-10-11 NOTE — PROGRESS NOTES
Physical Therapy  Facility/Department: [address] ONCOLOGY  Daily Treatment Note/Dischage Summary  NAME: Milagro Rob  : 1985  MRN: [id number]    Date of Service: 10/11/2021    Discharge Recommendations:  Milagro Rob scored a 23/24 on the AM-PAC short mobility form. At this time, no further PT is recommended upon discharge due to independence with functional mobility. Recommend patient returns to prior setting with prior services. Home with assist PRN   PT Equipment Recommendations  Equipment Needed: No    Assessment   Body structures, Functions, Activity limitations: Decreased endurance  Assessment: Patient continues to present with decreased endurance. Patient now independent with transfers and gait and able to ambulate short distances frequently throughout day. Despite presenting with endurance deficits, feel patient will return to baseline endurance without further PT intervention. No further acute PT needs identified. Treatment Diagnosis: impaired gait, transfers, and balance  PT Education: Goals;PT Role;Plan of Care;Energy Conservation;Transfer Training;General Safety;Orientation; Functional Mobility Training  Patient Education: d/c recommendations - verbalized understanding  Barriers to Learning: none  REQUIRES PT FOLLOW UP: No  Activity Tolerance  Activity Tolerance: Patient limited by endurance; Patient limited by fatigue     Patient Diagnosis(es): The primary encounter diagnosis was Pneumonia due to COVID-19 virus. A diagnosis of Hypoxia was also pertinent to this visit. has no past medical history on file. has no past surgical history on file.     Restrictions  Restrictions/Precautions  Restrictions/Precautions: Fall Risk, General Precautions, Isolation  Required Braces or Orthoses?: No  Position Activity Restriction  Other position/activity restrictions: 43 y.o. female with  history of obesity, who presents to the emergency room with complaints of cough, fever, chills, shortness of breath, diarrhea, generalized body aches, generalized weakness, ongoing since 9/19/2021. She was noted to be hypoxic with oxygen saturation of 84% on room air, requiring as much as 5 L/min supplemental oxygen at the time of my evaluation in the emergency room. Subjective   General  Chart Reviewed: Yes  Family / Caregiver Present: No  Subjective  Subjective: Denied pain at rest. Agreeable to therapy. States she has been walking in room without difficulty. States she plans on staying on main level of home initially and mother will be staying with her for increased support  General Comment  Comments: supine in bed upon arrival. On 3L O2 (states nursing just decreased her to 3L)  Pain Screening  Patient Currently in Pain: Denies  Vital Signs  Patient Currently in Pain: Denies       Orientation  Orientation  Overall Orientation Status: Within Functional Limits  Cognition      Objective   Bed mobility  Supine to Sit: Modified independent  Sit to Supine: Modified independent  Scooting: Modified independent  Transfers  Sit to Stand: Independent  Stand to sit: Independent  Ambulation  Ambulation?: Yes  Ambulation 1  Surface: level tile  Device: No Device  Other Apparatus: O2  Assistance: Independent  Quality of Gait: increased medial lateral sway, decreased orestes, steady gait, able to complete 180 degree turn without difficulty  Distance: 20'  Comments: SpO2 decreased to 84% on 3L O2 with short distance ambulation. Bumped up to 5L O2 to recover and still needed 3-5 minutes seated rest break for recovery.  Able to decrease slowly back to 3L O2 and Spo2 recovered to 90%     Balance  Sitting - Static: Good  Sitting - Dynamic: Good  Standing - Static: Good  Standing - Dynamic: Good           AM-PAC Score  AM-PAC Inpatient Mobility Raw Score : 23 (10/11/21 1048)  AM-PAC Inpatient T-Scale Score : 56.93 (10/11/21 1048)  Mobility Inpatient CMS 0-100% Score: 11.2 (10/11/21 1048)  Mobility Inpatient CMS G-Code Modifier : CI (10/11/21 1048)          Goals  Short term goals  Time Frame for Short term goals: upon d/c (no goals met 10/8)  Short term goal 1: Pt will perform bed mobility MOD I  -MET 10/11  Short term goal 2: Pt will perform transfers with LRAD and supervision - MET 10/11  Short term goal 3: Pt will ambulate 22' with LRAD and CGA - Partially met  Patient Goals   Patient goals : pt did not state    Plan    Plan  Times per week: D/C  Times per day: Daily  Current Treatment Recommendations: Strengthening, Balance Training, Functional Mobility Training, Transfer Training, Gait Training, Stair training, Endurance Training, Neuromuscular Re-education, Positioning, Modalities, Equipment Evaluation, Education, & procurement, Patient/Caregiver Education & Training, Safety Education & Training, Home Exercise Program  Safety Devices  Type of devices:  All fall risk precautions in place, Left in bed, Bed alarm in place, Nurse notified, Patient at risk for falls, Call light within reach  Restraints  Initially in place: No     Therapy Time   Individual Concurrent Group Co-treatment   Time In 1024         Time Out 1048         Minutes 24         Timed Code Treatment Minutes: 24 Minutes       Jr Luke PT    Thanks, Jr Luke PT, DPT [id number]

## 2021-10-12 VITALS
BODY MASS INDEX: 47.09 KG/M2 | OXYGEN SATURATION: 92 % | RESPIRATION RATE: 16 BRPM | HEIGHT: 66 IN | WEIGHT: 293 LBS | SYSTOLIC BLOOD PRESSURE: 93 MMHG | TEMPERATURE: 97.2 F | DIASTOLIC BLOOD PRESSURE: 70 MMHG | HEART RATE: 113 BPM

## 2021-10-12 LAB
ALBUMIN SERPL-MCNC: 2.9 G/DL (ref 3.4–5)
ALP BLD-CCNC: 65 U/L (ref 40–129)
ALT SERPL-CCNC: 71 U/L (ref 10–40)
ANION GAP SERPL CALCULATED.3IONS-SCNC: 10 MMOL/L (ref 3–16)
APTT: 82 SEC (ref 26.2–38.6)
AST SERPL-CCNC: 49 U/L (ref 15–37)
BASOPHILS ABSOLUTE: 0.1 K/UL (ref 0–0.2)
BASOPHILS RELATIVE PERCENT: 0.5 %
BILIRUB SERPL-MCNC: <0.2 MG/DL (ref 0–1)
BILIRUBIN DIRECT: <0.2 MG/DL (ref 0–0.3)
BILIRUBIN, INDIRECT: ABNORMAL MG/DL (ref 0–1)
BUN BLDV-MCNC: 17 MG/DL (ref 7–20)
C-REACTIVE PROTEIN: <3 MG/L (ref 0–5.1)
CALCIUM SERPL-MCNC: 8.6 MG/DL (ref 8.3–10.6)
CHLORIDE BLD-SCNC: 107 MMOL/L (ref 99–110)
CO2: 26 MMOL/L (ref 21–32)
CREAT SERPL-MCNC: 1.3 MG/DL (ref 0.6–1.1)
EOSINOPHILS ABSOLUTE: 0.2 K/UL (ref 0–0.6)
EOSINOPHILS RELATIVE PERCENT: 1.8 %
GFR AFRICAN AMERICAN: 55
GFR NON-AFRICAN AMERICAN: 46
GLUCOSE BLD-MCNC: 102 MG/DL (ref 70–99)
HCT VFR BLD CALC: 36.4 % (ref 36–48)
HEMOGLOBIN: 12.2 G/DL (ref 12–16)
LYMPHOCYTES ABSOLUTE: 1.4 K/UL (ref 1–5.1)
LYMPHOCYTES RELATIVE PERCENT: 11.5 %
MCH RBC QN AUTO: 32.3 PG (ref 26–34)
MCHC RBC AUTO-ENTMCNC: 33.6 G/DL (ref 31–36)
MCV RBC AUTO: 95.9 FL (ref 80–100)
MONOCYTES ABSOLUTE: 0.6 K/UL (ref 0–1.3)
MONOCYTES RELATIVE PERCENT: 5.5 %
NEUTROPHILS ABSOLUTE: 9.5 K/UL (ref 1.7–7.7)
NEUTROPHILS RELATIVE PERCENT: 80.7 %
PDW BLD-RTO: 13.2 % (ref 12.4–15.4)
PLATELET # BLD: 274 K/UL (ref 135–450)
PMV BLD AUTO: 7.9 FL (ref 5–10.5)
POTASSIUM SERPL-SCNC: 3.5 MMOL/L (ref 3.5–5.1)
RBC # BLD: 3.79 M/UL (ref 4–5.2)
SODIUM BLD-SCNC: 143 MMOL/L (ref 136–145)
TOTAL PROTEIN: 5.7 G/DL (ref 6.4–8.2)
WBC # BLD: 11.7 K/UL (ref 4–11)

## 2021-10-12 PROCEDURE — 6370000000 HC RX 637 (ALT 250 FOR IP): Performed by: INTERNAL MEDICINE

## 2021-10-12 PROCEDURE — 80076 HEPATIC FUNCTION PANEL: CPT

## 2021-10-12 PROCEDURE — 86140 C-REACTIVE PROTEIN: CPT

## 2021-10-12 PROCEDURE — 85730 THROMBOPLASTIN TIME PARTIAL: CPT

## 2021-10-12 PROCEDURE — 94640 AIRWAY INHALATION TREATMENT: CPT

## 2021-10-12 PROCEDURE — 85025 COMPLETE CBC W/AUTO DIFF WBC: CPT

## 2021-10-12 PROCEDURE — 2700000000 HC OXYGEN THERAPY PER DAY

## 2021-10-12 PROCEDURE — 94761 N-INVAS EAR/PLS OXIMETRY MLT: CPT

## 2021-10-12 PROCEDURE — 80048 BASIC METABOLIC PNL TOTAL CA: CPT

## 2021-10-12 RX ADMIN — Medication 2 PUFF: at 12:55

## 2021-10-12 RX ADMIN — Medication 2 PUFF: at 09:13

## 2021-10-12 RX ADMIN — ACETAMINOPHEN 650 MG: 325 TABLET ORAL at 11:22

## 2021-10-12 RX ADMIN — FAMOTIDINE 20 MG: 20 TABLET, FILM COATED ORAL at 10:27

## 2021-10-12 RX ADMIN — Medication 2000 UNITS: at 10:27

## 2021-10-12 RX ADMIN — GUAIFENESIN SYRUP AND DEXTROMETHORPHAN 5 ML: 100; 10 SYRUP ORAL at 10:27

## 2021-10-12 RX ADMIN — GUAIFENESIN SYRUP AND DEXTROMETHORPHAN 5 ML: 100; 10 SYRUP ORAL at 04:30

## 2021-10-12 ASSESSMENT — PAIN SCALES - GENERAL
PAINLEVEL_OUTOF10: 0

## 2021-10-12 NOTE — CARE COORDINATION
Patient discharged 10/12/2021to home with [employer] oxygen services.    All discharge needs met per case management    Caridad Connolly RN, BSN  [phone]

## 2021-10-12 NOTE — CARE COORDINATION
CM received vm from [place] with [employer] that he contacted financial to see if pt has a medicaid number yet, if not he will have to get payment.     Ravinder Lang RN, BSN  [phone]

## 2021-10-12 NOTE — PROGRESS NOTES
Pt discharged all orders reviewed extra education provided on discharge paperwork.  Pts mom to transport pt to private residence

## 2021-10-12 NOTE — CARE COORDINATION
VM left for Bishop Dey with [employer] [phone] that patient needs home oxygen.      Petar Valdovinos RN, BSN  [phone]

## 2021-10-12 NOTE — PROGRESS NOTES
CLINICAL PHARMACY NOTE: MEDS TO BEDS    Total # of Prescriptions Filled: 1   The following medications were delivered to the patient:  · Xarelto 20 mg    Additional Documentation:    Delivered to Nurse Jacque FONTANEZ RN=signed  Dalia Jade CPhT

## 2021-10-12 NOTE — PROGRESS NOTES
APTT this morning was therapeutic 82.0. No bolus no change to heparin drip.  Will continue to monitor

## 2021-10-12 NOTE — PROGRESS NOTES
Patients head to toe assessment completed. Vital signs WNL. Bed alarm engaged, call light within reach. Scheduled medications given per MAR. Patient denies any pain at the moment. Robitussin given for cough. Patient resting in bed. Will continue to monitor. 69

## 2021-10-12 NOTE — CARE COORDINATION
[facility] received a referral to this patient for home 02 @ 4 lpm cont via nc. Home 02 has been arranged for delivery. Pt aware to call [facility] [phone] to initiate setup. Portable tanks have been delivered to the hospital floor for discharge. Thank you for the referral.  Electronically signed by Axel Farrar on 10/12/2021 at 1:18 PM  Cell ph# [phone]    NOTE: After 5:00 pm, Weekends, Holidays: Call [facility] at [phone] to coordinate delivery of home medical equipment.

## 2021-10-12 NOTE — PROGRESS NOTES
10/11/21 1330   Resting (Room Air)   SpO2 83      During Walk (On O2)   SpO2 91      O2 Device Nasal cannula   O2 Flow Rate (l/min) 3 l/min   Rate of Dyspnea 0   Comments no c/o shortness of breath   After Walk   SpO2 3      O2 Device Nasal cannula   Does the Patient Qualify for Home O2 Yes   Liter Flow at Rest 3   Does the Patient Need Portable Oxygen Tanks Yes

## 2021-10-13 ENCOUNTER — CARE COORDINATION (OUTPATIENT)
Dept: CASE MANAGEMENT | Age: 36
End: 2021-10-13

## 2021-10-13 NOTE — CARE COORDINATION
[facility] Transitions Initial Follow Up Call    Call within 2 business days of discharge: Yes    Patient: Vanessa Huang Patient : 1985   MRN: [id number]  Reason for Admission:   Discharge Date: 10/12/21 RARS: Readmission Risk Score: 12      Last Discharge [facility]       Complaint Diagnosis Description Type Department Provider    10/1/21 Shortness of Breath Pneumonia due to COVID-19 virus . .. ED to Hosp-Admission (Discharged) (ADMITTED) F[facility] 5T Veda Fowler MD; Tiffanie Rashid. .. Non-face-to-face services provided:  Obtained and reviewed discharge summary and/or continuity of care documents     Transitions of Care Initial Call    Was this an external facility discharge? No Discharge Facility:     Challenges to be reviewed by the provider   Additional needs identified to be addressed with provider: No  none             Method of communication with provider : none      Advance Care Planning:   Does patient have an Advance Directive: not on file; education provided. Was this a readmission? No  Patient stated reason for admission: SOB  Patients top risk factors for readmission: medical condition-COVID    Care Transition Nurse (CTN) contacted the patient by telephone to perform post hospital discharge assessment. Verified name and  with patient as identifiers. Provided introduction to self, and explanation of the CTN role. CTN reviewed discharge instructions, medical action plan and red flags with patient who verbalized understanding. Patient given an opportunity to ask questions and does not have any further questions or concerns at this time. Were discharge instructions available to patient? Yes. Reviewed appropriate site of care based on symptoms and resources available to patient including: When to call 911. The patient agrees to contact the PCP office for questions related to their healthcare.      Medication reconciliation was performed with patient, who verbalizes understanding of administration of home medications. Advised obtaining a 90-day supply of all daily and as-needed medications. Covid Risk Education     Educated patient about risk for severe COVID-19 due to risk factors according to CDC guidelines. CTN reviewed discharge instructions, medical action plan and red flag symptoms with the patient who verbalized understanding. Discussed COVID vaccination status: Yes. Education provided on COVID-19 vaccination as appropriate. Discussed exposure protocols and quarantine with CDC Guidelines. Patient was given an opportunity to verbalize any questions and concerns and agrees to contact CTN or health care provider for questions related to their healthcare. Reviewed and educated family on any new and changed medications related to discharge diagnosis. Was patient discharged with a pulse oximeter? No Discussed and confirmed pulse oximeter discharge instructions and when to notify provider or seek emergency care. Pt states doing pretty good. Denies SOB, CP, fever, continues to cough. Using oxygen at 4L, ordered an oximeter that will be in tomorrow. F/U appt listed below. Agreed to more CTC f/u calls    CTN provided contact information. Plan for follow-up call in 5-7 days based on severity of symptoms and risk factors.   Plan for next call: self management-COVID, f/u appt    Care Transitions 24 Hour Call    Do you have any ongoing symptoms?: No  Do you have a copy of your discharge instructions?: Yes  Do you have all of your prescriptions and are they filled?: Yes  Have you been contacted by a [address]?: No  Have you scheduled your follow up appointment?: Yes  How are you going to get to your appointment?: Car - drive self  Were you discharged with any Home Care or Post Acute Services: No  Do you feel like you have everything you need to keep you well at home?: Yes  Care Transitions Interventions  No Identified Needs         Follow Up  Future Appointments Date Time Provider Yamilet Jacobs   10/19/2021 10:15 AM Sofia Santacruz MD [facility]IOR[facility]       Jeevan Cheema RN

## 2021-10-19 ENCOUNTER — HOSPITAL ENCOUNTER (INPATIENT)
Age: 36
LOS: 4 days | Discharge: HOME | DRG: 194 | End: 2021-10-23
Attending: EMERGENCY MEDICINE | Admitting: INTERNAL MEDICINE
Payer: MEDICAID

## 2021-10-19 ENCOUNTER — TELEPHONE (OUTPATIENT)
Dept: OTHER | Facility: CLINIC | Age: 36
End: 2021-10-19

## 2021-10-19 ENCOUNTER — APPOINTMENT (OUTPATIENT)
Dept: CT IMAGING | Age: 36
DRG: 194 | End: 2021-10-19
Payer: MEDICAID

## 2021-10-19 ENCOUNTER — OFFICE VISIT (OUTPATIENT)
Dept: INTERNAL MEDICINE CLINIC | Age: 36
DRG: 194 | End: 2021-10-19
Payer: MEDICAID

## 2021-10-19 ENCOUNTER — APPOINTMENT (OUTPATIENT)
Dept: GENERAL RADIOLOGY | Age: 36
DRG: 194 | End: 2021-10-19
Payer: MEDICAID

## 2021-10-19 VITALS
OXYGEN SATURATION: 88 % | TEMPERATURE: 98.8 F | HEIGHT: 66 IN | DIASTOLIC BLOOD PRESSURE: 89 MMHG | BODY MASS INDEX: 47.09 KG/M2 | WEIGHT: 293 LBS | HEART RATE: 133 BPM | SYSTOLIC BLOOD PRESSURE: 122 MMHG

## 2021-10-19 DIAGNOSIS — J12.82 PNEUMONIA DUE TO COVID-19 VIRUS: Primary | ICD-10-CM

## 2021-10-19 DIAGNOSIS — F41.9 ANXIETY: ICD-10-CM

## 2021-10-19 DIAGNOSIS — U07.1 PNEUMONIA DUE TO COVID-19 VIRUS: Primary | ICD-10-CM

## 2021-10-19 DIAGNOSIS — N17.9 AKI (ACUTE KIDNEY INJURY) (HCC): ICD-10-CM

## 2021-10-19 DIAGNOSIS — R09.02 HYPOXIA: Primary | ICD-10-CM

## 2021-10-19 PROBLEM — J96.01 ACUTE RESPIRATORY FAILURE WITH HYPOXIA (HCC): Status: ACTIVE | Noted: 2021-10-19

## 2021-10-19 LAB
ALBUMIN SERPL-MCNC: 3.5 G/DL (ref 3.4–5)
ALP BLD-CCNC: 80 U/L (ref 40–129)
ALT SERPL-CCNC: 27 U/L (ref 10–40)
ANION GAP SERPL CALCULATED.3IONS-SCNC: 12 MMOL/L (ref 3–16)
APTT: 33.4 SEC (ref 26.2–38.6)
AST SERPL-CCNC: 26 U/L (ref 15–37)
BACTERIA: ABNORMAL /HPF
BASE EXCESS VENOUS: 5.6 MMOL/L (ref -2–3)
BASOPHILS ABSOLUTE: 0 K/UL (ref 0–0.2)
BASOPHILS RELATIVE PERCENT: 0 %
BILIRUB SERPL-MCNC: 0.7 MG/DL (ref 0–1)
BILIRUBIN DIRECT: <0.2 MG/DL (ref 0–0.3)
BILIRUBIN URINE: NEGATIVE
BILIRUBIN, INDIRECT: NORMAL MG/DL (ref 0–1)
BLOOD, URINE: ABNORMAL
BUN BLDV-MCNC: 6 MG/DL (ref 7–20)
C-REACTIVE PROTEIN: 190.9 MG/L (ref 0–5.1)
CALCIUM SERPL-MCNC: 8.8 MG/DL (ref 8.3–10.6)
CARBOXYHEMOGLOBIN: 2.3 % (ref 0–1.5)
CHLORIDE BLD-SCNC: 101 MMOL/L (ref 99–110)
CLARITY: ABNORMAL
CO2: 28 MMOL/L (ref 21–32)
COLOR: YELLOW
CREAT SERPL-MCNC: 0.9 MG/DL (ref 0.6–1.1)
EKG ATRIAL RATE: 124 BPM
EKG DIAGNOSIS: NORMAL
EKG P AXIS: 43 DEGREES
EKG P-R INTERVAL: 130 MS
EKG Q-T INTERVAL: 352 MS
EKG QRS DURATION: 68 MS
EKG QTC CALCULATION (BAZETT): 505 MS
EKG R AXIS: -10 DEGREES
EKG T AXIS: -61 DEGREES
EKG VENTRICULAR RATE: 124 BPM
EOSINOPHILS ABSOLUTE: 0.2 K/UL (ref 0–0.6)
EOSINOPHILS RELATIVE PERCENT: 2 %
EPITHELIAL CELLS, UA: ABNORMAL /HPF (ref 0–5)
GFR AFRICAN AMERICAN: >60
GFR NON-AFRICAN AMERICAN: >60
GLUCOSE BLD-MCNC: 110 MG/DL (ref 70–99)
GLUCOSE URINE: NEGATIVE MG/DL
GONADOTROPIN, CHORIONIC (HCG) QUANT: <5 MIU/ML
HCO3 VENOUS: 31 MMOL/L (ref 24–28)
HCT VFR BLD CALC: 38.7 % (ref 36–48)
HEMATOLOGY PATH CONSULT: NO
HEMOGLOBIN, VEN, REDUCED: 58.4 %
HEMOGLOBIN: 12.8 G/DL (ref 12–16)
INR BLD: 2.1 (ref 0.88–1.12)
KETONES, URINE: NEGATIVE MG/DL
LACTIC ACID: 1.4 MMOL/L (ref 0.4–2)
LEUKOCYTE ESTERASE, URINE: ABNORMAL
LYMPHOCYTES ABSOLUTE: 1.8 K/UL (ref 1–5.1)
LYMPHOCYTES RELATIVE PERCENT: 16 %
MAGNESIUM: 1.7 MG/DL (ref 1.8–2.4)
MCH RBC QN AUTO: 32 PG (ref 26–34)
MCHC RBC AUTO-ENTMCNC: 33.1 G/DL (ref 31–36)
MCV RBC AUTO: 96.8 FL (ref 80–100)
METHEMOGLOBIN VENOUS: 0.3 % (ref 0–1.5)
MICROSCOPIC EXAMINATION: YES
MONOCYTES ABSOLUTE: 0.8 K/UL (ref 0–1.3)
MONOCYTES RELATIVE PERCENT: 7 %
NEUTROPHILS ABSOLUTE: 8.6 K/UL (ref 1.7–7.7)
NEUTROPHILS RELATIVE PERCENT: 75 %
NITRITE, URINE: NEGATIVE
O2 SAT, VEN: 40 %
PCO2, VEN: 46.9 MMHG (ref 41–51)
PDW BLD-RTO: 14.2 % (ref 12.4–15.4)
PH UA: 6.5 (ref 5–8)
PH VENOUS: 7.43 (ref 7.35–7.45)
PLATELET # BLD: 138 K/UL (ref 135–450)
PLATELET SLIDE REVIEW: ADEQUATE
PMV BLD AUTO: 8.7 FL (ref 5–10.5)
PO2, VEN: <30 MMHG (ref 25–40)
POTASSIUM REFLEX MAGNESIUM: 3.5 MMOL/L (ref 3.5–5.1)
PRO-BNP: 7164 PG/ML (ref 0–124)
PROCALCITONIN: 0.17 NG/ML (ref 0–0.15)
PROTEIN UA: 100 MG/DL
PROTHROMBIN TIME: 24.5 SEC (ref 9.9–12.7)
RBC # BLD: 4 M/UL (ref 4–5.2)
RBC # BLD: NORMAL 10*6/UL
RBC UA: ABNORMAL /HPF (ref 0–4)
SLIDE REVIEW: ABNORMAL
SODIUM BLD-SCNC: 141 MMOL/L (ref 136–145)
SPECIFIC GRAVITY UA: 1.01 (ref 1–1.03)
TCO2 CALC VENOUS: 32 MMOL/L
TOTAL PROTEIN: 7.1 G/DL (ref 6.4–8.2)
TROPONIN: 0.06 NG/ML
TROPONIN: 0.08 NG/ML
URINE REFLEX TO CULTURE: YES
URINE TYPE: ABNORMAL
UROBILINOGEN, URINE: 0.2 E.U./DL
WBC # BLD: 11.5 K/UL (ref 4–11)
WBC UA: ABNORMAL /HPF (ref 0–5)

## 2021-10-19 PROCEDURE — 80048 BASIC METABOLIC PNL TOTAL CA: CPT

## 2021-10-19 PROCEDURE — 6370000000 HC RX 637 (ALT 250 FOR IP): Performed by: EMERGENCY MEDICINE

## 2021-10-19 PROCEDURE — 6360000004 HC RX CONTRAST MEDICATION: Performed by: EMERGENCY MEDICINE

## 2021-10-19 PROCEDURE — 6360000002 HC RX W HCPCS: Performed by: STUDENT IN AN ORGANIZED HEALTH CARE EDUCATION/TRAINING PROGRAM

## 2021-10-19 PROCEDURE — 2580000003 HC RX 258: Performed by: STUDENT IN AN ORGANIZED HEALTH CARE EDUCATION/TRAINING PROGRAM

## 2021-10-19 PROCEDURE — 94640 AIRWAY INHALATION TREATMENT: CPT

## 2021-10-19 PROCEDURE — 99283 EMERGENCY DEPT VISIT LOW MDM: CPT

## 2021-10-19 PROCEDURE — 6360000002 HC RX W HCPCS: Performed by: EMERGENCY MEDICINE

## 2021-10-19 PROCEDURE — 2060000000 HC ICU INTERMEDIATE R&B

## 2021-10-19 PROCEDURE — 85610 PROTHROMBIN TIME: CPT

## 2021-10-19 PROCEDURE — 36415 COLL VENOUS BLD VENIPUNCTURE: CPT

## 2021-10-19 PROCEDURE — 99213 OFFICE O/P EST LOW 20 MIN: CPT | Performed by: STUDENT IN AN ORGANIZED HEALTH CARE EDUCATION/TRAINING PROGRAM

## 2021-10-19 PROCEDURE — 71045 X-RAY EXAM CHEST 1 VIEW: CPT

## 2021-10-19 PROCEDURE — 83605 ASSAY OF LACTIC ACID: CPT

## 2021-10-19 PROCEDURE — 84702 CHORIONIC GONADOTROPIN TEST: CPT

## 2021-10-19 PROCEDURE — 82803 BLOOD GASES ANY COMBINATION: CPT

## 2021-10-19 PROCEDURE — 85730 THROMBOPLASTIN TIME PARTIAL: CPT

## 2021-10-19 PROCEDURE — 84484 ASSAY OF TROPONIN QUANT: CPT

## 2021-10-19 PROCEDURE — 81001 URINALYSIS AUTO W/SCOPE: CPT

## 2021-10-19 PROCEDURE — 83735 ASSAY OF MAGNESIUM: CPT

## 2021-10-19 PROCEDURE — 94761 N-INVAS EAR/PLS OXIMETRY MLT: CPT

## 2021-10-19 PROCEDURE — 83880 ASSAY OF NATRIURETIC PEPTIDE: CPT

## 2021-10-19 PROCEDURE — 84145 PROCALCITONIN (PCT): CPT

## 2021-10-19 PROCEDURE — 85025 COMPLETE CBC W/AUTO DIFF WBC: CPT

## 2021-10-19 PROCEDURE — 6370000000 HC RX 637 (ALT 250 FOR IP): Performed by: STUDENT IN AN ORGANIZED HEALTH CARE EDUCATION/TRAINING PROGRAM

## 2021-10-19 PROCEDURE — 80076 HEPATIC FUNCTION PANEL: CPT

## 2021-10-19 PROCEDURE — 86140 C-REACTIVE PROTEIN: CPT

## 2021-10-19 PROCEDURE — 71275 CT ANGIOGRAPHY CHEST: CPT

## 2021-10-19 PROCEDURE — 96374 THER/PROPH/DIAG INJ IV PUSH: CPT

## 2021-10-19 PROCEDURE — 93005 ELECTROCARDIOGRAM TRACING: CPT | Performed by: EMERGENCY MEDICINE

## 2021-10-19 PROCEDURE — 2700000000 HC OXYGEN THERAPY PER DAY

## 2021-10-19 RX ORDER — SODIUM CHLORIDE 0.65 %
1 AEROSOL, SPRAY (ML) NASAL PRN
Qty: 1 EACH | Refills: 3 | Status: SHIPPED | OUTPATIENT
Start: 2021-10-19 | End: 2022-06-01

## 2021-10-19 RX ORDER — ECHINACEA PURPUREA EXTRACT 125 MG
1 TABLET ORAL PRN
Qty: 1 EACH | Refills: 3 | Status: SHIPPED | OUTPATIENT
Start: 2021-10-19 | End: 2021-10-19

## 2021-10-19 RX ORDER — DEXAMETHASONE SODIUM PHOSPHATE 4 MG/ML
6 INJECTION, SOLUTION INTRA-ARTICULAR; INTRALESIONAL; INTRAMUSCULAR; INTRAVENOUS; SOFT TISSUE ONCE
Status: COMPLETED | OUTPATIENT
Start: 2021-10-19 | End: 2021-10-19

## 2021-10-19 RX ORDER — FLUTICASONE PROPIONATE 110 UG/1
2 AEROSOL, METERED RESPIRATORY (INHALATION) 2 TIMES DAILY
Qty: 12 G | Refills: 3 | Status: SHIPPED | OUTPATIENT
Start: 2021-10-19 | End: 2021-10-19

## 2021-10-19 RX ORDER — FLUTICASONE PROPIONATE 220 UG/1
2 AEROSOL, METERED RESPIRATORY (INHALATION) 2 TIMES DAILY
Status: CANCELLED | OUTPATIENT
Start: 2021-10-19 | End: 2021-11-18

## 2021-10-19 RX ORDER — ASPIRIN 81 MG/1
81 TABLET, CHEWABLE ORAL DAILY
Status: DISCONTINUED | OUTPATIENT
Start: 2021-10-20 | End: 2021-10-21

## 2021-10-19 RX ORDER — MAGNESIUM SULFATE IN WATER 40 MG/ML
2000 INJECTION, SOLUTION INTRAVENOUS ONCE
Status: COMPLETED | OUTPATIENT
Start: 2021-10-19 | End: 2021-10-20

## 2021-10-19 RX ORDER — ASPIRIN 325 MG
325 TABLET ORAL ONCE
Status: COMPLETED | OUTPATIENT
Start: 2021-10-19 | End: 2021-10-19

## 2021-10-19 RX ORDER — FLUTICASONE PROPIONATE 110 UG/1
2 AEROSOL, METERED RESPIRATORY (INHALATION) 2 TIMES DAILY
Qty: 12 G | Refills: 0 | Status: SHIPPED | OUTPATIENT
Start: 2021-10-19 | End: 2021-10-26 | Stop reason: SDUPTHER

## 2021-10-19 RX ORDER — ACETAMINOPHEN 650 MG/1
650 SUPPOSITORY RECTAL EVERY 6 HOURS PRN
Status: DISCONTINUED | OUTPATIENT
Start: 2021-10-19 | End: 2021-10-23 | Stop reason: HOSPADM

## 2021-10-19 RX ORDER — FUROSEMIDE 10 MG/ML
40 INJECTION INTRAMUSCULAR; INTRAVENOUS ONCE
Status: COMPLETED | OUTPATIENT
Start: 2021-10-19 | End: 2021-10-19

## 2021-10-19 RX ORDER — LORAZEPAM 0.5 MG/1
0.5 TABLET ORAL ONCE
Status: COMPLETED | OUTPATIENT
Start: 2021-10-19 | End: 2021-10-19

## 2021-10-19 RX ORDER — DEXAMETHASONE SODIUM PHOSPHATE 4 MG/ML
6 INJECTION, SOLUTION INTRA-ARTICULAR; INTRALESIONAL; INTRAMUSCULAR; INTRAVENOUS; SOFT TISSUE EVERY 24 HOURS
Status: DISCONTINUED | OUTPATIENT
Start: 2021-10-19 | End: 2021-10-23

## 2021-10-19 RX ORDER — DEXAMETHASONE 2 MG/1
10 TABLET ORAL
Qty: 35 TABLET | Refills: 0 | Status: ON HOLD | OUTPATIENT
Start: 2021-10-19 | End: 2021-10-23 | Stop reason: HOSPADM

## 2021-10-19 RX ORDER — ACETAMINOPHEN 325 MG/1
650 TABLET ORAL EVERY 6 HOURS PRN
Status: DISCONTINUED | OUTPATIENT
Start: 2021-10-19 | End: 2021-10-23 | Stop reason: HOSPADM

## 2021-10-19 RX ORDER — ALBUTEROL SULFATE 90 UG/1
2 AEROSOL, METERED RESPIRATORY (INHALATION) EVERY 4 HOURS PRN
Status: DISCONTINUED | OUTPATIENT
Start: 2021-10-19 | End: 2021-10-23 | Stop reason: HOSPADM

## 2021-10-19 RX ORDER — SODIUM CHLORIDE 0.9 % (FLUSH) 0.9 %
5-40 SYRINGE (ML) INJECTION PRN
Status: DISCONTINUED | OUTPATIENT
Start: 2021-10-19 | End: 2021-10-23 | Stop reason: HOSPADM

## 2021-10-19 RX ORDER — SODIUM CHLORIDE 0.9 % (FLUSH) 0.9 %
5-40 SYRINGE (ML) INJECTION EVERY 12 HOURS SCHEDULED
Status: DISCONTINUED | OUTPATIENT
Start: 2021-10-19 | End: 2021-10-23 | Stop reason: HOSPADM

## 2021-10-19 RX ORDER — FUROSEMIDE 10 MG/ML
40 INJECTION INTRAMUSCULAR; INTRAVENOUS DAILY
Status: DISCONTINUED | OUTPATIENT
Start: 2021-10-20 | End: 2021-10-20

## 2021-10-19 RX ORDER — ONDANSETRON 4 MG/1
4 TABLET, ORALLY DISINTEGRATING ORAL EVERY 8 HOURS PRN
Status: DISCONTINUED | OUTPATIENT
Start: 2021-10-19 | End: 2021-10-23 | Stop reason: HOSPADM

## 2021-10-19 RX ORDER — ATORVASTATIN CALCIUM 20 MG/1
20 TABLET, FILM COATED ORAL NIGHTLY
Status: DISCONTINUED | OUTPATIENT
Start: 2021-10-19 | End: 2021-10-23 | Stop reason: HOSPADM

## 2021-10-19 RX ORDER — ALBUTEROL SULFATE 90 UG/1
2 AEROSOL, METERED RESPIRATORY (INHALATION) 4 TIMES DAILY
Status: DISCONTINUED | OUTPATIENT
Start: 2021-10-20 | End: 2021-10-23 | Stop reason: HOSPADM

## 2021-10-19 RX ORDER — ALBUTEROL SULFATE 90 UG/1
2 AEROSOL, METERED RESPIRATORY (INHALATION) EVERY 6 HOURS PRN
Status: DISCONTINUED | OUTPATIENT
Start: 2021-10-19 | End: 2021-10-19

## 2021-10-19 RX ORDER — SODIUM CHLORIDE 9 MG/ML
25 INJECTION, SOLUTION INTRAVENOUS PRN
Status: DISCONTINUED | OUTPATIENT
Start: 2021-10-19 | End: 2021-10-21

## 2021-10-19 RX ORDER — ALBUTEROL SULFATE 2.5 MG/3ML
2.5 SOLUTION RESPIRATORY (INHALATION) EVERY 6 HOURS PRN
Status: DISCONTINUED | OUTPATIENT
Start: 2021-10-19 | End: 2021-10-19

## 2021-10-19 RX ORDER — ONDANSETRON 2 MG/ML
4 INJECTION INTRAMUSCULAR; INTRAVENOUS EVERY 6 HOURS PRN
Status: DISCONTINUED | OUTPATIENT
Start: 2021-10-19 | End: 2021-10-23 | Stop reason: HOSPADM

## 2021-10-19 RX ORDER — POLYETHYLENE GLYCOL 3350 17 G/17G
17 POWDER, FOR SOLUTION ORAL DAILY PRN
Status: DISCONTINUED | OUTPATIENT
Start: 2021-10-19 | End: 2021-10-23 | Stop reason: HOSPADM

## 2021-10-19 RX ORDER — LORAZEPAM 0.5 MG/1
0.5 TABLET ORAL EVERY 6 HOURS PRN
Qty: 28 TABLET | Refills: 0 | Status: ON HOLD | OUTPATIENT
Start: 2021-10-19 | End: 2021-10-23 | Stop reason: HOSPADM

## 2021-10-19 RX ADMIN — SODIUM CHLORIDE, PRESERVATIVE FREE 10 ML: 5 INJECTION INTRAVENOUS at 22:32

## 2021-10-19 RX ADMIN — ASPIRIN 325 MG ORAL TABLET 325 MG: 325 PILL ORAL at 16:21

## 2021-10-19 RX ADMIN — SODIUM CHLORIDE 25 ML: 9 INJECTION, SOLUTION INTRAVENOUS at 23:33

## 2021-10-19 RX ADMIN — CEFTRIAXONE 1000 MG: 1 INJECTION, POWDER, FOR SOLUTION INTRAMUSCULAR; INTRAVENOUS at 22:33

## 2021-10-19 RX ADMIN — FUROSEMIDE 40 MG: 10 INJECTION, SOLUTION INTRAMUSCULAR; INTRAVENOUS at 14:52

## 2021-10-19 RX ADMIN — SODIUM CHLORIDE 25 ML: 9 INJECTION, SOLUTION INTRAVENOUS at 22:32

## 2021-10-19 RX ADMIN — DEXAMETHASONE SODIUM PHOSPHATE 6 MG: 4 INJECTION, SOLUTION INTRAMUSCULAR; INTRAVENOUS at 22:28

## 2021-10-19 RX ADMIN — MAGNESIUM SULFATE HEPTAHYDRATE 2000 MG: 2 INJECTION, SOLUTION INTRAVENOUS at 23:35

## 2021-10-19 RX ADMIN — ATORVASTATIN CALCIUM 20 MG: 20 TABLET, FILM COATED ORAL at 22:28

## 2021-10-19 RX ADMIN — LORAZEPAM 0.5 MG: 0.5 TABLET ORAL at 12:52

## 2021-10-19 RX ADMIN — ENOXAPARIN SODIUM 40 MG: 40 INJECTION SUBCUTANEOUS at 22:28

## 2021-10-19 RX ADMIN — IOPAMIDOL 80 ML: 755 INJECTION, SOLUTION INTRAVENOUS at 13:59

## 2021-10-19 RX ADMIN — DEXAMETHASONE SODIUM PHOSPHATE 6 MG: 4 INJECTION, SOLUTION INTRAMUSCULAR; INTRAVENOUS at 16:21

## 2021-10-19 ASSESSMENT — ENCOUNTER SYMPTOMS
NAUSEA: 0
CONSTIPATION: 0
ABDOMINAL PAIN: 0
DIARRHEA: 0
COUGH: 1
SHORTNESS OF BREATH: 1
VOMITING: 0

## 2021-10-19 ASSESSMENT — PATIENT HEALTH QUESTIONNAIRE - PHQ9
SUM OF ALL RESPONSES TO PHQ QUESTIONS 1-9: 19
SUM OF ALL RESPONSES TO PHQ QUESTIONS 1-9: 19
SUM OF ALL RESPONSES TO PHQ9 QUESTIONS 1 & 2: 6
7. TROUBLE CONCENTRATING ON THINGS, SUCH AS READING THE NEWSPAPER OR WATCHING TELEVISION: 0
3. TROUBLE FALLING OR STAYING ASLEEP: 3
4. FEELING TIRED OR HAVING LITTLE ENERGY: 3
5. POOR APPETITE OR OVEREATING: 3
10. IF YOU CHECKED OFF ANY PROBLEMS, HOW DIFFICULT HAVE THESE PROBLEMS MADE IT FOR YOU TO DO YOUR WORK, TAKE CARE OF THINGS AT HOME, OR GET ALONG WITH OTHER PEOPLE: 2
SUM OF ALL RESPONSES TO PHQ QUESTIONS 1-9: 19
1. LITTLE INTEREST OR PLEASURE IN DOING THINGS: 3
6. FEELING BAD ABOUT YOURSELF - OR THAT YOU ARE A FAILURE OR HAVE LET YOURSELF OR YOUR FAMILY DOWN: 1
8. MOVING OR SPEAKING SO SLOWLY THAT OTHER PEOPLE COULD HAVE NOTICED. OR THE OPPOSITE, BEING SO FIGETY OR RESTLESS THAT YOU HAVE BEEN MOVING AROUND A LOT MORE THAN USUAL: 3
9. THOUGHTS THAT YOU WOULD BE BETTER OFF DEAD, OR OF HURTING YOURSELF: 0
2. FEELING DOWN, DEPRESSED OR HOPELESS: 3

## 2021-10-19 ASSESSMENT — PAIN SCALES - GENERAL
PAINLEVEL_OUTOF10: 0

## 2021-10-19 ASSESSMENT — COLUMBIA-SUICIDE SEVERITY RATING SCALE - C-SSRS
1. WITHIN THE PAST MONTH, HAVE YOU WISHED YOU WERE DEAD OR WISHED YOU COULD GO TO SLEEP AND NOT WAKE UP?: NO
2. HAVE YOU ACTUALLY HAD ANY THOUGHTS OF KILLING YOURSELF?: NO
6. HAVE YOU EVER DONE ANYTHING, STARTED TO DO ANYTHING, OR PREPARED TO DO ANYTHING TO END YOUR LIFE?: NO

## 2021-10-19 NOTE — TELEPHONE ENCOUNTER
Writer contacted Dr. Caridda Gupta,  ED provider to inform of 30 day readmission risk. ED provider informed writer of readmission.

## 2021-10-19 NOTE — TELEPHONE ENCOUNTER
Writer contacted ED provider to inform of 30 day readmission risk. Writer's attempt to contact ED provider was unsuccessful. No Decision on disposition at this time.       Call Back: If you need to call back to inform of disposition you can contact me at [phone]

## 2021-10-19 NOTE — PATIENT INSTRUCTIONS
- please go to ER as soon as you leave clinic  - once discharged from ER/hospital, please start using fluticasone spray as prescribed  - have home aid to increase oxygen to 6L with humdification  - take ativan 0.5 mg every 6 hours as needed for anxiety  - take dexamethasone 10 mg daily for up to 10 days  - try to have your legs elevated as tolerated

## 2021-10-19 NOTE — Clinical Note
Patient Class: Inpatient [101]   REQUIRED: Diagnosis: Acute respiratory failure with hypoxia ([facility] [address] [540719]   Estimated Length of Stay: Estimated stay of more than 2 midnights   Admitting Provider: Lisset Bone [[id number]]

## 2021-10-19 NOTE — PROGRESS NOTES
Outpatient Clinic Established Patient Note    Patient: Ernie Yoo  : 1985 (43 y.o.)  Date: 10/19/2021    CC: hospital follow up    HPI:    Ms. Eliezer Zarco is a 43 y.o. with PMH as below who presents to clinic for hospital follow up. Pt was hospitalized at [facility] 10/1 - 10/12 where she was treated for Covid-19 PNA. Her oxygen requirements and inflammation markers were trending down before discharge. She received dexamethasone 20 mg for 5 days, was on heparin drip, and received one dose of tocilizumab. She was discharged on 3L NC, Xarelto for one month and albuterol as needed. Pt was also treated for STEVE thought to be ATN from sepsis and hypotension. Nephrology was following her during her hospitalization. Pt has been home resting for the past one week still using portable O2 at 4L. She still endorses SOB, and cough. Her oxygen pulse was dropping occasionally to the 85-88% but states it improves after using saline nasal spray to clean her nose. She has been using albuterol every 4 hours and she was feeling improvement following use. Pt also started having occasional pleuritic pain yesterday. Pt endorses anxiety episodes where she experiences hyperventillation. She is drinking 32oz of fluid daily but has been having poor oral intake. Otherwise, pt denies fever, night sweats, chills, palpitations, nausea, vomiting, diarrhea, dysuria. Allergies:    Shellfish-derived products and Strawberry (diagnostic)    Health Maintenance Due   Topic Date Due    Hepatitis C screen  Never done    Varicella vaccine (1 of 2 - 2-dose childhood series) Never done    COVID-19 Vaccine (1) Never done    HIV screen  Never done    DTaP/Tdap/Td vaccine (1 - Tdap) Never done    Cervical cancer screen  Never done    Flu vaccine (1) Never done         There is no immunization history on file for this patient.     Review of Systems  A 10-organ Review Of Systems was obtained and otherwise unremarkable except as per HPI.    Data: Old records have been reviewed electronically. Past Medical History:    Past Medical History:   Diagnosis Date    COVID-19     Obesity        Past Surgical History:  Past Surgical History:   Procedure Laterality Date    DILATION AND CURETTAGE OF UTERUS         Home Meds:  Prior to Visit  Outpatient Medications Prior to Visit   Medication Sig Dispense Refill    rivaroxaban (Assunta Barnacle) 20 MG TABS tablet Take 1 tablet by mouth daily (with breakfast) 30 tablet 0    albuterol sulfate  (90 Base) MCG/ACT inhaler Inhale 2 puffs into the lungs 4 times daily 18 g 3    guaiFENesin-dextromethorphan (ROBITUSSIN DM) 100-10 MG/5ML syrup Take 5 mLs by mouth every 4 hours as needed for Cough 120 mL 0     No facility-administered medications prior to visit. After Visit:  Prior to Visit Medications    Medication Sig Taking? Authorizing Provider   LORazepam (ATIVAN) 0.5 MG tablet Take 1 tablet by mouth every 6 hours as needed for Anxiety for up to 7 days.  Yes Eusebia Mattson MD   ALTAMIST SPRAY 0.65 % nasal spray 1 spray by Nasal route as needed for Congestion Yes Eusebia Mattson MD   fluticasone (FLOVENT HFA) 110 MCG/ACT inhaler Inhale 2 puffs into the lungs 2 times daily Yes Eusebia Mattson MD   dexamethasone (DECADRON) 2 MG tablet Take 5 tablets by mouth daily (with breakfast) for 7 days Yes Eusebia Mattson MD   rivaroxaban (XARELTO) 20 MG TABS tablet Take 1 tablet by mouth daily (with breakfast) Yes Duong Walton MD   albuterol sulfate  (90 Base) MCG/ACT inhaler Inhale 2 puffs into the lungs 4 times daily Yes Duong Walton MD   guaiFENesin-dextromethorphan (ROBITUSSIN DM) 100-10 MG/5ML syrup Take 5 mLs by mouth every 4 hours as needed for Cough Yes Duong Walton MD       Allergies:    Shellfish-derived products and Strawberry (diagnostic)    Family History:       Family history unknown: Yes         PHYSICAL EXAM:  /89 (Site: Right Upper Arm, Position: Sitting, Cuff Size: Large Adult)   Pulse 133 Temp 98.8 °F (37.1 °C) (Temporal)   Ht 5' 5.5\" (1.664 m)   Wt (!) 347 lb 9.6 oz (157.7 kg)   LMP 10/01/2021 (Approximate)   SpO2 (!) 88%   BMI 56.96 kg/m²   Physical Exam  Constitutional:       Appearance: Normal appearance. HENT:      Head: Normocephalic and atraumatic. Eyes:      Pupils: Pupils are equal, round, and reactive to light. Cardiovascular:      Rate and Rhythm: Regular rhythm. Tachycardia present. Pulses: Normal pulses. Heart sounds: Normal heart sounds. Pulmonary:      Effort: Pulmonary effort is normal.      Breath sounds: Rales (more basilar) present. No wheezing. Chest:      Chest wall: No tenderness. Abdominal:      General: Abdomen is flat. Bowel sounds are normal.      Palpations: Abdomen is soft. Musculoskeletal:         General: Swelling present. No tenderness. Normal range of motion. Cervical back: Normal range of motion and neck supple. Right lower leg: No edema. Left lower leg: No edema. Skin:     General: Skin is warm and dry. Capillary Refill: Capillary refill takes less than 2 seconds. Neurological:      Mental Status: She is alert. Assessment & Plan:      1. Pneumonia due to COVID-19 virus  Increased o2 requirement, tachycardia. Sating 70's% with ambulation on 5L NC. New pleuritic chest pain. Concern pt is having worsening respirotary status 2/2 PE or worsening Covid PNA. Could also be related to anxiety. Pt had VQ 10/12 which showed low probability for PE, didn't get CTPA when hosptailized due to STEVE  - advised pt to go to ER   - will start dexamethasone 10 mg daily for 7 days, and fluticasone 110 2 puffs BID daily if pt was stable to be discharged by ED  - continue albuterol as needed  - manage anxiety as below      2. STEVE (acute kidney injury) ([address]  Cr as high as 6.5 past hospitalization. Cr on discharge 1.3. Though to be ATN 2/2 hypotension/sepsis from Covid PNA. - will follow up on labs to be done at hospital    3. Anxiety  Pt complains of anxiety episodes since discharged from hospital. Related to Covid-19 PNA.   - LORazepam (ATIVAN) 0.5 MG tablet; Take 1 tablet by mouth every 6 hours as needed for Anxiety for up to 7 days. Dispense: 28 tablet; Refill: 0      Return in about 1 week (around 10/26/2021).     Patient Instructions   - please go to ER as soon as you leave clinic  - once discharged from ER/hospital, please start using fluticasone spray as prescribed  - have home aid to increase oxygen to 6L with humdification  - take ativan 0.5 mg every 6 hours as needed for anxiety  - take dexamethasone 10 mg daily for up to 10 days  - try to have your legs elevated as tolerated           Dispo: Pt has been staffed with Dr. Ronny Pham  _______________  Tom Jacob MD, 10/19/2021 1:29 PM   PGY-3

## 2021-10-19 NOTE — PROGRESS NOTES
Clinical Pharmacy Progress Note  Medication History     Admit Date: 10/19/2021    List of of current medications patient is taking is complete. Home Medication list in EPIC updated to reflect changes noted below. Source of information: Patient conversation, chart review    Patient's home pharmacy: [address] ([phone])     The patients medication list is now up to date. She has started new Rxs that were prescribed today at the outpatient clinic that she has not started taking yet: Ativan, Dexamethasone, Flovent. She has taken all of her \"old\" medication today. Please call with any questions.   Bonifacio Tijerina, PharmD  PGY-1 Pharmacy Resident  [id number]  10/19/2021 12:52 PM

## 2021-10-19 NOTE — ED TRIAGE NOTES
Patient arrives from the [facility] for low O2 saturation and tachycardia. Patient states that she was diagnosed with COVID on 10/1 and was admitted to [facility] for 12 days before coming home. Patient states that she followed up with the OP clinic today but was told that she needed to come here for a CT scan. Patient is 95% on 6L during triage.

## 2021-10-19 NOTE — ED PROVIDER NOTES
[facility]          ATTENDING PHYSICIAN NOTE       Date of evaluation: 10/19/2021    Chief Complaint     Shortness of Breath (Tested positive for COVID on 10/1, spent 12 days at [facility], saw Jona Phalen today for follow up and noted to have a HR in the 130s and needed 6L of O2 for >91% sat) and Tachycardia    History of Present Illness     Vanessa Huang is a 43 y.o. female with a past medical history of COVID-19 recently in the hospital from 10 1-10 12 and had STEVE, hypotension requiring oxygen supplementation was discharged on 4 L of oxygen who presents today with tachycardia and hypoxia. Per the office note she actually went for an outpatient office visit for discharge follow-up and was saturating in 70% with ambulation on 5 L of oxygen. Was noted increased chest pain and tachycardia to 133 as well. Here she is speaking in full sentences saturating between 90 and 97% on 6 L of oxygen. She is tachycardic here to the 120s. Check she denies any symptoms states she had a little bit of decreased p.o. intake over the past few days but has been drinking plenty of water. No real chest pain but states her anxiety has been high. She went to PCPs office and had a saturation that was low with ambulation. Review of Systems     Review of Systems  A full 10 point review of systems was obtained. Pertinent positives and negatives as documented in the HPI, otherwise all other systems were reviewed and were negative. Past Medical, Surgical, Family, and Social History     She has a past medical history of COVID-19 and Obesity. She has a past surgical history that includes Dilation and curettage of uterus. Her Family history is unknown by patient. She reports that she has never smoked. She has never used smokeless tobacco. She reports previous alcohol use. She reports that she does not use drugs.     Medications     Previous Medications    ALBUTEROL SULFATE  (90 BASE) MCG/ACT INHALER    Inhale 2 puffs into the lungs 4 times daily    ALTAMIST SPRAY 0.65 % NASAL SPRAY    1 spray by Nasal route as needed for Congestion    FLUTICASONE (FLOVENT HFA) 110 MCG/ACT INHALER    Inhale 2 puffs into the lungs 2 times daily    GUAIFENESIN-DEXTROMETHORPHAN (ROBITUSSIN DM) 100-10 MG/5ML SYRUP    Take 5 mLs by mouth every 4 hours as needed for Cough    LORAZEPAM (ATIVAN) 0.5 MG TABLET    Take 1 tablet by mouth every 6 hours as needed for Anxiety for up to 7 days. RIVAROXABAN (XARELTO) 20 MG TABS TABLET    Take 1 tablet by mouth daily (with breakfast)       Allergies     She is allergic to shellfish-derived products and strawberry (diagnostic). Physical Exam     INITIAL VITALS: BP: (!) 142/102, Temp: 98.4 °F (36.9 °C), Pulse: 129, Resp: 20, SpO2: 94 %   Physical Exam  General: Well appearing in NAD  HEENT:  head is atraumatic, sclera are clear, oropharynx is nonerythematous, mucus membranes are moist  Neck: Trachea midline  Chest: Respirations with coarse breath sounds bilaterally   cardiovascular: Tachycardic rate, regular rhythm, + radial pulses bilaterally  Abdominal: Nondistended abdomen, soft, nontender without rebound or gaurding  Skin: Warm, dry well perfused, no rashes  Extremities: no obvious deformities, no tenderness to palpation diffusely  Neurologic:  Alert and oriented, speech is clear and intact without dysarthria, gait is intact  Psychologic: appropriate mood and affect    Diagnostic Results     EKG   Ventricular rate 124, sinus tachycardia with diffuse T wave and inversions in the inferior and anterior leads no ST elevation. T wave inversions which are diffuse appear new from previous EKG from October 1.     RADIOLOGY:  CTA PULMONARY W CONTRAST   Final Result      1. No evidence of pulmonary embolus. 2. Bilateral lower lung peripheral predominant patchy groundglass opacities and a minor component of consolidative opacity most compatible with multifocal pneumonia. Differential considerations include Covid-19 or other viral pneumonia versus other    infectious or inflammatory pneumonitis. XR CHEST PORTABLE   Final Result      Diffuse coarse interstitial and alveolar consolidation progressive since 10/10/2021 and suspicious for atypical pneumonia. Stable cardiac mediastinal silhouette.           LABS:   Results for orders placed or performed during the hospital encounter of 10/19/21   CBC Auto Differential   Result Value Ref Range    WBC 11.5 (H) 4.0 - 11.0 K/uL    RBC 4.00 4.00 - 5.20 M/uL    Hemoglobin 12.8 12.0 - 16.0 g/dL    Hematocrit 38.7 36.0 - 48.0 %    MCV 96.8 80.0 - 100.0 fL    MCH 32.0 26.0 - 34.0 pg    MCHC 33.1 31.0 - 36.0 g/dL    RDW 14.2 12.4 - 15.4 %    Platelets 073 796 - 172 K/uL    MPV 8.7 5.0 - 10.5 fL    PLATELET SLIDE REVIEW Adequate     SLIDE REVIEW see below     Path Consult No     Neutrophils % 75.0 %    Lymphocytes % 16.0 %    Monocytes % 7.0 %    Eosinophils % 2.0 %    Basophils % 0.0 %    Neutrophils Absolute 8.6 (H) 1.7 - 7.7 K/uL    Lymphocytes Absolute 1.8 1.0 - 5.1 K/uL    Monocytes Absolute 0.8 0.0 - 1.3 K/uL    Eosinophils Absolute 0.2 0.0 - 0.6 K/uL    Basophils Absolute 0.0 0.0 - 0.2 K/uL    RBC Morphology Normal    Basic Metabolic Panel w/ Reflex to MG   Result Value Ref Range    Sodium 141 136 - 145 mmol/L    Potassium reflex Magnesium 3.5 3.5 - 5.1 mmol/L    Chloride 101 99 - 110 mmol/L    CO2 28 21 - 32 mmol/L    Anion Gap 12 3 - 16    Glucose 110 (H) 70 - 99 mg/dL    BUN 6 (L) 7 - 20 mg/dL    CREATININE 0.9 0.6 - 1.1 mg/dL    GFR Non-African American >60 >60    GFR African American >60 >60    Calcium 8.8 8.3 - 10.6 mg/dL   Troponin   Result Value Ref Range    Troponin 0.08 (H) <0.01 ng/mL   Brain Natriuretic Peptide   Result Value Ref Range    Pro-BNP 7,164 (H) 0 - 124 pg/mL   Protime-INR   Result Value Ref Range    Protime 24.5 (H) 9.9 - 12.7 sec    INR 2.10 (H) 0.88 - 1.12   APTT   Result Value Ref Range    aPTT 33.4 26.2 - 38.6 sec   Hepatic Function Panel   Result Value Ref Range    Total Protein 7.1 6.4 - 8.2 g/dL    Albumin 3.5 3.4 - 5.0 g/dL    Alkaline Phosphatase 80 40 - 129 U/L    ALT 27 10 - 40 U/L    AST 26 15 - 37 U/L    Total Bilirubin 0.7 0.0 - 1.0 mg/dL    Bilirubin, Direct <0.2 0.0 - 0.3 mg/dL    Bilirubin, Indirect see below 0.0 - 1.0 mg/dL   Blood Gas, Venous   Result Value Ref Range    pH, Asher 7.428 7.350 - 7.450    pCO2, Asher 46.9 41.0 - 51.0 mmHg    pO2, Asher <30.0 25 - 40 mmHg    HCO3, Venous 31.0 (H) 24.0 - 28.0 mmol/L    Base Excess, Asher 5.6 (H) -2.0 - 3.0 mmol/L    O2 Sat, Asher 40 Not established %    Carboxyhemoglobin 2.3 (H) 0.0 - 1.5 %    MetHgb, Asher 0.3 0.0 - 1.5 %    TC02 (Calc), Asher 32 mmol/L    Hemoglobin, Asher, Reduced 58.40 %   Lactic Acid, Plasma   Result Value Ref Range    Lactic Acid 1.4 0.4 - 2.0 mmol/L   Urinalysis Reflex to Culture    Specimen: Urine, clean catch   Result Value Ref Range    Color, UA Yellow Straw/Yellow    Clarity, UA SL CLOUDY (A) Clear    Glucose, Ur Negative Negative mg/dL    Bilirubin Urine Negative Negative    Ketones, Urine Negative Negative mg/dL    Specific Gravity, UA 1.015 1.005 - 1.030    Blood, Urine SMALL (A) Negative    pH, UA 6.5 5.0 - 8.0    Protein,  (A) Negative mg/dL    Urobilinogen, Urine 0.2 <2.0 E.U./dL    Nitrite, Urine Negative Negative    Leukocyte Esterase, Urine MODERATE (A) Negative    Microscopic Examination YES     Urine Type NotGiven     Urine Reflex to Culture Yes    HCG, Quantitative, Pregnancy   Result Value Ref Range    hCG Quant <5.0 <5.0 mIU/mL   Microscopic Urinalysis   Result Value Ref Range    WBC, UA 10-20 (A) 0 - 5 /HPF    RBC, UA 5-10 (A) 0 - 4 /HPF    Epithelial Cells, UA 11-20 (A) 0 - 5 /HPF    Bacteria, UA 1+ (A) None Seen /HPF   Magnesium   Result Value Ref Range    Magnesium 1.70 (L) 1.80 - 2.40 mg/dL   EKG 12 Lead   Result Value Ref Range    Ventricular Rate 124 BPM    Atrial Rate 124 BPM    P-R Interval 130 ms    QRS Duration 68 ms    Q-T Interval 352 ms    QTc Calculation (Bazett) 505 ms    P Axis 43 degrees    R Axis -10 degrees    T Axis -61 degrees    Diagnosis       EKG performed in ER and to be interpreted by ER physician. Confirmed by MD, ER ([id number]),  Ty Davis ([phone]) on 10/19/2021 2:59:05 PM       ED BEDSIDE ULTRASOUND:    RECENT VITALS:  BP: (!) 142/102,Temp: 98.4 °F (36.9 °C), Pulse: 129, Resp: 20, SpO2: 94 %     Procedures       ED Course     Nursing Notes, Past Medical Hx, Past Surgical Hx, Social Hx,Allergies, and Family Hx were reviewed. patient was given the following medications:  Orders Placed This Encounter   Medications    LORazepam (ATIVAN) tablet 0.5 mg    iopamidol (ISOVUE-370) 76 % injection 80 mL    furosemide (LASIX) injection 40 mg    dexamethasone (DECADRON) injection 6 mg    albuterol (PROVENTIL) nebulizer solution 2.5 mg     Order Specific Question:   Initiate RT Bronchodilator Protocol     Answer: Yes    aspirin tablet 325 mg       CONSULTS:  IP CONSULT TO HOSPITALIST    MEDICAL DECISIONMAKING / ASSESSMENT / Isis Fritz is a 43 y.o. female who presents today with shortness of breath progressive, hypoxia and tachycardia after being diagnosed with COVID-19 approximately 2 weeks ago. Had a prolonged hospital stay and has now requiring increased oxygenation. Here she is anxious appearing but EKG shows diffuse T wave inversions but no signs of ST elevation or acute ischemia and she currently denies chest pain. Large pulmonary embolism versus heart failure are in the differential at this point in time versus progressive inflammation from COVID-19. CTPA came back without any evidence of pulmonary embolism but did show increased diffuse bilateral infiltrates. She has no fever no elevation white blood cell count here is not complained of increased cough so infectious was less likely.  However she does have elevated BNP and troponin diffuse T wave inversions with concern for possible cardiomyopathy or inflammatory component causing this. She is currently stable on 8 L nasal cannula and well-appearing. Did treat with Decadron and albuterol inhaler as well as Lasix for diaresis. Will add aspirin for NSTEMI, which appears type II she has diffuse T wave inversions but no chest pain at this time. Concern for Covid induced cardiomyopathy will admit at this time for continued work-up for hypoxia. Will repeat troponin trend at this time as well, discussed with hospitalist as well as AOD. Critical Care:  Due to the immediate potential for life-threatening deterioration due to acute hypoxic respiratory failure as well as NSTEMI, I spent 45 minutes providing critical care. This time is excluding time spent performing procedures. Clinical Impression     1. Hypoxia        Disposition     PATIENT REFERRED TO:  No follow-up provider specified.     DISCHARGE MEDICATIONS:  New Prescriptions    DEXAMETHASONE (DECADRON) 2 MG TABLET    Take 5 tablets by mouth daily (with breakfast) for 7 days       Nayeli Espinoza MD  10/19/21 [id number]

## 2021-10-19 NOTE — H&P
Internal Medicine  PGY 1  History & Physical      CC Hypoxia    History Obtained From:  patient    HISTORY OF PRESENT ILLNESS:   Patient is a 58-year-old female with a past medical history of obesity and a recent Covid infection who presents to the emergency department with hypoxia as directed from a primary care visit. Patient states that she was at a primary care visit today and while there during intake her oxygen saturation was measured and noted to be in the low 70s with ambulation. Additionally the patient was noted to be tachycardic to the 130s. Patient was recently treated at [facility] for Covid infection with an accompanying STEVE from the dates of 10/01 to 10/12. Patient states that she feels that she was first symptomatic with Covid around 23 August, and as her condition continued to deteriorate eventually her mother called the ambulance for her as she was unable to get out of bed secondary to her shortness of breath. Patient was admitted to [facility], and received treatment with Decadron and tocilizumab. Patient initially improved with hospitalization and treatment, and was ultimately discharged on 3 L nasal cannula for home oxygen. Patient states that while at home she has had periods of hypoxia to the mid 80s with activity and symptomatic shortness of breath. Patient also endorses an ongoing cough, however her sputum has never been productive patient states she has had no fevers or chills, no loss of taste or smell, no nausea/vomiting, no diarrhea or constipation. Patient has also had some urinary symptoms since being catheterized at [facility], in particular feelings of urgency, frequency and dysuria. On arrival to the emergency department here at [facility] the patient was noted to be hypoxic on her baseline 3 to 4 L, and was increased to 8 L which allowed her to maintain a decent oxygen saturation.   A work-up was initiated to determine the etiology of her shortness of breath Chest x-ray performed in the emergency department reveals diffuse coarse interstitial and a velar consolidations which has been progressive since her last chest x-ray at [facility] on the 10th of this month which is suspicious for an atypical pneumonia. Additionally a CTPA ordered to rule out the possibility of pulmonary embolus showed no emboli however did demonstrate groundglass opacities consistent with a atypical or viral pneumonia. Patient's most recent echo was completed on 11 October, and demonstrated a normal ejection fraction and no diastolic dysfunction. Notable labs performed in the emergency department include a moderately elevated white count, an elevated proBNP to over 7000, moderately elevated troponin to 0.08, and a dirty UA. Patient was admitted for ongoing shortness of breath with concern for an ongoing Covid infection versus a new cardiomyopathy possibly caused by her previous infection. There is also possibility of a superimposed atypical pneumonia. Patient also appears to have a UTI. Past Medical History:        Diagnosis Date    COVID-19     Obesity    ·     Past Surgical History:        Procedure Laterality Date    DILATION AND CURETTAGE OF UTERUS     ·     Medications Priorto Admission:    · Not in a hospital admission. Allergies:  Shellfish-derived products and Strawberry (diagnostic)    Social History:   · TOBACCO:   reports that she has never smoked. She has never used smokeless tobacco.  · ETOH:   reports previous alcohol use. · DRUGS : Denies    ·   Family History:       Family history unknown: Yes   ·     Review of Systems   Constitutional: Positive for fatigue. Negative for chills and fever. HENT: Negative for congestion. Eyes: Negative for visual disturbance. Respiratory: Positive for cough and shortness of breath. Cardiovascular: Positive for chest pain and leg swelling (pt endorses swelling). Negative for palpitations. Gastrointestinal: Negative for abdominal pain, constipation, diarrhea, nausea and vomiting. Endocrine: Negative for polyuria. Genitourinary: Positive for dysuria, frequency and urgency. Negative for decreased urine volume, difficulty urinating and hematuria. Musculoskeletal: Negative for myalgias, neck pain and neck stiffness. Neurological: Positive for weakness. Negative for dizziness, syncope, light-headedness, numbness and headaches. Physical Exam  Constitutional:       General: She is not in acute distress. Appearance: Normal appearance. She is obese. She is not ill-appearing. HENT:      Head: Normocephalic and atraumatic. Right Ear: External ear normal.      Left Ear: External ear normal.      Nose:      Comments: Pt wearing surgical mask     Mouth/Throat:      Comments: Pt wearing surgical mask  Eyes:      Extraocular Movements: Extraocular movements intact. Pupils: Pupils are equal, round, and reactive to light. Cardiovascular:      Rate and Rhythm: Regular rhythm. Tachycardia present. Heart sounds: Normal heart sounds. No murmur heard. No friction rub. No gallop. Pulmonary:      Effort: No respiratory distress. Comments: Pt satting on 8L of NC w/ bibasilar crackles  Abdominal:      General: Abdomen is flat. There is no distension. Palpations: Abdomen is soft. Tenderness: There is no abdominal tenderness. There is no guarding or rebound. Musculoskeletal:      Cervical back: Normal range of motion and neck supple. Comments: Trace bilateral pitting edema   Skin:     General: Skin is warm and dry. Capillary Refill: Capillary refill takes less than 2 seconds. Neurological:      General: No focal deficit present. Mental Status: She is alert and oriented to person, place, and time. Cranial Nerves: No cranial nerve deficit. Motor: No weakness.    Psychiatric:         Mood and Affect: Mood normal.         Behavior: Behavior normal.       Vitals:    10/19/21 1209   BP: (!) 142/102   Pulse: 129   Resp: 20   Temp: 98.4 °F (36.9 °C)   SpO2: 94%     DATA:    Labs:  CBC:   Recent Labs     10/19/21  1301   WBC 11.5*   HGB 12.8   HCT 38.7          BMP:   Recent Labs     10/19/21  1301      K 3.5      CO2 28   BUN 6*   CREATININE 0.9   GLUCOSE 110*     LFT's:   Recent Labs     10/19/21  1301   AST 26   ALT 27   BILITOT 0.7   ALKPHOS 80     Troponin:   Recent Labs     10/19/21  1301   TROPONINI 0.08*     BNP:No results for input(s): BNP in the last 72 hours. ABGs: No results for input(s): PHART, ZEQ0POV, PO2ART in the last 72 hours. INR:   Recent Labs     10/19/21  1301   INR 2.10*       U/A:  Recent Labs     10/19/21  1331   COLORU Yellow   PHUR 6.5   WBCUA 10-20*   RBCUA 5-10*   BACTERIA 1+*   CLARITYU SL CLOUDY*   SPECGRAV 1.015   LEUKOCYTESUR MODERATE*   UROBILINOGEN 0.2   BILIRUBINUR Negative   BLOODU SMALL*   GLUCOSEU Negative       CTA PULMONARY W CONTRAST   Final Result      1. No evidence of pulmonary embolus. 2. Bilateral lower lung peripheral predominant patchy groundglass opacities and a minor component of consolidative opacity most compatible with multifocal pneumonia. Differential considerations include Covid-19 or other viral pneumonia versus other    infectious or inflammatory pneumonitis. XR CHEST PORTABLE   Final Result      Diffuse coarse interstitial and alveolar consolidation progressive since 10/10/2021 and suspicious for atypical pneumonia. Stable cardiac mediastinal silhouette. ASSESSMENT AND PLAN:  Pt is a 44 y/o F w/ a PMHx of obesity and a recent COVID infection (10/01 - 10/12, tx'd w/ decadron + tocy) who presents w/ new worsening hypoxia from her outpt clinic. Resolving COVID PNA vs Atypical PNA  Pt diagnosed w/ COVID on 10/01 during admission to [facility].  Treated w/ Deacdron & Tociluzimab, discharged 10/12 on 3L NC.  CXR & CTPA on presentation w/ bibasilar

## 2021-10-20 ENCOUNTER — APPOINTMENT (OUTPATIENT)
Dept: VASCULAR LAB | Age: 36
DRG: 194 | End: 2021-10-20
Payer: MEDICAID

## 2021-10-20 LAB
ALBUMIN SERPL-MCNC: 3.3 G/DL (ref 3.4–5)
ANION GAP SERPL CALCULATED.3IONS-SCNC: 13 MMOL/L (ref 3–16)
ANION GAP SERPL CALCULATED.3IONS-SCNC: 13 MMOL/L (ref 3–16)
ANTI-XA UNFRAC HEPARIN: >1.8 IU/ML (ref 0.3–0.7)
APTT: 35.1 SEC (ref 26.2–38.6)
BASOPHILS ABSOLUTE: 0 K/UL (ref 0–0.2)
BASOPHILS RELATIVE PERCENT: 0.2 %
BUN BLDV-MCNC: 10 MG/DL (ref 7–20)
BUN BLDV-MCNC: 14 MG/DL (ref 7–20)
CALCIUM SERPL-MCNC: 8.5 MG/DL (ref 8.3–10.6)
CALCIUM SERPL-MCNC: 8.6 MG/DL (ref 8.3–10.6)
CHLORIDE BLD-SCNC: 103 MMOL/L (ref 99–110)
CHLORIDE BLD-SCNC: 98 MMOL/L (ref 99–110)
CO2: 25 MMOL/L (ref 21–32)
CO2: 27 MMOL/L (ref 21–32)
CREAT SERPL-MCNC: 0.9 MG/DL (ref 0.6–1.1)
CREAT SERPL-MCNC: 0.9 MG/DL (ref 0.6–1.1)
D DIMER: 333 NG/ML DDU (ref 0–229)
EKG ATRIAL RATE: 114 BPM
EKG DIAGNOSIS: NORMAL
EKG P AXIS: 30 DEGREES
EKG P-R INTERVAL: 144 MS
EKG Q-T INTERVAL: 384 MS
EKG QRS DURATION: 80 MS
EKG QTC CALCULATION (BAZETT): 529 MS
EKG R AXIS: -26 DEGREES
EKG T AXIS: -59 DEGREES
EKG VENTRICULAR RATE: 114 BPM
EOSINOPHILS ABSOLUTE: 0 K/UL (ref 0–0.6)
EOSINOPHILS RELATIVE PERCENT: 0 %
GFR AFRICAN AMERICAN: >60
GFR AFRICAN AMERICAN: >60
GFR NON-AFRICAN AMERICAN: >60
GFR NON-AFRICAN AMERICAN: >60
GLUCOSE BLD-MCNC: 128 MG/DL (ref 70–99)
GLUCOSE BLD-MCNC: 160 MG/DL (ref 70–99)
HCT VFR BLD CALC: 35.1 % (ref 36–48)
HEMOGLOBIN: 12.1 G/DL (ref 12–16)
INR BLD: 1.39 (ref 0.88–1.12)
LYMPHOCYTES ABSOLUTE: 0.8 K/UL (ref 1–5.1)
LYMPHOCYTES RELATIVE PERCENT: 9.1 %
MAGNESIUM: 2.7 MG/DL (ref 1.8–2.4)
MCH RBC QN AUTO: 32.7 PG (ref 26–34)
MCHC RBC AUTO-ENTMCNC: 34.4 G/DL (ref 31–36)
MCV RBC AUTO: 95.2 FL (ref 80–100)
MONOCYTES ABSOLUTE: 0.2 K/UL (ref 0–1.3)
MONOCYTES RELATIVE PERCENT: 2.1 %
NEUTROPHILS ABSOLUTE: 7.8 K/UL (ref 1.7–7.7)
NEUTROPHILS RELATIVE PERCENT: 88.6 %
PDW BLD-RTO: 14.1 % (ref 12.4–15.4)
PHOSPHORUS: 3.7 MG/DL (ref 2.5–4.9)
PLATELET # BLD: 147 K/UL (ref 135–450)
PMV BLD AUTO: 9.2 FL (ref 5–10.5)
POTASSIUM REFLEX MAGNESIUM: 3.9 MMOL/L (ref 3.5–5.1)
POTASSIUM SERPL-SCNC: 3.6 MMOL/L (ref 3.5–5.1)
PROTHROMBIN TIME: 15.9 SEC (ref 9.9–12.7)
RBC # BLD: 3.69 M/UL (ref 4–5.2)
REPORT: NORMAL
RESPIRATORY PANEL PCR: NORMAL
SARS-COV-2, NAAT: NOT DETECTED
SODIUM BLD-SCNC: 138 MMOL/L (ref 136–145)
SODIUM BLD-SCNC: 141 MMOL/L (ref 136–145)
TROPONIN: 0.03 NG/ML
WBC # BLD: 8.8 K/UL (ref 4–11)

## 2021-10-20 PROCEDURE — 0202U NFCT DS 22 TRGT SARS-COV-2: CPT

## 2021-10-20 PROCEDURE — 97535 SELF CARE MNGMENT TRAINING: CPT

## 2021-10-20 PROCEDURE — 85025 COMPLETE CBC W/AUTO DIFF WBC: CPT

## 2021-10-20 PROCEDURE — 97530 THERAPEUTIC ACTIVITIES: CPT

## 2021-10-20 PROCEDURE — 6370000000 HC RX 637 (ALT 250 FOR IP): Performed by: STUDENT IN AN ORGANIZED HEALTH CARE EDUCATION/TRAINING PROGRAM

## 2021-10-20 PROCEDURE — 36415 COLL VENOUS BLD VENIPUNCTURE: CPT

## 2021-10-20 PROCEDURE — 85610 PROTHROMBIN TIME: CPT

## 2021-10-20 PROCEDURE — 84484 ASSAY OF TROPONIN QUANT: CPT

## 2021-10-20 PROCEDURE — 93010 ELECTROCARDIOGRAM REPORT: CPT | Performed by: INTERNAL MEDICINE

## 2021-10-20 PROCEDURE — 80069 RENAL FUNCTION PANEL: CPT

## 2021-10-20 PROCEDURE — 83735 ASSAY OF MAGNESIUM: CPT

## 2021-10-20 PROCEDURE — 97165 OT EVAL LOW COMPLEX 30 MIN: CPT

## 2021-10-20 PROCEDURE — 97162 PT EVAL MOD COMPLEX 30 MIN: CPT

## 2021-10-20 PROCEDURE — 87070 CULTURE OTHR SPECIMN AEROBIC: CPT

## 2021-10-20 PROCEDURE — 99255 IP/OBS CONSLTJ NEW/EST HI 80: CPT | Performed by: INTERNAL MEDICINE

## 2021-10-20 PROCEDURE — 94761 N-INVAS EAR/PLS OXIMETRY MLT: CPT

## 2021-10-20 PROCEDURE — 2700000000 HC OXYGEN THERAPY PER DAY

## 2021-10-20 PROCEDURE — 93005 ELECTROCARDIOGRAM TRACING: CPT | Performed by: STUDENT IN AN ORGANIZED HEALTH CARE EDUCATION/TRAINING PROGRAM

## 2021-10-20 PROCEDURE — 6360000002 HC RX W HCPCS: Performed by: STUDENT IN AN ORGANIZED HEALTH CARE EDUCATION/TRAINING PROGRAM

## 2021-10-20 PROCEDURE — 2060000000 HC ICU INTERMEDIATE R&B

## 2021-10-20 PROCEDURE — 87040 BLOOD CULTURE FOR BACTERIA: CPT

## 2021-10-20 PROCEDURE — 99223 1ST HOSP IP/OBS HIGH 75: CPT | Performed by: INTERNAL MEDICINE

## 2021-10-20 PROCEDURE — 93970 EXTREMITY STUDY: CPT

## 2021-10-20 PROCEDURE — 6370000000 HC RX 637 (ALT 250 FOR IP): Performed by: INTERNAL MEDICINE

## 2021-10-20 PROCEDURE — 6360000002 HC RX W HCPCS: Performed by: INTERNAL MEDICINE

## 2021-10-20 PROCEDURE — 87205 SMEAR GRAM STAIN: CPT

## 2021-10-20 PROCEDURE — 87635 SARS-COV-2 COVID-19 AMP PRB: CPT

## 2021-10-20 PROCEDURE — 2580000003 HC RX 258: Performed by: STUDENT IN AN ORGANIZED HEALTH CARE EDUCATION/TRAINING PROGRAM

## 2021-10-20 PROCEDURE — 85520 HEPARIN ASSAY: CPT

## 2021-10-20 PROCEDURE — 94640 AIRWAY INHALATION TREATMENT: CPT

## 2021-10-20 PROCEDURE — 87449 NOS EACH ORGANISM AG IA: CPT

## 2021-10-20 PROCEDURE — 85379 FIBRIN DEGRADATION QUANT: CPT

## 2021-10-20 PROCEDURE — 97116 GAIT TRAINING THERAPY: CPT

## 2021-10-20 PROCEDURE — 85730 THROMBOPLASTIN TIME PARTIAL: CPT

## 2021-10-20 RX ORDER — HEPARIN SODIUM 1000 [USP'U]/ML
60 INJECTION, SOLUTION INTRAVENOUS; SUBCUTANEOUS PRN
Status: DISCONTINUED | OUTPATIENT
Start: 2021-10-20 | End: 2021-10-21

## 2021-10-20 RX ORDER — HEPARIN SODIUM 1000 [USP'U]/ML
5000 INJECTION, SOLUTION INTRAVENOUS; SUBCUTANEOUS PRN
Status: DISCONTINUED | OUTPATIENT
Start: 2021-10-20 | End: 2021-10-21

## 2021-10-20 RX ORDER — MAGNESIUM SULFATE IN WATER 40 MG/ML
4000 INJECTION, SOLUTION INTRAVENOUS ONCE
Status: COMPLETED | OUTPATIENT
Start: 2021-10-20 | End: 2021-10-20

## 2021-10-20 RX ORDER — HEPARIN SODIUM 10000 [USP'U]/100ML
5-30 INJECTION, SOLUTION INTRAVENOUS CONTINUOUS
Status: DISCONTINUED | OUTPATIENT
Start: 2021-10-20 | End: 2021-10-21

## 2021-10-20 RX ORDER — HEPARIN SODIUM 1000 [USP'U]/ML
60 INJECTION, SOLUTION INTRAVENOUS; SUBCUTANEOUS ONCE
Status: COMPLETED | OUTPATIENT
Start: 2021-10-20 | End: 2021-10-20

## 2021-10-20 RX ORDER — FUROSEMIDE 10 MG/ML
40 INJECTION INTRAMUSCULAR; INTRAVENOUS 2 TIMES DAILY
Status: COMPLETED | OUTPATIENT
Start: 2021-10-20 | End: 2021-10-22

## 2021-10-20 RX ORDER — GUAIFENESIN/DEXTROMETHORPHAN 100-10MG/5
5 SYRUP ORAL EVERY 4 HOURS PRN
Status: DISCONTINUED | OUTPATIENT
Start: 2021-10-20 | End: 2021-10-23 | Stop reason: HOSPADM

## 2021-10-20 RX ORDER — OXYMETAZOLINE HYDROCHLORIDE 0.05 G/100ML
2 SPRAY NASAL 2 TIMES DAILY
Status: COMPLETED | OUTPATIENT
Start: 2021-10-21 | End: 2021-10-21

## 2021-10-20 RX ADMIN — MAGNESIUM SULFATE HEPTAHYDRATE 4000 MG: 4 INJECTION, SOLUTION INTRAVENOUS at 11:36

## 2021-10-20 RX ADMIN — CEFTRIAXONE 1000 MG: 1 INJECTION, POWDER, FOR SOLUTION INTRAMUSCULAR; INTRAVENOUS at 21:09

## 2021-10-20 RX ADMIN — SODIUM CHLORIDE, PRESERVATIVE FREE 10 ML: 5 INJECTION INTRAVENOUS at 21:02

## 2021-10-20 RX ADMIN — FUROSEMIDE 40 MG: 10 INJECTION, SOLUTION INTRAMUSCULAR; INTRAVENOUS at 08:15

## 2021-10-20 RX ADMIN — SALINE NASAL SPRAY 1 SPRAY: 1.5 SOLUTION NASAL at 05:40

## 2021-10-20 RX ADMIN — ATORVASTATIN CALCIUM 20 MG: 20 TABLET, FILM COATED ORAL at 21:01

## 2021-10-20 RX ADMIN — FUROSEMIDE 40 MG: 10 INJECTION, SOLUTION INTRAMUSCULAR; INTRAVENOUS at 18:05

## 2021-10-20 RX ADMIN — ALBUTEROL SULFATE 2 PUFF: 90 AEROSOL, METERED RESPIRATORY (INHALATION) at 20:56

## 2021-10-20 RX ADMIN — ALBUTEROL SULFATE 2 PUFF: 90 AEROSOL, METERED RESPIRATORY (INHALATION) at 12:30

## 2021-10-20 RX ADMIN — SODIUM CHLORIDE 25 ML: 9 INJECTION, SOLUTION INTRAVENOUS at 21:08

## 2021-10-20 RX ADMIN — SODIUM CHLORIDE, PRESERVATIVE FREE 10 ML: 5 INJECTION INTRAVENOUS at 08:23

## 2021-10-20 RX ADMIN — DEXAMETHASONE SODIUM PHOSPHATE 6 MG: 4 INJECTION, SOLUTION INTRAMUSCULAR; INTRAVENOUS at 21:02

## 2021-10-20 RX ADMIN — ASPIRIN 81 MG: 81 TABLET, CHEWABLE ORAL at 08:15

## 2021-10-20 RX ADMIN — GUAIFENESIN SYRUP AND DEXTROMETHORPHAN 5 ML: 100; 10 SYRUP ORAL at 21:01

## 2021-10-20 RX ADMIN — ALBUTEROL SULFATE 2 PUFF: 90 AEROSOL, METERED RESPIRATORY (INHALATION) at 08:39

## 2021-10-20 RX ADMIN — HEPARIN SODIUM 13 UNITS/KG/HR: 10000 INJECTION, SOLUTION INTRAVENOUS at 15:56

## 2021-10-20 RX ADMIN — ENOXAPARIN SODIUM 40 MG: 40 INJECTION SUBCUTANEOUS at 08:15

## 2021-10-20 RX ADMIN — HEPARIN SODIUM 9530 UNITS: 1000 INJECTION INTRAVENOUS; SUBCUTANEOUS at 15:51

## 2021-10-20 RX ADMIN — ALBUTEROL SULFATE 2 PUFF: 90 AEROSOL, METERED RESPIRATORY (INHALATION) at 16:34

## 2021-10-20 RX ADMIN — SODIUM CHLORIDE 25 ML: 9 INJECTION, SOLUTION INTRAVENOUS at 11:35

## 2021-10-20 ASSESSMENT — ENCOUNTER SYMPTOMS
SHORTNESS OF BREATH: 0
ABDOMINAL PAIN: 0

## 2021-10-20 ASSESSMENT — PAIN SCALES - GENERAL
PAINLEVEL_OUTOF10: 0

## 2021-10-20 NOTE — RT PROTOCOL NOTE
RT Nebulizer Bronchodilator Protocol Note    There is a bronchodilator order in the chart from a provider indicating to follow the RT Bronchodilator Protocol and there is an Initiate RT Bronchodilator Protocol order as well (see protocol at bottom of note). CXR Findings:  XR CHEST PORTABLE    Result Date: 10/19/2021  Diffuse coarse interstitial and alveolar consolidation progressive since 10/10/2021 and suspicious for atypical pneumonia. Stable cardiac mediastinal silhouette. The findings from the last RT Protocol Assessment were as follows:  Smoking: None or smoker <15 pack years  Respiratory Pattern: Dyspnea on exertion or RR 21-25 bpm  Breath Sounds: Slightly diminished and/or crackles  Cough: Strong, spontaneous, non-productive  Indication for Bronchodilator Therapy: On home bronchodilators  Bronchodilator Assessment Score: 4    Pt requesting MDI Albuterol QID    Aerosolized bronchodilator medication orders have been revised according to the RT Nebulizer Bronchodilator Protocol below. Respiratory Therapist to perform RT Therapy Protocol Assessment initially then follow the protocol. Repeat RT Therapy Protocol Assessment PRN for score 0-3 or on second treatment, BID, and PRN for scores above 3. No Indications - adjust the frequency to every 6 hours PRN wheezing or bronchospasm, if no treatments needed after 48 hours then discontinue using Per Protocol order mode. If indication present, adjust the RT bronchodilator orders based on the Bronchodilator Assessment Score as indicated below. If a patient is on this medication at home then do not decrease Frequency below that used at home. 0-3 - enter or revise RT bronchodilator order(s) to equivalent RT Bronchodilator order with Frequency of every 4 hours PRN for wheezing or increased work of breathing using Per Protocol order mode.        4-6 - enter or revise RT Bronchodilator order(s) to two equivalent RT bronchodilator orders with one order

## 2021-10-20 NOTE — CONSULTS
on dexamethasone. She was also started on rocephin for UTI. Pt had ECHO today which showed  EF 60%, but with signs of right-sided volume and pressure overload. Patient seen and examined at bedside. She states her breathing is the same as yesterday. She still endorses clear sputum cough. She was on 6L NC sating 90%. She denies fever, night sweats, chills, chest pain, palpitations, nausea, vomiting, diarrhea, dysuria. Past Medical History:      Diagnosis Date    COVID-19     Obesity       Past Surgical History:        Procedure Laterality Date    DILATION AND CURETTAGE OF UTERUS       Current Medications:     magnesium sulfate  4,000 mg IntraVENous Once    furosemide  40 mg IntraVENous BID    sodium chloride flush  5-40 mL IntraVENous 2 times per day    cefTRIAXone (ROCEPHIN) IV  1,000 mg IntraVENous Q24H    dexamethasone  6 mg IntraVENous Q24H    aspirin  81 mg Oral Daily    enoxaparin  40 mg SubCUTAneous BID    atorvastatin  20 mg Oral Nightly    albuterol sulfate HFA  2 puff Inhalation 4x daily          Social History:   · TOBACCO:   reports that she has never smoked. She has never used smokeless tobacco.  · ETOH:   reports previous alcohol use. · DRUGS : none      Family History:       Family history unknown:  Yes   ·       REVIEW OF SYSTEMS:    See above    Objective:   PHYSICAL EXAM:      VITALS:  BP (!) 141/93   Pulse 118   Temp 98.2 °F (36.8 °C) (Oral)   Resp 18   Ht 5' 5\" (1.651 m)   Wt (!) 350 lb 3.2 oz (158.8 kg)   LMP 10/01/2021 (Approximate)   SpO2 92%   BMI 58.28 kg/m²   24HR INTAKE/OUTPUT:      Intake/Output Summary (Last 24 hours) at 10/20/2021 0924  Last data filed at 10/20/2021 0841  Gross per 24 hour   Intake 240 ml   Output 1550 ml   Net -1310 ml     CURRENT PULSE OXIMETRY:  SpO2: 92 %  24HR PULSE OXIMETRY RANGE:  SpO2  Av.3 %  Min: 88 %  Max: 98 %    CONSTITUTIONAL:  awake, alert, cooperative, no apparent distress, and appears stated age  EYES: Pupils equal round and reactive to light. Normal conjunctiva  NECK:  Supple, symmetrical, trachea midline, no adenopathy, thyroid symmetric, not enlarged and no tenderness, skin normal  LUNGS:  Diffuse crackles worse in the basilar area. no increased work of breathing. No accessory muscle use  CARDIOVASCULAR:  Tachycardia, normal S1 and S2, 1+ edema RLE and LLE. and no JVD  ABDOMEN:  normal bowel sounds, non-distended and no masses palpated, and no tenderness to palpation. No hepatospleenomegaly  MUSCULOSKELETAL: No obvious misalignment or effusion of the joints. No clubbing, cyanosis of the digits. SKIN:  normal skin color, texture, turgor and no redness, warmth, or swelling.  No palpable nodules    DATA:    Old records have been reviewed  CBC with Differential:    Lab Results   Component Value Date    WBC 8.8 10/20/2021    RBC 3.69 10/20/2021    HGB 12.1 10/20/2021    HCT 35.1 10/20/2021     10/20/2021    MCV 95.2 10/20/2021    MCH 32.7 10/20/2021    MCHC 34.4 10/20/2021    RDW 14.1 10/20/2021    BANDSPCT 4 10/10/2021    METASPCT 1 10/06/2021    LYMPHOPCT 9.1 10/20/2021    MONOPCT 2.1 10/20/2021    BASOPCT 0.2 10/20/2021    MONOSABS 0.2 10/20/2021    LYMPHSABS 0.8 10/20/2021    EOSABS 0.0 10/20/2021    BASOSABS 0.0 10/20/2021     BMP:    Lab Results   Component Value Date     10/20/2021    K 3.9 10/20/2021     10/20/2021    CO2 25 10/20/2021    BUN 10 10/20/2021    CREATININE 0.9 10/20/2021    CALCIUM 8.6 10/20/2021    GFRAA >60 10/20/2021    LABGLOM >60 10/20/2021    GLUCOSE 160 10/20/2021     Hepatic Function Panel:    Lab Results   Component Value Date    ALKPHOS 80 10/19/2021    ALT 27 10/19/2021    AST 26 10/19/2021    PROT 7.1 10/19/2021    BILITOT 0.7 10/19/2021    BILIDIR <0.2 10/19/2021    IBILI see below 10/19/2021     ABG:  No results found for: HEI5DXW, BEART, C9WJGCNH, PHART, THGBART, LMJ5LCL, PO2ART, GWE3YBJ    Cultures:   Blood Culture:    Sputum Culture:        Radiology Review:  All pertinent images / reports were reviewed as a part of this visit. CTPA 10/19/2021     1. No evidence of pulmonary embolus.     2. Bilateral lower lung peripheral predominant patchy groundglass opacities and a minor component of consolidative opacity most compatible with multifocal pneumonia. Differential considerations include Covid-19 or other viral pneumonia versus other    infectious or inflammatory pneumonitis. PFTs:      Echo 10/20/2021  Left ventricular cavity size is normal. There is borderline left ventricular   hypertrophy. Overall left ventricular systolic function appears normal with   an ejection fraction of 60%. There is mild septal flattening consistent with   right ventricular pressure and/or volume overload. No regional wall motion   abnormalities are noted. Global strain is -15.3%. The right ventricle is enlarged and hypokinetic. TAPSE measures 1.39 cm. Pulmonary embolism should be ruled out if not already done. Doppler Studies:    Assessment/Plan   Acute hypoxic respiratory failure 2/2 Covid-19 PNA    Possible cardiomyopathy with some evidence of volume overload. Currently on 6L NC with occasional increase to 7-8L. Onset Covid symptoms one month ago. Hospitalized 10/1-10/12 received tocilizumab, 7 days steroids and discharged on Xarelto. CTPA negative but peripheral lung GGO and consolidative changes suggesting multifocal PNA. CRP this admission higher 190 than day of discharge < 0.3. ProBNP 7000. Echo revealed right heart side failure (hypokinesis, enlarged RV, TAPSE 1.39). Pt responding to lasix with good urine output. Worsening of respiratory status could be 2/2 progression of Covid-19, but covid-19 cardiomyopathy is a possible etiology given clinical picture.    - ok to continue steroids   - lasix per cardiology recommendations  - daily weight, strict intake/output  - order blood culture X2   - no indications for tocilizumab or baricitinib at this time as pt [facility]      Milan Villegas MD  PG-Y3    Patient has been staffed with attending Dr. Eliezer Vargas    Patient seen, examined and discussed with the resident and I agree with the assessment and plan. Briefly, this is a 43 y.o. female  with recent severe COVID-19 pneumonia who presents with worsening dyspnea and increased oxygen requirement. Patient is an unusual case as her Covid is about a [de-identified] old. She tested positive on October 1 which is 3 weeks ago but was symptomatic for about 10 days before she presented to an outside hospital.  She was hospitalized for almost 2 weeks and received appropriate treatment with Decadron and tocilizumab. She was also evaluated for pulmonary embolism with a VQ scan because she had some acute kidney injury at that time. Despite the difficulty in interpreting a VQ scan with an abnormal chest x-ray they were able to assess that it is being low probability. She was discharged a week ago on 3 L of oxygen having completed her other therapies. She initially was feeling better but then started feeling worse and her oxygen levels were running lower. For my evaluation she did have some crackles on exam but was saturating well on 7 L. She also has some lower extremity edema which is new since discharge but she states that she is always swollen up if she is sedentary, which she has been. Patient CRP is still elevated, however she has already gotten a dose of tocilizumab and there are no studies that I am aware of giving repeat doses weeks apart. Her CTPA was negative for pulmonary embolism but did show residual airspace disease consistent with having a recent viral infection. We do not have another CT scan to compare to but the distribution is fairly similar to what was seen on her chest x-rays. There is the possibility that it has worsened. I cannot rule out the addition of pulmonary edema to her airspace disease.     Echocardiogram today showed normal left ventricular function with some left ventricular hypertrophy but a dilated and hypokinetic right heart. Patient was discharged on Xarelto and her D-dimer is 333. She also has had lower extremity Dopplers now which do not show any evidence of clot. I do not think this is an embolic event. It is possible that she has developed some chronic thromboembolic pulmonary hypertension but I think volume overload makes a bit more sense. I do not think a VQ scan given the abnormalities on her chest x-ray would be helpful at this time. She was started on heparin drip because of the concern of a pulmonary embolism from the echocardiogram which is reasonable, although I do not think her worsening as a result of a new blood clot. This is going to be difficult to prove 1 way or the other. Thus far all her data is suggesting that she does not have an acute clot. I have had patients who had Covid and developed worsening dyspnea and airspace disease a month or more after their initial illness. However the ones that I have seen were immune suppressed patients with histories of liquid tumors like multiple myeloma and lymphoma. This patient does not carry such a history. However in those patients there was a benefit to prolong steroid use and there is some other published data that steroids at 0.5-1 mix per cake for a couple of weeks can improve post Covid dyspnea and cough. Hopefully will also help increased oxygen requirements. I would continue diuresis as long as her kidney function can tolerate.     Pattie Vale MD

## 2021-10-20 NOTE — DISCHARGE SUMMARY
[address] [place] [address] DISCHARGE SUMMARY    Patient Demographics    Patient. Lei Wilson  Date of Birth. 1985  MRN. [id number]     Primary care provider. No primary care provider on file. (Tel: None)    Admit date: 10/1/2021    Discharge date (blank if same as Note Date): 10/12/2021  Note Date: 10/20/2021     Reason for Hospitalization. Chief Complaint   Patient presents with    Shortness of Breath     pt. with sob, cough, fever and weakness that started on 9/19.  pt. with increased SOB tonight. pt. was 84% on room air. pt. pt. arrived by [facility]. Acute hypoxic respiratory failure  -Secondary to COVID-19  -Treated with remdesivir Decadron completed course. Patient also with an echo VQ scan. Was treated with heparin drip.  -Patient was weaned to 3 L nasal cannula. Patient also discharged on NOAC due to high risk clotting for 1 month patient was discharged stable  Acute kidney injury resolved      Consults. IP CONSULT TO NEPHROLOGY  IP CONSULT TO PULMONOLOGY  IP CONSULT TO UROLOGY  IP CONSULT TO PHARMACY  IP CONSULT TO PHARMACY    Physical examination on discharge day. BP 93/70   Pulse 113   Temp 97.2 °F (36.2 °C) (Oral)   Resp 16   Ht 5' 6\" (1.676 m)   Wt (!) 350 lb 1.6 oz (158.8 kg)   LMP 10/01/2021 (Approximate)   SpO2 92%   BMI 56.51 kg/m²   General appearance. Alert. Looks comfortable. HEENT. Sclera clear. Moist mucus membranes. Cardiovascular. Regular rate and rhythm, normal S1, S2. No murmur. Respiratory. Not using accessory muscles. Clear to auscultation bilaterally, no wheeze. Gastrointestinal. Abdomen soft, non-tender, not distended, normal bowel sounds  Neurology. Facial symmetry. No speech deficits. Moving all extremities equally. Extremities. No edema in lower extremities. Skin.  Warm, dry, normal turgor    Condition at time of discharge stable     Medication instructions provided to patient at discharge. Medication List      START taking these medications    albuterol sulfate  (90 Base) MCG/ACT inhaler  Inhale 2 puffs into the lungs 4 times daily     guaiFENesin-dextromethorphan 100-10 MG/5ML syrup  Commonly known as: ROBITUSSIN DM  Take 5 mLs by mouth every 4 hours as needed for Cough     rivaroxaban 20 MG Tabs tablet  Commonly known as: Xarelto  Take 1 tablet by mouth daily (with breakfast)           Where to Get Your Medications      These medications were sent to [facility], [address] [place] [address]    Phone: [phone]   · albuterol sulfate  (90 Base) MCG/ACT inhaler  · guaiFENesin-dextromethorphan 100-10 MG/5ML syrup  · rivaroxaban 20 MG Tabs tablet         Discharge recommendations given to patient. Follow Up. pcp in 1 week   Disposition. home  Activity. activity as tolerated  Diet: No diet orders on file      Spent 45  minutes in discharge process.     Signed:  Bing Singh MD     10/20/2021 3:22 PM

## 2021-10-20 NOTE — PROGRESS NOTES
Physical Therapy    Facility/Department: [facility] [address] [facility]  Initial Assessment/discharge note    NAME: Sveta Jones  : 1985  MRN: [id number]    Date of Service: 10/20/2021    Discharge Mike Packer scored a 22/24 on the AM-PAC short mobility form. Current research shows that an AM-PAC score of 18 or greater is typically associated with a discharge to the patient's home setting. Based on the patient's AM-PAC score and their current functional mobility deficits, it is recommended that the patient have 2-3 sessions per week of Physical Therapy at d/c to increase the patient's independence. At this time, this patient demonstrates the endurance and safety to discharge home with  (home  services) and a follow up treatment frequency of 2-3x/wk. Please see assessment section for further patient specific details. If patient discharges prior to next session this note will serve as a discharge summary. Please see below for the latest assessment towards goals. PT Equipment Recommendations  Equipment Needed:  (bariatric w/c for home)    Assessment   Assessment: 42 yo admitted 10/19/21 for hypoxia. Pt recently 10 day hospitalization for COVID 19. Pt demo mobility below her baseline of independent at home prior to Miguelito positive and was supervision at home since hospital d/c. Pt plans to return home at discharge. If home, recommend 24 hour supervision initially, home PT safety eval, use of w/c for longer distances with increased 02 requirement. No further acute PT needs identified. Recommend nursing continue to encourage ambulation PRN and often with supervision for safety. Pt declined sitting in chair due to discomfort. Treatment Diagnosis: mobility impairment due to hypoxia  Decision Making: Low Complexity  Patient Education: Pt educated on PT role, importance of OOB mobility, need to call for assist to get up and she verbalized understanding.   REQUIRES PT FOLLOW UP: No  Activity Tolerance  Activity Tolerance: Patient limited by endurance       Patient Diagnosis(es): The encounter diagnosis was Hypoxia. has a past medical history of COVID-19 and Obesity. has a past surgical history that includes Dilation and curettage of uterus. Restrictions  Position Activity Restriction  Other position/activity restrictions: up with assist     Vision/Hearing  Vision: Impaired  Vision Exceptions: Wears glasses at all times  Hearing: Within functional limits       Subjective  General  Chart Reviewed: Yes  Additional Pertinent Hx: 43 y.o. female with a past medical history of COVID-19 (recently in the hospital from 10/1/21-10/12/21), morbid obesity, and had STEVE, hypotension requiring oxygen supplementation was discharged on 4 L of oxygen who presented to ED 10/19/21 with tachycardia and hypoxia. CXR positive PNA (atypical vs COVID), NSTEMI, pulmonology/cardiology consults. Family / Caregiver Present: No  Diagnosis: hypoxia  Follows Commands: Within Functional Limits  Subjective  Subjective: Pt found supine in bed and agreeable to PT. \" I get anxious and then I can't breathe.  \"  Pain Screening  Patient Currently in Pain: Denies         Orientation  Orientation  Overall Orientation Status: Within Functional Limits     Social/Functional History  Social/Functional History  Lives With: Alone (Mom staying with pt since 10/12)  Type of Home:  ([facility])  Home Layout: Multi-level, Bed/Bath upstairs, Able to Live on Main level with bedroom/bathroom  Bathroom Shower/Tub: Tub/Shower unit  Bathroom Toilet: Handicap height (sink nearby)  Home Equipment: Cane (using cane since d/c from [facility])  Receives Help From: Family  ADL Assistance:  (sponge bathing since 10/12; dressing self)  Homemaking Assistance: Independent (Mom helping since 10/12)  Ambulation Assistance: Independent  Transfer Assistance: Independent  Active : Yes  Occupation: Full time employment  Type of occupation: computer work  Additional Comments: Pt denies any recent falls. Objective          AROM RLE (degrees)  RLE AROM: WFL  AROM LLE (degrees)  LLE AROM : WFL     Strength RLE  Strength RLE: WFL  Strength LLE  Strength LLE: WFL        Bed mobility  Supine to Sit: Independent (HOB up and use of rail)     Transfers  Sit to Stand: Modified independent (x3 trials)  Stand to sit: Modified independent (x3 trials)     Ambulation  Device: No Device  Other Apparatus: O2 (5L)  Assistance: Supervision  Quality of Gait: slow, steady gait with increased wt shift side to side  Distance: 15 ft x2  Comments: sp02 85% on 5L initially after ambulation but up to 90% within 90 seconds  Stairs/Curb  Stairs? :  (unable to assess due to droplet plus precaution)              Plan   Safety Devices  Type of devices:  (pt left floor with transporter, RN aware)             AM-PAC Score  AM-PAC Inpatient Mobility Raw Score : 22 (10/20/21 1445)  AM-PAC Inpatient T-Scale Score : 53.28 (10/20/21 1445)  Mobility Inpatient CMS 0-100% Score: 20.91 (10/20/21 1445)  Mobility Inpatient CMS G-Code Modifier : CJ (10/20/21 1445)    Therapy Time   Individual Concurrent Group Co-treatment   Time In 1330         Time Out 1425         Minutes 55           Timed Code Treatment Minutes:45       Total Treatment Minutes:  [address] [facility], PT

## 2021-10-20 NOTE — CONSULTS
43 y.o. her for sob. Had recent admission to [facility] for COVID about 203 weeks ago. Has had increasing sob, BUCIO, and had one day where she had some chest pain that was sharp and radiated to her back. No n/v/syncope. Has had some LE edema but she has not been able to lay flat, and she was \"afraid to go to sleep\" during this time as well. She came to PCP where she was found to be hypoxic. She was sent to ER for O2 support and hypoxia. Past Medical History:   Diagnosis Date    COVID-19     Obesity      Past Surgical History:   Procedure Laterality Date    DILATION AND CURETTAGE OF UTERUS       Social History     Tobacco Use    Smoking status: Never Smoker    Smokeless tobacco: Never Used   Substance Use Topics    Alcohol use: Not Currently    Drug use: Never     Allergies   Allergen Reactions    Shellfish-Derived Products Anaphylaxis    Strawberry (Diagnostic) Anaphylaxis    Bee Venom Hives and Swelling     Family History   Family history unknown: Yes     Review of Systems   General: No fevers, chills. + sweats. HEENT: No blurry or decreased vision. No changes in hearing, nasal discharge or sore throat. Cardiovascular: See HPI. No cramping in legs or buttocks when walking. Respiratory: No cough, hemoptysis, or wheezing. No history of asthma. Gastrointestinal: No abdominal pain, hematochezia, melana, or history of GI ulcers. Genito-Urinary: No dysuria or hematuria. No urgency or polyuria. Musculoskeletal: No complaints of joint pain, joint swelling or muscular weakness/soreness. Neurological: No dizziness or headaches. No numbness/tingling, speech problems or weakness. No history of a stroke or TIA. Psychological: No anxiety or depression  Hematological and Lymphatic: No abnormal bleeding or bruising, blood clots, jaundice. Endocrine: No malaise/lethargy, palpitations, polydipsia/polyuria, temperature intolerance or unexpected weight changes.    Skin: No rashes or non-healing ulcers. PE:  Blood pressure (!) 141/93, pulse 118, temperature 98.2 °F (36.8 °C), temperature source Oral, resp. rate 18, height 5' 5\" (1.651 m), weight (!) 350 lb 3.2 oz (158.8 kg), last menstrual period 10/01/2021, SpO2 92 %. General (appearance): Well devel. No acute distress  Eyes: Anicteric. EOMI  Neck: No obvious JVD but hard to assess  Ears/Nose/Mouth/Thorat: No cyanosis  CV: Reg tach   Respiratory:  Normal respiratory effort with mild tachypnea  GI: Abd obese  Skin: Warm, dry. No rashes  Neuro/Psych: Alert and oriented x 3. Appropriate behavior  Ext:  No c/c. Pulses:  Normal carotid    Lab Results   Component Value Date    WBC 8.8 10/20/2021    HGB 12.1 10/20/2021    HCT 35.1 (L) 10/20/2021    MCV 95.2 10/20/2021     10/20/2021     Lab Results   Component Value Date     10/20/2021    K 3.9 10/20/2021     10/20/2021    CO2 25 10/20/2021    BUN 10 10/20/2021    CREATININE 0.9 10/20/2021    GLUCOSE 160 (H) 10/20/2021    CALCIUM 8.6 10/20/2021    PROT 7.1 10/19/2021    LABALBU 3.5 10/19/2021    BILITOT 0.7 10/19/2021    ALKPHOS 80 10/19/2021    AST 26 10/19/2021    ALT 27 10/19/2021    LABGLOM >60 10/20/2021    GFRAA >60 10/20/2021    AGRATIO 0.9 (L) 10/06/2021    GLOB 3.4 10/06/2021     Lab Results   Component Value Date    INR 2.10 (H) 10/19/2021    INR 1.05 10/02/2021    INR 1.05 10/01/2021    PROTIME 24.5 (H) 10/19/2021    PROTIME 11.9 10/02/2021    PROTIME 11.9 10/01/2021     Lab Results   Component Value Date    TROPONINI 0.06 (H) 10/19/2021     Lab Results   Component Value Date    .9 (H) 10/19/2021     Lab Results   Component Value Date    DDIMER 824 (H) 10/11/2021     10/1/2021 COVID positive    Pro-Ca.17    10/20/2020 ProBNP: 7164    10/1/2021 TTE: EF 65-70%. Tr TR. RVSP 29 mm Hg    10/20/2021 ECG: S tach, diffuse TWI    10/1/2021 ECG: NSR. Echo: Pending    10/19/2021 CTA Chest:  1.  No evidence of pulmonary embolus.     2. Bilateral lower lung peripheral predominant patchy groundglass opacities and a minor component of consolidative opacity most compatible with multifocal pneumonia. Differential considerations include Covid-19 or other viral pneumonia versus other    infectious or inflammatory pneumonitis. A/P:  43 y.o. admitted with SOB on 10/19/ Had COVID dx 10/1/2021 and was at [facility]. She was hypoxic in office and sent to ER. Issues:  -Eleavated Tn  -Abnormal ECG (diffuse TWI)  -Morbid obesity  -HTN  -Pneumonia  -? dCHF given ct and elevated bnp    Recs:  -Give IV mag  -Increase lasix to 40 IV BID  -Get echo  -Start BB after echo if EF is reduced  -Given high CRP, recent covid, and CT findings, I do have concern for covid cardiomyopathy. Clyde White MD, [facility]

## 2021-10-20 NOTE — PROGRESS NOTES
Physical Therapy/Occupational Therapy    Hold note    Referral received, chart reviewed. Pt currently off floor for testing. Will f/u later pm as time allows or 10/21/21. RN aware.        Saran Tucker, PT

## 2021-10-20 NOTE — PROGRESS NOTES
4 Eyes Admission Assessment     I agree as the admission nurse that 2 RN's have performed a thorough Head to Toe Skin Assessment on the patient. ALL assessment sites listed below have been assessed on admission. Areas assessed by both nurses:   [x]   Head, Face, and Ears   [x]   Shoulders, Back, and Chest  [x]   Arms, Elbows, and Hands   [x]   Coccyx, Sacrum, and Ischium  [x]   Legs, Feet, and Heels        Does the Patient have Skin Breakdown?   No         Vince Prevention initiated:  No   Wound Care Orders initiated:  No      [facility] nurse consulted for Pressure Injury (Stage 3,4, Unstageable, DTI, NWPT, and Complex wounds) or Vince score 18 or lower:  No      Nurse 1 eSignature: Electronically signed by Pippa Sahni RN on 10/20/21 at 10:09 PM EDT    **SHARE this note so that the co-signing nurse is able to place an eSignature**    Nurse 2 eSignature: Electronically signed by Skip Bucio RN on 10/19/21 at 8:38 PM EDT

## 2021-10-20 NOTE — PROGRESS NOTES
Occupational Therapy   Occupational Therapy Initial Assessment, Tx, DC  Date: 10/20/2021   Patient Name: Flip Millard  MRN: [id number]     : 1985    Date of Service: 10/20/2021    Discharge Recommendations: Flip Millard scored a 21/24 on the AM-PAC ADL Inpatient form. Current research shows that an AM-PAC score of 18 or greater is typically associated with a discharge to the patient's home setting. Based on the patient's AM-PAC score, and their current ADL deficits, it is recommended that the patient have 2-3 sessions per week of Occupational Therapy at d/c to increase the patient's independence. At this time, this patient demonstrates the endurance and safety to discharge home with (home vs OP services) and a follow up treatment frequency of 2-3x/wk. Please see assessment section for further patient specific details. If patient discharges prior to next session this note will serve as a discharge summary. Please see below for the latest assessment towards goals. OT Equipment Recommendations  Equipment Needed: Yes  Mobility Devices: ADL Assistive Devices  ADL Assistive Devices: Toileting - 3-in-1 Commode    Assessment   Assessment: From home alone, mother has been staying with pt, able to assist and provide 24hr. [facility]10[facility]12 Covid 19. Pt currently requires SPVN for all fx mobility and transfers, ADL with no AD. Pt completing mobility in room, to and from bathroom with SPVN. Pt with no further acute care OT needs. DC acute OT. Decision Making: Low Complexity  OT Education: OT Role;Plan of Care;ADL Adaptive Strategies;Transfer Training;Energy Conservation  Patient Education: verb understanding  Barriers to Learning: anxiety  REQUIRES OT FOLLOW UP: No  Activity Tolerance  Activity Tolerance: Patient Tolerated treatment well  Safety Devices  Safety Devices in place: Yes  Type of devices: All fall risk precautions in place; Left in bed;Bed alarm in place;Call light within reach;Nurse notified;Gait belt           Patient Diagnosis(es): The encounter diagnosis was Hypoxia. has a past medical history of COVID-19 and Obesity. has a past surgical history that includes Dilation and curettage of uterus. Restrictions  Position Activity Restriction  Other position/activity restrictions: up with assist    Subjective   General  Chart Reviewed: Yes  Additional Pertinent Hx: 58-year-old female with a past medical history of obesity and a recent Covid infection who presents to the emergency department with hypoxia. Oxygen saturation was measured and noted to be in the low 70s with ambulation, noted to be tachycardic to the 130s. Patient was recently treated at [facility] for Covid infection with an accompanying STEVE from the dates of 10/01 to 10/12. Referring Practitioner: Constance Day MD  Diagnosis: hypoxia  Subjective  Subjective:  In bed upon arrival, agreeable with encouragement, reporting anxiety with breathing issues and mobility, \"I am fully functional\"  Patient Currently in Pain: Denies  Vital Signs  Patient Currently in Pain: Denies  Oxygen Therapy  SpO2: 99 %  Pulse Oximeter Device Mode: Continuous  Pulse Oximeter Device Location: Right;Finger  O2 Device: High flow nasal cannula  O2 Flow Rate (L/min): 5 L/min  Social/Functional History  Social/Functional History  Lives With: Alone (Mom staying with pt since 10/12)  Type of Home:  ([facility])  Home Layout: Multi-level, Bed/Bath upstairs, Able to Live on Main level with bedroom/bathroom  Bathroom Shower/Tub: Tub/Shower unit  Bathroom Toilet: Handicap height (sink nearby)  Home Equipment: Cane (using cane since d/c from [facility])  Receives Help From: Family  ADL Assistance:  (sponge bathing since 10/12; dressing self)  Homemaking Assistance: Independent (Mom helping since 10/12)  Ambulation Assistance: Independent  Transfer Assistance: Independent  Active : Yes  Occupation: Full time employment  Type of occupation: computer work  Additional Comments: Pt denies any recent falls. Objective        Orientation  Overall Orientation Status: Within Normal Limits     Balance  Sitting Balance: Independent  Standing Balance: Stand by assistance  Functional Mobility  Functional - Mobility Device: No device  Activity: To/from bathroom  Assist Level: Stand by assistance  Functional Mobility Comments: SBA to [facility]  Toilet Transfers  Toilet - Technique: Ambulating  Equipment Used: Standard toilet  Toilet Transfer: Supervision  ADL  Feeding: Independent; Beverage management  Grooming: Supervision (stance at sink, wash hands)  LE Bathing: Supervision  LE Dressing: Supervision (laina socks)  Toileting: Supervision        Bed mobility  Supine to Sit: Independent  Transfers  Sit to stand: Supervision  Stand to sit: Supervision     Cognition  Overall Cognitive Status: [facility]  Cognition Comment: anxious, increased time for all tasks                 LUE AROM (degrees)  LUE AROM : WFL  Left Hand AROM (degrees)  Left Hand AROM: WFL  RUE AROM (degrees)  RUE AROM : WFL  Right Hand AROM (degrees)  Right Hand AROM: WFL  LUE Strength  Gross LUE Strength: WFL  RUE Strength  Gross RUE Strength: WFL                            AM-PAC Score        AM-[facility] Inpatient Daily Activity Raw Score: 21 (10/20/21 1448)  AM-PAC Inpatient ADL T-Scale Score : 44.27 (10/20/21 1448)  ADL Inpatient CMS 0-100% Score: 32.79 (10/20/21 1448)  ADL Inpatient CMS G-Code Modifier : CJ (10/20/21 1448)      Therapy Time   Individual Concurrent Group Co-treatment   Time In 1330         Time Out 1425         Minutes 55              Timed Code Treatment Minutes:   40    Total Treatment Minutes:  [address], OT

## 2021-10-20 NOTE — PROGRESS NOTES
Attempted to call patients mom, got a busy tone. Will pass off to day shift RN.    Electronically signed by Attila May RN on 10/20/2021 at 7:35 AM

## 2021-10-20 NOTE — PLAN OF CARE
Problem: Falls - Risk of:  Goal: Will remain free from falls  Description: Will remain free from falls  Outcome: Met This Shift  Fall precautions in place. No attempts to exit bed without assistance this shift. Problem: Gas Exchange - Impaired:  Goal: Levels of oxygenation will improve  Description: Levels of oxygenation will improve  Outcome: Ongoing  Patient on 8L HF staying in mid 90's. Problem: Fluid Volume - Imbalance:  Goal: Absence of imbalanced fluid volume signs and symptoms  Description: Absence of imbalanced fluid volume signs and symptoms  Outcome: Ongoing  MD administered Laxis in ED. Patient urinary output has been adequate.

## 2021-10-20 NOTE — PROGRESS NOTES
Progress Note    Admit Date: 10/19/2021  Day: 2  Diet: ADULT DIET; Regular; 4 carb choices (60 gm/meal); No Added Salt (3-4 gm)    CC: hypoxia    Interval history: Pt seen at the bedside. VENUS, she states she is not SOB just sitting but when she ambulates. Currently at 92% on 7L HFNC. Denies any fevers/chills, SOB, nausea/vomiting, abdominal pain. PT states at home she was afraid to go to sleep and lie down. She just sat up right and unable to sleep. She had sharp chest pain yesterday that went towards her back. HPI: 42 yo F with PMH of COVID infection and obesity who presented to ED with hypoxia as directed from her PCP visit. Her O2 sats at visit were in low 70's with ambulation on 5LNC. Tachycardic to 130's. Patient was recently treated at [facility] for Covid infection with an accompanying STEVE from the dates of 10/01 to 10/12. Patient states that she feels that she was first symptomatic with Covid around 23 August worsening SOB. Patient was admitted to [facility], and received treatment with Decadron and tocilizumab. Patient initially improved with hospitalization and treatment, and was ultimately discharged on 3 L nasal cannula for home oxygen. Patient states that while at home she has had periods of hypoxia to the mid 80s with activity and symptomatic shortness of breath. Patient also endorses an ongoing cough, however her sputum has never been productive patient states she has had no fevers or chills, no loss of taste or smell, no nausea/vomiting, no diarrhea or constipation. Patient has also had some urinary symptoms since being catheterized at [facility], in particular feelings of urgency, frequency and dysuria. In ED CXR diffuse coarse interstitial and alveolar consolidations which have been progressive. CTPA ruled out PE, but had ground glass opacities consistent with atypical or viral pneumonia. proBNP to over 5000, moderately elevated troponin 0.08, dirty UA.  Pt admitted for ongoing SOB with concern for ongoing COVID infection versus a new cardiomyopathy or super imposed atypical pneumonia and UTI    ECHO on 10/11 normal EF and no diastolic dysfxn. Medications:     Scheduled Meds:   sodium chloride flush  5-40 mL IntraVENous 2 times per day    cefTRIAXone (ROCEPHIN) IV  1,000 mg IntraVENous Q24H    dexamethasone  6 mg IntraVENous Q24H    furosemide  40 mg IntraVENous Daily    aspirin  81 mg Oral Daily    enoxaparin  40 mg SubCUTAneous BID    atorvastatin  20 mg Oral Nightly    albuterol sulfate HFA  2 puff Inhalation 4x daily     Continuous Infusions:   sodium chloride 25 mL (10/19/21 2333)     PRN Meds:sodium chloride, sodium chloride flush, sodium chloride, ondansetron **OR** ondansetron, polyethylene glycol, acetaminophen **OR** acetaminophen, perflutren lipid microspheres, albuterol sulfate HFA    Objective:   Vitals:   T-max:  Patient Vitals for the past 8 hrs:   BP Temp Temp src Pulse Resp SpO2 Weight   10/20/21 0800 (!) 141/93 98.2 °F (36.8 °C) Oral 118 18 94 % --   10/20/21 0600 -- -- -- -- -- -- (!) 350 lb 3.2 oz (158.8 kg)   10/20/21 0442 -- -- -- -- 22 91 % --   10/20/21 0333 120/86 98.8 °F (37.1 °C) Axillary 108 20 94 % --   10/20/21 0030 -- -- -- -- 20 95 % --       Intake/Output Summary (Last 24 hours) at 10/20/2021 0809  Last data filed at 10/20/2021 0549  Gross per 24 hour   Intake --   Output 700 ml   Net -700 ml       Review of Systems   Constitutional: Positive for activity change. Respiratory: Negative for shortness of breath. Cardiovascular: Negative for chest pain. Gastrointestinal: Negative for abdominal pain. Genitourinary: Negative for difficulty urinating. Physical Exam  Constitutional:       Appearance: She is obese. HENT:      Head: Normocephalic and atraumatic. Eyes:      Extraocular Movements: Extraocular movements intact.       Conjunctiva/sclera: Conjunctivae normal.   Cardiovascular:      Rate and Rhythm: Tachycardia present. Heart sounds: Normal heart sounds. Pulmonary:      Effort: Pulmonary effort is normal.      Breath sounds: Normal breath sounds. Abdominal:      Palpations: Abdomen is soft. Musculoskeletal:         General: Normal range of motion. Cervical back: Normal range of motion. Neurological:      General: No focal deficit present. Mental Status: She is alert. Psychiatric:         Mood and Affect: Mood normal.         LABS:    CBC:   Recent Labs     10/19/21  1301 10/20/21  0527   WBC 11.5* 8.8   HGB 12.8 12.1   HCT 38.7 35.1*    147   MCV 96.8 95.2     Renal:    Recent Labs     10/19/21  1301 10/20/21  0527    141   K 3.5 3.9    103   CO2 28 25   BUN 6* 10   CREATININE 0.9 0.9   GLUCOSE 110* 160*   CALCIUM 8.8 8.6   MG 1.70*  --    ANIONGAP 12 13     Hepatic:   Recent Labs     10/19/21  1301   AST 26   ALT 27   BILITOT 0.7   BILIDIR <0.2   PROT 7.1   LABALBU 3.5   ALKPHOS 80     Troponin:   Recent Labs     10/19/21  1301 10/19/21  1603   TROPONINI 0.08* 0.06*     BNP: No results for input(s): BNP in the last 72 hours. Lipids: No results for input(s): CHOL, HDL in the last 72 hours. Invalid input(s): LDLCALCU, TRIGLYCERIDE  ABGs:  No results for input(s): PHART, GQZ2RCV, PO2ART, WHQ6JVL, BEART, THGBART, I5BNSMQR, XJX0INR in the last 72 hours. INR:   Recent Labs     10/19/21  1301   INR 2.10*     Lactate: No results for input(s): LACTATE in the last 72 hours. Cultures:  -----------------------------------------------------------------  RAD:   CTA PULMONARY W CONTRAST   Final Result      1. No evidence of pulmonary embolus. 2. Bilateral lower lung peripheral predominant patchy groundglass opacities and a minor component of consolidative opacity most compatible with multifocal pneumonia. Differential considerations include Covid-19 or other viral pneumonia versus other    infectious or inflammatory pneumonitis.       XR CHEST PORTABLE   Final Result Diffuse coarse interstitial and alveolar consolidation progressive since 10/10/2021 and suspicious for atypical pneumonia. Stable cardiac mediastinal silhouette. Assessment/Plan:   43yo F with past medical history of recent COVID infection. Recently admitted to [facility] 10/1-10/12 (treated with decadron and toci)who presents to ED with new worsening hypoxia from her outpatient clinic. Acute Hypoxic Respiratory Failure 2/2 COVID Cardiomyopathy vs atypical pneumonia  Pt diagnosed w/ COVID on 10/01 during admission to [facility].  Treated w/ Deacdron & Tociluzimab, discharged 10/12 on 3L NC, Xarelto for one month and albuterol as needed, BNP on admission 7000  CXR & CTPA on presentation w/ bibasilar GGO's  -procal slightly elevated 0.17  -.0  -Lovenox 40mg BID (holding home Xarelto)  -supplemental O2 PRN, currently 7L NC  -6 mg Decadron IV QD  -Respiratory viral panel  -Respiratory cultures  -Legionella and Strep pneume urine Ag  -Cards consult  -lasix 40mg IV in ED, 40mg QD  -strict I/O's  -ECHO ordered  -pulm consult    Type II NSTEMI  Trop 0.08 w/ new T-wave inversions on intake EKG  -Trop Q6H  -325mg ASA given in ED, Asa 81 mg QD  -Atorvastatin 20 mg QD  -Cards C/s per above     UTI  Pt was urinary sx's + WBCs and LE on UA  -Urine Cx pending  -CXT 100mg qd    Anxiety  Anxiety episodes since d/c from hospital related to COVID-19 PNA  -lorazepam 0.5 mg     Code Status: FULL  FEN: 4 carb choice  PPX: lovenox  DISPO: Kirsten Medina MD, PGY-1  10/20/21  8:09 AM    This patient has been staffed and discussed with Dr. Marleny Flannery

## 2021-10-20 NOTE — CARE COORDINATION
Case Management Assessment           Initial Evaluation                Date / Time of Evaluation: 10/20/2021 3:06 PM                 Assessment Completed by: Jose A Dooley RN    Patient Name: Michelle Osorio     YOB: 1985  Diagnosis: Hypoxia [R09.02]  Acute respiratory failure with hypoxia [facility]) [J96.01]     Date / Time: 10/19/2021 12:00 PM    Patient Admission Status: Inpatient    If patient is discharged prior to next notation, then this note serves as note for discharge by case management. Current PCP: No primary care provider on file. Clinic Patient: No    Chart Reviewed: Yes  Patient/ Family Interviewed: No    Initial assessment completed at bedside with: chart reviewed     Hospitalization in the last 30 days: Yes    Emergency Contacts:  Extended Emergency Contact Information  Primary Emergency Contact: Angeline Fleming, [address] Phone: [phone]  Relation: Parent  Preferred language: English   needed? No    Advance Directives:   Code Status: Full Code        Financial  Payor: PENDING MEDICAID / Plan: PENDING MEDICAID / Product Type: *No Product type* /     Pre-cert required for SNF: Yes    Pharmacy    [place] [address] [phone] Sushant Intrans[facility] [phone]  [address] [place] [address]  Phone: [phone] Fax: [phone]      Potential assistance Purchasing Medications: Potential Assistance Purchasing Medications: No  Does Patient want to participate in local refill/ meds to beds program?: Not Assessed    Meds To Beds General Rules:  1. Can ONLY be done Monday- Friday between 8:30am-5pm  2. Prescription(s) must be in pharmacy by 3pm to be filled same day  3. Copy of patient's insurance/ prescription drug card and patient face sheet must be sent along with the prescription(s)  4. Cost of Rx cannot be added to hospital bill. If financial assistance is needed, please contact unit  or ;   or Social Worker CANNOT provide pharmacy voucher for patients co-pays  5. Patients can then  the prescription on their way out of the hospital at discharge, or pharmacy can deliver to the bedside if staff is available. (payment due at time of pick-up or delivery - cash, check, or card accepted)     Able to afford home medications/ co-pay costs: Yes    ADLS  Support Systems: Parent    PT AM-PAC: 22 /24  OT AM-PAC: 21 /24    [place]: from home  Steps:             [address]  DME Provider: n/a  Equipment:     Home Oxygen and [address] [place] prior to admission: Yes  [address]: [facility]  Phone: [phone]   Other Respiratory Equipment:           DISCHARGE PLAN:  Disposition: Home- No Services Needed    Transportation PLAN for discharge: family     Factors facilitating achievement of predicted outcomes: Family support    Barriers to discharge: Medical complications    Additional Case Management Notes:   Patient was recently discharged from [facility]. She was treated there for COVID 19 and discharged home with oxygen from [facility]. She is currently in isolation and not answering phone. CM will try to reach out again tomorrow. The Plan for Transition of Care is related to the following treatment goals of Hypoxia [R09.02]  Acute respiratory failure with hypoxia ([address] [J96.01]    The Patient and/or patient representative Jimmy Hammans and her family were provided with a choice of provider and agrees with the discharge plan     Freedom of choice list was provided with basic dialogue that supports the patient's individualized plan of care/goals and shares the quality data associated with the providers.      Care Transition patient: No    Shasha Griffiths RN  [facility]  Case Management Department  Ph: [phone]   Fax: [phone]

## 2021-10-21 LAB
ALBUMIN SERPL-MCNC: 3.2 G/DL (ref 3.4–5)
ANION GAP SERPL CALCULATED.3IONS-SCNC: 12 MMOL/L (ref 3–16)
ANTI-XA UNFRAC HEPARIN: 1.08 IU/ML (ref 0.3–0.7)
BUN BLDV-MCNC: 13 MG/DL (ref 7–20)
CALCIUM SERPL-MCNC: 8.6 MG/DL (ref 8.3–10.6)
CHLORIDE BLD-SCNC: 99 MMOL/L (ref 99–110)
CO2: 27 MMOL/L (ref 21–32)
CREAT SERPL-MCNC: 0.9 MG/DL (ref 0.6–1.1)
GFR AFRICAN AMERICAN: >60
GFR NON-AFRICAN AMERICAN: >60
GLUCOSE BLD-MCNC: 123 MG/DL (ref 70–99)
HCT VFR BLD CALC: 37.4 % (ref 36–48)
HEMOGLOBIN: 12.9 G/DL (ref 12–16)
L. PNEUMOPHILA SEROGP 1 UR AG: NORMAL
MAGNESIUM: 2.3 MG/DL (ref 1.8–2.4)
MCH RBC QN AUTO: 32.6 PG (ref 26–34)
MCHC RBC AUTO-ENTMCNC: 34.5 G/DL (ref 31–36)
MCV RBC AUTO: 94.7 FL (ref 80–100)
PDW BLD-RTO: 14.2 % (ref 12.4–15.4)
PHOSPHORUS: 3.8 MG/DL (ref 2.5–4.9)
PLATELET # BLD: 155 K/UL (ref 135–450)
PMV BLD AUTO: 9.2 FL (ref 5–10.5)
POTASSIUM SERPL-SCNC: 3.9 MMOL/L (ref 3.5–5.1)
RBC # BLD: 3.94 M/UL (ref 4–5.2)
SODIUM BLD-SCNC: 138 MMOL/L (ref 136–145)
STREP PNEUMONIAE ANTIGEN, URINE: NORMAL
WBC # BLD: 10.5 K/UL (ref 4–11)

## 2021-10-21 PROCEDURE — 99232 SBSQ HOSP IP/OBS MODERATE 35: CPT | Performed by: NURSE PRACTITIONER

## 2021-10-21 PROCEDURE — 93325 DOPPLER ECHO COLOR FLOW MAPG: CPT

## 2021-10-21 PROCEDURE — 80069 RENAL FUNCTION PANEL: CPT

## 2021-10-21 PROCEDURE — 6360000002 HC RX W HCPCS: Performed by: INTERNAL MEDICINE

## 2021-10-21 PROCEDURE — 6370000000 HC RX 637 (ALT 250 FOR IP): Performed by: NURSE PRACTITIONER

## 2021-10-21 PROCEDURE — 36415 COLL VENOUS BLD VENIPUNCTURE: CPT

## 2021-10-21 PROCEDURE — 6360000002 HC RX W HCPCS: Performed by: STUDENT IN AN ORGANIZED HEALTH CARE EDUCATION/TRAINING PROGRAM

## 2021-10-21 PROCEDURE — 6370000000 HC RX 637 (ALT 250 FOR IP): Performed by: STUDENT IN AN ORGANIZED HEALTH CARE EDUCATION/TRAINING PROGRAM

## 2021-10-21 PROCEDURE — 1200000000 HC SEMI PRIVATE

## 2021-10-21 PROCEDURE — 93308 TTE F-UP OR LMTD: CPT

## 2021-10-21 PROCEDURE — 99233 SBSQ HOSP IP/OBS HIGH 50: CPT | Performed by: INTERNAL MEDICINE

## 2021-10-21 PROCEDURE — 94761 N-INVAS EAR/PLS OXIMETRY MLT: CPT

## 2021-10-21 PROCEDURE — 85520 HEPARIN ASSAY: CPT

## 2021-10-21 PROCEDURE — 6370000000 HC RX 637 (ALT 250 FOR IP)

## 2021-10-21 PROCEDURE — 2580000003 HC RX 258: Performed by: STUDENT IN AN ORGANIZED HEALTH CARE EDUCATION/TRAINING PROGRAM

## 2021-10-21 PROCEDURE — 2700000000 HC OXYGEN THERAPY PER DAY

## 2021-10-21 PROCEDURE — 94640 AIRWAY INHALATION TREATMENT: CPT

## 2021-10-21 PROCEDURE — 83735 ASSAY OF MAGNESIUM: CPT

## 2021-10-21 PROCEDURE — 85027 COMPLETE CBC AUTOMATED: CPT

## 2021-10-21 RX ORDER — LORAZEPAM 2 MG/ML
0.5 INJECTION INTRAMUSCULAR
Status: ACTIVE | OUTPATIENT
Start: 2021-10-21 | End: 2021-10-21

## 2021-10-21 RX ORDER — VITAMIN B COMPLEX
1000 TABLET ORAL DAILY
Status: DISCONTINUED | OUTPATIENT
Start: 2021-10-21 | End: 2021-10-23 | Stop reason: HOSPADM

## 2021-10-21 RX ORDER — OXYMETAZOLINE HYDROCHLORIDE 0.05 G/100ML
2 SPRAY NASAL ONCE
Status: COMPLETED | OUTPATIENT
Start: 2021-10-21 | End: 2021-10-21

## 2021-10-21 RX ORDER — POTASSIUM CHLORIDE 20 MEQ/1
20 TABLET, EXTENDED RELEASE ORAL ONCE
Status: COMPLETED | OUTPATIENT
Start: 2021-10-21 | End: 2021-10-21

## 2021-10-21 RX ADMIN — GUAIFENESIN SYRUP AND DEXTROMETHORPHAN 5 ML: 100; 10 SYRUP ORAL at 04:45

## 2021-10-21 RX ADMIN — HEPARIN SODIUM 7 UNITS/KG/HR: 10000 INJECTION, SOLUTION INTRAVENOUS at 07:15

## 2021-10-21 RX ADMIN — ASPIRIN 81 MG: 81 TABLET, CHEWABLE ORAL at 09:04

## 2021-10-21 RX ADMIN — Medication 2 SPRAY: at 00:07

## 2021-10-21 RX ADMIN — ALBUTEROL SULFATE 2 PUFF: 90 AEROSOL, METERED RESPIRATORY (INHALATION) at 12:19

## 2021-10-21 RX ADMIN — FUROSEMIDE 40 MG: 10 INJECTION, SOLUTION INTRAMUSCULAR; INTRAVENOUS at 09:04

## 2021-10-21 RX ADMIN — ALBUTEROL SULFATE 2 PUFF: 90 AEROSOL, METERED RESPIRATORY (INHALATION) at 21:35

## 2021-10-21 RX ADMIN — ACETAMINOPHEN 650 MG: 325 TABLET ORAL at 20:57

## 2021-10-21 RX ADMIN — SODIUM CHLORIDE, PRESERVATIVE FREE 10 ML: 5 INJECTION INTRAVENOUS at 20:57

## 2021-10-21 RX ADMIN — ALBUTEROL SULFATE 2 PUFF: 90 AEROSOL, METERED RESPIRATORY (INHALATION) at 15:46

## 2021-10-21 RX ADMIN — POTASSIUM CHLORIDE 20 MEQ: 1500 TABLET, EXTENDED RELEASE ORAL at 10:44

## 2021-10-21 RX ADMIN — DEXAMETHASONE SODIUM PHOSPHATE 6 MG: 4 INJECTION, SOLUTION INTRAMUSCULAR; INTRAVENOUS at 20:57

## 2021-10-21 RX ADMIN — ATORVASTATIN CALCIUM 20 MG: 20 TABLET, FILM COATED ORAL at 20:57

## 2021-10-21 RX ADMIN — OXYMETAZOLINE HYDROCHLORIDE 2 SPRAY: 5 SPRAY NASAL at 21:15

## 2021-10-21 RX ADMIN — FUROSEMIDE 40 MG: 10 INJECTION, SOLUTION INTRAMUSCULAR; INTRAVENOUS at 18:04

## 2021-10-21 RX ADMIN — CEFTRIAXONE 1000 MG: 1 INJECTION, POWDER, FOR SOLUTION INTRAMUSCULAR; INTRAVENOUS at 20:52

## 2021-10-21 RX ADMIN — GUAIFENESIN SYRUP AND DEXTROMETHORPHAN 5 ML: 100; 10 SYRUP ORAL at 20:57

## 2021-10-21 RX ADMIN — Medication 1000 UNITS: at 20:57

## 2021-10-21 RX ADMIN — SODIUM CHLORIDE, PRESERVATIVE FREE 10 ML: 5 INJECTION INTRAVENOUS at 09:08

## 2021-10-21 ASSESSMENT — PAIN DESCRIPTION - PAIN TYPE: TYPE: ACUTE PAIN

## 2021-10-21 ASSESSMENT — ENCOUNTER SYMPTOMS
SHORTNESS OF BREATH: 0
ABDOMINAL PAIN: 0

## 2021-10-21 ASSESSMENT — PAIN SCALES - GENERAL
PAINLEVEL_OUTOF10: 0
PAINLEVEL_OUTOF10: 5
PAINLEVEL_OUTOF10: 0
PAINLEVEL_OUTOF10: 5

## 2021-10-21 ASSESSMENT — PAIN DESCRIPTION - LOCATION: LOCATION: CHEST

## 2021-10-21 ASSESSMENT — PAIN DESCRIPTION - ONSET: ONSET: ON-GOING

## 2021-10-21 ASSESSMENT — PAIN - FUNCTIONAL ASSESSMENT: PAIN_FUNCTIONAL_ASSESSMENT: ACTIVITIES ARE NOT PREVENTED

## 2021-10-21 ASSESSMENT — PAIN DESCRIPTION - DESCRIPTORS: DESCRIPTORS: SHARP

## 2021-10-21 ASSESSMENT — PAIN DESCRIPTION - PROGRESSION: CLINICAL_PROGRESSION: NOT CHANGED

## 2021-10-21 ASSESSMENT — PAIN DESCRIPTION - ORIENTATION: ORIENTATION: LEFT

## 2021-10-21 ASSESSMENT — PAIN DESCRIPTION - FREQUENCY: FREQUENCY: INTERMITTENT

## 2021-10-21 NOTE — PROGRESS NOTES
ENZYMES:   Recent Labs     10/19/21  1301 10/19/21  1603 10/20/21  0838   TROPONINI 0.08* 0.06* 0.03*         Imaging:    10/20/2021 Limited echo   Left ventricular cavity size is normal. There is borderline left ventricular   hypertrophy. Overall left ventricular systolic function appears normal with   an ejection fraction of 60%. There is mild septal flattening consistent with   right ventricular pressure and/or volume overload. No regional wall motion   abnormalities are noted. Global strain is -15.3%. The right ventricle is enlarged and hypokinetic. TAPSE measures 1.39 cm. Pulmonary embolism should be ruled out if not already done. 10/20/2021 Venous duplex: Neg for DVT bilaterally     10/19/2021 CTA PE     1. No evidence of pulmonary embolus.     2. Bilateral lower lung peripheral predominant patchy groundglass opacities and a minor component of consolidative opacity most compatible with multifocal pneumonia. Differential considerations include Covid-19 or other viral pneumonia versus other    infectious or inflammatory pneumonitis. 10/19/2021 CXR:     Diffuse coarse interstitial and alveolar consolidation progressive since 10/10/2021 and suspicious for atypical pneumonia.       Stable cardiac mediastinal silhouette. 10/11/2021 VQ: low probability of PE    10/11/2021 Echo:   -Normal global systolic function with an ejection fraction estimated at   65-70%.   -No obvious regional wall motion abnormalities noted. -There is trivial tricuspid regurgitation with a RVSP estimation of 29   mmHg.   -Normal diastolic function. Avg. E/e'=7.9   -No pericardial effusion noted. Assessment:  43 y.o. with SOB and hypoxia.  Had recent COVID 10/1/2021  Issues:  -SOB  -Hypoxia  -Elevated Tn  -Abnl ECG (diffuse TWI)  -HTN  -Morbid obesity  -PNA  -? dCHF given CT and elevated BNP    Plan:  -Keep K>4, Mg>2. (replacing K)  -statin  -Lasix 40 mg IV BID  Spoke with Dr Meena Nelson and ok to stop heparin and asa

## 2021-10-21 NOTE — PLAN OF CARE
Problem: Gas Exchange - Impaired:  Goal: Levels of oxygenation will improve  Description: Levels of oxygenation will improve  Outcome: Ongoing  Weaned to 5L NC with humidification. O2 maintaining in mid to low 90's. Problem: Fluid Volume - Imbalance:  Goal: Absence of imbalanced fluid volume signs and symptoms  Description: Absence of imbalanced fluid volume signs and symptoms  Outcome: Ongoing  Receiving laxis IV push BID. Urine output has been adequate. Maintaining strict I&O's and getting daily weight. Problem: Bleeding:  Goal: Will show no signs and symptoms of excessive bleeding  Description: Will show no signs and symptoms of excessive bleeding  Outcome: Ongoing  Nose bleeding. Been a recurring issue with long term oxygen use. Progressed more with heparin gtt. Doctor ordered afrin spray, administered and paused heparin gtt for 1 hour. Dose lowered, will continue to monitor.

## 2021-10-21 NOTE — DISCHARGE SUMMARY
INTERNAL MEDICINE    RESIDENT DISCHARGE SUMMARY   Discharge Summaries      Patient ID: Fabián Sutherland   Gender: female      :  1985  AGE: 43 y.o. MRN:  [id number]  Code Status: Full Code   PCP: No primary care provider on file. Admit date:  10/19/2021      Discharge date:  No discharge date for patient encounter. Admitting Physician:  No admitting provider for patient encounter. Discharge Physician: Nicolasa Toney     Admission Condition:  stable  Discharged Condition:  Stable    Discharge Diagnoses: Active Hospital Problems    Diagnosis Date Noted    Acute respiratory failure with hypoxia ([facility] [address] [J96.01] 10/19/2021       The patient was seen and examined on day of discharge and this discharge summary is in conjunction with any daily progress note from day of discharge. Hospital Course:   44 yo F with PMH of COVID infection and obesity who presented to ED with hypoxia as directed from her PCP visit. Her O2 sats at visit were in low 70's with ambulation on 5LNC. Tachycardic to 130's. Patient was recently treated at [facility] for Covid infection with an accompanying STEVE from the dates of 10/01 to 10/12. She presented to the ED for new worsening hypoxia from her outpatient clinic. In ED CXR diffuse coarse interstitial and alveolar consolidations which have been progressive. CTPA ruled out PE, but had ground glass opacities consistent with atypical or viral pneumonia. proBNP to over 5000, moderately elevated troponin 0.08, dirty UA. Pt was admitted for acute hypoxic respiratory failure secondary to COVID cardiomyopathy vs acute CHF. Cardiology was consulted who recommended Lasix 40 mg IV BID. She diuresed well and has been improving oxygenation requirements, from 8L HFNC in the ED to 3L on discharge. An echocardiogram showed LVEF 60% and enlarged hypokinetic right ventricle.  Due to concern for thromboembolism she was given heparin, though she had issues with nosebleeds and venous dopplers were negative so this was discontinued. Respiratory viral panel and respiratory cultures were negative. Legionella and strep pneumo urine antigen were negative. Blood cultures with no growth to date. She received ceftriaxone for suspected UTI. Urine culture pending. She will resume her home medications on discharge. Patient discharged in stable condition.        Disposition:  Home    Physical Exam Performed:     /88   Pulse 103   Temp 98.1 °F (36.7 °C) (Oral)   Resp 20   Ht 5' 5\" (1.651 m)   Wt (!) 336 lb 9.6 oz (152.7 kg)   LMP 10/01/2021 (Approximate)   SpO2 92%   BMI 56.01 kg/m²     Physical Exam    Labs: For convenience and continuity at follow-up the following most recent labs are provided:      CBC:    Lab Results   Component Value Date    WBC 8.8 10/20/2021    HGB 12.1 10/20/2021    HCT 35.1 10/20/2021     10/20/2021       Renal:    Lab Results   Component Value Date     10/21/2021    K 3.9 10/21/2021    K 3.9 10/20/2021    CL 99 10/21/2021    CO2 27 10/21/2021    BUN 13 10/21/2021    CREATININE 0.9 10/21/2021    CALCIUM 8.6 10/21/2021    PHOS 3.8 10/21/2021         Significant Diagnostic Studies    Radiology:   VL Extremity Venous Bilateral         CTA PULMONARY W CONTRAST   Final Result      1. No evidence of pulmonary embolus. 2. Bilateral lower lung peripheral predominant patchy groundglass opacities and a minor component of consolidative opacity most compatible with multifocal pneumonia. Differential considerations include Covid-19 or other viral pneumonia versus other    infectious or inflammatory pneumonitis. XR CHEST PORTABLE   Final Result      Diffuse coarse interstitial and alveolar consolidation progressive since 10/10/2021 and suspicious for atypical pneumonia. Stable cardiac mediastinal silhouette.              Consults:     IP CONSULT TO HOSPITALIST  IP CONSULT TO CARDIOLOGY  IP CONSULT TO PULMONOLOGY      Discharge Instructions/Follow-up:  Patient to follow up with her primary care provider. Activity: activity as tolerated    Diet: regular diet    Discharge Medications:     Current Discharge Medication List           Details   dexamethasone (DECADRON) 2 MG tablet Take 5 tablets by mouth daily (with breakfast) for 7 days  Qty: 35 tablet, Refills: 0              Details   rivaroxaban (XARELTO) 20 MG TABS tablet Take 1 tablet by mouth daily (with breakfast)  Qty: 30 tablet, Refills: 0      albuterol sulfate  (90 Base) MCG/ACT inhaler Inhale 2 puffs into the lungs 4 times daily  Qty: 18 g, Refills: 3      guaiFENesin-dextromethorphan (ROBITUSSIN DM) 100-10 MG/5ML syrup Take 5 mLs by mouth every 4 hours as needed for Cough  Qty: 120 mL, Refills: 0      LORazepam (ATIVAN) 0.5 MG tablet Take 1 tablet by mouth every 6 hours as needed for Anxiety for up to 7 days. Qty: 28 tablet, Refills: 0    Associated Diagnoses: Anxiety      ALTAMIST SPRAY 0.65 % nasal spray 1 spray by Nasal route as needed for Congestion  Qty: 1 each, Refills: 3      fluticasone (FLOVENT HFA) 110 MCG/ACT inhaler Inhale 2 puffs into the lungs 2 times daily  Qty: 12 g, Refills: 0             Time Spent on discharge is more than 30 minutes in the examination, evaluation, counseling and review of medications and discharge plan. Signed:    Tosha Bear   Internal Medicine Resident, PGY-1  10/21/2021      Thank you No primary care provider on file. for the opportunity to be involved in this patient's care. If you have any questions or concerns please feel free to contact me.

## 2021-10-21 NOTE — PROGRESS NOTES
Pulmonary Progress Note      Reason for Consult: \"discharged 2 weeks ago after rx for covid pna, back with increasing o2 requirements and increasing CRP\"  Requesting Physician: Dr. Randal Browning  Subjective:   Pt has been having nasal bleeding with clots overnight. Afrin was prescribed but she didn't receive it as she didn't think it would help given her clogged nose. Her O2 overnight was between 5 and 7L NC. Patient seen and examined at bedside. She states her SOB is mostly due to her clogged nose with blood and clots. She continues to use the saline nasal spray. O2 NC was decreased to 3.5L and she was sating 93%. Patient made 5.7L urine past 24h. She denies fever, night sweats, chills, chest pain, palpitations, nausea, vomiting, diarrhea, dysuria. Past Medical History:      Diagnosis Date    COVID-19     Obesity       Past Surgical History:        Procedure Laterality Date    DILATION AND CURETTAGE OF UTERUS       Current Medications:     potassium chloride  20 mEq Oral Once    furosemide  40 mg IntraVENous BID    sodium chloride flush  5-40 mL IntraVENous 2 times per day    cefTRIAXone (ROCEPHIN) IV  1,000 mg IntraVENous Q24H    dexamethasone  6 mg IntraVENous Q24H    aspirin  81 mg Oral Daily    atorvastatin  20 mg Oral Nightly    albuterol sulfate HFA  2 puff Inhalation 4x daily          Social History:   · TOBACCO:   reports that she has never smoked. She has never used smokeless tobacco.  · ETOH:   reports previous alcohol use.   · DRUGS : none      Family History:       Family history unknown: Yes         REVIEW OF SYSTEMS:    See above    Objective:   PHYSICAL EXAM:      VITALS:  BP (!) 136/97   Pulse 106   Temp 98.3 °F (36.8 °C) (Oral)   Resp 18   Ht 5' 5\" (1.651 m)   Wt (!) 336 lb 9.6 oz (152.7 kg)   LMP 10/01/2021 (Approximate)   SpO2 97%   BMI 56.01 kg/m²   24HR INTAKE/OUTPUT:      Intake/Output Summary (Last 24 hours) at 10/21/2021 1017  Last data filed at 10/21/2021 4459  Gross per 24 hour   Intake 1186.1 ml   Output 4950 ml   Net -3763.9 ml     CURRENT PULSE OXIMETRY:  SpO2: 97 %  24HR PULSE OXIMETRY RANGE:  SpO2  Av.6 %  Min: 87 %  Max: 100 %    CONSTITUTIONAL:  awake, alert, cooperative, no apparent distress, and appears stated age  EYES: Pupils equal round and reactive to light. Normal conjunctiva  NECK:  Supple, symmetrical, trachea midline, no adenopathy, thyroid symmetric, not enlarged and no tenderness, skin normal  LUNGS:  rales more in basilar area. no increased work of breathing. No accessory muscle use  CARDIOVASCULAR:  Tachycardia, normal S1 and S2, trace edema RLE and LLE. and no JVD  ABDOMEN:  normal bowel sounds, non-distended and no masses palpated, and no tenderness to palpation. No hepatospleenomegaly  MUSCULOSKELETAL: No obvious misalignment or effusion of the joints. No clubbing, cyanosis of the digits. SKIN:  normal skin color, texture, turgor and no redness, warmth, or swelling.  No palpable nodules    DATA:    Old records have been reviewed  CBC with Differential:    Lab Results   Component Value Date    WBC 8.8 10/20/2021    RBC 3.69 10/20/2021    HGB 12.1 10/20/2021    HCT 35.1 10/20/2021     10/20/2021    MCV 95.2 10/20/2021    MCH 32.7 10/20/2021    MCHC 34.4 10/20/2021    RDW 14.1 10/20/2021    BANDSPCT 4 10/10/2021    METASPCT 1 10/06/2021    LYMPHOPCT 9.1 10/20/2021    MONOPCT 2.1 10/20/2021    BASOPCT 0.2 10/20/2021    MONOSABS 0.2 10/20/2021    LYMPHSABS 0.8 10/20/2021    EOSABS 0.0 10/20/2021    BASOSABS 0.0 10/20/2021     BMP:    Lab Results   Component Value Date     10/21/2021    K 3.9 10/21/2021    K 3.9 10/20/2021    CL 99 10/21/2021    CO2 27 10/21/2021    BUN 13 10/21/2021    CREATININE 0.9 10/21/2021    CALCIUM 8.6 10/21/2021    GFRAA >60 10/21/2021    LABGLOM >60 10/21/2021    GLUCOSE 123 10/21/2021     Hepatic Function Panel:    Lab Results   Component Value Date    ALKPHOS 80 10/19/2021    ALT 27 10/19/2021    AST 26 10/19/2021    PROT 7.1 10/19/2021    BILITOT 0.7 10/19/2021    BILIDIR <0.2 10/19/2021    IBILI see below 10/19/2021     ABG:  No results found for: UFK4NSE, BEART, O4ATYZRX, PHART, THGBART, BTF6UMO, PO2ART, HFC9LRP    Cultures:   Blood Culture:    Sputum Culture:        Radiology Review:  All pertinent images / reports were reviewed as a part of this visit. CTPA 10/19/2021     1. No evidence of pulmonary embolus.     2. Bilateral lower lung peripheral predominant patchy groundglass opacities and a minor component of consolidative opacity most compatible with multifocal pneumonia. Differential considerations include Covid-19 or other viral pneumonia versus other    infectious or inflammatory pneumonitis. PFTs:      Echo 10/20/2021  Left ventricular cavity size is normal. There is borderline left ventricular   hypertrophy. Overall left ventricular systolic function appears normal with   an ejection fraction of 60%. There is mild septal flattening consistent with   right ventricular pressure and/or volume overload. No regional wall motion   abnormalities are noted. Global strain is -15.3%. The right ventricle is enlarged and hypokinetic. TAPSE measures 1.39 cm. Pulmonary embolism should be ruled out if not already done. Doppler Studies:  Venous doppler 10/20/2021     Tech Comments   Right   There is no evidence of deep or superficial venous thrombosis involving the   right lower extremity. Color flow imaging was used in some areas to better   visualize the veins. The peroneal vein is not visualized. Left   There is no evidence of deep or superficial venous thrombosis involving the   left lower extremity. Color flow imaging was used in some areas to better   visualize the veins. The peroneal vein is not visualized. There is no previous exam for comparison.        Assessment/Plan   Acute hypoxic respiratory failure 2/2 Covid-19 PNA    Possible cardiomyopathy with evidence of volume overload and right heart side failure. Currently on 3.5L (down from 7L) NC. Good urine output (5.7L past 24h). Onset Covid symptoms one month ago. Hospitalized 10/1-10/12 received tocilizumab, 7 days steroids and discharged on Xarelto. CTPA negative for PE but peripheral lung GGO and consolidative changes suggesting multifocal PNA. ProBNP 7000. Echo revealed right heart side failure (hypokinesis, enlarged RV, TAPSE 1.39). Pt responding to lasix. Respiratory status improving with diuresis may indicate cardiomyopathy has a bigger role in her respirotary status than progression of Covid-19. Pulmonary embolism is is unlikely given improvement in D-dimer, negative CTPA, negative doppler.   - wean O2 as tolerated; goal >=90%  - ok to continue steroids; if no improvement, would consider increasing dose to 0.5 mg/kg steroids  - lasix per cardiology recommendations as tolerated  - recommend discontinuing heparin and ASA. This should help decreasing epistaxis   - daily weight, strict intake/output  - order blood culture X2   - no indications for tocilizumab or baricitinib at this time as pt received tocilizumab prior hospitzlization      Derick Olivas MD  PG-Y3    Patient has been staffed with attending Dr. Dona Foy    Patient seen, examined and discussed with the resident and I agree with the assessment and plan. Oxygen requirement down to 5L as of this morning. She could tolerate 3L but if she moves or speaks she still drops. Still complaining of sinus congestion with bloody secretions on anticoagulation. Anticoagulation stopped this morning because we haven't been able to find any evidence of clots. She diuresed over 5L since yesterday and is on decadron. Unclear if the slight improvement is from one, the other, or both. I would continue both. If further diuresis doesn't help her oxygen requirements then we may need to increase the steroid dose.   I suspect the RV failure is a stronger contributor than worsening pulmonary inflammation from covid but it's not clear. Regardless, we should keep her sats above 90% so as to avoid additional strain on the RV.         Faith Cazares MD

## 2021-10-21 NOTE — PROGRESS NOTES
Progress Note    Admit Date: 10/19/2021  Day: 2  Diet: ADULT DIET; Regular; 4 carb choices (60 gm/meal); No Added Salt (3-4 gm)    CC: hypoxia    Interval history: Pt seen at the bedside. ROSALEEO. Currently at 97% on 7L HFNC. Denies any fevers/chills, SOB, nausea/vomiting, abdominal pain. Hasnt had BM since Tuesday. Pt had had trouble with nosebleed overnight. Feels like her nose is congested and she can't sleep because of it. Nasal spray ordered, and humidified O2. Heparin gtt supratherapeutic overnight, was turned off for a bitd    PT states at home she was afraid to go to sleep and lie down. She just sat up right and unable to sleep. She had sharp chest pain yesterday that went towards her back. HPI: 42 yo F with PMH of COVID infection and obesity who presented to ED with hypoxia as directed from her PCP visit. Her O2 sats at visit were in low 70's with ambulation on 5LNC. Tachycardic to 130's. Patient was recently treated at [facility] for Covid infection with an accompanying STEVE from the dates of 10/01 to 10/12. Patient states that she feels that she was first symptomatic with Covid around 23 August worsening SOB. Patient was admitted to [facility], and received treatment with Decadron and tocilizumab. Patient initially improved with hospitalization and treatment, and was ultimately discharged on 3 L nasal cannula for home oxygen. Patient states that while at home she has had periods of hypoxia to the mid 80s with activity and symptomatic shortness of breath. Patient also endorses an ongoing cough, however her sputum has never been productive patient states she has had no fevers or chills, no loss of taste or smell, no nausea/vomiting, no diarrhea or constipation. Patient has also had some urinary symptoms since being catheterized at [facility], in particular feelings of urgency, frequency and dysuria.     In ED CXR diffuse coarse interstitial and alveolar consolidations which have been progressive. CTPA ruled out PE, but had ground glass opacities consistent with atypical or viral pneumonia. proBNP to over 5000, moderately elevated troponin 0.08, dirty UA. Pt admitted for ongoing SOB with concern for ongoing COVID infection versus a new cardiomyopathy or super imposed atypical pneumonia and UTI    ECHO on 10/11 normal EF and no diastolic dysfxn. Medications:     Scheduled Meds:   furosemide  40 mg IntraVENous BID    sodium chloride flush  5-40 mL IntraVENous 2 times per day    cefTRIAXone (ROCEPHIN) IV  1,000 mg IntraVENous Q24H    dexamethasone  6 mg IntraVENous Q24H    aspirin  81 mg Oral Daily    atorvastatin  20 mg Oral Nightly    albuterol sulfate HFA  2 puff Inhalation 4x daily     Continuous Infusions:   heparin (PORCINE) Infusion 7 Units/kg/hr (10/21/21 0715)    sodium chloride 25 mL (10/20/21 2108)     PRN Meds:sodium chloride, heparin (porcine), heparin (porcine), guaiFENesin-dextromethorphan, sodium chloride flush, sodium chloride, ondansetron **OR** ondansetron, polyethylene glycol, acetaminophen **OR** acetaminophen, perflutren lipid microspheres, albuterol sulfate HFA    Objective:   Vitals:   T-max:  Patient Vitals for the past 8 hrs:   BP Temp Temp src Pulse Resp SpO2 Weight   10/21/21 0600 -- -- -- -- -- -- (!) 336 lb 9.6 oz (152.7 kg)   10/21/21 0315 -- -- -- -- -- 94 % --   10/21/21 0302 122/88 98.1 °F (36.7 °C) Axillary 103 20 94 % --       Intake/Output Summary (Last 24 hours) at 10/21/2021 0820  Last data filed at 10/21/2021 2195  Gross per 24 hour   Intake 1026.1 ml   Output 5800 ml   Net -4773.9 ml       Review of Systems   Constitutional: Positive for activity change. Respiratory: Negative for shortness of breath. Cardiovascular: Negative for chest pain. Gastrointestinal: Negative for abdominal pain. Genitourinary: Negative for difficulty urinating. Physical Exam  Constitutional:       Appearance: She is obese.    HENT:      Head: Normocephalic and atraumatic. Eyes:      Extraocular Movements: Extraocular movements intact. Conjunctiva/sclera: Conjunctivae normal.   Cardiovascular:      Rate and Rhythm: Tachycardia present. Heart sounds: Normal heart sounds. Pulmonary:      Effort: Pulmonary effort is normal.      Breath sounds: Normal breath sounds. Abdominal:      Palpations: Abdomen is soft. Musculoskeletal:         General: Normal range of motion. Cervical back: Normal range of motion. Neurological:      General: No focal deficit present. Mental Status: She is alert. Psychiatric:         Mood and Affect: Mood normal.         LABS:    CBC:   Recent Labs     10/19/21  1301 10/20/21  0527   WBC 11.5* 8.8   HGB 12.8 12.1   HCT 38.7 35.1*    147   MCV 96.8 95.2     Renal:    Recent Labs     10/19/21  1301 10/19/21  1301 10/20/21  0527 10/20/21  1821 10/21/21  0421      < > 141 138 138   K 3.5   < > 3.9 3.6 3.9      < > 103 98* 99   CO2 28   < > 25 27 27   BUN 6*   < > 10 14 13   CREATININE 0.9   < > 0.9 0.9 0.9   GLUCOSE 110*   < > 160* 128* 123*   CALCIUM 8.8   < > 8.6 8.5 8.6   MG 1.70*  --   --  2.70* 2.30   PHOS  --   --   --  3.7 3.8   ANIONGAP 12   < > 13 13 12    < > = values in this interval not displayed. Hepatic:   Recent Labs     10/19/21  1301 10/20/21  1821 10/21/21  0421   AST 26  --   --    ALT 27  --   --    BILITOT 0.7  --   --    BILIDIR <0.2  --   --    PROT 7.1  --   --    LABALBU 3.5 3.3* 3.2*   ALKPHOS 80  --   --      Troponin:   Recent Labs     10/19/21  1301 10/19/21  1603 10/20/21  0838   TROPONINI 0.08* 0.06* 0.03*     BNP: No results for input(s): BNP in the last 72 hours. Lipids: No results for input(s): CHOL, HDL in the last 72 hours. Invalid input(s): LDLCALCU, TRIGLYCERIDE  ABGs:  No results for input(s): PHART, GNU0ACZ, PO2ART, LQG9UAY, BEART, THGBART, J9FWGZGN, FIL3PCV in the last 72 hours.     INR:   Recent Labs     10/19/21  1301 10/20/21  1131   INR 2.10* 1.39*     Lactate: No results for input(s): LACTATE in the last 72 hours. Cultures:  -----------------------------------------------------------------  RAD:   VL Extremity Venous Bilateral         CTA PULMONARY W CONTRAST   Final Result      1. No evidence of pulmonary embolus. 2. Bilateral lower lung peripheral predominant patchy groundglass opacities and a minor component of consolidative opacity most compatible with multifocal pneumonia. Differential considerations include Covid-19 or other viral pneumonia versus other    infectious or inflammatory pneumonitis. XR CHEST PORTABLE   Final Result      Diffuse coarse interstitial and alveolar consolidation progressive since 10/10/2021 and suspicious for atypical pneumonia. Stable cardiac mediastinal silhouette. 10/20/21 ECHO: Left ventricular cavity size is normal. There is borderline left ventricular hypertrophy. Overall left ventricular systolic function appears  normal with an ejection fraction of 60%. There is mild septal flattening consistent with right ventricular pressure and/or volume  overload. No regional wall motion abnormalities are noted. Global strain is -15.3%. The right ventricle is enlarged and hypokinetic. TAPSE measures 1.39 cm. Pulmonary embolism should be ruled out if not already done. Assessment/Plan:   41yo F with past medical history of recent COVID infection. Recently admitted to [facility] 10/1-10/12 (treated with decadron and toci)who presents to ED with new worsening hypoxia from her outpatient clinic. Acute Hypoxic Respiratory Failure 2/2 COVID Cardiomyopathy vs Acute CHF  Ruled out atypical pneumonia  Pt diagnosed w/ COVID on 10/01 during admission to [facility].  Treated w/ Deacdron & Tociluzimab, discharged 10/12 on 3L NC, Xarelto for one month and albuterol as needed, BNP on admission 7000, D-Dimer 333  CXR & CTPA on presentation w/ bibasilar GGO's  -procal slightly elevated 0.17  -CRP 190.0  -Lovenox 40mg BID (holding home Xarelto)  -supplemental O2 PRN, currently 5L NC improving  -6 mg Decadron IV QD  -Respiratory viral panel  -Respiratory cultures  -Legionella and Strep pneume urine Ag  -strict I/O's  -Cards consult   -lasix 40mg bid--5.8L UO  -ECHO: LV fxn nomral, mild septal flattening RV pressure or volume overload   -concern for thromboembolism, started on heparin gtt now  d/c   -Bilateral LE dopplers, prelim read: no evidence of  superficial venous thrombosis  -pulm following    Type II NSTEMI  Trop 0.08 w/ new T-wave inversions on intake EKG  -325mg ASA given in ED  -d/c Asa 81 mg QD  -d/c Atorvastatin 20 mg QD  -Cards C/s per above     UTI  Pt was urinary sx's + WBCs and LE on UA  -Urine Cx pending  -CXT 1000mg qd    Anxiety  Anxiety episodes since d/c from hospital related to COVID-19 PNA  -1 dose prn lorazepam 0.5 mg    Code Status: FULL  FEN: 4 carb choice  PPX: lovenox  DISPO: Shaun Avilez MD, PGY-1  10/21/21  8:20 AM    This patient has been staffed and discussed with Dr. Lance Ovalle

## 2021-10-22 LAB
ANION GAP SERPL CALCULATED.3IONS-SCNC: 12 MMOL/L (ref 3–16)
BUN BLDV-MCNC: 14 MG/DL (ref 7–20)
CALCIUM SERPL-MCNC: 9 MG/DL (ref 8.3–10.6)
CHLORIDE BLD-SCNC: 99 MMOL/L (ref 99–110)
CO2: 26 MMOL/L (ref 21–32)
CREAT SERPL-MCNC: 0.9 MG/DL (ref 0.6–1.1)
CULTURE, RESPIRATORY: NORMAL
GFR AFRICAN AMERICAN: >60
GFR NON-AFRICAN AMERICAN: >60
GLUCOSE BLD-MCNC: 112 MG/DL (ref 70–99)
GRAM STAIN RESULT: NORMAL
HCT VFR BLD CALC: 35.4 % (ref 36–48)
HEMOGLOBIN: 11.9 G/DL (ref 12–16)
MAGNESIUM: 2 MG/DL (ref 1.8–2.4)
MCH RBC QN AUTO: 32.3 PG (ref 26–34)
MCHC RBC AUTO-ENTMCNC: 33.5 G/DL (ref 31–36)
MCV RBC AUTO: 96.4 FL (ref 80–100)
PDW BLD-RTO: 14.1 % (ref 12.4–15.4)
PLATELET # BLD: 147 K/UL (ref 135–450)
PMV BLD AUTO: 9 FL (ref 5–10.5)
POTASSIUM SERPL-SCNC: 4.3 MMOL/L (ref 3.5–5.1)
RBC # BLD: 3.67 M/UL (ref 4–5.2)
SODIUM BLD-SCNC: 137 MMOL/L (ref 136–145)
WBC # BLD: 6.2 K/UL (ref 4–11)

## 2021-10-22 PROCEDURE — 83735 ASSAY OF MAGNESIUM: CPT

## 2021-10-22 PROCEDURE — 2580000003 HC RX 258: Performed by: STUDENT IN AN ORGANIZED HEALTH CARE EDUCATION/TRAINING PROGRAM

## 2021-10-22 PROCEDURE — 36415 COLL VENOUS BLD VENIPUNCTURE: CPT

## 2021-10-22 PROCEDURE — 2700000000 HC OXYGEN THERAPY PER DAY

## 2021-10-22 PROCEDURE — 99232 SBSQ HOSP IP/OBS MODERATE 35: CPT | Performed by: INTERNAL MEDICINE

## 2021-10-22 PROCEDURE — 6360000002 HC RX W HCPCS: Performed by: INTERNAL MEDICINE

## 2021-10-22 PROCEDURE — 94761 N-INVAS EAR/PLS OXIMETRY MLT: CPT

## 2021-10-22 PROCEDURE — 85027 COMPLETE CBC AUTOMATED: CPT

## 2021-10-22 PROCEDURE — 99232 SBSQ HOSP IP/OBS MODERATE 35: CPT | Performed by: NURSE PRACTITIONER

## 2021-10-22 PROCEDURE — 1200000000 HC SEMI PRIVATE

## 2021-10-22 PROCEDURE — 80048 BASIC METABOLIC PNL TOTAL CA: CPT

## 2021-10-22 PROCEDURE — 6360000002 HC RX W HCPCS: Performed by: STUDENT IN AN ORGANIZED HEALTH CARE EDUCATION/TRAINING PROGRAM

## 2021-10-22 PROCEDURE — 6370000000 HC RX 637 (ALT 250 FOR IP): Performed by: STUDENT IN AN ORGANIZED HEALTH CARE EDUCATION/TRAINING PROGRAM

## 2021-10-22 PROCEDURE — 94618 PULMONARY STRESS TESTING: CPT

## 2021-10-22 PROCEDURE — 94640 AIRWAY INHALATION TREATMENT: CPT

## 2021-10-22 RX ORDER — FUROSEMIDE 80 MG
80 TABLET ORAL 2 TIMES DAILY
Status: DISCONTINUED | OUTPATIENT
Start: 2021-10-23 | End: 2021-10-22

## 2021-10-22 RX ORDER — FUROSEMIDE 40 MG/1
40 TABLET ORAL 2 TIMES DAILY
Status: DISCONTINUED | OUTPATIENT
Start: 2021-10-23 | End: 2021-10-23 | Stop reason: HOSPADM

## 2021-10-22 RX ADMIN — ENOXAPARIN SODIUM 40 MG: 100 INJECTION SUBCUTANEOUS at 09:54

## 2021-10-22 RX ADMIN — FUROSEMIDE 40 MG: 10 INJECTION, SOLUTION INTRAMUSCULAR; INTRAVENOUS at 09:54

## 2021-10-22 RX ADMIN — ALBUTEROL SULFATE 2 PUFF: 90 AEROSOL, METERED RESPIRATORY (INHALATION) at 07:40

## 2021-10-22 RX ADMIN — GUAIFENESIN SYRUP AND DEXTROMETHORPHAN 5 ML: 100; 10 SYRUP ORAL at 02:59

## 2021-10-22 RX ADMIN — ACETAMINOPHEN 650 MG: 325 TABLET ORAL at 06:14

## 2021-10-22 RX ADMIN — DEXAMETHASONE SODIUM PHOSPHATE 6 MG: 4 INJECTION, SOLUTION INTRAMUSCULAR; INTRAVENOUS at 20:21

## 2021-10-22 RX ADMIN — FUROSEMIDE 40 MG: 10 INJECTION, SOLUTION INTRAMUSCULAR; INTRAVENOUS at 19:45

## 2021-10-22 RX ADMIN — ALBUTEROL SULFATE 2 PUFF: 90 AEROSOL, METERED RESPIRATORY (INHALATION) at 21:00

## 2021-10-22 RX ADMIN — Medication 1000 UNITS: at 09:54

## 2021-10-22 RX ADMIN — ALBUTEROL SULFATE 2 PUFF: 90 AEROSOL, METERED RESPIRATORY (INHALATION) at 14:42

## 2021-10-22 RX ADMIN — ATORVASTATIN CALCIUM 20 MG: 20 TABLET, FILM COATED ORAL at 20:21

## 2021-10-22 RX ADMIN — CEFTRIAXONE 1000 MG: 1 INJECTION, POWDER, FOR SOLUTION INTRAMUSCULAR; INTRAVENOUS at 20:30

## 2021-10-22 RX ADMIN — ALBUTEROL SULFATE 2 PUFF: 90 AEROSOL, METERED RESPIRATORY (INHALATION) at 11:03

## 2021-10-22 RX ADMIN — SODIUM CHLORIDE, PRESERVATIVE FREE 10 ML: 5 INJECTION INTRAVENOUS at 09:55

## 2021-10-22 RX ADMIN — SODIUM CHLORIDE, PRESERVATIVE FREE 10 ML: 5 INJECTION INTRAVENOUS at 20:31

## 2021-10-22 RX ADMIN — GUAIFENESIN SYRUP AND DEXTROMETHORPHAN 5 ML: 100; 10 SYRUP ORAL at 22:18

## 2021-10-22 ASSESSMENT — PAIN DESCRIPTION - ONSET: ONSET: ON-GOING

## 2021-10-22 ASSESSMENT — PAIN DESCRIPTION - PAIN TYPE: TYPE: ACUTE PAIN

## 2021-10-22 ASSESSMENT — PAIN DESCRIPTION - ORIENTATION: ORIENTATION: LEFT

## 2021-10-22 ASSESSMENT — PAIN SCALES - GENERAL
PAINLEVEL_OUTOF10: 0
PAINLEVEL_OUTOF10: 5
PAINLEVEL_OUTOF10: 0

## 2021-10-22 ASSESSMENT — PAIN DESCRIPTION - PROGRESSION: CLINICAL_PROGRESSION: NOT CHANGED

## 2021-10-22 ASSESSMENT — ENCOUNTER SYMPTOMS
SHORTNESS OF BREATH: 0
ABDOMINAL PAIN: 0

## 2021-10-22 ASSESSMENT — PAIN DESCRIPTION - LOCATION: LOCATION: CHEST

## 2021-10-22 ASSESSMENT — PAIN DESCRIPTION - FREQUENCY: FREQUENCY: INTERMITTENT

## 2021-10-22 ASSESSMENT — PAIN - FUNCTIONAL ASSESSMENT: PAIN_FUNCTIONAL_ASSESSMENT: PREVENTS OR INTERFERES SOME ACTIVE ACTIVITIES AND ADLS

## 2021-10-22 ASSESSMENT — PAIN DESCRIPTION - DESCRIPTORS: DESCRIPTORS: SHARP

## 2021-10-22 NOTE — CONSULTS
Comprehensive Nutrition Assessment    RECOMMENDATIONS:  1. PO Diet: RD modified diet to regular, low sodium diet   2. ONS: Disontinue High Calorie / High Protein Ensure Enlive     NUTRITION ASSESSMENT:   Type and Reason for Visit:  Type and Reason for Visit: Initial, Consult   Nutritional summary & status: RD consulted to see pt d/t pt with poor appetite/intake for 5 or more days. Pt states she was in isolation previously to moving floors and had good appetite and intake then. Pt states she is still hungry after her meals but is unable to order more due to the CC restriction. RD modifying diet order to promote adequate nutrition intake. Pt states she has not been drinking the ONS and prefers solid foods at this time. Pt reports her UBW is 253 lbs but she has been seeing a doctor outpatient to help with wt loss and has goal to lose more wt. Will continue to monitor po intakes at meals and add ONS if po intakes decrease.       Patient admitted d/t hypoxia    PMH significant for: obesity, hx of COVID-19    MALNUTRITION ASSESSMENT  Context of Malnutrition: Acute Illness   Malnutrition Status: No malnutrition  Findings of the 6 clinical characteristics of malnutrition (Minimum of 2 out of 6 clinical characteristics is required to make the diagnosis of moderate or severe Protein Calorie Malnutrition based on AND/ASPEN Guidelines):  Energy Intake: Mild decrease in energy intake (comment)   Energy Intake Time: x2days     NUTRITION DIAGNOSIS   · Inadequate oral intake related to inadequate protein-energy intake as evidenced by intake 26-50%, intake 51-75%    NUTRITION INTERVENTION  Food and/or Nutrient Delivery:  Modify Current Diet, Discontinue Oral Nutrition Supplement  Nutrition Education/Counseling:  No recommendation at this time   Goals:  Pt will consume >75% of all meals throughout adm       Nutrition Monitoring and Evaluation:   Food/Nutrient Intake Outcomes:  Food and Nutrient Intake  Physical Signs/Symptoms Outcomes:  Biochemical Data, Weight     OBJECTIVE DATA: Significant to nutrition assessment  · Nutrition-Focused Physical Findings: Nutrition Related Findings: lbm 10/19, BLE trace edema   · Labs: Reviewed  · Meds: Reviewed; Vit D, lasix   · Wounds: Wound Type: None     CURRENT NUTRITION THERAPIES  ADULT DIET; Regular; 4 carb choices (60 gm/meal); Low Sodium (2 gm)  ADULT ORAL NUTRITION SUPPLEMENT; Breakfast, Lunch, Dinner; Standard High Calorie/High Protein Oral Supplement     PO Intake: Average Meal Intake: % ((x 2 meals))   PO Supplement Intake:Average Supplements Intake: 0%  IVF: n/a    ANTHROPOMETRICS  Current Height: 5' 5\" (165.1 cm)  Current Weight: (!) 336 lb 6.8 oz (152.6 kg)    Admission weight: (!) 347 lb 4.8 oz (157.5 kg)  Ideal Body Weight (lbs) (Calculated): 125 lbs (Ideal Body Weight (Kg) (Calculated): 57 kg)  Usual Bodyweight: 253 lbs per pt   Weight Changes: -11 lbs in 3 days; diuretic tx noted and pt intentionally trying to lose wt pta      BMI BMI (Calculated): 56.1    Wt Readings from Last 50 Encounters:   10/22/21 (!) 336 lb 6.8 oz (152.6 kg)   10/19/21 (!) 347 lb 9.6 oz (157.7 kg)   10/12/21 (!) 350 lb 1.6 oz (158.8 kg)     COMPARATIVE STANDARDS  Energy (kcal):  1959-3988 (8-11); Weight Used for Energy Requirements:  Current (152.6 kg)   Protein (g):  102-113 (1.8-2); Weight Used for Protein Requirements:  Ideal        Fluid (ml/day):  >1500mL; Method Used for Fluid Requirements:  1 ml/kcal      The patient will still be monitored per nutrition standards of care. Consult dietitian if nutrition interventions essential to patient care is needed.      Gerhardt Finders, 64 [address]  [place]:  [phone]  Office:  [phone]

## 2021-10-22 NOTE — PROGRESS NOTES
10/22/21 1200   Resting (Room Air)   SpO2 86      Resting (On O2)   SpO2 93   HR 98   O2 Device Nasal cannula   O2 Flow Rate (l/min) 3 l/min   FIO2 (%) 32   During Walk (Room Air)   SpO2 84      Walk/Assistance Device Ambulation   Rate of Dyspnea 3   Symptoms Shortness of breath; Fatigue   During Walk (On O2)   SpO2 91   HR 99   O2 Device Nasal cannula   O2 Flow Rate (l/min) 3 l/min   Need Additional O2 Flow Rate Rows No   Walk/Assistance Device Ambulation   Rate of Dyspnea 1   Symptoms Fatigue   Comments pt already on home oxygen, she came to the hospital with her own tank   After Walk   SpO2 91   HR 98   O2 Device Nasal cannula   O2 Flow Rate (l/min) 3 l/min   FIO2 (%) 32   Does the Patient Qualify for Home O2 Yes   Liter Flow at Rest 3   Liter Flow on Exertion 3   Does the Patient Need Portable Oxygen Tanks No

## 2021-10-22 NOTE — PROGRESS NOTES
Physician Progress Note      Mary Kate Horowitz  [facility] #:                  [id number]  :                       1985  ADMIT DATE:       10/19/2021 12:00 PM  [address] [place] DATE:  RESPONDING  PROVIDER #:        Tresa Perez MD          QUERY TEXT:    Pt admitted with hypoxia and multifactorial pneumonia Pt noted to have recent   history of COVID hospitaliztion from 10/01 to 10/12. If possible, please   document in progress notes and discharge summary if you are evaluating and/or   treating any of the following: The medical record reflects the following:  Risk Factors: 43 y.o. female unvaccinated for Covid-19  Clinical Indicators: Hypoxic and SOB on presentation  Treatment: Decadron, Rocephin, supplemental high flow (8L) NC  Options provided:  -- Pneumonia is likely a sequela of prior COVID infection  -- Covid pneumonia related to presumed ongoing current or recurrent Covid-19   infection  -- Pneumonia is not a sequela of prior COVID infection  -- Other - I will add my own diagnosis  -- Disagree - Not applicable / Not valid  -- Disagree - Clinically unable to determine / Unknown  -- Refer to Clinical Documentation Reviewer    PROVIDER RESPONSE TEXT:    pneumonia is not a sequela of prior COVID infection.     Query created by: Richie Truong on 10/21/2021 6:05 PM      Electronically signed by:  Tresa Perez MD 10/22/2021 8:28 AM

## 2021-10-22 NOTE — PLAN OF CARE
Problem: Nutrition  Goal: Optimal nutrition therapy  Outcome: Ongoing    Nutrition Problem #1: Inadequate oral intake  Intervention: Food and/or Nutrient Delivery: Modify Current Diet, Discontinue Oral Nutrition Supplement  Nutritional Goals: Pt will consume >75% of all meals throughout adm

## 2021-10-22 NOTE — PLAN OF CARE
D: Slept for 4hrs over night without any distress. Still desat with minimal exertion sitting on side of bed or transferring to chair. O2 @ 4LPNC. Encouraged to sit up in chair with meals for at least 1hr. C/o some L-sided c/p pain with coughing see pain assessment. A: Cont to monitor during hourly rounds     Problem: Gas Exchange - Impaired:  Goal: Levels of oxygenation will improve  Description: Levels of oxygenation will improve  Outcome: Ongoing     Problem: Bleeding:  Goal: Will show no signs and symptoms of excessive bleeding  Description: Will show no signs and symptoms of excessive bleeding  Outcome: Ongoing     Problem: Pain:  Description: Pain management should include both nonpharmacologic and pharmacologic interventions.   Goal: Pain level will decrease  Description: Pain level will decrease  Outcome: Ongoing  Goal: Control of acute pain  Description: Control of acute pain  Outcome: Ongoing  Goal: Control of chronic pain  Description: Control of chronic pain  Outcome: Ongoing

## 2021-10-22 NOTE — PROGRESS NOTES
Progress Note    Admit Date: 10/19/2021  Day: 2  Diet: ADULT DIET; Regular; 4 carb choices (60 gm/meal); Low Sodium (2 gm)  ADULT ORAL NUTRITION SUPPLEMENT; Breakfast, Lunch, Dinner, HS Snack; Standard High Calorie/High Protein Oral Supplement    CC: hypoxia    Interval history: Pt seen at the bedside. NAEO. Down to 3L NC, sats in the 90's. Denies any fevers/chills, SOB, nausea/vomiting, abdominal pain. Before she came in, she states at home she was afraid to go to sleep and lie down. She just sat up right and unable to sleep. She had sharp chest pain yesterday that went towards her back. HPI: 42 yo F with PMH of COVID infection and obesity who presented to ED with hypoxia as directed from her PCP visit. Her O2 sats at visit were in low 70's with ambulation on 5LNC. Tachycardic to 130's. Patient was recently treated at [facility] for Covid infection with an accompanying STEVE from the dates of 10/01 to 10/12. Patient states that she feels that she was first symptomatic with Covid around 23 August worsening SOB. Patient was admitted to [facility], and received treatment with Decadron and tocilizumab. Patient initially improved with hospitalization and treatment, and was ultimately discharged on 3 L nasal cannula for home oxygen. Patient states that while at home she has had periods of hypoxia to the mid 80s with activity and symptomatic shortness of breath. Patient also endorses an ongoing cough, however her sputum has never been productive patient states she has had no fevers or chills, no loss of taste or smell, no nausea/vomiting, no diarrhea or constipation. Patient has also had some urinary symptoms since being catheterized at [facility], in particular feelings of urgency, frequency and dysuria. In ED CXR diffuse coarse interstitial and alveolar consolidations which have been progressive.  CTPA ruled out PE, but had ground glass opacities consistent with atypical or viral pneumonia. proBNP to over 5000, moderately elevated troponin 0.08, dirty UA. Pt admitted for ongoing SOB with concern for ongoing COVID infection versus a new cardiomyopathy or super imposed atypical pneumonia and UTI    ECHO on 10/11 normal EF and no diastolic dysfxn. Medications:     Scheduled Meds:   enoxaparin  40 mg SubCUTAneous Daily    Vitamin D  1,000 Units Oral Daily    furosemide  40 mg IntraVENous BID    sodium chloride flush  5-40 mL IntraVENous 2 times per day    cefTRIAXone (ROCEPHIN) IV  1,000 mg IntraVENous Q24H    dexamethasone  6 mg IntraVENous Q24H    atorvastatin  20 mg Oral Nightly    albuterol sulfate HFA  2 puff Inhalation 4x daily     Continuous Infusions:    PRN Meds:sodium chloride, guaiFENesin-dextromethorphan, sodium chloride flush, ondansetron **OR** ondansetron, polyethylene glycol, acetaminophen **OR** acetaminophen, perflutren lipid microspheres, albuterol sulfate HFA    Objective:   Vitals:   T-max:  Patient Vitals for the past 8 hrs:   BP Temp Temp src Pulse Resp SpO2 Weight   10/22/21 0535 109/77 98 °F (36.7 °C) Oral 75 20 97 % --   10/22/21 0304 -- -- -- -- -- -- (!) 336 lb 6.8 oz (152.6 kg)   10/22/21 0258 -- -- -- -- -- 90 % --   10/22/21 0247 110/79 97.2 °F (36.2 °C) Oral 81 20 93 % --   10/22/21 0240 -- -- -- -- -- 97 % --   10/22/21 0105 -- -- -- -- 20 97 % --   10/22/21 0000 -- -- -- -- 20 99 % --       Intake/Output Summary (Last 24 hours) at 10/22/2021 0604  Last data filed at 10/22/2021 0600  Gross per 24 hour   Intake 2182.77 ml   Output 4100 ml   Net -1917.23 ml       Review of Systems   Constitutional: Positive for activity change. Respiratory: Negative for shortness of breath. Cardiovascular: Negative for chest pain. Gastrointestinal: Negative for abdominal pain. Genitourinary: Negative for difficulty urinating. Physical Exam  Constitutional:       Appearance: She is obese. HENT:      Head: Normocephalic and atraumatic.    Eyes: Extraocular Movements: Extraocular movements intact. Conjunctiva/sclera: Conjunctivae normal.   Cardiovascular:      Rate and Rhythm: Tachycardia present. Heart sounds: Normal heart sounds. Pulmonary:      Effort: Pulmonary effort is normal.      Breath sounds: Rales present. Abdominal:      Palpations: Abdomen is soft. Musculoskeletal:         General: Normal range of motion. Cervical back: Normal range of motion. Neurological:      General: No focal deficit present. Mental Status: She is alert. Psychiatric:         Mood and Affect: Mood normal.         LABS:    CBC:   Recent Labs     10/19/21  1301 10/20/21  0527 10/21/21  1536   WBC 11.5* 8.8 10.5   HGB 12.8 12.1 12.9   HCT 38.7 35.1* 37.4    147 155   MCV 96.8 95.2 94.7     Renal:    Recent Labs     10/19/21  1301 10/19/21  1301 10/20/21  0527 10/20/21  1821 10/21/21  0421      < > 141 138 138   K 3.5   < > 3.9 3.6 3.9      < > 103 98* 99   CO2 28   < > 25 27 27   BUN 6*   < > 10 14 13   CREATININE 0.9   < > 0.9 0.9 0.9   GLUCOSE 110*   < > 160* 128* 123*   CALCIUM 8.8   < > 8.6 8.5 8.6   MG 1.70*  --   --  2.70* 2.30   PHOS  --   --   --  3.7 3.8   ANIONGAP 12   < > 13 13 12    < > = values in this interval not displayed. Hepatic:   Recent Labs     10/19/21  1301 10/20/21  1821 10/21/21  0421   AST 26  --   --    ALT 27  --   --    BILITOT 0.7  --   --    BILIDIR <0.2  --   --    PROT 7.1  --   --    LABALBU 3.5 3.3* 3.2*   ALKPHOS 80  --   --      Troponin:   Recent Labs     10/19/21  1301 10/19/21  1603 10/20/21  0838   TROPONINI 0.08* 0.06* 0.03*     BNP: No results for input(s): BNP in the last 72 hours. Lipids: No results for input(s): CHOL, HDL in the last 72 hours. Invalid input(s): LDLCALCU, TRIGLYCERIDE  ABGs:  No results for input(s): PHART, GLR3DUB, PO2ART, FIJ8NIA, BEART, THGBART, D7DBKMOB, NXZ3KOF in the last 72 hours.     INR:   Recent Labs     10/19/21  1301 10/20/21  1131   INR 2.10* 1.39* Lactate: No results for input(s): LACTATE in the last 72 hours. Cultures:  -----------------------------------------------------------------  RAD:   VL Extremity Venous Bilateral         CTA PULMONARY W CONTRAST   Final Result      1. No evidence of pulmonary embolus. 2. Bilateral lower lung peripheral predominant patchy groundglass opacities and a minor component of consolidative opacity most compatible with multifocal pneumonia. Differential considerations include Covid-19 or other viral pneumonia versus other    infectious or inflammatory pneumonitis. XR CHEST PORTABLE   Final Result      Diffuse coarse interstitial and alveolar consolidation progressive since 10/10/2021 and suspicious for atypical pneumonia. Stable cardiac mediastinal silhouette. 10/20/21 ECHO: Left ventricular cavity size is normal. There is borderline left ventricular hypertrophy. Overall left ventricular systolic function appears  normal with an ejection fraction of 60%. There is mild septal flattening consistent with right ventricular pressure and/or volume  overload. No regional wall motion abnormalities are noted. Global strain is -15.3%. The right ventricle is enlarged and hypokinetic. TAPSE measures 1.39 cm. Pulmonary embolism should be ruled out if not already done. Assessment/Plan:   41yo F with past medical history of recent COVID infection. Recently admitted to [facility] 10/1-10/12 (treated with decadron and toci)who presents to ED with new worsening hypoxia from her outpatient clinic. Acute Hypoxic Respiratory Failure 2/2 COVID Cardiomyopathy vs Acute CHF  Ruled out atypical pneumonia  Pt diagnosed w/ COVID on 10/01 during admission to [facility].  Treated w/ Deacdron & Tociluzimab, discharged 10/12 on 3L NC, Xarelto for one month and albuterol as needed, BNP on admission 7000, D-Dimer 333  CXR & CTPA on presentation w/ bibasilar GGO's  -procal slightly elevated 0.17  -.0  -Lovenox 40mg BID (holding home Xarelto)  -supplemental O2 PRN, currently 5L NC improving  -6 mg Decadron IV QD  -Respiratory viral panel-neg  -Respiratory cultures-neg  -Legionella and Strep pneume urine Ag-neg  -strict I/O's  -Cards consult   -lasix 40mg bid--4.1L UO  -ECHO: LV fxn nomral, mild septal flattening RV pressure or volume overload   -concern for thromboembolism, started on heparin gtt now  d/c   -Bilateral LE dopplers, prelim read: no evidence of  superficial venous thrombosis  -pulm following    Type II NSTEMI--resolved  Trop 0.08 w/ new T-wave inversions on intake EKG  -325mg ASA given in ED  -d/c Asa 81 mg QD  -d/c Atorvastatin 20 mg QD  -Cards C/s per above     UTI  Pt was urinary sx's + WBCs and LE on UA  -Urine Cx pending  -CXT 1000mg qd, cont for 1 more day then d/c    Anxiety  Anxiety episodes since d/c from hospital related to COVID-19 PNA  -1 dose prn lorazepam 0.5 mg    Code Status: FULL  FEN: 4 carb choice  PPX: lovenox  DISPO: Georgie Drummond MD, PGY-1  10/22/21  6:04 AM    This patient has been staffed and discussed with Dr. Emerita Anna

## 2021-10-22 NOTE — PROGRESS NOTES
CC:  HPI: Had recent admission to [facility] for COVID about a couple weeks ago. Has had increasing sob, BUCIO, and had one day where she had some chest pain that was sharp and radiated to her back. No n/v/syncope. Has had some LE edema but she has not been able to lay flat, and she was \"afraid to go to sleep\" during this time as well.     She came to PCP where she was found to be hypoxic. She was sent to ER for O2 support and hypoxia    S: Has BUCIO. Requiring 3 liters oxygen. No chest pain. Tele: Sinus tachy    O:  Physical Exam:  /79   Pulse 101   Temp 98.2 °F (36.8 °C) (Axillary)   Resp 20   Ht 5' 5\" (1.651 m)   Wt (!) 336 lb 6.8 oz (152.6 kg)   LMP 10/01/2021 (Approximate)   SpO2 96%   BMI 55.98 kg/m²    General (appearance):  No acute distress  Eyes: anicteric   Neck: soft, No JVD  Ears/Nose/Mouth/Thorat: No cyanosis  CV: Reg tachy   Respiratory:  Normal effort. No crackles or wheezes  GI: soft, non-tender, non-distended  Skin: Warm, dry. No rashes  Neuro/Psych: Alert and oriented x 3. Appropriate behavior  Ext:  No c/c.  No significant edema   Pulses:  2+ radial     I.O's= -7.2 L     Weight  Admission: Weight: (!) 347 lb 4.8 oz (157.5 kg)   Today: Weight: (!) 336 lb 6.8 oz (152.6 kg)    CBC:   Recent Labs     10/20/21  0527 10/21/21  1536 10/22/21  0709   WBC 8.8 10.5 6.2   HGB 12.1 12.9 11.9*   HCT 35.1* 37.4 35.4*   MCV 95.2 94.7 96.4    155 147     BMP:   Recent Labs     10/20/21  1821 10/21/21  0421 10/22/21  0709    138 137   K 3.6 3.9 4.3   CL 98* 99 99   CO2 27 27 26   PHOS 3.7 3.8  --    BUN 14 13 14   CREATININE 0.9 0.9 0.9     Mag:   Lab Results   Component Value Date    MG 2.00 10/22/2021     LIVER PROFILE:   Recent Labs     10/19/21  1301   AST 26   ALT 27   BILIDIR <0.2   BILITOT 0.7   ALKPHOS 80     PT/INR:   Recent Labs     10/19/21  1301 10/20/21  1131   PROTIME 24.5* 15.9*   INR 2.10* 1.39*     Pro-BNP:   Lab Results   Component Value Date    PROBNP 7,164 10/19/2021 CARDIAC ENZYMES:   Recent Labs     10/19/21  1301 10/19/21  1603 10/20/21  0838   TROPONINI 0.08* 0.06* 0.03*         Imaging:    10/20/2021 Limited echo   Left ventricular cavity size is normal. There is borderline left ventricular   hypertrophy. Overall left ventricular systolic function appears normal with   an ejection fraction of 60%. There is mild septal flattening consistent with   right ventricular pressure and/or volume overload. No regional wall motion   abnormalities are noted. Global strain is -15.3%. The right ventricle is enlarged and hypokinetic. TAPSE measures 1.39 cm. Pulmonary embolism should be ruled out if not already done. 10/20/2021 Venous duplex: Neg for DVT bilaterally     10/19/2021 CTA PE     1. No evidence of pulmonary embolus.     2. Bilateral lower lung peripheral predominant patchy groundglass opacities and a minor component of consolidative opacity most compatible with multifocal pneumonia. Differential considerations include Covid-19 or other viral pneumonia versus other    infectious or inflammatory pneumonitis. 10/19/2021 CXR:     Diffuse coarse interstitial and alveolar consolidation progressive since 10/10/2021 and suspicious for atypical pneumonia.       Stable cardiac mediastinal silhouette. 10/11/2021 VQ: low probability of PE    10/11/2021 Echo:   -Normal global systolic function with an ejection fraction estimated at   65-70%.   -No obvious regional wall motion abnormalities noted. -There is trivial tricuspid regurgitation with a RVSP estimation of 29   mmHg.   -Normal diastolic function. Avg. E/e'=7.9   -No pericardial effusion noted. Assessment:  43 y.o. with SOB and hypoxia.  Had recent Miguelito 10/1/2021  Issues:  -SOB  -Hypoxia  -Elevated Tn  -Abnl ECG (diffuse TWI)  -HTN  -Morbid obesity  -PNA  -? dCHF given CT and elevated BNP    Plan:  -Keep K>4, Mg>2.   -statin  -Lasix 40 mg IV BID

## 2021-10-22 NOTE — PROGRESS NOTES
Pulmonary Progress Note      Reason for Consult: \"discharged 2 weeks ago after rx for covid pna, back with increasing o2 requirements and increasing CRP\"  Requesting Physician: Dr. Ruffin Ahumada  Subjective:   No acute overnight events. O2 requirement has gone down to 3L NC. 24H Uoutput 4.1L, net -1.7L. Patient seen and examined at bedside. She states her SOB is better, and her nasal clogging and bleeding has improved significantly. She denies fever, night sweats, chills, chest pain, palpitations, nausea, vomiting, diarrhea, dysuria. Past Medical History:      Diagnosis Date    COVID-19     Obesity       Past Surgical History:        Procedure Laterality Date    DILATION AND CURETTAGE OF UTERUS       Current Medications:     enoxaparin  40 mg SubCUTAneous Daily    Vitamin D  1,000 Units Oral Daily    furosemide  40 mg IntraVENous BID    sodium chloride flush  5-40 mL IntraVENous 2 times per day    cefTRIAXone (ROCEPHIN) IV  1,000 mg IntraVENous Q24H    dexamethasone  6 mg IntraVENous Q24H    atorvastatin  20 mg Oral Nightly    albuterol sulfate HFA  2 puff Inhalation 4x daily          Social History:   · TOBACCO:   reports that she has never smoked. She has never used smokeless tobacco.  · ETOH:   reports previous alcohol use.   · DRUGS : none      Family History:       Family history unknown: Yes         REVIEW OF SYSTEMS:    See above    Objective:   PHYSICAL EXAM:      VITALS:  /77   Pulse 75   Temp 98 °F (36.7 °C) (Oral)   Resp 20   Ht 5' 5\" (1.651 m)   Wt (!) 336 lb 6.8 oz (152.6 kg)   LMP 10/01/2021 (Approximate)   SpO2 96%   BMI 55.98 kg/m²   24HR INTAKE/OUTPUT:      Intake/Output Summary (Last 24 hours) at 10/22/2021 0902  Last data filed at 10/22/2021 0615  Gross per 24 hour   Intake 2302.77 ml   Output 4100 ml   Net -1797.23 ml     CURRENT PULSE OXIMETRY:  SpO2: 96 %  24HR PULSE OXIMETRY RANGE:  SpO2  Av.9 %  Min: 85 %  Max: 100 %    CONSTITUTIONAL:  awake, alert, cooperative, no apparent distress, and appears stated age  EYES: Pupils equal round and reactive to light. Normal conjunctiva  NECK:  Supple, symmetrical, trachea midline, no adenopathy, thyroid symmetric, not enlarged and no tenderness, skin normal  LUNGS:  Clear breathing sounds. no increased work of breathing. No accessory muscle use  CARDIOVASCULAR:  Regular rate and rhythm, normal S1 and S2, no edema, and no JVD  ABDOMEN:  normal bowel sounds, non-distended and no masses palpated, and no tenderness to palpation. No hepatospleenomegaly  MUSCULOSKELETAL: No obvious misalignment or effusion of the joints. No clubbing, cyanosis of the digits. SKIN:  normal skin color, texture, turgor and no redness, warmth, or swelling.  No palpable nodules    DATA:    Old records have been reviewed  CBC with Differential:    Lab Results   Component Value Date    WBC 6.2 10/22/2021    RBC 3.67 10/22/2021    HGB 11.9 10/22/2021    HCT 35.4 10/22/2021     10/22/2021    MCV 96.4 10/22/2021    MCH 32.3 10/22/2021    MCHC 33.5 10/22/2021    RDW 14.1 10/22/2021    BANDSPCT 4 10/10/2021    METASPCT 1 10/06/2021    LYMPHOPCT 9.1 10/20/2021    MONOPCT 2.1 10/20/2021    BASOPCT 0.2 10/20/2021    MONOSABS 0.2 10/20/2021    LYMPHSABS 0.8 10/20/2021    EOSABS 0.0 10/20/2021    BASOSABS 0.0 10/20/2021     BMP:    Lab Results   Component Value Date     10/22/2021    K 4.3 10/22/2021    K 3.9 10/20/2021    CL 99 10/22/2021    CO2 26 10/22/2021    BUN 14 10/22/2021    CREATININE 0.9 10/22/2021    CALCIUM 9.0 10/22/2021    GFRAA >60 10/22/2021    LABGLOM >60 10/22/2021    GLUCOSE 112 10/22/2021     Hepatic Function Panel:    Lab Results   Component Value Date    ALKPHOS 80 10/19/2021    ALT 27 10/19/2021    AST 26 10/19/2021    PROT 7.1 10/19/2021    BILITOT 0.7 10/19/2021    BILIDIR <0.2 10/19/2021    IBILI see below 10/19/2021     ABG:  No results found for: RDZ3EMQ, BEART, G8MGXNYQ, PHART, THGBART, PLI8HQP, PO2ART, YEA5RKQ    Cultures:   Blood Culture:    Sputum Culture:        Radiology Review:  All pertinent images / reports were reviewed as a part of this visit. CTPA 10/19/2021     1. No evidence of pulmonary embolus.     2. Bilateral lower lung peripheral predominant patchy groundglass opacities and a minor component of consolidative opacity most compatible with multifocal pneumonia. Differential considerations include Covid-19 or other viral pneumonia versus other    infectious or inflammatory pneumonitis. PFTs:      Echo 10/20/2021  Left ventricular cavity size is normal. There is borderline left ventricular   hypertrophy. Overall left ventricular systolic function appears normal with   an ejection fraction of 60%. There is mild septal flattening consistent with   right ventricular pressure and/or volume overload. No regional wall motion   abnormalities are noted. Global strain is -15.3%. The right ventricle is enlarged and hypokinetic. TAPSE measures 1.39 cm. Pulmonary embolism should be ruled out if not already done. Doppler Studies:  Venous doppler 10/20/2021     Tech Comments   Right   There is no evidence of deep or superficial venous thrombosis involving the   right lower extremity. Color flow imaging was used in some areas to better   visualize the veins. The peroneal vein is not visualized. Left   There is no evidence of deep or superficial venous thrombosis involving the   left lower extremity. Color flow imaging was used in some areas to better   visualize the veins. The peroneal vein is not visualized. There is no previous exam for comparison. Assessment/Plan   Acute hypoxic respiratory failure 2/2 Covid-19 PNA    And cardiomyopathy with evidence of volume overload and right heart side failure. Improving. Currently on 3L. Still on steroids and lasix. Good urine output (4.1L past 24h).    - wean O2 as tolerated; goal >=90%  - continue steroids;at current dose for total of 10 days  - lasix per cardiology recommendations as tolerated  - daily weight, strict intake/output  - f/u blood culture X2   - no indications for tocilizumab or baricitinib at this time as pt received tocilizumab prior hospitzlization      Amanda Osman MD  PG-Y3    Patient has been staffed with attending Dr. Severiano Lieberman    Patient seen, examined and discussed with the resident and I agree with the assessment and plan as edited above. Oxygen requirement has come down further and she is back on her previous 3 L. She does better if she puts the nasal cannula in her mouth as her nose is still clogged from previous epistaxis when on blood thinners. 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 She should continue on diuretics and follow-up with cardiology as an outpatient. From a pulmonary perspective she could discharge as early as tomorrow.     Lidia Diaz MD

## 2021-10-23 VITALS
RESPIRATION RATE: 18 BRPM | HEART RATE: 85 BPM | BODY MASS INDEX: 48.82 KG/M2 | DIASTOLIC BLOOD PRESSURE: 79 MMHG | SYSTOLIC BLOOD PRESSURE: 119 MMHG | TEMPERATURE: 97.9 F | OXYGEN SATURATION: 95 % | WEIGHT: 293 LBS | HEIGHT: 65 IN

## 2021-10-23 LAB
ANION GAP SERPL CALCULATED.3IONS-SCNC: 14 MMOL/L (ref 3–16)
BUN BLDV-MCNC: 15 MG/DL (ref 7–20)
CALCIUM SERPL-MCNC: 9.5 MG/DL (ref 8.3–10.6)
CHLORIDE BLD-SCNC: 95 MMOL/L (ref 99–110)
CO2: 27 MMOL/L (ref 21–32)
CREAT SERPL-MCNC: 0.9 MG/DL (ref 0.6–1.1)
GFR AFRICAN AMERICAN: >60
GFR NON-AFRICAN AMERICAN: >60
GLUCOSE BLD-MCNC: 90 MG/DL (ref 70–99)
HCT VFR BLD CALC: 39.9 % (ref 36–48)
HEMOGLOBIN: 13.9 G/DL (ref 12–16)
MAGNESIUM: 2 MG/DL (ref 1.8–2.4)
MCH RBC QN AUTO: 32.8 PG (ref 26–34)
MCHC RBC AUTO-ENTMCNC: 34.8 G/DL (ref 31–36)
MCV RBC AUTO: 94.2 FL (ref 80–100)
PDW BLD-RTO: 14 % (ref 12.4–15.4)
PLATELET # BLD: 185 K/UL (ref 135–450)
PMV BLD AUTO: 9.5 FL (ref 5–10.5)
POTASSIUM SERPL-SCNC: 3.7 MMOL/L (ref 3.5–5.1)
RBC # BLD: 4.24 M/UL (ref 4–5.2)
SODIUM BLD-SCNC: 136 MMOL/L (ref 136–145)
WBC # BLD: 8.5 K/UL (ref 4–11)

## 2021-10-23 PROCEDURE — 2580000003 HC RX 258: Performed by: STUDENT IN AN ORGANIZED HEALTH CARE EDUCATION/TRAINING PROGRAM

## 2021-10-23 PROCEDURE — 6370000000 HC RX 637 (ALT 250 FOR IP): Performed by: STUDENT IN AN ORGANIZED HEALTH CARE EDUCATION/TRAINING PROGRAM

## 2021-10-23 PROCEDURE — 85027 COMPLETE CBC AUTOMATED: CPT

## 2021-10-23 PROCEDURE — 80048 BASIC METABOLIC PNL TOTAL CA: CPT

## 2021-10-23 PROCEDURE — 6360000002 HC RX W HCPCS: Performed by: STUDENT IN AN ORGANIZED HEALTH CARE EDUCATION/TRAINING PROGRAM

## 2021-10-23 PROCEDURE — 99232 SBSQ HOSP IP/OBS MODERATE 35: CPT | Performed by: NURSE PRACTITIONER

## 2021-10-23 PROCEDURE — 2700000000 HC OXYGEN THERAPY PER DAY

## 2021-10-23 PROCEDURE — 94640 AIRWAY INHALATION TREATMENT: CPT

## 2021-10-23 PROCEDURE — 94664 DEMO&/EVAL PT USE INHALER: CPT

## 2021-10-23 PROCEDURE — 83735 ASSAY OF MAGNESIUM: CPT

## 2021-10-23 PROCEDURE — 36415 COLL VENOUS BLD VENIPUNCTURE: CPT

## 2021-10-23 PROCEDURE — 94761 N-INVAS EAR/PLS OXIMETRY MLT: CPT

## 2021-10-23 RX ORDER — ATORVASTATIN CALCIUM 20 MG/1
20 TABLET, FILM COATED ORAL NIGHTLY
Qty: 30 TABLET | Refills: 3 | Status: CANCELLED | OUTPATIENT
Start: 2021-10-23

## 2021-10-23 RX ORDER — CHOLECALCIFEROL (VITAMIN D3) 25 MCG
1000 TABLET ORAL DAILY
Qty: 60 TABLET | Refills: 3 | Status: CANCELLED | OUTPATIENT
Start: 2021-10-23

## 2021-10-23 RX ORDER — DEXAMETHASONE 4 MG/1
6 TABLET ORAL DAILY
Status: DISCONTINUED | OUTPATIENT
Start: 2021-10-23 | End: 2021-10-23 | Stop reason: HOSPADM

## 2021-10-23 RX ORDER — POTASSIUM CHLORIDE 20 MEQ/1
20 TABLET, EXTENDED RELEASE ORAL DAILY
Qty: 30 TABLET | Refills: 0 | Status: SHIPPED | OUTPATIENT
Start: 2021-10-23 | End: 2021-10-26 | Stop reason: SDUPTHER

## 2021-10-23 RX ORDER — DEXAMETHASONE 6 MG/1
6 TABLET ORAL DAILY
Qty: 5 TABLET | Refills: 0 | Status: SHIPPED | OUTPATIENT
Start: 2021-10-24 | End: 2021-10-29

## 2021-10-23 RX ORDER — FUROSEMIDE 40 MG/1
40 TABLET ORAL DAILY
Qty: 60 TABLET | Refills: 3 | Status: CANCELLED | OUTPATIENT
Start: 2021-10-23

## 2021-10-23 RX ORDER — ATORVASTATIN CALCIUM 20 MG/1
20 TABLET, FILM COATED ORAL NIGHTLY
Qty: 30 TABLET | Refills: 0 | Status: CANCELLED | OUTPATIENT
Start: 2021-10-23

## 2021-10-23 RX ORDER — FUROSEMIDE 40 MG/1
40 TABLET ORAL 2 TIMES DAILY
Qty: 60 TABLET | Refills: 3 | Status: CANCELLED | OUTPATIENT
Start: 2021-10-23

## 2021-10-23 RX ORDER — FUROSEMIDE 80 MG
80 TABLET ORAL DAILY
Qty: 60 TABLET | Refills: 0 | Status: SHIPPED | OUTPATIENT
Start: 2021-10-23 | End: 2021-11-02

## 2021-10-23 RX ORDER — DEXAMETHASONE 6 MG/1
6 TABLET ORAL DAILY
Qty: 6 TABLET | Refills: 0 | Status: CANCELLED | OUTPATIENT
Start: 2021-10-23 | End: 2021-10-29

## 2021-10-23 RX ORDER — CHOLECALCIFEROL (VITAMIN D3) 25 MCG
1000 TABLET ORAL DAILY
Qty: 60 TABLET | Refills: 0 | Status: SHIPPED | OUTPATIENT
Start: 2021-10-24 | End: 2021-10-26 | Stop reason: ALTCHOICE

## 2021-10-23 RX ORDER — ATORVASTATIN CALCIUM 20 MG/1
20 TABLET, FILM COATED ORAL NIGHTLY
Qty: 30 TABLET | Refills: 3 | Status: SHIPPED | OUTPATIENT
Start: 2021-10-23 | End: 2022-01-26 | Stop reason: ALTCHOICE

## 2021-10-23 RX ORDER — FUROSEMIDE 80 MG
80 TABLET ORAL DAILY
Qty: 30 TABLET | Refills: 0 | Status: SHIPPED | OUTPATIENT
Start: 2021-10-23 | End: 2021-11-02

## 2021-10-23 RX ADMIN — ALBUTEROL SULFATE 2 PUFF: 90 AEROSOL, METERED RESPIRATORY (INHALATION) at 08:03

## 2021-10-23 RX ADMIN — ENOXAPARIN SODIUM 40 MG: 100 INJECTION SUBCUTANEOUS at 08:46

## 2021-10-23 RX ADMIN — ALBUTEROL SULFATE 2 PUFF: 90 AEROSOL, METERED RESPIRATORY (INHALATION) at 12:00

## 2021-10-23 RX ADMIN — SODIUM CHLORIDE, PRESERVATIVE FREE 10 ML: 5 INJECTION INTRAVENOUS at 08:54

## 2021-10-23 RX ADMIN — ALBUTEROL SULFATE 2 PUFF: 90 AEROSOL, METERED RESPIRATORY (INHALATION) at 15:52

## 2021-10-23 RX ADMIN — Medication 1000 UNITS: at 08:47

## 2021-10-23 RX ADMIN — FUROSEMIDE 40 MG: 40 TABLET ORAL at 08:47

## 2021-10-23 RX ADMIN — DEXAMETHASONE 6 MG: 4 TABLET ORAL at 08:47

## 2021-10-23 ASSESSMENT — PAIN SCALES - GENERAL: PAINLEVEL_OUTOF10: 0

## 2021-10-23 NOTE — PROGRESS NOTES
[facility]    Respiratory Therapy     Home Oxygen Evaluation        Name: Shanice Jimenez  Medical Record Number: [id number]  Age: 43 y.o. Gender:  female   : 1985  Today's date: 10/23/2021  Room: [address] [place] [address]      Assessment        /80   Pulse 89   Temp 99 °F (37.2 °C) (Oral)   Resp 19   Ht 5' 5\" (1.651 m)   Wt (!) 335 lb 8.6 oz (152.2 kg)   LMP 10/01/2021 (Approximate)   SpO2 96%   BMI 55.84 kg/m²     Patient Active Problem List   Diagnosis    Pneumonia due to COVID-19 virus    STEVE [facility]acute kidney [facility]) ([facility] [address]    Hypotension due to hypovolemia    Morbid obesity (HCC)    Anxiety    Acute respiratory failure with hypoxia (HCC)       Social History:  Social History     Tobacco Use    Smoking status: Never Smoker    Smokeless tobacco: Never Used   Substance Use Topics    Alcohol use: Not Currently    Drug use: Never       Patient Room Air saturation at rest 90  %  Patient Room Air saturation with Activity 80 %    Oxygen saturations of 88% or less on RA qualifies patient for Home Oxygen    Patient resting on 2  lmp  with an oxygen saturation of  94 %     Patient  with Activity on 2 lpm with an oxygen saturation of 85%    Patient  with Activity  on 3 lpm with an oxygen saturation of 92%      Qualifying patient for home oxygen with ambulation and continuous flow  @ 3 lpm.      In your clinical assessment does the Patient Require Portable Oxygen Tanks?     Yes               Patient/caregiver was educated on Home Oxygen process:  Yes      Level of patient/caregiver understanding able to:   [x] Verbalize understanding   [] Demonstrate understanding       [] Teach back        [] Needs reinforcement        []  No available caregiver               []  Other:     Response to education:  Good     Time Spent with Home O2 Set Up:  20  minutes     Wale Terrell RCP on 10/23/2021 at 10:37 AM                 .

## 2021-10-23 NOTE — DISCHARGE SUMMARY
INTERNAL MEDICINE    RESIDENT DISCHARGE SUMMARY   Discharge Summaries      Patient ID: France Perez   Gender: female      :  1985  AGE: 43 y.o. MRN:  [id number]  Code Status: Full Code   PCP: Walter Diggs MD    Admit date:  10/19/2021      Discharge date:  No discharge date for patient encounter. Admitting Physician:  Zenaida Palm MD    Discharge Physician: Joseph Soriano MD     Admission Condition:  serious  Discharged Condition:  stable Stable    Discharge Diagnoses: Active Hospital Problems    Diagnosis Date Noted    Acute respiratory failure with hypoxia ([address] [J96.01] 10/19/2021       The patient was seen and examined on day of discharge and this discharge summary is in conjunction with any daily progress note from day of discharge. Hospital Course:   France Perez is a 42 yo F with PMH of COVID infection and obesity who presented to ED with hypoxia as directed from her PCP visit. Her O2 sats at visit were in low 70's with ambulation on 5LNC. Tachycardic to 130's. Patient was recently treated at [facility] for Covid infection with an accompanying STEVE from the dates of 10/01 to 10/12. She presented to the ED for new worsening hypoxia from her outpatient clinic. In ED CXR diffuse coarse interstitial and alveolar consolidations which have been progressive. CTPA ruled out PE, but had ground glass opacities consistent with atypical or viral pneumonia. proBNP to over 5000, moderately elevated troponin 0.08, dirty UA. Pt was admitted for acute hypoxic respiratory failure secondary to COVID cardiomyopathy vs acute CHF.      Pt was started on Lasix 40 IV daily. Cardiology was consulted who recommended Lasix 40 mg IV BID. She diuresed well and has been improving oxygenation requirements, from 8L HFNC in the ED to 3L on discharge. An echocardiogram showed LVEF 60% and enlarged hypokinetic right ventricle.  Due to concern for thromboembolism she started on a heparin gtt, though she had issues with nosebleeds and venous dopplers were negative so this was discontinued. Pulmonology was consulted due to her recent COVID infection. They recommended the patient complete 10 days of decadron. Atypical pneumonia was ruled out. Respiratory viral panel and respiratory cultures were negative. Legionella and strep pneumo urine antigen were negative. Blood cultures with no growth to date. She received ceftriaxone for 5 days due suspected UTI, pt had urinary sxs and WBC and LE on U/A. Michele Britton She will be continued on PO lasix. She will be continued on xarelto for 10 more days. Pt had a home O2 evaluation prior to discharge. She will resume her home medications on discharge. Patient discharged in stable condition. Disposition:  Home    Physical Exam Performed:     /80   Pulse 89   Temp 99 °F (37.2 °C) (Oral)   Resp 19   Ht 5' 5\" (1.651 m)   Wt (!) 335 lb 8.6 oz (152.2 kg)   LMP 10/01/2021 (Approximate)   SpO2 96%   BMI 55.84 kg/m²     Physical Exam  Constitutional:       Appearance: She is obese. HENT:      Head: Normocephalic and atraumatic. Eyes:      Extraocular Movements: Extraocular movements intact. Conjunctiva/sclera: Conjunctivae normal.   Cardiovascular:      Rate and Rhythm: Normal rate and regular rhythm. Pulses: Normal pulses. Pulmonary:      Effort: Pulmonary effort is normal.      Breath sounds: Rales present. Abdominal:      General: Abdomen is flat. Palpations: Abdomen is soft. Musculoskeletal:         General: Normal range of motion. Cervical back: Normal range of motion. Right lower leg: No edema. Left lower leg: No edema. Skin:     General: Skin is warm. Neurological:      General: No focal deficit present. Mental Status: She is alert. Psychiatric:         Mood and Affect: Mood normal.         Labs:  For convenience and continuity at follow-up the following most recent labs are provided:      CBC:    Lab Results   Component Value Date    WBC 8.5 10/23/2021    HGB 13.9 10/23/2021    HCT 39.9 10/23/2021     10/23/2021       Renal:    Lab Results   Component Value Date     10/23/2021    K 3.7 10/23/2021    K 3.9 10/20/2021    CL 95 10/23/2021    CO2 27 10/23/2021    BUN 15 10/23/2021    CREATININE 0.9 10/23/2021    CALCIUM 9.5 10/23/2021    PHOS 3.8 10/21/2021         Significant Diagnostic Studies    Radiology:   VL Extremity Venous Bilateral         CTA PULMONARY W CONTRAST   Final Result      1. No evidence of pulmonary embolus. 2. Bilateral lower lung peripheral predominant patchy groundglass opacities and a minor component of consolidative opacity most compatible with multifocal pneumonia. Differential considerations include Covid-19 or other viral pneumonia versus other    infectious or inflammatory pneumonitis. XR CHEST PORTABLE   Final Result      Diffuse coarse interstitial and alveolar consolidation progressive since 10/10/2021 and suspicious for atypical pneumonia. Stable cardiac mediastinal silhouette. Consults:     IP CONSULT TO HOSPITALIST  IP CONSULT TO CARDIOLOGY  IP CONSULT TO PULMONOLOGY  IP CONSULT TO DIETITIAN      Discharge Instructions/Follow-up:  Follow-up with your PCP and Cardiology, Dr. Leda Kwok    Activity: activity as tolerated    Diet: cardiac diet    Discharge Medications:     Current Discharge Medication List           Details   dexamethasone (DECADRON) 2 MG tablet Take 5 tablets by mouth daily (with breakfast) for 7 days  Qty: 35 tablet, Refills: 0              Details   rivaroxaban (XARELTO) 20 MG TABS tablet Take 1 tablet by mouth daily (with breakfast)  Qty: 30 tablet, Refills: 0      albuterol sulfate  (90 Base) MCG/ACT inhaler Inhale 2 puffs into the lungs 4 times daily  Qty: 18 g, Refills: 3      LORazepam (ATIVAN) 0.5 MG tablet Take 1 tablet by mouth every 6 hours as needed for Anxiety for up to 7 days.   Qty: 28 tablet, Refills: 0 Associated Diagnoses: Anxiety      ALTAMIST SPRAY 0.65 % nasal spray 1 spray by Nasal route as needed for Congestion  Qty: 1 each, Refills: 3      fluticasone (FLOVENT HFA) 110 MCG/ACT inhaler Inhale 2 puffs into the lungs 2 times daily  Qty: 12 g, Refills: 0             Time Spent on discharge is more than 45 minutes in the examination, evaluation, counseling and review of medications and discharge plan. Signed: Lyndal Babinski, MD   Internal Medicine Resident, PGY-1  10/23/2021      Thank you Peter Tapia MD for the opportunity to be involved in this patient's care. If you have any questions or concerns please feel free to contact me.

## 2021-10-23 NOTE — PROGRESS NOTES
CC:  HPI: Had recent admission to [facility] for COVID about a couple weeks ago. Has had increasing sob, BUCIO, and had one day where she had some chest pain that was sharp and radiated to her back. No n/v/syncope. Has had some LE edema but she has not been able to lay flat, and she was \"afraid to go to sleep\" during this time as well.     She came to PCP where she was found to be hypoxic. She was sent to ER for O2 support and hypoxia    S:BUCIO improving. No chest pain    Tele: Sinus     O:  Physical Exam:  /80   Pulse 89   Temp 99 °F (37.2 °C) (Oral)   Resp 19   Ht 5' 5\" (1.651 m)   Wt (!) 335 lb 8.6 oz (152.2 kg)   LMP 10/01/2021 (Approximate)   SpO2 96%   BMI 55.84 kg/m²    General (appearance):  No acute distress  Eyes: anicteric   Neck: soft, No JVD  Ears/Nose/Mouth/Thorat: No cyanosis  CV: Reg   Respiratory:  Normal effort. No crackles or wheezes  GI: soft, non-tender, non-distended  Skin: Warm, dry. No rashes  Neuro/Psych: Alert and oriented x 3. Appropriate behavior  Ext:  No c/c. No significant edema   Pulses:  2+ radial     I.O's= -11.1 L     Weight  Admission: Weight: (!) 347 lb 4.8 oz (157.5 kg)   Today: Weight: (!) 335 lb 8.6 oz (152.2 kg)    CBC:   Recent Labs     10/21/21  1536 10/22/21  0709 10/23/21  0758   WBC 10.5 6.2 8.5   HGB 12.9 11.9* 13.9   HCT 37.4 35.4* 39.9   MCV 94.7 96.4 94.2    147 185     BMP:   Recent Labs     10/20/21  1821 10/20/21  1821 10/21/21  0421 10/22/21  0709 10/23/21  0758      < > 138 137 136   K 3.6   < > 3.9 4.3 3.7   CL 98*   < > 99 99 95*   CO2 27   < > 27 26 27   PHOS 3.7  --  3.8  --   --    BUN 14   < > 13 14 15   CREATININE 0.9   < > 0.9 0.9 0.9    < > = values in this interval not displayed.      Mag:   Lab Results   Component Value Date    MG 2.00 10/23/2021     PT/INR:   Recent Labs     10/20/21  1131   PROTIME 15.9*   INR 1.39*     Pro-BNP:   Lab Results   Component Value Date    PROBNP 7,164 10/19/2021     Imaging:    10/20/2021 Limited echo   Left ventricular cavity size is normal. There is borderline left ventricular   hypertrophy. Overall left ventricular systolic function appears normal with   an ejection fraction of 60%. There is mild septal flattening consistent with   right ventricular pressure and/or volume overload. No regional wall motion   abnormalities are noted. Global strain is -15.3%. The right ventricle is enlarged and hypokinetic. TAPSE measures 1.39 cm. Pulmonary embolism should be ruled out if not already done. 10/20/2021 Venous duplex: Neg for DVT bilaterally     10/19/2021 CTA PE     1. No evidence of pulmonary embolus.     2. Bilateral lower lung peripheral predominant patchy groundglass opacities and a minor component of consolidative opacity most compatible with multifocal pneumonia. Differential considerations include Covid-19 or other viral pneumonia versus other    infectious or inflammatory pneumonitis. 10/19/2021 CXR:     Diffuse coarse interstitial and alveolar consolidation progressive since 10/10/2021 and suspicious for atypical pneumonia.       Stable cardiac mediastinal silhouette. 10/11/2021 VQ: low probability of PE    10/11/2021 Echo:   -Normal global systolic function with an ejection fraction estimated at   65-70%.   -No obvious regional wall motion abnormalities noted. -There is trivial tricuspid regurgitation with a RVSP estimation of 29   mmHg.   -Normal diastolic function. Avg. E/e'=7.9   -No pericardial effusion noted. Assessment:  43 y.o. with SOB and hypoxia.  Had fernando Farley 10/1/2021  Issues:  -SOB  -Hypoxia  -Elevated Tn  -Abnl ECG (diffuse TWI)  -HTN  -Morbid obesity  -PNA  -? dCHF given CT and elevated BNP    Plan:  -Keep K>4, Mg>2.   -statin  -Lasix 40 mg PO BID

## 2021-10-23 NOTE — PROGRESS NOTES
Progress Note    Admit Date: 10/19/2021  Day: 2  Diet: ADULT DIET; Regular; Low Sodium (2 gm)    CC: hypoxia    Interval history: Pt seen at the bedside. NAEO. Down to 3L NC, at what she was discharge on at last admission sats in the 90's. States she is doing a lot better, wants to go home. Before she came in, she states at home she was afraid to go to sleep and lie down. She just sat up right and unable to sleep. She had sharp chest pain yesterday that went towards her back. HPI: 42 yo F with PMH of COVID infection and obesity who presented to ED with hypoxia as directed from her PCP visit. Her O2 sats at visit were in low 70's with ambulation on 5LNC. Tachycardic to 130's. Patient was recently treated at [facility] for Covid infection with an accompanying STEVE from the dates of 10/01 to 10/12. Patient states that she feels that she was first symptomatic with Covid around 23 August worsening SOB. Patient was admitted to [facility], and received treatment with Decadron and tocilizumab. Patient initially improved with hospitalization and treatment, and was ultimately discharged on 3 L nasal cannula for home oxygen. Patient states that while at home she has had periods of hypoxia to the mid 80s with activity and symptomatic shortness of breath. Patient also endorses an ongoing cough, however her sputum has never been productive patient states she has had no fevers or chills, no loss of taste or smell, no nausea/vomiting, no diarrhea or constipation. Patient has also had some urinary symptoms since being catheterized at [facility], in particular feelings of urgency, frequency and dysuria. In ED CXR diffuse coarse interstitial and alveolar consolidations which have been progressive. CTPA ruled out PE, but had ground glass opacities consistent with atypical or viral pneumonia. proBNP to over 5000, moderately elevated troponin 0.08, dirty UA.  Pt admitted for ongoing SOB with concern for ongoing COVID infection versus a new cardiomyopathy or super imposed atypical pneumonia and UTI    ECHO on 10/11 normal EF and no diastolic dysfxn. Medications:     Scheduled Meds:   furosemide  40 mg Oral BID    enoxaparin  40 mg SubCUTAneous Daily    Vitamin D  1,000 Units Oral Daily    sodium chloride flush  5-40 mL IntraVENous 2 times per day    dexamethasone  6 mg IntraVENous Q24H    atorvastatin  20 mg Oral Nightly    albuterol sulfate HFA  2 puff Inhalation 4x daily     Continuous Infusions:    PRN Meds:sodium chloride, guaiFENesin-dextromethorphan, sodium chloride flush, ondansetron **OR** ondansetron, polyethylene glycol, acetaminophen **OR** acetaminophen, perflutren lipid microspheres, albuterol sulfate HFA    Objective:   Vitals:   T-max:  Patient Vitals for the past 8 hrs:   BP Temp Temp src Pulse Resp SpO2 Weight   10/23/21 0402 -- -- -- -- -- -- (!) 335 lb 8.6 oz (152.2 kg)   10/23/21 0328 111/78 98.1 °F (36.7 °C) Oral 86 18 97 % --   10/23/21 0002 136/83 97.6 °F (36.4 °C) Oral 84 18 96 % --       Intake/Output Summary (Last 24 hours) at 10/23/2021 0739  Last data filed at 10/23/2021 0331  Gross per 24 hour   Intake 10 ml   Output 3850 ml   Net -3840 ml       Review of Systems   All other systems reviewed and are negative. Physical Exam  Constitutional:       Appearance: She is obese. HENT:      Head: Normocephalic and atraumatic. Eyes:      Extraocular Movements: Extraocular movements intact. Conjunctiva/sclera: Conjunctivae normal.   Cardiovascular:      Rate and Rhythm: Normal rate and regular rhythm. Heart sounds: Normal heart sounds. Pulmonary:      Effort: Pulmonary effort is normal.      Breath sounds: Rales present. Comments: improved  Abdominal:      Palpations: Abdomen is soft. Musculoskeletal:         General: Normal range of motion. Cervical back: Normal range of motion. Neurological:      General: No focal deficit present.       Mental Status: She is alert. Psychiatric:         Mood and Affect: Mood normal.         LABS:    CBC:   Recent Labs     10/21/21  1536 10/22/21  0709   WBC 10.5 6.2   HGB 12.9 11.9*   HCT 37.4 35.4*    147   MCV 94.7 96.4     Renal:    Recent Labs     10/20/21  1821 10/21/21  0421 10/22/21  0709    138 137   K 3.6 3.9 4.3   CL 98* 99 99   CO2 27 27 26   BUN 14 13 14   CREATININE 0.9 0.9 0.9   GLUCOSE 128* 123* 112*   CALCIUM 8.5 8.6 9.0   MG 2.70* 2.30 2.00   PHOS 3.7 3.8  --    ANIONGAP 13 12 12     Hepatic:   Recent Labs     10/20/21  1821 10/21/21  0421   LABALBU 3.3* 3.2*     Troponin:   Recent Labs     10/20/21  0838   TROPONINI 0.03*     BNP: No results for input(s): BNP in the last 72 hours. Lipids: No results for input(s): CHOL, HDL in the last 72 hours. Invalid input(s): LDLCALCU, TRIGLYCERIDE  ABGs:  No results for input(s): PHART, FWH7MOX, PO2ART, PJR2IDQ, BEART, THGBART, F5TTHHST, VSM2XLW in the last 72 hours. INR:   Recent Labs     10/20/21  1131   INR 1.39*     Lactate: No results for input(s): LACTATE in the last 72 hours. Cultures:  -----------------------------------------------------------------  RAD:   VL Extremity Venous Bilateral         CTA PULMONARY W CONTRAST   Final Result      1. No evidence of pulmonary embolus. 2. Bilateral lower lung peripheral predominant patchy groundglass opacities and a minor component of consolidative opacity most compatible with multifocal pneumonia. Differential considerations include Covid-19 or other viral pneumonia versus other    infectious or inflammatory pneumonitis. XR CHEST PORTABLE   Final Result      Diffuse coarse interstitial and alveolar consolidation progressive since 10/10/2021 and suspicious for atypical pneumonia. Stable cardiac mediastinal silhouette. 10/20/21 ECHO: Left ventricular cavity size is normal. There is borderline left ventricular hypertrophy.  Overall left ventricular systolic function appears  normal with an ejection fraction of 60%. There is mild septal flattening consistent with right ventricular pressure and/or volume  overload. No regional wall motion abnormalities are noted. Global strain is -15.3%. The right ventricle is enlarged and hypokinetic. TAPSE measures 1.39 cm. Pulmonary embolism should be ruled out if not already done. Assessment/Plan:   43yo F with past medical history of recent COVID infection. Recently admitted to [facility] 10/1-10/12 (treated with decadron and toci)who presents to ED with new worsening hypoxia from her outpatient clinic. Acute Hypoxic Respiratory Failure 2/2 COVID Cardiomyopathy vs Acute CHF  Ruled out atypical pneumonia  Pt diagnosed w/ COVID on 10/01 during admission to [facility].  Treated w/ Deacdron & Tociluzimab, discharged 10/12 on 3L NC, Xarelto for one month and albuterol as needed, BNP on admission 7000, D-Dimer 333  CXR & CTPA on presentation w/ bibasilar GGO's  -procal slightly elevated 0.17  -.0  -Respiratory viral panel-neg  -Respiratory cultures-neg  -Legionella and Strep pneume urine Ag-neg  -Lovenox 40mg BID (holding home Xarelto)  -supplemental O2 PRN, currently 5L NC improving  -6 mg Decadron IV QD continue for 10 days  -strict I/O's  -Cards consult   -lasix 40mg IV bid--3.5L UO   -switch to PO today  -ECHO: LV fxn nomral, mild septal flattening RV pressure or volume overload   -concerned for thromboembolism, was started on heparin  gtt x1day   -Bilateral LE dopplers, prelim read: no evidence of  superficial venous thrombosis  -pulm following     Type II NSTEMI--resolved  Trop 0.08 w/ new T-wave inversions on intake EKG  -325mg ASA given in ED  -d/c Asa 81 mg QD  -d/c Atorvastatin 20 mg QD  -Cards C/s per above     UTI  Pt was urinary sx's + WBCs and LE on UA  -Urine Cx pending  -CXT 1000mg qd, cont for 1 more day then d/c    Anxiety  Anxiety episodes since d/c from hospital related to COVID-19 PNA  -1 dose prn lorazepam 0.5 mg    Code Status: FULL  FEN: 4 carb choice  PPX: lovenox  DISPO: Dav Orta MD, PGY-1  10/23/21  7:39 AM    This patient has been staffed and discussed with Dr. Lacey Aguilar

## 2021-10-23 NOTE — PLAN OF CARE
Problem: Gas Exchange - Impaired:  Goal: Levels of oxygenation will improve  Description: Levels of oxygenation will improve  Outcome: Ongoing   Patient saturations are maintaining in high 90% range. Patient claims she cannot breathe through her nose so she has her oxygen nasal cannula in her mouth. She is still on her baseline of 3L of oxygen per minute. Patient does not appear to be having trouble breathing.

## 2021-10-23 NOTE — PROGRESS NOTES
Discharge order received. Patient has oxygen concentrator to go home on. Discharge paperwork gone over with patient and her mother. All prescriptions were given to the patient. Telemetry and IV removed. Patient belongings given to mother who took them to the car. Patient aided into the car with oxygen concentrator. All questions answered.

## 2021-10-23 NOTE — DISCHARGE INSTR - DIET

## 2021-10-23 NOTE — CARE COORDINATION
Case Management Assessment            Discharge Note                    Date / Time of Note: 10/23/2021 10:26 AM                  Discharge Note Completed by: ALEXIS Sadler, [facility]    Patient Name: Lucía Ocasio   YOB: 1985  Diagnosis: Hypoxia [R09.02]  Acute respiratory failure with hypoxia ([address] [J96.01]   Date / Time: 10/19/2021 12:00 PM    Current PCP: Maria Teresa Coronel MD  Clinic patient: No    Hospitalization in the last 30 days: Yes    Advance Directives:  Code Status: Full Code  [place] DNR form completed and on chart: No    Financial:  Payor: MEDICAID [place] / Plan: [address] [place] [address] DEPT OF JOB / Product Type: *No Product type* /      Pharmacy:    [facility], [address] [place] [address]  Phone: [phone] Fax: [phone]      Assistance purchasing medications?: Potential Assistance Purchasing Medications: No  Assistance provided by Case Management: None at this time    Does patient want to participate in local refill/ meds to beds program?: Not Assessed    Meds To Beds General Rules:  1. Can ONLY be done Monday- Friday between 8:30am-5pm  2. Prescription(s) must be in pharmacy by 3pm to be filled same day  3. Copy of patient's insurance/ prescription drug card and patient face sheet must be sent along with the prescription(s)  4. Cost of Rx cannot be added to hospital bill. If financial assistance is needed, please contact unit  or ;  or  CANNOT provide pharmacy voucher for patients co-pays  5.  Patients can then  the prescription on their way out of the hospital at discharge, or pharmacy can deliver to the bedside if staff is available. (payment due at time of pick-up or delivery - cash, check, or card accepted)     Able to afford home medications/ co-pay costs: Yes    ADLS:  Current PT AM-PAC Score: 22 /24  Current OT AM-PAC Score: 21 /24      DISCHARGE Disposition: Home- No Services Needed    LOC at discharge: Not Applicable  ANNEL Completed: Yes    Notification completed in HENS/PAS?:  Not Applicable    IMM Completed:   Not Indicated    Transportation:  Transportation PLAN for discharge: family   Mode of Transport: [address] ordered at discharge: No  [address]: Not Applicable  Orders faxed: No    Durable Medical Equipment:  DME Provider: None  Equipment obtained during hospitalization:     Home Oxygen and Respiratory Equipment:  Oxygen needed at discharge?: No  [address]: [facility]  Phone: [phone]   Portable tank available for discharge?: Yes    Dialysis:  Dialysis patient: No    Dialysis Center:  Not Applicable    Additional CM Notes: Pt from home alone, Pt is active w/[facility] for Home O2, Pt to return home at DC, Pt's family will transport home. The Plan for Transition of Care is related to the following treatment goals of Hypoxia [R09.02]  Acute respiratory failure with hypoxia ([facility] [J96.01]    The Patient and/or patient representative Nitish Espinosa and her family were provided with a choice of provider and agrees with the discharge plan Yes    Freedom of choice list was provided with basic dialogue that supports the patient's individualized plan of care/goals and shares the quality data associated with the providers.  Not Indicated    Care Transitions patient: No    ALEXIS Betancourt, [facility]  Case Management Department  Ph: [phone]  Fax: [phone]

## 2021-10-24 LAB
BLOOD CULTURE, ROUTINE: NORMAL
CULTURE, BLOOD 2: NORMAL

## 2021-10-26 ENCOUNTER — OFFICE VISIT (OUTPATIENT)
Dept: INTERNAL MEDICINE CLINIC | Age: 36
End: 2021-10-26
Payer: MEDICAID

## 2021-10-26 VITALS
HEIGHT: 65 IN | BODY MASS INDEX: 48.82 KG/M2 | SYSTOLIC BLOOD PRESSURE: 136 MMHG | WEIGHT: 293 LBS | DIASTOLIC BLOOD PRESSURE: 82 MMHG | OXYGEN SATURATION: 100 % | HEART RATE: 70 BPM | TEMPERATURE: 97.9 F

## 2021-10-26 DIAGNOSIS — Z11.59 ENCOUNTER FOR HEPATITIS C SCREENING TEST FOR LOW RISK PATIENT: ICD-10-CM

## 2021-10-26 DIAGNOSIS — E55.9 VITAMIN D DEFICIENCY: ICD-10-CM

## 2021-10-26 DIAGNOSIS — Z11.4 ENCOUNTER FOR SCREENING FOR HIV: ICD-10-CM

## 2021-10-26 DIAGNOSIS — J96.01 ACUTE RESPIRATORY FAILURE WITH HYPOXIA (HCC): Primary | ICD-10-CM

## 2021-10-26 DIAGNOSIS — N17.9 AKI (ACUTE KIDNEY INJURY) (HCC): ICD-10-CM

## 2021-10-26 DIAGNOSIS — I50.811 ACUTE RIGHT-SIDED CONGESTIVE HEART FAILURE (HCC): ICD-10-CM

## 2021-10-26 DIAGNOSIS — Z00.00 HEALTHCARE MAINTENANCE: ICD-10-CM

## 2021-10-26 DIAGNOSIS — M54.6 ACUTE LEFT-SIDED THORACIC BACK PAIN: ICD-10-CM

## 2021-10-26 PROCEDURE — 99213 OFFICE O/P EST LOW 20 MIN: CPT | Performed by: STUDENT IN AN ORGANIZED HEALTH CARE EDUCATION/TRAINING PROGRAM

## 2021-10-26 RX ORDER — MULTIVIT-MIN/IRON/FOLIC ACID/K 18-600-40
2000 CAPSULE ORAL DAILY
Qty: 30 CAPSULE | Refills: 3 | Status: SHIPPED | OUTPATIENT
Start: 2021-12-01 | End: 2021-11-22 | Stop reason: SDUPTHER

## 2021-10-26 RX ORDER — POTASSIUM CHLORIDE 20 MEQ/1
20 TABLET, EXTENDED RELEASE ORAL DAILY
Qty: 30 TABLET | Refills: 0 | Status: SHIPPED | OUTPATIENT
Start: 2021-10-26 | End: 2021-11-02

## 2021-10-26 RX ORDER — POTASSIUM CHLORIDE 20 MEQ/1
20 TABLET, EXTENDED RELEASE ORAL DAILY
Qty: 30 TABLET | Refills: 0 | Status: SHIPPED | OUTPATIENT
Start: 2021-10-26 | End: 2021-10-26

## 2021-10-26 RX ORDER — LIDOCAINE 4 G/G
1 PATCH TOPICAL DAILY
Qty: 30 PATCH | Refills: 0 | Status: SHIPPED | OUTPATIENT
Start: 2021-10-26 | End: 2021-11-25

## 2021-10-26 RX ORDER — ERGOCALCIFEROL 1.25 MG/1
50000 CAPSULE ORAL WEEKLY
Qty: 6 CAPSULE | Refills: 0 | Status: SHIPPED | OUTPATIENT
Start: 2021-10-26 | End: 2022-01-03 | Stop reason: SDUPTHER

## 2021-10-26 RX ORDER — FLUTICASONE PROPIONATE 110 UG/1
2 AEROSOL, METERED RESPIRATORY (INHALATION) 2 TIMES DAILY
Qty: 12 G | Refills: 0 | Status: SHIPPED | OUTPATIENT
Start: 2021-10-26 | End: 2021-11-02

## 2021-10-26 NOTE — PATIENT INSTRUCTIONS
- try to wean down O2 as tolerated; with goal of pulse ox > 90%  - place lidocaine patch on back area where you have pain  - take vitamin D 50,000U weekly for 6 weeks then start taking the 2000U daily after that.  Stop taking the 1000U  - go to lab next week for blood work

## 2021-10-26 NOTE — PROGRESS NOTES
daily for 5 doses 5 tablet 0    furosemide (LASIX) 80 MG tablet Take 1 tablet by mouth daily 30 tablet 0    ALTAMIST SPRAY 0.65 % nasal spray 1 spray by Nasal route as needed for Congestion 1 each 3    albuterol sulfate  (90 Base) MCG/ACT inhaler Inhale 2 puffs into the lungs 4 times daily 18 g 3    furosemide (LASIX) 80 MG tablet Take 1 tablet by mouth daily 60 tablet 0    Cholecalciferol (VITAMIN D) 25 MCG TABS Take 1 tablet by mouth daily 60 tablet 0    potassium chloride (KLOR-CON M) 20 MEQ extended release tablet Take 1 tablet by mouth daily (Patient not taking: Reported on 10/26/2021) 30 tablet 0    rivaroxaban (XARELTO) 10 MG TABS tablet Take 1 tablet by mouth daily (with breakfast) for 10 days 10 tablet 0    fluticasone (FLOVENT HFA) 110 MCG/ACT inhaler Inhale 2 puffs into the lungs 2 times daily 12 g 0     No facility-administered medications prior to visit. After Visit:  Prior to Visit Medications    Medication Sig Taking?  Authorizing Provider   lidocaine 4 % external patch Place 1 patch onto the skin daily Yes Peter Tapia MD   potassium chloride (KLOR-CON M) 20 MEQ extended release tablet Take 1 tablet by mouth daily Yes Peter Tapia MD   fluticasone (FLOVENT HFA) 110 MCG/ACT inhaler Inhale 2 puffs into the lungs 2 times daily Yes Peter Tapia MD   vitamin D (ERGOCALCIFEROL) 1.25 MG (95789 UT) CAPS capsule Take 1 capsule by mouth once a week for 6 doses Yes Peter Tapia MD   Vitamin D, Cholecalciferol, 50 MCG (2000 UT) CAPS Take 2,000 Units by mouth daily Yes Peter Tapia MD   atorvastatin (LIPITOR) 20 MG tablet Take 1 tablet by mouth nightly Yes Raysa Quiroz MD   dexamethasone (DECADRON) 6 MG tablet Take 1 tablet by mouth daily for 5 doses Yes Raysa Quiroz MD   furosemide (LASIX) 80 MG tablet Take 1 tablet by mouth daily Yes Raysa Quiroz MD   ALTAMIST SPRAY 0.65 % nasal spray 1 spray by Nasal route as needed for Congestion Yes Peter Tapia MD   albuterol sulfate  (90 Base) MCG/ACT inhaler Inhale 2 puffs into the lungs 4 times daily Yes Niraj Carlos MD   furosemide (LASIX) 80 MG tablet Take 1 tablet by mouth daily  Heide Kocher, MD       Allergies:    Shellfish-derived products, Strawberry (diagnostic), and Bee venom    Family History:       Family history unknown: Yes         PHYSICAL EXAM:  /82 (Site: Right Lower Arm, Position: Sitting, Cuff Size: Large Adult)   Pulse 70   Temp 97.9 °F (36.6 °C) (Temporal)   Ht 5' 5\" (1.651 m)   Wt (!) 335 lb 12.8 oz (152.3 kg)   LMP 10/01/2021 (Approximate)   SpO2 100%   BMI 55.88 kg/m²   Physical Exam  Constitutional:       Appearance: Normal appearance. Cardiovascular:      Rate and Rhythm: Normal rate and regular rhythm. Pulses: Normal pulses. Heart sounds: Normal heart sounds. Pulmonary:      Effort: Pulmonary effort is normal.      Breath sounds: Normal breath sounds. No wheezing or rales. Abdominal:      General: Abdomen is flat. Bowel sounds are normal.      Palpations: Abdomen is soft. Musculoskeletal:         General: No swelling. Normal range of motion. Cervical back: Normal range of motion and neck supple. Right lower leg: No edema. Left lower leg: No edema. Skin:     General: Skin is warm and dry. Capillary Refill: Capillary refill takes less than 2 seconds. Neurological:      Mental Status: She is alert. Assessment & Plan:      1. Acute respiratory failure with hypoxia (HCC)  2/2 Covid-19 and right heart failure. O2 requirement trending down. Hospitalized 10/19 - 10/23 where treated with IV diuresis. - continue oral lasix 80 mg daily  - f/u cardiology recs; appointment tomorrow  - advised pt to wean O2 as tolerated with goal of pulse ox > 90%  - continue dexamethasone 6 mg for 4 more days  - continue albuterol as needed  - continue fluticasone inhaler BID  - d/c Xarelto; d-dimer trending down, negative CTPA for PE and negative doppler studies for DVT    2. Acute right-sided congestive heart failure   Improving. Likely 2/2 cardiomyopathy caused Covid PNA No sign of volume overload. Pt back to baseline weight 335lbs. Echo 10/19/2021 revealed with hypokinetic and enlarged RV TAPSE 1.39cm. - continue lasix as above  - pt follows cardiology    2. STEVE (acute kidney injury) ([address]  Resolved. Cr 0.9 on discharge. - BASIC METABOLIC PANEL; Future    3. Healthcare maintenance  - LIPID PANEL; Future  - HIV-1 AND HIV-2 ANTIBODIES; Future  - HEPATITIS C ANTIBODY; Future    4. Vitamin D deficiency  Vit D 6.7 10/2/2021.  - vitamin D 50,000U weekly for 6 weeks followed by 2000U daily    5- acute left-sided left thoracic back pain  Likely musculoskeletal. Started after hospitalization. No weakness, tingling sesnation or numbness. - lidocaine patch    Return in about 1 week (around 11/2/2021). Patient Instructions   - try to wean down O2 as tolerated; with goal of pulse ox > 90%  - place lidocaine patch on back area where you have pain  - take vitamin D 50,000U weekly for 6 weeks then start taking the 2000U daily after that.  Stop taking the 1000U  - go to lab next week for blood work          Dispo: Pt has been staffed with Dr. Maurizio Bustos  _______________  Adrianna Christiansen MD, 10/26/2021 7:29 PM   PGY-3

## 2021-10-26 NOTE — PROGRESS NOTES
Outpatient Clinic Established Patient Note    Patient: Mary Farooq  : 1985 (43 y.o.)  Date: 10/26/2021    CC:     HPI:          Allergies:    Shellfish-derived products, Strawberry (diagnostic), and Bee venom    Health Maintenance Due   Topic Date Due    Hepatitis C screen  Never done    Varicella vaccine (1 of 2 - 2-dose childhood series) Never done    Lipid screen  Never done    COVID-19 Vaccine (1) Never done    HIV screen  Never done    DTaP/Tdap/Td vaccine (1 - Tdap) Never done    Cervical cancer screen  Never done    Flu vaccine (1) Never done         There is no immunization history on file for this patient. Review of Systems  A 10-organ Review Of Systems was obtained and otherwise unremarkable except as per HPI. Data: Old records have been reviewed electronically.       Past Medical History:    Past Medical History:   Diagnosis Date    COVID-19     Obesity        Past Surgical History:  Past Surgical History:   Procedure Laterality Date    DILATION AND CURETTAGE OF UTERUS         Home Meds:  Prior to Visit  Outpatient Medications Prior to Visit   Medication Sig Dispense Refill    atorvastatin (LIPITOR) 20 MG tablet Take 1 tablet by mouth nightly 30 tablet 3    dexamethasone (DECADRON) 6 MG tablet Take 1 tablet by mouth daily for 5 doses 5 tablet 0    furosemide (LASIX) 80 MG tablet Take 1 tablet by mouth daily 30 tablet 0    Cholecalciferol (VITAMIN D) 25 MCG TABS Take 1 tablet by mouth daily 60 tablet 0    rivaroxaban (XARELTO) 10 MG TABS tablet Take 1 tablet by mouth daily (with breakfast) for 10 days 10 tablet 0    ALTAMIST SPRAY 0.65 % nasal spray 1 spray by Nasal route as needed for Congestion 1 each 3    fluticasone (FLOVENT HFA) 110 MCG/ACT inhaler Inhale 2 puffs into the lungs 2 times daily 12 g 0    albuterol sulfate  (90 Base) MCG/ACT inhaler Inhale 2 puffs into the lungs 4 times daily 18 g 3    furosemide (LASIX) 80 MG tablet Take 1 tablet by mouth daily 60 tablet 0    potassium chloride (KLOR-CON M) 20 MEQ extended release tablet Take 1 tablet by mouth daily (Patient not taking: Reported on 10/26/2021) 30 tablet 0     No facility-administered medications prior to visit. After Visit:  Prior to Visit Medications    Medication Sig Taking? Authorizing Provider   atorvastatin (LIPITOR) 20 MG tablet Take 1 tablet by mouth nightly Yes Herve Lopez MD   dexamethasone (DECADRON) 6 MG tablet Take 1 tablet by mouth daily for 5 doses Yes Herve Lopez MD   furosemide (LASIX) 80 MG tablet Take 1 tablet by mouth daily Yes Herve Lopez MD   Cholecalciferol (VITAMIN D) 25 MCG TABS Take 1 tablet by mouth daily Yes Herve Lopez MD   rivaroxaban (XARELTO) 10 MG TABS tablet Take 1 tablet by mouth daily (with breakfast) for 10 days Yes Braden Rojas MD   ALTAMIST SPRAY 0.65 % nasal spray 1 spray by Nasal route as needed for Congestion Yes Duglas Luo MD   fluticasone (FLOVENT HFA) 110 MCG/ACT inhaler Inhale 2 puffs into the lungs 2 times daily Yes Duglas Luo MD   albuterol sulfate  (90 Base) MCG/ACT inhaler Inhale 2 puffs into the lungs 4 times daily Yes Shazia Crocker MD   furosemide (LASIX) 80 MG tablet Take 1 tablet by mouth daily  Braden Rojas MD   potassium chloride (KLOR-CON M) 20 MEQ extended release tablet Take 1 tablet by mouth daily  Patient not taking: Reported on 10/26/2021  Braden Rojas MD       Allergies:    Shellfish-derived products, Strawberry (diagnostic), and Bee venom    Family History:       Family history unknown: Yes         PHYSICAL EXAM:  /82 (Site: Right Lower Arm, Position: Sitting, Cuff Size: Large Adult)   Pulse 70   Temp 97.9 °F (36.6 °C) (Temporal)   Ht 5' 5\" (1.651 m)   Wt (!) 335 lb 12.8 oz (152.3 kg)   LMP 10/01/2021 (Approximate)   SpO2 100%   BMI 55.88 kg/m²   Physical Exam    Assessment & Plan:      There are no diagnoses linked to this encounter. No follow-ups on file.     There are no Patient Instructions on file for this visit.     Dispo: Pt has been staffed with Dr. Deepa Aparicio  _______________  Susanne Lynch MD, 10/26/2021 11:01 AM   PGY-3

## 2021-10-27 ENCOUNTER — FOLLOWUP TELEPHONE ENCOUNTER (OUTPATIENT)
Dept: INPATIENT UNIT | Age: 36
End: 2021-10-27

## 2021-10-27 ENCOUNTER — OFFICE VISIT (OUTPATIENT)
Dept: CARDIOLOGY CLINIC | Age: 36
End: 2021-10-27
Payer: MEDICAID

## 2021-10-27 VITALS
DIASTOLIC BLOOD PRESSURE: 74 MMHG | WEIGHT: 293 LBS | HEART RATE: 101 BPM | OXYGEN SATURATION: 97 % | BODY MASS INDEX: 48.82 KG/M2 | SYSTOLIC BLOOD PRESSURE: 112 MMHG | HEIGHT: 65 IN

## 2021-10-27 DIAGNOSIS — I50.811 ACUTE RIGHT-SIDED CONGESTIVE HEART FAILURE (HCC): Primary | ICD-10-CM

## 2021-10-27 PROCEDURE — 1036F TOBACCO NON-USER: CPT | Performed by: NURSE PRACTITIONER

## 2021-10-27 PROCEDURE — 99213 OFFICE O/P EST LOW 20 MIN: CPT | Performed by: NURSE PRACTITIONER

## 2021-10-27 PROCEDURE — G8484 FLU IMMUNIZE NO ADMIN: HCPCS | Performed by: NURSE PRACTITIONER

## 2021-10-27 PROCEDURE — G8427 DOCREV CUR MEDS BY ELIG CLIN: HCPCS | Performed by: NURSE PRACTITIONER

## 2021-10-27 PROCEDURE — 1111F DSCHRG MED/CURRENT MED MERGE: CPT | Performed by: NURSE PRACTITIONER

## 2021-10-27 PROCEDURE — G8417 CALC BMI ABV UP PARAM F/U: HCPCS | Performed by: NURSE PRACTITIONER

## 2021-10-28 NOTE — PROGRESS NOTES
Pt attended PCP appt on 10/26/21. This will serve as 72 hour HF follow up. Please see office note for details.

## 2021-11-01 DIAGNOSIS — Z11.59 ENCOUNTER FOR HEPATITIS C SCREENING TEST FOR LOW RISK PATIENT: ICD-10-CM

## 2021-11-01 DIAGNOSIS — Z00.00 HEALTHCARE MAINTENANCE: ICD-10-CM

## 2021-11-01 DIAGNOSIS — N17.9 AKI (ACUTE KIDNEY INJURY) (HCC): ICD-10-CM

## 2021-11-01 DIAGNOSIS — Z11.4 ENCOUNTER FOR SCREENING FOR HIV: ICD-10-CM

## 2021-11-01 LAB
ANION GAP SERPL CALCULATED.3IONS-SCNC: 16 MMOL/L (ref 3–16)
BUN BLDV-MCNC: 15 MG/DL (ref 7–20)
CALCIUM SERPL-MCNC: 9.9 MG/DL (ref 8.3–10.6)
CHLORIDE BLD-SCNC: 99 MMOL/L (ref 99–110)
CHOLESTEROL, TOTAL: 162 MG/DL (ref 0–199)
CO2: 26 MMOL/L (ref 21–32)
CREAT SERPL-MCNC: 1.1 MG/DL (ref 0.6–1.1)
GFR AFRICAN AMERICAN: >60
GFR NON-AFRICAN AMERICAN: 55
GLUCOSE BLD-MCNC: 102 MG/DL (ref 70–99)
HDLC SERPL-MCNC: 56 MG/DL (ref 40–60)
HEPATITIS C ANTIBODY INTERPRETATION: NORMAL
LDL CHOLESTEROL CALCULATED: 83 MG/DL
POTASSIUM SERPL-SCNC: 4.8 MMOL/L (ref 3.5–5.1)
SODIUM BLD-SCNC: 141 MMOL/L (ref 136–145)
TRIGL SERPL-MCNC: 117 MG/DL (ref 0–150)
VLDLC SERPL CALC-MCNC: 23 MG/DL

## 2021-11-02 ENCOUNTER — OFFICE VISIT (OUTPATIENT)
Dept: INTERNAL MEDICINE CLINIC | Age: 36
End: 2021-11-02
Payer: MEDICAID

## 2021-11-02 ENCOUNTER — OFFICE VISIT (OUTPATIENT)
Dept: PULMONOLOGY | Age: 36
End: 2021-11-02
Payer: MEDICAID

## 2021-11-02 VITALS
DIASTOLIC BLOOD PRESSURE: 78 MMHG | HEIGHT: 65 IN | BODY MASS INDEX: 48.82 KG/M2 | SYSTOLIC BLOOD PRESSURE: 118 MMHG | WEIGHT: 293 LBS

## 2021-11-02 VITALS
SYSTOLIC BLOOD PRESSURE: 114 MMHG | HEIGHT: 65 IN | DIASTOLIC BLOOD PRESSURE: 81 MMHG | HEART RATE: 82 BPM | OXYGEN SATURATION: 95 % | TEMPERATURE: 98.1 F | BODY MASS INDEX: 48.82 KG/M2 | WEIGHT: 293 LBS

## 2021-11-02 DIAGNOSIS — J12.82 PNEUMONIA DUE TO COVID-19 VIRUS: ICD-10-CM

## 2021-11-02 DIAGNOSIS — J96.11 CHRONIC RESPIRATORY FAILURE WITH HYPOXIA (HCC): ICD-10-CM

## 2021-11-02 DIAGNOSIS — I27.20 PULMONARY HYPERTENSION (HCC): Primary | ICD-10-CM

## 2021-11-02 DIAGNOSIS — U07.1 PNEUMONIA DUE TO COVID-19 VIRUS: ICD-10-CM

## 2021-11-02 DIAGNOSIS — J96.01 ACUTE RESPIRATORY FAILURE WITH HYPOXIA (HCC): Primary | ICD-10-CM

## 2021-11-02 LAB
HIV AG/AB: NORMAL
HIV ANTIGEN: NORMAL
HIV-1 ANTIBODY: NORMAL
HIV-2 AB: NORMAL

## 2021-11-02 PROCEDURE — G8484 FLU IMMUNIZE NO ADMIN: HCPCS | Performed by: INTERNAL MEDICINE

## 2021-11-02 PROCEDURE — 99214 OFFICE O/P EST MOD 30 MIN: CPT | Performed by: INTERNAL MEDICINE

## 2021-11-02 PROCEDURE — G8417 CALC BMI ABV UP PARAM F/U: HCPCS | Performed by: INTERNAL MEDICINE

## 2021-11-02 PROCEDURE — 99213 OFFICE O/P EST LOW 20 MIN: CPT | Performed by: STUDENT IN AN ORGANIZED HEALTH CARE EDUCATION/TRAINING PROGRAM

## 2021-11-02 PROCEDURE — G8427 DOCREV CUR MEDS BY ELIG CLIN: HCPCS | Performed by: INTERNAL MEDICINE

## 2021-11-02 PROCEDURE — 1036F TOBACCO NON-USER: CPT | Performed by: INTERNAL MEDICINE

## 2021-11-02 PROCEDURE — 1111F DSCHRG MED/CURRENT MED MERGE: CPT | Performed by: INTERNAL MEDICINE

## 2021-11-02 RX ORDER — FUROSEMIDE 40 MG/1
40 TABLET ORAL DAILY
Qty: 30 TABLET | Refills: 0 | Status: SHIPPED | OUTPATIENT
Start: 2021-11-02 | End: 2022-01-26 | Stop reason: SDUPTHER

## 2021-11-02 RX ORDER — POTASSIUM CHLORIDE 750 MG/1
10 TABLET, EXTENDED RELEASE ORAL DAILY
Qty: 30 TABLET | Refills: 0 | Status: SHIPPED | OUTPATIENT
Start: 2021-11-02 | End: 2021-11-22 | Stop reason: ALTCHOICE

## 2021-11-02 NOTE — PATIENT INSTRUCTIONS
- please go to lab few days before your next appointment  - please cut down lasix to 40 mg daily and potassium chloride to 10 meq daily  - please watch your weight and leg edema, if you notice any worsening please contact clinic

## 2021-11-02 NOTE — PROGRESS NOTES
PULMONARY OFFICE FOLLOW UP NOTE    REASON FOR VISIT:   Chief Complaint   Patient presents with    Follow-Up from [address] [place] [address] 349: 11/2/2021    HISTORY OF PRESENT ILLNESS: 43y.o. year old female with past medical history of obesity who is here for hospital follow-up. Patient was hospitalized for Covid pneumonia in October 2021 and treated with steroids, Actemra. Could not get remdesivir due to acute kidney injury. She was discharged on home oxygen up to 4 L/min as well as Xarelto. Patient was feeling good for few days but then she presented at [facility] on 10/20/2021 for worsening shortness of breath. She underwent a CT chest that did not show any PE but showed bilateral groundglass opacities consistent with Covid pneumonia. Echo was performed that showed pulmonary hypertension with RV pressure overload. She had an echo performed 9 days earlier that did not show any evidence of pulmonary hypertension. Patient was also taking Xarelto before she presented to [facility] for worsening symptoms. She was noted to have fluid overload and was initiated on Lasix with which her symptoms improved. She was finally discharged on 3 L/min oxygen. She today comes here for follow-up. She has been her oxygen down to 1 L/min. There are times when she is not using her oxygen when she is sitting. Her activity level is slowly improving. Shortness of breath is improving. Still has some persistent dry cough but it is improving. I reviewed the CT chest from 10/9/2021 and my interpretation is as follows. No PE noted. Bilateral groundglass opacities seen. Echo from 10/11/2021 showed EF of 65 to 70%. RVSP 29.  Echo from 10/20/2021 showed EF of 60%. Septal flattening. RV pressure and volume overload. Enlarged RV with hypokinetic RV. TAPSE 1.39      REVIEW OF SYSTEMS:   CONSTITUTIONAL SYMPTOMS: The patient denies fever, fatigue, night sweats, weight loss or weight gain.    HEENT: No vision changes. No tinnitus, Denies sinus pain. No hoarseness, or dysphagia. NECK: Patient denies swelling in the neck. CARDIOVASCULAR: Denies chest pain, palpitation, syncope. RESPIRATORY: See above. GASTROINTESTINAL: Denies nausea, abdominal pain or change in bowel function. GENITOURINARY: Denies obstructive symptoms. No history of incontinence. BREASTS: No masses or lumps in the breasts. SKIN: No rashes or itching. MUSCULOSKELETAL: Denies weakness or bone pain. NEUROLOGICAL: No headaches or seizures. PSYCHIATRIC: Denies mood swings or depression. ENDOCRINE: Denies heat or cold intolerance or excessive thirst.  HEMATOLOGIC/LYMPHATIC: Denies easy bruising or lymph node swelling. ALLERGIC/IMMUNOLOGIC: No environmental allergies.     PAST MEDICAL HISTORY:   Past Medical History:   Diagnosis Date    COVID-19     Obesity        PAST SURGICAL HISTORY:   Past Surgical History:   Procedure Laterality Date    DILATION AND CURETTAGE OF UTERUS         SOCIAL HISTORY:   Social History     Tobacco Use    Smoking status: Never Smoker    Smokeless tobacco: Never Used   Substance Use Topics    Alcohol use: Not Currently    Drug use: Never       FAMILY HISTORY:   Family History   Family history unknown: Yes       MEDICATIONS:     Current Outpatient Medications on File Prior to Visit   Medication Sig Dispense Refill    potassium chloride (KLOR-CON M) 10 MEQ extended release tablet Take 1 tablet by mouth daily 30 tablet 0    furosemide (LASIX) 40 MG tablet Take 1 tablet by mouth daily 30 tablet 0    Handicap Placard MISC by Does not apply route From 11/2/2021 until 2/28/2022 1 each 0    lidocaine 4 % external patch Place 1 patch onto the skin daily 30 patch 0    vitamin D (ERGOCALCIFEROL) 1.25 MG (81335 UT) CAPS capsule Take 1 capsule by mouth once a week for 6 doses 6 capsule 0    [START ON 12/1/2021] Vitamin D, Cholecalciferol, 50 MCG (2000 UT) CAPS Take 2,000 Units by mouth daily 30 capsule 3    atorvastatin (LIPITOR) 20 MG tablet Take 1 tablet by mouth nightly 30 tablet 3    ALTAMIST SPRAY 0.65 % nasal spray 1 spray by Nasal route as needed for Congestion 1 each 3    albuterol sulfate  (90 Base) MCG/ACT inhaler Inhale 2 puffs into the lungs 4 times daily 18 g 3     No current facility-administered medications on file prior to visit. ALLERGIES:   Allergies as of 11/02/2021 - Fully Reviewed 11/02/2021   Allergen Reaction Noted    Shellfish-derived products Anaphylaxis 10/01/2021    Benezett (diagnostic) Anaphylaxis 10/01/2021    Bee venom Hives and Swelling 11/25/2015      OBJECTIVE:   height is 5' 5\" (1.651 m) and weight is 337 lb (152.9 kg) (abnormal). Her blood pressure is 118/78. PHYSICAL EXAM:    CONSTITUTIONAL: She is a 43y.o.-year-old who appears her stated age. She is alert and oriented x 3 and in no acute distress. HEENT: PERRL. No scleral icterus. No thrush, atraumatic, normocephalic. NECK: Supple, without cervical or supraclavicular lymphadenopathy:  CARDIOVASCULAR: S1 S2 RRR. Without murmer  RESPIRATORY & CHEST: Lungs are clear to auscultation and percussion. No wheezing, no crackles. Good air movement  GASTROINTESTINAL & ABDOMEN: Soft, nontender, positive bowel sounds in all quadrants, non-distended, without hepatosplenomegaly. GENITOURINARY: Deferred. MUSCULOSKELETAL: No tenderness to palpation of the axial skeleton. There is no clubbing. No cyanosis. No edema of the lower extremities. SKIN OF BODY: No rash or jaundice. PSYCHIATRIC EVALUATION: Normal affect. Patient answers questions appropriately. HEMATOLOGIC/LYMPHATIC/ IMMUNOLOGIC: No palpable lymphadenopathy. NEUROLOGIC: Alert and oriented x 3. Groslly non-focal. Motor strength is 5+/5 in all muscle groups. The patient has a normal sensorium globally.         ASSESSMENT AND PLAN:     1. Pulmonary hypertension ([address]  Patient was noted to have evidence of pulmonary hypertension with septal flattening and RV pressure overload on echo from 10/20/2021. Echo performed 9 days prior on 10/11/2021 did not show any evidence of pulmonary hypertension and was noted to have RVSP of 29. I believe that patient was urged to have evidence of pulmonary hypertension on echo secondary to fluid overload. Patient improved symptomatically with diuresis. CTPA was negative for PE. Currently she is on Lasix 40 mg daily which she will continue for now. Plan for repeat echo in 4 months.    - ECHO Complete 2D W Doppler W Color; Future      2. Pneumonia due to COVID-19 virus  History of Covid pneumonia in October 2021. Symptoms are slowly improving.  - XR CHEST STANDARD (2 VW); Future      3. Chronic respiratory failure with hypoxia (HCC)  Continue oxygen at 2 L/min for now. Patient would benefit from portable oxygen concentrator that she can carry with her everywhere. Return in about 4 months (around 3/2/2022). Beth Maldonado MD  Pulmonary Critical Care and Sleep Medicine  Electronically signed by Beth Maldonado MD on 11/2/2021 at 3:24 PM     This note was completed using dragon medical speech recognition software. Grammatical errors, random word insertions, pronoun errors and incomplete sentences are occasional consequences of this technology due to software limitations. If there are questions or concerns about the content of this note of information contained within the body of this dictation they should be addressed with the provider for clarification.

## 2021-11-02 NOTE — PROGRESS NOTES
Outpatient Clinic Established Patient Note    Patient: Shanice Jimenez  : 1985 (43 y.o.)  Date: 2021    CC: follow up    HPI:    Ms. Bunny Garza is a 43 y.o. F with PMH as below who presents to clinic for follow up. Pt's states her o2 requirement has been going down now sating >95% on room air but occasionally requires 1L on exertion where she desats to the 88-89%. She feels her breathing has improved. She still endorses some fatigue but improved compared to before. She still has some occasional cough. She reports urinating less than before. Denies any weight gain or edema in her legs. Pt did not start fluticasone due to cost/insurance reasons. Otherwise, pt denies fever, night sweats, chills, chest pain, dyspnea, palpitations, nausea, vomiting, diarrhea, dysuria. Allergies:    Shellfish-derived products, Strawberry (diagnostic), and Bee venom    Health Maintenance Due   Topic Date Due    Varicella vaccine (1 of 2 - 2-dose childhood series) Never done    Pneumococcal 0-64 years Vaccine (1 of 2 - PPSV23) Never done    COVID-19 Vaccine (1) Never done    HIV screen  Never done    DTaP/Tdap/Td vaccine (1 - Tdap) Never done    Cervical cancer screen  Never done    Flu vaccine (1) Never done         There is no immunization history on file for this patient. Review of Systems  A 10-organ Review Of Systems was obtained and otherwise unremarkable except as per HPI. Data: Old records have been reviewed electronically.       Past Medical History:    Past Medical History:   Diagnosis Date    COVID-19     Obesity        Past Surgical History:  Past Surgical History:   Procedure Laterality Date    DILATION AND CURETTAGE OF UTERUS         Home Meds:  Prior to Visit  Outpatient Medications Prior to Visit   Medication Sig Dispense Refill    vitamin D (ERGOCALCIFEROL) 1.25 MG (10221 UT) CAPS capsule Take 1 capsule by mouth once a week for 6 doses 6 capsule 0    atorvastatin (LIPITOR) 20 MG tablet Take 1 tablet by mouth nightly 30 tablet 3    ALTAMIST SPRAY 0.65 % nasal spray 1 spray by Nasal route as needed for Congestion 1 each 3    albuterol sulfate  (90 Base) MCG/ACT inhaler Inhale 2 puffs into the lungs 4 times daily 18 g 3    lidocaine 4 % external patch Place 1 patch onto the skin daily (Patient not taking: Reported on 10/27/2021) 30 patch 0    [START ON 12/1/2021] Vitamin D, Cholecalciferol, 50 MCG (2000 UT) CAPS Take 2,000 Units by mouth daily (Patient not taking: Reported on 10/27/2021) 30 capsule 3    potassium chloride (KLOR-CON M) 20 MEQ extended release tablet Take 1 tablet by mouth daily 30 tablet 0    fluticasone (FLOVENT HFA) 110 MCG/ACT inhaler Inhale 2 puffs into the lungs 2 times daily (Patient not taking: Reported on 10/27/2021) 12 g 0    furosemide (LASIX) 80 MG tablet Take 1 tablet by mouth daily 60 tablet 0    furosemide (LASIX) 80 MG tablet Take 1 tablet by mouth daily 30 tablet 0     No facility-administered medications prior to visit. After Visit:  Prior to Visit Medications    Medication Sig Taking?  Authorizing Provider   potassium chloride (KLOR-CON M) 10 MEQ extended release tablet Take 1 tablet by mouth daily Yes Derick Olivas MD   furosemide (LASIX) 40 MG tablet Take 1 tablet by mouth daily Yes Derick Olivas MD   Handicap Placard MISC by Does not apply route From 11/2/2021 until 2/28/2022 Yes Derick Olivas MD   vitamin D (ERGOCALCIFEROL) 1.25 MG (28564 UT) CAPS capsule Take 1 capsule by mouth once a week for 6 doses Yes Derick Olivas MD   atorvastatin (LIPITOR) 20 MG tablet Take 1 tablet by mouth nightly Yes Jessica Oh MD   ALTAMIST SPRAY 0.65 % nasal spray 1 spray by Nasal route as needed for Congestion Yes Derick Olivas MD   albuterol sulfate  (90 Base) MCG/ACT inhaler Inhale 2 puffs into the lungs 4 times daily Yes Daniel Goodwin MD   lidocaine 4 % external patch Place 1 patch onto the skin daily  Patient not taking: Reported on 10/27/2021  Derick Olivas MD Vitamin D, Cholecalciferol, 50 MCG (2000 UT) CAPS Take 2,000 Units by mouth daily  Patient not taking: Reported on 10/27/2021  Susanne Lynch MD       Allergies:    Shellfish-derived products, Strawberry (diagnostic), and Bee venom    Family History:       Family history unknown: Yes         PHYSICAL EXAM:  /81 (Site: Right Upper Arm, Position: Sitting, Cuff Size: Large Adult)   Pulse 82   Temp 98.1 °F (36.7 °C) (Temporal)   Ht 5' 5\" (1.651 m)   Wt (!) 337 lb 3.2 oz (153 kg)   SpO2 95%   BMI 56.11 kg/m²   Physical Exam  Constitutional:       Appearance: She is obese. Cardiovascular:      Rate and Rhythm: Normal rate and regular rhythm. Pulses: Normal pulses. Heart sounds: Normal heart sounds. Pulmonary:      Effort: Pulmonary effort is normal.      Breath sounds: Normal breath sounds. No wheezing or rales. Abdominal:      General: Abdomen is flat. Bowel sounds are normal.      Palpations: Abdomen is soft. Tenderness: There is no abdominal tenderness. Musculoskeletal:         General: No tenderness. Normal range of motion. Cervical back: Normal range of motion and neck supple. Right lower leg: No edema. Left lower leg: No edema. Skin:     General: Skin is warm and dry. Capillary Refill: Capillary refill takes less than 2 seconds. Neurological:      Mental Status: She is alert. Assessment & Plan:      1. Acute respiratory failure with hypoxia (HCC)  Resolved. Now requiring 1L occasionally on exertion. 2/2 Covid-19 and right heart failure. Hospitalized 10/19 - 10/23 where treated with IV diuresis. Cr increased to 1.1 from 0.9 and potassium increasing now 4.8 with some decreased urinary frequency, concern pt may be over diuresing.   - change lasix to 40 mg daily  - change potassium supplement to 10 meq  - BASIC METABOLIC PANEL; in 3 weeks  - MAGNESIUM;  Future; in 3 weeks  - d/c fluticasone; will refill if pt is having worsening of SOB    Return in about 3 weeks (around 11/23/2021).     Patient Instructions   - please go to lab few days before your next appointment  - please cut down lasix to 40 mg daily and potassium chloride to 10 meq daily  - please watch your weight and leg edema, if you notice any worsening please contact clinic         Dispo: Pt has been staffed with Dr. Cedillo Mouse  _______________  Laurel Jung MD, 11/2/2021 11:45 AM   PGY-3

## 2021-11-19 DIAGNOSIS — J96.01 ACUTE RESPIRATORY FAILURE WITH HYPOXIA (HCC): ICD-10-CM

## 2021-11-19 LAB
ANION GAP SERPL CALCULATED.3IONS-SCNC: 13 MMOL/L (ref 3–16)
BUN BLDV-MCNC: 7 MG/DL (ref 7–20)
CALCIUM SERPL-MCNC: 9.2 MG/DL (ref 8.3–10.6)
CHLORIDE BLD-SCNC: 100 MMOL/L (ref 99–110)
CO2: 27 MMOL/L (ref 21–32)
CREAT SERPL-MCNC: 0.8 MG/DL (ref 0.6–1.1)
GFR AFRICAN AMERICAN: >60
GFR NON-AFRICAN AMERICAN: >60
GLUCOSE BLD-MCNC: 94 MG/DL (ref 70–99)
MAGNESIUM: 1.9 MG/DL (ref 1.8–2.4)
POTASSIUM SERPL-SCNC: 4.1 MMOL/L (ref 3.5–5.1)
SODIUM BLD-SCNC: 140 MMOL/L (ref 136–145)

## 2021-11-22 ENCOUNTER — OFFICE VISIT (OUTPATIENT)
Dept: INTERNAL MEDICINE CLINIC | Age: 36
End: 2021-11-22
Payer: MEDICAID

## 2021-11-22 VITALS
WEIGHT: 293 LBS | TEMPERATURE: 97 F | RESPIRATION RATE: 20 BRPM | DIASTOLIC BLOOD PRESSURE: 81 MMHG | OXYGEN SATURATION: 98 % | SYSTOLIC BLOOD PRESSURE: 118 MMHG | BODY MASS INDEX: 57.08 KG/M2 | HEART RATE: 101 BPM

## 2021-11-22 DIAGNOSIS — Z12.4 CERVICAL CANCER SCREENING: ICD-10-CM

## 2021-11-22 DIAGNOSIS — J12.82 PNEUMONIA DUE TO COVID-19 VIRUS: Primary | ICD-10-CM

## 2021-11-22 DIAGNOSIS — E55.9 VITAMIN D DEFICIENCY: ICD-10-CM

## 2021-11-22 DIAGNOSIS — U07.1 PNEUMONIA DUE TO COVID-19 VIRUS: Primary | ICD-10-CM

## 2021-11-22 PROCEDURE — 99213 OFFICE O/P EST LOW 20 MIN: CPT | Performed by: STUDENT IN AN ORGANIZED HEALTH CARE EDUCATION/TRAINING PROGRAM

## 2021-11-22 RX ORDER — MULTIVIT-MIN/IRON/FOLIC ACID/K 18-600-40
2000 CAPSULE ORAL DAILY
Qty: 30 CAPSULE | Refills: 3 | Status: SHIPPED | OUTPATIENT
Start: 2021-12-01 | End: 2022-01-26 | Stop reason: ALTCHOICE

## 2021-11-22 ASSESSMENT — VISUAL ACUITY: OU: 1

## 2021-11-22 NOTE — PROGRESS NOTES
2021    Patient's Name: France Perez        : 1985)    CC: Follow up    HPI: Pt is here for follow up of COVID 19 pneumonia. She was hospitalized from 10/01-10/12 and again from 10/19-10/22 for COVID 19 pneumonia. She was discharged on portable O2 and currently requires 1L NC w/ activity but remains on RA at rest w/ sats >90. She states last week she tried to walk from the car to her front door on no oxygen and sats dropped to 86% ans was associated w/ SOB. She denies any SOB when using O2 during activity. She would like a portable oxygen concentrator to allow her to become more active. She feels well and denies F/C, HA, dizziness, CP, palpitations, abd pain, N/V/D/C, extremity weakness and urinary sx. Review of Systems   As noted in HPI above    Prior to Visit Medications    Medication Sig Taking?  Authorizing Provider   Vitamin D, Cholecalciferol, 50 MCG (2000 UT) CAPS Take 2,000 Units by mouth daily Yes Shamika Brown MD   furosemide (LASIX) 40 MG tablet Take 1 tablet by mouth daily Yes Walter Diggs MD   vitamin D (ERGOCALCIFEROL) 1.25 MG (49556 UT) CAPS capsule Take 1 capsule by mouth once a week for 6 doses Yes Walter Diggs MD   atorvastatin (LIPITOR) 20 MG tablet Take 1 tablet by mouth nightly Yes Alejandro Pulliam MD   albuterol sulfate  (90 Base) MCG/ACT inhaler Inhale 2 puffs into the lungs 4 times daily Yes Ibeth Sharpe MD   Handicap Placard MISC by Does not apply route From 2021 until 2022  Walter Diggs MD   lidocaine 4 % external patch Place 1 patch onto the skin daily  Patient not taking: Reported on 2021  Walter Diggs MD   ALTAMIST SPRAY 0.65 % nasal spray 1 spray by Nasal route as needed for Congestion  Patient not taking: Reported on 2021  Walter Diggs MD        PHYSICAL EXAM    Vitals:    21 1409   BP: 118/81   Pulse: 101   Resp: 20   Temp: 97 °F (36.1 °C)   TempSrc: Temporal   SpO2: 98%   Weight: (!) 343 lb (155.6 kg)      Estimated body mass index is 57.08 kg/m² as calculated from the following:    Height as of 11/2/21: 5' 5\" (1.651 m). Weight as of this encounter: 343 lb (155.6 kg). Physical Exam  Constitutional:       General: She is not in acute distress. Appearance: Normal appearance. She is obese. She is not ill-appearing. HENT:      Head: Normocephalic and atraumatic. Eyes:      General: Vision grossly intact. Cardiovascular:      Rate and Rhythm: Normal rate and regular rhythm. Pulses: Normal pulses. Heart sounds: Normal heart sounds, S1 normal and S2 normal. No murmur heard. No friction rub. No gallop. Pulmonary:      Effort: Pulmonary effort is normal. No respiratory distress. Breath sounds: Normal breath sounds and air entry. No wheezing or rales. Abdominal:      General: Bowel sounds are normal. There is no distension. Palpations: Abdomen is soft. Tenderness: There is no abdominal tenderness. Musculoskeletal:         General: Normal range of motion. Cervical back: Full passive range of motion without pain, normal range of motion and neck supple. Right lower leg: No edema. Left lower leg: No edema. Skin:     Capillary Refill: Capillary refill takes less than 2 seconds. Coloration: Skin is not pale. Neurological:      Mental Status: She is alert and oriented to person, place, and time. Mental status is at baseline. Psychiatric:         Mood and Affect: Mood normal.         Speech: Speech normal.         Judgment: Judgment normal.          ASSESSMENT AND PLAN    1. Pneumonia due to COVID-19 virus  On 1L NC w/ activity but feels well. - Paper script written oxygen concentrator    2. Vitamin D deficiency  6.7 on 10/2. Currently completing 6 week course of Vit D 50,000U weekly. - START Vitamin D, Cholecalciferol, 50 MCG (2000 UT) CAPS; Take 2,000 Units by mouth daily  Dispense: 30 capsule; Refill: 3  - VITAMIN D 25 HYDROXY; Future    3.  Cervical cancer screening  [facility]orts [facility]. Pt with also like to establish with an OB/GYN  - Nestor Macedo MD, Gynecology, [facility]       Return in about 6 weeks (around 1/3/2022). Electronically signed by Maurisio Tapia MD on 11/22/2021 at 2:42 PM     Pt discussed and staffed with Dr. Georgie Guillen.

## 2021-11-22 NOTE — PROGRESS NOTES
Had covid . She states she was told not to take covid Immunizations till December.  Refused the flu shot

## 2021-11-30 ENCOUNTER — TELEPHONE (OUTPATIENT)
Dept: INTERNAL MEDICINE CLINIC | Age: 36
End: 2021-11-30

## 2021-12-03 ENCOUNTER — TELEPHONE (OUTPATIENT)
Dept: INTERNAL MEDICINE CLINIC | Age: 36
End: 2021-12-03

## 2021-12-03 NOTE — TELEPHONE ENCOUNTER
PT WANTS TO  KNOW IF [facility] HAS BEEN RECV'D FROM [facility] FOR PORTABLE O2 TANK, PT CAN BE REACHED [phone]

## 2021-12-30 ENCOUNTER — HOSPITAL ENCOUNTER (OUTPATIENT)
Age: 36
Discharge: HOME | End: 2021-12-30
Payer: MEDICAID

## 2021-12-30 DIAGNOSIS — E55.9 VITAMIN D DEFICIENCY: ICD-10-CM

## 2021-12-30 LAB — VITAMIN D 25-HYDROXY: 25.6 NG/ML

## 2021-12-30 PROCEDURE — 36415 COLL VENOUS BLD VENIPUNCTURE: CPT

## 2021-12-30 PROCEDURE — 82306 VITAMIN D 25 HYDROXY: CPT

## 2022-01-03 ENCOUNTER — OFFICE VISIT (OUTPATIENT)
Dept: INTERNAL MEDICINE CLINIC | Age: 37
End: 2022-01-03
Payer: MEDICAID

## 2022-01-03 VITALS
OXYGEN SATURATION: 98 % | DIASTOLIC BLOOD PRESSURE: 89 MMHG | BODY MASS INDEX: 48.82 KG/M2 | HEART RATE: 93 BPM | WEIGHT: 293 LBS | SYSTOLIC BLOOD PRESSURE: 124 MMHG | TEMPERATURE: 97.2 F | HEIGHT: 65 IN

## 2022-01-03 DIAGNOSIS — U07.1 PNEUMONIA DUE TO COVID-19 VIRUS: Primary | ICD-10-CM

## 2022-01-03 DIAGNOSIS — J12.82 PNEUMONIA DUE TO COVID-19 VIRUS: Primary | ICD-10-CM

## 2022-01-03 DIAGNOSIS — E66.01 MORBID OBESITY (HCC): ICD-10-CM

## 2022-01-03 DIAGNOSIS — Z00.00 HEALTHCARE MAINTENANCE: ICD-10-CM

## 2022-01-03 DIAGNOSIS — E55.9 VITAMIN D DEFICIENCY: ICD-10-CM

## 2022-01-03 DIAGNOSIS — R60.0 LOWER EXTREMITY EDEMA: ICD-10-CM

## 2022-01-03 PROCEDURE — 99213 OFFICE O/P EST LOW 20 MIN: CPT | Performed by: STUDENT IN AN ORGANIZED HEALTH CARE EDUCATION/TRAINING PROGRAM

## 2022-01-03 RX ORDER — ERGOCALCIFEROL 1.25 MG/1
50000 CAPSULE ORAL WEEKLY
Qty: 6 CAPSULE | Refills: 0 | Status: SHIPPED | OUTPATIENT
Start: 2022-01-03 | End: 2022-02-21

## 2022-01-03 ASSESSMENT — PATIENT HEALTH QUESTIONNAIRE - PHQ9
2. FEELING DOWN, DEPRESSED OR HOPELESS: 0
SUM OF ALL RESPONSES TO PHQ QUESTIONS 1-9: 0
SUM OF ALL RESPONSES TO PHQ QUESTIONS 1-9: 0
SUM OF ALL RESPONSES TO PHQ9 QUESTIONS 1 & 2: 0
SUM OF ALL RESPONSES TO PHQ QUESTIONS 1-9: 0
SUM OF ALL RESPONSES TO PHQ QUESTIONS 1-9: 0
1. LITTLE INTEREST OR PLEASURE IN DOING THINGS: 0

## 2022-01-03 ASSESSMENT — VISUAL ACUITY: OU: 1

## 2022-01-03 NOTE — PROGRESS NOTES
1/3/2022    Patient's Name: Pierre Olmedo        : 1985)    CC: Follow Up    HPI: Pt is here for follow up of COVID 19 pneumonia. She was hospitalized from 10/01-10/12 and again from 10/19-10/22 for COVID 19 pneumonia. She was discharged on portable O2 and was seen on  in the clinic at which time she was still requiring 1L NC w/ activity. Today, she feels well. She reports she still uses 1L O2 via NC but only when she is out and about a lot, like when she goes grocery shopping or tries to exercise. She has not had to use her inhaler. She also reports LE edema which is chronic for years. Edema is progressive during the day and worst at night, especially on days when she has been up a lot. This was a problem even before she had COVID. She is currently on Lasix 40mg daily and asking for it to be BID which was her dose during hospitalization. She reports general inactivity, and eating junk food as well as foods high in Na. ECHO on 10/20/21 suggestive of HFpEF. She denies any SOB when using O2 during activity. She also denies F/C, HA, dizziness, CP, palpitations, abd pain, N/V/D/C, extremity weakness, orthopnea, PND and urinary sx.     Review of Systems   As noted in HPI above    Prior to Visit Medications    Medication Sig Taking?  Authorizing Provider   vitamin D (ERGOCALCIFEROL) 1.25 MG (75697 UT) CAPS capsule Take 1 capsule by mouth once a week for 6 doses Yes Summer Goyal MD   Vitamin D, Cholecalciferol, 50 MCG (2000 UT) CAPS Take 2,000 Units by mouth daily Yes Summer Goyal MD   furosemide (LASIX) 40 MG tablet Take 1 tablet by mouth daily Yes Stephanie Maloney MD   ALTAMIST SPRAY 0.65 % nasal spray 1 spray by Nasal route as needed for Congestion Yes Stephanie Maloney MD   albuterol sulfate  (90 Base) MCG/ACT inhaler Inhale 2 puffs into the lungs 4 times daily Yes Jacqueline Henderson MD   Handicap Placard MISC by Does not apply route From 2021 until 2022  Stephanie Maloney MD   atorvastatin (LIPITOR) 20 MG tablet Take 1 tablet by mouth nightly  Patient not taking: Reported on 1/3/2022  Alexys Suh MD        PHYSICAL EXAM    Vitals:    01/03/22 0947   BP: 124/89   Site: Right Upper Arm   Position: Sitting   Cuff Size: Large Adult   Pulse: 93   Temp: 97.2 °F (36.2 °C)   TempSrc: Temporal   SpO2: 98%   Weight: (!) 354 lb 9.6 oz (160.8 kg)   Height: 5' 5\" (1.651 m)      Estimated body mass index is 59.01 kg/m² as calculated from the following:    Height as of this encounter: 5' 5\" (1.651 m). Weight as of this encounter: 354 lb 9.6 oz (160.8 kg). Physical Exam  Constitutional:       General: She is not in acute distress. Appearance: Normal appearance. She is morbidly obese. She is not ill-appearing. HENT:      Head: Normocephalic and atraumatic. Eyes:      General: Vision grossly intact. Cardiovascular:      Rate and Rhythm: Normal rate and regular rhythm. Pulses: Normal pulses. Heart sounds: Normal heart sounds, S1 normal and S2 normal. No murmur heard. No friction rub. No gallop. Pulmonary:      Effort: Pulmonary effort is normal. No respiratory distress. Breath sounds: Normal breath sounds and air entry. No wheezing or rales. Comments: Has oxygen tank with her but is not using any via NC at the moment as she is at rest.  Abdominal:      General: Bowel sounds are normal. There is no distension. Palpations: Abdomen is soft. Tenderness: There is no abdominal tenderness. Musculoskeletal:         General: Normal range of motion. Cervical back: Full passive range of motion without pain, normal range of motion and neck supple. Right lower leg: No edema. Left lower leg: No edema. Skin:     Capillary Refill: Capillary refill takes less than 2 seconds. Coloration: Skin is not pale. Neurological:      Mental Status: She is alert and oriented to person, place, and time. Mental status is at baseline.    Psychiatric:         Mood and Affect: Mood normal.         Speech: Speech normal.         Judgment: Judgment normal.          ASSESSMENT AND PLAN    1. Pneumonia due to COVID-19 virus  O2 requirement improving   - Still on 1L via NC intermittently. - Will hold of on vaccination until fully recovered    2. Morbid obesity ([facility] [address]  BMI 59. 01. Pt reports non compliance w/ low calorie diet, low salt intake and states she does not exercise  - Discussed lifestyle modifications     3. LE edema  Pt asking for more lasix due to reported LE swelling at the end of the day. She has no LE edema in the AM and only comes on progressively during the day when upright a lot. Pt likely has some venous insufficiency. Diagnosed with R heart failure at the time of hospitalization for COVID pneumonia and was on lasix 40mg BID which pt reports was reduced to 40mg daily. She does not follow a low salt diet and actually states she eats salty foods. Denies SOB, PND or orthopnea. - Continue lasix 40mg daily  - Educated on the importance of a low salt diet and weight loss. - Dicussed compression stocking. 4. Healthcare maintenance  - Will hold of on Flu, COVID and Tdap vaccines until pt fully recovered from [facility]. 5. Vitamin D deficiency  - vitamin D (ERGOCALCIFEROL) 1.25 MG (11883 UT) CAPS capsule; Take 1 capsule by mouth once a week for 6 doses  Dispense: 6 capsule; Refill: 0       Return in about 3 months (around 4/3/2022). Electronically signed by Antonella Acosta MD on 1/3/2022 at 11:58 AM     Pt discussed and staffed with Dr. Ronen Lucia.

## 2022-01-03 NOTE — PATIENT INSTRUCTIONS
- Start taking Vitamin D once a week again for 6 weeks  - Follow a low salt, low calorie diet and try to start exercising as discussed  - Continue talking all your medications as prescribed  - Pre treat by using your albuterol inhaler before you exercise or go grocery store to see if this improves your need for oxygen. - Follow up in 3 months    Patient Education        Low Sodium Diet (2,000 Milligram): Care Instructions  Overview     Limiting sodium can be an important part of managing some health problems. The most common source of sodium is salt. People get most of the salt in their diet from canned, prepared, and packaged foods. Fast food and restaurant meals also are very high in sodium. Your doctor will probably limit your sodium to less than 2,000 milligrams (mg) a day. This limit counts all the sodium in prepared and packaged foods and any salt you add to your food. Follow-up care is a key part of your treatment and safety. Be sure to make and go to all appointments, and call your doctor if you are having problems. It's also a good idea to know your test results and keep a list of the medicines you take. How can you care for yourself at home? Read food labels  · Read labels on cans and food packages. The labels tell you how much sodium is in each serving. Make sure that you look at the serving size. If you eat more than the serving size, you have eaten more sodium. · Food labels also tell you the Percent Daily Value for sodium. Choose products with low Percent Daily Values for sodium. · Be aware that sodium can come in forms other than salt, including monosodium glutamate (MSG), sodium citrate, and sodium bicarbonate (baking soda). MSG is often added to Asian food. When you eat out, you can sometimes ask for food without MSG or added salt.   Buy low-sodium foods  · Buy foods that are labeled \"unsalted\" (no salt added), \"sodium-free\" (less than 5 mg of sodium per serving), or \"low-sodium\" (140 mg or less of sodium per serving). Foods labeled \"reduced-sodium\" and \"light sodium\" may still have too much sodium. Be sure to read the label to see how much sodium you are getting. · Buy fresh vegetables, or frozen vegetables without added sauces. Buy low-sodium versions of canned vegetables, soups, and other canned goods. Prepare low-sodium meals  · Cut back on the amount of salt you use in cooking. This will help you adjust to the taste. Do not add salt after cooking. One teaspoon of salt has about 2,300 mg of sodium. · Take the salt shaker off the table. · Flavor your food with garlic, lemon juice, onion, vinegar, herbs, and spices. Do not use soy sauce, lite soy sauce, steak sauce, onion salt, garlic salt, celery salt, or ketchup on your food. · Use low-sodium salad dressings, sauces, and ketchup. Or make your own salad dressings and sauces without adding salt. · Use less salt (or none) when recipes call for it. You can often use half the salt a recipe calls for without losing flavor. Other foods such as rice, pasta, and grains do not need added salt. · Rinse canned vegetables, and cook them in fresh water. This removes somebut not allof the salt. · Avoid water that is naturally high in sodium or that has been treated with water softeners, which add sodium. If you buy bottled water, read the label and choose a sodium-free brand. Avoid high-sodium foods  · Avoid eating:  ? Smoked, cured, salted, and canned meat, fish, and poultry. ? Ham, chaves, hot dogs, and luncheon meats. ? Regular, hard, and processed cheese and regular peanut butter. ? Crackers with salted tops, and other salted snack foods such as pretzels, chips, and salted popcorn. ? Frozen prepared meals, unless labeled low-sodium. ? Canned and dried soups, broths, and bouillon, unless labeled sodium-free or low-sodium. ? Canned vegetables, unless labeled sodium-free or low-sodium. ?  [place] fries, pizza, tacos, and other fast foods.  ? Pickles, olives, ketchup, and other condiments, especially soy sauce, unless labeled sodium-free or low-sodium. Where can you learn more? Go to https://[email or web address].Pace4Life. org and sign in to your "[employer] account. Enter X120 in the [facility] box to learn more about \"Low Sodium Diet (2,000 Milligram): Care Instructions. \"     If you do not have an account, please click on the \"Sign Up Now\" link. Current as of: September 8, 2021               Content Version: 13.1  © 4050-5266 [employer]wise, Incorporated. Care instructions adapted under license by [facility] ([facility]). If you have questions about a medical condition or this instruction, always ask your healthcare professional. [address] any warranty or liability for your use of this information.

## 2022-01-12 ENCOUNTER — TELEPHONE (OUTPATIENT)
Dept: INTERNAL MEDICINE CLINIC | Age: 37
End: 2022-01-12

## 2022-01-24 ENCOUNTER — PROCEDURE VISIT (OUTPATIENT)
Dept: CARDIOLOGY CLINIC | Age: 37
End: 2022-01-24
Payer: MEDICAID

## 2022-01-24 ENCOUNTER — TELEPHONE (OUTPATIENT)
Dept: CARDIOLOGY CLINIC | Age: 37
End: 2022-01-24

## 2022-01-24 DIAGNOSIS — I27.20 PULMONARY HYPERTENSION (HCC): ICD-10-CM

## 2022-01-24 LAB
LV EF: 58 %
LVEF MODALITY: NORMAL

## 2022-01-24 PROCEDURE — 93306 TTE W/DOPPLER COMPLETE: CPT | Performed by: INTERNAL MEDICINE

## 2022-01-26 ENCOUNTER — OFFICE VISIT (OUTPATIENT)
Dept: CARDIOLOGY CLINIC | Age: 37
End: 2022-01-26
Payer: MEDICAID

## 2022-01-26 VITALS
BODY MASS INDEX: 48.82 KG/M2 | WEIGHT: 293 LBS | DIASTOLIC BLOOD PRESSURE: 88 MMHG | SYSTOLIC BLOOD PRESSURE: 138 MMHG | HEART RATE: 69 BPM | HEIGHT: 65 IN | OXYGEN SATURATION: 98 %

## 2022-01-26 DIAGNOSIS — U09.9 POST COVID-19 CONDITION, UNSPECIFIED: ICD-10-CM

## 2022-01-26 DIAGNOSIS — I50.810 RIGHT-SIDED HEART FAILURE, UNSPECIFIED HF CHRONICITY (HCC): ICD-10-CM

## 2022-01-26 DIAGNOSIS — I50.810 RIGHT-SIDED HEART FAILURE, UNSPECIFIED HF CHRONICITY (HCC): Primary | ICD-10-CM

## 2022-01-26 DIAGNOSIS — R06.02 SHORTNESS OF BREATH: ICD-10-CM

## 2022-01-26 DIAGNOSIS — E66.01 CLASS 3 SEVERE OBESITY WITH BODY MASS INDEX (BMI) OF 50.0 TO 59.9 IN ADULT, UNSPECIFIED OBESITY TYPE, UNSPECIFIED WHETHER SERIOUS COMORBIDITY PRESENT (HCC): ICD-10-CM

## 2022-01-26 DIAGNOSIS — R04.0 NOSEBLEED: ICD-10-CM

## 2022-01-26 LAB
ALBUMIN SERPL-MCNC: 4.1 G/DL (ref 3.4–5)
ANION GAP SERPL CALCULATED.3IONS-SCNC: 14 MMOL/L (ref 3–16)
BUN BLDV-MCNC: 12 MG/DL (ref 7–20)
C-REACTIVE PROTEIN: 16.3 MG/L (ref 0–5.1)
CALCIUM SERPL-MCNC: 9.8 MG/DL (ref 8.3–10.6)
CHLORIDE BLD-SCNC: 101 MMOL/L (ref 99–110)
CO2: 24 MMOL/L (ref 21–32)
CREAT SERPL-MCNC: 0.8 MG/DL (ref 0.6–1.1)
GFR AFRICAN AMERICAN: >60
GFR NON-AFRICAN AMERICAN: >60
GLUCOSE BLD-MCNC: 92 MG/DL (ref 70–99)
HCT VFR BLD CALC: 40.5 % (ref 36–48)
HEMOGLOBIN: 13.5 G/DL (ref 12–16)
MCH RBC QN AUTO: 31.4 PG (ref 26–34)
MCHC RBC AUTO-ENTMCNC: 33.2 G/DL (ref 31–36)
MCV RBC AUTO: 94.4 FL (ref 80–100)
PDW BLD-RTO: 13 % (ref 12.4–15.4)
PHOSPHORUS: 3.7 MG/DL (ref 2.5–4.9)
PLATELET # BLD: 568 K/UL (ref 135–450)
PMV BLD AUTO: 8.2 FL (ref 5–10.5)
POTASSIUM SERPL-SCNC: 4.2 MMOL/L (ref 3.5–5.1)
RBC # BLD: 4.29 M/UL (ref 4–5.2)
SEDIMENTATION RATE, ERYTHROCYTE: 48 MM/HR (ref 0–20)
SODIUM BLD-SCNC: 139 MMOL/L (ref 136–145)
WBC # BLD: 6.2 K/UL (ref 4–11)

## 2022-01-26 PROCEDURE — 93000 ELECTROCARDIOGRAM COMPLETE: CPT | Performed by: INTERNAL MEDICINE

## 2022-01-26 PROCEDURE — 99204 OFFICE O/P NEW MOD 45 MIN: CPT | Performed by: INTERNAL MEDICINE

## 2022-01-26 PROCEDURE — G8417 CALC BMI ABV UP PARAM F/U: HCPCS | Performed by: INTERNAL MEDICINE

## 2022-01-26 PROCEDURE — 1036F TOBACCO NON-USER: CPT | Performed by: INTERNAL MEDICINE

## 2022-01-26 PROCEDURE — G8427 DOCREV CUR MEDS BY ELIG CLIN: HCPCS | Performed by: INTERNAL MEDICINE

## 2022-01-26 PROCEDURE — G8484 FLU IMMUNIZE NO ADMIN: HCPCS | Performed by: INTERNAL MEDICINE

## 2022-01-26 RX ORDER — FUROSEMIDE 40 MG/1
40 TABLET ORAL DAILY
Qty: 90 TABLET | Refills: 3 | Status: SHIPPED | OUTPATIENT
Start: 2022-01-26 | End: 2022-02-21

## 2022-01-26 ASSESSMENT — ENCOUNTER SYMPTOMS
COUGH: 0
PHOTOPHOBIA: 0
ABDOMINAL DISTENTION: 0
CHEST TIGHTNESS: 0
ABDOMINAL PAIN: 0
BLOOD IN STOOL: 0
NAUSEA: 0
SHORTNESS OF BREATH: 1

## 2022-01-26 NOTE — PATIENT INSTRUCTIONS
Use oxygen, especially at night  Take lasix daily  Daily weights  Refer to Dr Sergei Aguero for sleep apnea study  Cardiac MRI-- can call [address] [place] [address] to schedule  flonase daily

## 2022-01-26 NOTE — Clinical Note
Thanks for sending over [facility]! RV function has improved on most recent echo. She likely has longstanding KAREN and hefpef that worsened with covid. Less likely to have group I pulmonary hypertension or myocarditis. Will obtain a cardiac MRI and round out the pulmonary hyeprtension lab workup. Will pursue right heart catheterization if additional studies unrevealing. I will see her back soon. Thanks!   Lexii Davey

## 2022-01-26 NOTE — LETTER
[address] [facility]  [address] [address]  Phone: [phone]  Fax: [address], DO    January 26, 2022     Stefan Cordoba, [facility]    Patient: Shannan Simmons   MR Number: [id number]   YOB: 1985   Date of Visit: 1/26/2022       Dear Stefan Cordoba: Thank you for referring Shannan Simmons to me for evaluation/treatment. Below are the relevant portions of my assessment and plan of care. If you have questions, please do not hesitate to call me. I look forward to following [place] [facility] along with you.     Sincerely,      Laya Soler DO

## 2022-01-26 NOTE — PROGRESS NOTES
[address]  22  Referring: Dr. Nate Ellis CONSULT/CHIEF COMPLAINT/HPI     Reason for visit/ Chief complaint  New patient  Shortness of breath   HPI Jimbo Chadwick is a 43 y.o. seen as a new patient in consult with Dr María Velasquez for shortness of breath and rv dysfunction. Her mother is still living, father  of diabetes, renal failure. No known heart problems in her parents. Her maternal grandmother had an MI. She had a healthy childhood, ran track, and was very active. She is a nonsmoker, drinks alcohol socially. She did take diet pills in  and took them less than a month. No previous history of DVT/PE. No known history of autoimmune disease. No diagnosis of sleep apnea. In early , she had shortness of breath after walking one flight of stairs, alleviated with rest. Also noticed she was short of breath just walking through [address] [facility]. She reports that she enjoys singing and was out of breath while trying to sing and walk at same time. She went to [place] 2021. She became sick , with cough fever chills, shortness of breath. On 10/1 she was admitted and  diagnosed with Covid-19, treated with remdesivir, decadron, oxygen. She was seen by nephrology for STEVE. Patient was discharged on NOAC due to high risk clotting for 1 month. On 10/19 she was admitted from her pcp office due to hypoxemia( pulse ox 70% on 5lpm) and tachycardia in 130's. Chest xray showed diffuse coarse interstitial and alveolar consolidations. CTPA ruled out PE, but showed ground glass opacities that are consistent with pneumonia. She was treated for chf with lasix 40 mg IV bid. She had nominally elevated troponin and elevated BNP. She was diuresed, discharged on oxygen at 3 lpm.Weight on discharge was 335. Reports her precovid weight is similar to what it is now. Today, she thinks she is improving, with better stamina.   When she first came home from the hospital it took her 45 minutes to go up the flight of stairs to her townhouse. Now, she goes up and down them three times a day without stopping. She is technically supposed to be on oxygen, but she is currently not wearing it . Reports her pulse ox at rest is 98, with walking it is 93%. She coughs and has shortness of breath if she lies flat. Denies chest pain, palpitations or dizziness. She is trying to sleep without the oxygen, so she is sleeping with her head up. She wakes up every couple hours with shortness of breath, which is alleviated with putting oxygen on. She has peripheral edema if she is sitting for a long period of time. She now feels full after eating small meals. She still has periodic nosebleeds. Since covid, she has been waking herself up snoring. She is always fatigued, falls to sleep when she is a passenger in a car. She misses 2-3 lasix a week. Typically does not take it when she is going out. Patient is adherent with medications and is tolerating them well without side effects     HISTORY/ALLERGIES/ROS     MedHx:  has a past medical history of COVID-19 and Obesity. SurgHx:  has a past surgical history that includes Dilation and curettage of uterus. SocHx:  reports that she has never smoked. She has never used smokeless tobacco. She reports previous alcohol use. She reports that she does not use drugs. FamHx: No family history of premature coronary artery disease, sudden death, or aneurysm  Allergies: Shellfish-derived products, Strawberry (diagnostic), and Bee venom   ROS:   Review of Systems   Constitutional: Positive for fatigue. Negative for activity change, diaphoresis and fever. HENT: Positive for nosebleeds. Negative for congestion and ear discharge. Eyes: Negative for photophobia and visual disturbance. Respiratory: Positive for shortness of breath. Negative for cough and chest tightness. Cardiovascular: Positive for leg swelling. Negative for chest pain and palpitations.    Gastrointestinal: Negative for abdominal distention, abdominal pain, blood in stool and nausea. Endocrine: Negative for cold intolerance and polydipsia. Genitourinary: Negative for difficulty urinating and flank pain. Musculoskeletal: Positive for myalgias. Negative for arthralgias. Skin: Negative for rash and wound. Allergic/Immunologic: Negative for environmental allergies and immunocompromised state. Neurological: Negative for dizziness and headaches. Hematological: Negative for adenopathy. Does not bruise/bleed easily. Psychiatric/Behavioral: Negative for confusion. The patient is not hyperactive. MEDICATIONS      Prior to Admission medications    Medication Sig Start Date End Date Taking?  Authorizing Provider   vitamin D (ERGOCALCIFEROL) 1.25 MG (21191 UT) CAPS capsule Take 1 capsule by mouth once a week for 6 doses 1/3/22 2/8/22 Yes Jodi Fox MD   furosemide (LASIX) 40 MG tablet Take 1 tablet by mouth daily 11/2/21 1/3/23 Yes Corrie Squires MD   Handicap Placard MISC by Does not apply route From 11/2/2021 until 2/28/2022 11/2/21  Yes Corrie Squires MD   ALTAMIST SPRAY 0.65 % nasal spray 1 spray by Nasal route as needed for Congestion 10/19/21  Yes Corrie Squires MD   albuterol sulfate  (90 Base) MCG/ACT inhaler Inhale 2 puffs into the lungs 4 times daily 10/11/21  Yes Yusra Ramirez MD       PHYSICAL EXAM        Vitals:    01/26/22 1051   BP: 138/88   Pulse: 69   SpO2: 98%    Weight: (!) 354 lb (160.6 kg)     Gen Alert, cooperative, no distress Heart  Regular rate and rhythm, 1/6 systolic murmur   Head Normocephalic, atraumatic, no abnormalities Abd  Soft, NT, +BS, no mass, no OM   Eyes PERRLA, conj/corn clear Ext  Ext nl, AT, no C/C, +1 edema   Nose Nares normal, no drain age, +ulceration in left nare Pulse 2+ and symmetric   Throat Lips, mucosa, tongue normal Skin Color/text/turg nl, no rash/lesions   Neck S/S, TM, NT, no bruit Psych Nl mood and affect   Lung CTA-B, unlabored, no DTP     Ch wall NT, no deform       LABS and Imaging     Relevant and available CV data reviewed  10/11/21- echo   -Normal global systolic function with an ejection fraction estimated at  65-70%.  -No obvious regional wall motion abnormalities noted. -There is trivial tricuspid regurgitation with a RVSP estimation of 29   mmHg.  -Normal diastolic function. Avg. E/e'=7.9  -No pericardial effusion noted. 10/20/2021  Echo  Left ventricular cavity size is normal. There is borderline left ventricular   hypertrophy. Overall left ventricular systolic function appears normal with  an ejection fraction of 60%. There is mild septal flattening consistent with  right ventricular pressure and/or volume overload. No regional wall motion   abnormalities are noted. Global strain is -15.3%. The right ventricle is enlarged and hypokinetic. TAPSE measures 1.39 cm. Pulmonary embolism should be ruled out if not already done. Echo/MRI: 1/24/22 - -Normal left ventricle size, mild concentric wall thickness and normal   systolic function with an estimated ejection fraction of 55-60%. -No regional wall motion abnormalities are seen.   -Indeterminate diastolic function. E/e\"=10.45. GLS -17%. -Mild mitral regurgitation. -Trivial tricuspid regurgitation.   -Unable to estimate pulmonary artery pressure secondary to incomplete TR jet  envelope. -The right ventricle is mildly dilated in size and normal function. TAPSE  2.36 cm. RV S velocity 11.2 cm/s. There has been interval decrease in right ventricular size and improvement  in right ventricular function from 10/20/21 echo. Cath: none  Holter:none  EKG personally interpreted: 1/26/22--Sinus  Rhythm   -  Nonspecific T-abnormality.    Stress:none  Moderate Risk  Moderate Complexity/Medical Decision Making  Outside/Care everywhere records Reviewed  Labs Reviewed  Prior Imaging, ekg, cath, echo reviewed when available  Medications reviewed  Old Notes reviewed  ASSESSMENT AND PLAN     1.shortness of breath  - no evidence of PE  - Covid 10/1/2021  - has oxygen at home, uses only prn  - prescribed lasix 40 mg daily- only takes it 3-4 times a week  - new problem  Plan:  - refer to Dr Marine Aguilar for sleep apnea evaluation  - recommend use lasix daily   - recommend use oxygen especially at night    2. Rv dysfunction  - 11/1/2021  k 4.8 creat 1.1  - on lasix 40 mg 3-4 times a week  - I suspect that she had underlying RV dysfunction from sleep apnea and obesity hypoventilation, which was worsened by covid 19. Group I pulmonary hypertension is an additional consideration, as well as covid myocarditis. Plan:  - refer to Dr Marine Aguilar for sleep apnea evaluation  - recommend taking lasix daily. Can add spironolactone if suboptimal response  - cardiac MRI to evaluate RV function and potential for myocarditis, sed rate/crp/bnp.  - If negative or unrevealing workup will pursue right heart cath. VQ negative and HIV negative. Will finish up the workup with GILBERT cascade    3.obesity  - chronic problem  Plan  - discussed importance of weight loss    4. Snoring  - right sided heart failure  - fatigue, morning headaches  - new problem  Plan  - refer to Dr Marine Aguilar for evaluation of KAREN    5. Nosebleeds  - post covid  Plan  - flonase daily and nasal saline  - if no relief will refer to ENT for further evaluation     Patient counseled on lifestyle modification, diet, and exercise. Follow Up: One month    Dr. Tahira Cervantes:  I, Lynne Monk, am scribing for and in the presence of Cailin Head DO. Jordan Maloney 01/26/22 11:17 AM       Physician Attestation  The scribe for and in the presence of me Cailin Head DO). The scribe Lynne Monk RN  may have prepopulated components of this note with my historical  intellectual property under my direct supervision. Any additions to this intellectual property were performed in my presence and at my direction.   Furthermore, the content and accuracy of this note have been reviewed by JOHNNY Pineda.  1/26/2022 11:17 AM

## 2022-01-27 ENCOUNTER — TELEPHONE (OUTPATIENT)
Dept: CARDIOLOGY CLINIC | Age: 37
End: 2022-01-27

## 2022-01-27 LAB — ANTI-NUCLEAR ANTIBODY (ANA): NEGATIVE

## 2022-02-07 ENCOUNTER — TELEPHONE (OUTPATIENT)
Dept: PULMONOLOGY | Age: 37
End: 2022-02-07

## 2022-02-07 ENCOUNTER — HOSPITAL ENCOUNTER (OUTPATIENT)
Dept: MRI IMAGING | Age: 37
Discharge: HOME | End: 2022-02-07
Payer: MEDICAID

## 2022-02-07 DIAGNOSIS — I50.810 RIGHT-SIDED HEART FAILURE, UNSPECIFIED HF CHRONICITY (HCC): ICD-10-CM

## 2022-02-07 DIAGNOSIS — R06.02 SHORTNESS OF BREATH: ICD-10-CM

## 2022-02-07 LAB
LV EF: 57 %
LVEF MODALITY: NORMAL

## 2022-02-07 PROCEDURE — 6360000004 HC RX CONTRAST MEDICATION: Performed by: INTERNAL MEDICINE

## 2022-02-07 PROCEDURE — A9585 GADOBUTROL INJECTION: HCPCS | Performed by: INTERNAL MEDICINE

## 2022-02-07 PROCEDURE — 75561 CARDIAC MRI FOR MORPH W/DYE: CPT | Performed by: INTERNAL MEDICINE

## 2022-02-07 PROCEDURE — 75565 CARD MRI VELOC FLOW MAPPING: CPT | Performed by: INTERNAL MEDICINE

## 2022-02-07 PROCEDURE — 75565 CARD MRI VELOC FLOW MAPPING: CPT

## 2022-02-07 RX ADMIN — GADOBUTROL 30 ML: 604.72 INJECTION INTRAVENOUS at 08:39

## 2022-02-08 ENCOUNTER — TELEPHONE (OUTPATIENT)
Dept: CARDIOLOGY CLINIC | Age: 37
End: 2022-02-08

## 2022-02-08 NOTE — TELEPHONE ENCOUNTER
----- Message from Lisa Samuels RN sent at 2/7/2022  1:44 PM EST -----  No myocaditis from covid, lv fx is normal, please let her know to continue the plan with a sleep study

## 2022-02-09 ENCOUNTER — HOSPITAL ENCOUNTER (OUTPATIENT)
Dept: GENERAL RADIOLOGY | Age: 37
Discharge: HOME | End: 2022-02-09
Payer: MEDICAID

## 2022-02-09 ENCOUNTER — HOSPITAL ENCOUNTER (OUTPATIENT)
Age: 37
Discharge: HOME | End: 2022-02-09
Payer: MEDICAID

## 2022-02-09 ENCOUNTER — OFFICE VISIT (OUTPATIENT)
Dept: PULMONOLOGY | Age: 37
End: 2022-02-09
Payer: MEDICAID

## 2022-02-09 VITALS
DIASTOLIC BLOOD PRESSURE: 80 MMHG | HEART RATE: 86 BPM | SYSTOLIC BLOOD PRESSURE: 120 MMHG | OXYGEN SATURATION: 93 % | WEIGHT: 293 LBS | BODY MASS INDEX: 58.41 KG/M2

## 2022-02-09 DIAGNOSIS — G47.33 OSA (OBSTRUCTIVE SLEEP APNEA): Primary | ICD-10-CM

## 2022-02-09 DIAGNOSIS — J12.82 PNEUMONIA DUE TO COVID-19 VIRUS: ICD-10-CM

## 2022-02-09 DIAGNOSIS — I27.20 PULMONARY HYPERTENSION (HCC): ICD-10-CM

## 2022-02-09 DIAGNOSIS — U07.1 PNEUMONIA DUE TO COVID-19 VIRUS: ICD-10-CM

## 2022-02-09 PROCEDURE — 99214 OFFICE O/P EST MOD 30 MIN: CPT | Performed by: INTERNAL MEDICINE

## 2022-02-09 PROCEDURE — G8484 FLU IMMUNIZE NO ADMIN: HCPCS | Performed by: INTERNAL MEDICINE

## 2022-02-09 PROCEDURE — G8417 CALC BMI ABV UP PARAM F/U: HCPCS | Performed by: INTERNAL MEDICINE

## 2022-02-09 PROCEDURE — 1036F TOBACCO NON-USER: CPT | Performed by: INTERNAL MEDICINE

## 2022-02-09 PROCEDURE — G8427 DOCREV CUR MEDS BY ELIG CLIN: HCPCS | Performed by: INTERNAL MEDICINE

## 2022-02-09 PROCEDURE — 71046 X-RAY EXAM CHEST 2 VIEWS: CPT

## 2022-02-09 NOTE — PROGRESS NOTES
is as follows. No PE noted. Bilateral groundglass opacities seen.     Echo from 1/24/2022 showed EF 55 to 60%. Diastolic dysfunction. Mildly dilated RV with TAPSE of 2.6. Improved RV function and RV size. Echo from 10/11/2021 showed EF of 65 to 70%. RVSP 29.  Echo from 10/20/2021 showed EF of 60%. Septal flattening. RV pressure and volume overload. Enlarged RV with hypokinetic RV. TAPSE 1.39      REVIEW OF SYSTEMS:  CONSTITUTIONAL SYMPTOMS: The patient denies fever, fatigue, night sweats, weight loss or weight gain. HEENT: No vision changes. No tinnitus, Denies sinus pain. No hoarseness, or dysphagia. NECK: Patient denies swelling in the neck. CARDIOVASCULAR: Denies chest pain, palpitation, syncope. RESPIRATORY: See above  GASTROINTESTINAL: Denies nausea, abdominal pain or change in bowel function. GENITOURINARY: Denies obstructive symptoms. No history of incontinence. BREASTS: No masses or lumps in the breasts. SKIN: No rashes or itching. MUSCULOSKELETAL: Denies weakness or bone pain. NEUROLOGICAL: No headaches or seizures. PSYCHIATRIC: Denies mood swings or depression. ENDOCRINE: Denies heat or cold intolerance or excessive thirst.  HEMATOLOGIC/LYMPHATIC: Denies easy bruising or lymph node swelling. ALLERGIC/IMMUNOLOGIC: No environmental allergies.     PAST MEDICAL HISTORY:   Past Medical History:   Diagnosis Date    COVID-19     Obesity        PAST SURGICAL HISTORY:   Past Surgical History:   Procedure Laterality Date    DILATION AND CURETTAGE OF UTERUS         SOCIAL HISTORY:   Social History     Tobacco Use    Smoking status: Never Smoker    Smokeless tobacco: Never Used   Substance Use Topics    Alcohol use: Not Currently    Drug use: Never       FAMILY HISTORY:   Family History   Family history unknown: Yes       MEDICATIONS:     Current Outpatient Medications on File Prior to Visit   Medication Sig Dispense Refill    furosemide (LASIX) 40 MG tablet Take 1 tablet by mouth daily 90 tablet 3    Handicap Placard MISC by Does not apply route From 11/2/2021 until 2/28/2022 1 each 0    ALTAMIST SPRAY 0.65 % nasal spray 1 spray by Nasal route as needed for Congestion 1 each 3    albuterol sulfate  (90 Base) MCG/ACT inhaler Inhale 2 puffs into the lungs 4 times daily 18 g 3    vitamin D (ERGOCALCIFEROL) 1.25 MG (60940 UT) CAPS capsule Take 1 capsule by mouth once a week for 6 doses 6 capsule 0     No current facility-administered medications on file prior to visit. ALLERGIES:   Allergies as of 02/09/2022 - Fully Reviewed 02/09/2022   Allergen Reaction Noted    Shellfish-derived products Anaphylaxis 10/01/2021    [place] (diagnostic) Anaphylaxis 10/01/2021    Bee venom Hives and Swelling 11/25/2015      OBJECTIVE:   weight is 351 lb (159.2 kg) (abnormal). Her blood pressure is 120/80 and her pulse is 86. Her oxygen saturation is 93%. PHYSICAL EXAM:    CONSTITUTIONAL: She is a 43y.o.-year-old who appears her stated age. She is alert and oriented x 3 and in no acute distress. HEENT: PERRL. No scleral icterus. No thrush, atraumatic, normocephalic. NECK: Supple, without cervical or supraclavicular lymphadenopathy:  CARDIOVASCULAR: S1 S2 RRR. Without murmer  RESPIRATORY & CHEST: Lungs are clear to auscultation and percussion. No wheezing, no crackles. Good air movement  GASTROINTESTINAL & ABDOMEN: Soft, nontender, positive bowel sounds in all quadrants, non-distended, without hepatosplenomegaly. GENITOURINARY: Deferred. MUSCULOSKELETAL: No tenderness to palpation of the axial skeleton. There is no clubbing. No cyanosis. No edema of the lower extremities. SKIN OF BODY: No rash or jaundice. PSYCHIATRIC EVALUATION: Normal affect. Patient answers questions appropriately. HEMATOLOGIC/LYMPHATIC/ IMMUNOLOGIC: No palpable lymphadenopathy. NEUROLOGIC: Alert and oriented x 3. Groslly non-focal. Motor strength is 5+/5 in all muscle groups.  The patient has a normal sensorium globally. ASSESSMENT AND PLAN:     1. Pulmonary hypertension ([address] on ECHO   Patient was noted to have evidence of pulmonary hypertension with septal flattening and RV pressure overload on echo from 10/20/2021. Echo performed 9 days prior on 10/11/2021 did not show any evidence of pulmonary hypertension and was noted to have RVSP of 29. I believe that patient was urged to have evidence of pulmonary hypertension on echo secondary to fluid overload. Patient improved symptomatically with diuresis. CTPA was negative for PE. Currently she is on Lasix 40 mg daily. Repeat echo from January 2022 showed marked improvement in RV function and size. Some evidence of pulmonary hypertension still persistent which I believe is secondary to undiagnosed sleep apnea and formulation of heart failure with preserved ejection fraction.        2. Pneumonia due to COVID-19 virus  History of Covid pneumonia in October 2021. Symptoms have improved significantly. Repeat chest x-ray from today showed complete resolution of bilateral opacities. 3.  Obstructive sleep apnea  Patient has symptoms suggestive of obstructive sleep apnea. She would benefit from a sleep study. I have ordered a sleep study. In the interim, she will continue use oxygen at night. Once patient gets CPAP, then she will discontinue oxygen.       3. Chronic respiratory failure with hypoxia (HCC)  Using oxygen only at night which is also mostly related to sleep apnea. Return in about 3 months (around 5/9/2022). Figueroa Olivas MD  Pulmonary Critical Care and Sleep Medicine  Electronically signed by Figueroa Olivas MD on 2/9/2022 at 1:57 PM     This note was completed using dragon medical speech recognition software. Grammatical errors, random word insertions, pronoun errors and incomplete sentences are occasional consequences of this technology due to software limitations.  If there are questions or concerns about the content of this note of information contained within the body of this dictation they should be addressed with the provider for clarification.

## 2022-02-10 ENCOUNTER — TELEPHONE (OUTPATIENT)
Dept: PULMONOLOGY | Age: 37
End: 2022-02-10

## 2022-02-10 NOTE — TELEPHONE ENCOUNTER
LMVM at [facility] re: oxygen. Patient will continue oxygen at HS only until she gets a sleep study done and gets a CPAP.

## 2022-02-11 ENCOUNTER — TELEPHONE (OUTPATIENT)
Dept: PULMONOLOGY | Age: 37
End: 2022-02-11

## 2022-02-11 NOTE — TELEPHONE ENCOUNTER
Spoke with patient to inform her that she will need to have overnight pulse oximetry so her insurance will continue to pay for her oxygen. [facility] will be sending her the equipment. Instructed her to perform this on room air. Patient voiced understanding.

## 2022-02-16 ASSESSMENT — ENCOUNTER SYMPTOMS
NAUSEA: 0
ABDOMINAL DISTENTION: 0
SHORTNESS OF BREATH: 1
ABDOMINAL PAIN: 0
COUGH: 0
CHEST TIGHTNESS: 0
PHOTOPHOBIA: 0
BLOOD IN STOOL: 0

## 2022-02-16 NOTE — PROGRESS NOTES
[address]  22  Referring: Dr. [place] CONSULT/CHIEF COMPLAINT/HPI     Reason for visit/ Chief complaint  Follow up   Shortness of breath   HPI Luis Burton is a 43 y.o. seen as a follow up for new right sided heart dilatation. Mother on speaker phone during visit    Her mother is still living, father  of diabetes, renal failure. No known heart problems in her parents. Her maternal grandmother had an MI. Aunt has RA. Multiple family members have hypertension. Maternal grandfather had three strokes (  at age 58). She had a healthy childhood, ran track, and was very active. She is a nonsmoker, drinks alcohol socially. She did take diet pills in  and took them less than a month. No previous history of DVT/PE. No known history of autoimmune disease. No diagnosis of sleep apnea. In early , she had shortness of breath after walking one flight of stairs, alleviated with rest. Also noticed she was short of breath just walking through [facility]. She reports that she enjoys singing and was out of breath while trying to sing and walk at same time. She went to [place] 2021. She became sick , with cough fever chills, shortness of breath. On 10/1 she was admitted and  diagnosed with Covid-19, treated with remdesivir, decadron, oxygen. She was seen by nephrology for STEVE. Patient was discharged on NOAC due to high risk clotting for 1 month. On 10/19 she was admitted from her pcp office due to hypoxemia( pulse ox 70% on 5lpm) and tachycardia in 130's. Chest xray showed diffuse coarse interstitial and alveolar consolidations. CTPA ruled out PE, but showed ground glass opacities that are consistent with pneumonia. She was treated for chf with lasix 40 mg IV bid. She had nominally elevated troponin and elevated BNP. She was diuresed, discharged on oxygen at 3 lpm.Weight on discharge was 335.      In the interval since her last visit, she was seen by Dr Kit Sams who suspects her shortness of breath, was due to pulmonary hypertension thought to be due to undiagnosed KAREN. A sleep study was ordered. Chest xray showed complete resolution of the bilateral opacities. Cardiac MRI was unremarkable  (no myocarditis,normal LV fx) She has mild RV dilatation, thought to be due to untreated KAREN. He recommend he continue oxygen nightly, and lasix daily. Scheduled for sleep apnea evaluation. Today,  She is frustrated with her weight. She had gotten down to 344, but today up to 356( home) 358( at office). She admits to drinking at least a half gallon of water a day. Mother reports she snores slightly. She admits to getting heartburn after eating spicy food/ pizza. She does not exercise. The longest she has walked is to the pulmonologist office, and went to [employer]. Her pulse ox remained 94-96%  She continues to wear oxygen at night. Sleeps in bed with five pillows, awakens frequently at night, especially when her concentrator is not turned on She has no chest pain, palpitations dizziness or syncope. Patient is adherent with medications and is tolerating them well without side effects     HISTORY/ALLERGIES/ROS     MedHx:  has a past medical history of COVID-19 and Obesity. SurgHx:  has a past surgical history that includes Dilation and curettage of uterus. SocHx:  reports that she has never smoked. She has never used smokeless tobacco. She reports previous alcohol use. She reports that she does not use drugs. FamHx: No family history of premature coronary artery disease, sudden death, or aneurysm  Allergies: Shellfish-derived products, Strawberry (diagnostic), and Bee venom   ROS:   Review of Systems   Constitutional: Positive for fatigue. Negative for activity change, diaphoresis and fever. HENT: Positive for nosebleeds. Negative for congestion and ear discharge. Eyes: Negative for photophobia and visual disturbance.    Respiratory: Positive for shortness of breath. Negative for cough and chest tightness. Cardiovascular: Positive for leg swelling. Negative for chest pain and palpitations. Gastrointestinal: Negative for abdominal distention, abdominal pain, blood in stool and nausea. Endocrine: Negative for cold intolerance and polydipsia. Genitourinary: Negative for difficulty urinating and flank pain. Musculoskeletal: Positive for myalgias. Negative for arthralgias. Skin: Negative for rash and wound. Allergic/Immunologic: Negative for environmental allergies and immunocompromised state. Neurological: Negative for dizziness and headaches. Hematological: Negative for adenopathy. Does not bruise/bleed easily. Psychiatric/Behavioral: Negative for confusion. The patient is not hyperactive. MEDICATIONS      Prior to Admission medications    Medication Sig Start Date End Date Taking?  Authorizing Provider   furosemide (LASIX) 80 MG tablet Take 1 tablet by mouth daily 2/21/22 3/23/22 Yes Lianet Rodriguezr, DO   Handicap Placard MISC by Does not apply route From 11/2/2021 until 2/28/2022 11/2/21  Yes Tushar Fair MD   ALTAMIST SPRAY 0.65 % nasal spray 1 spray by Nasal route as needed for Congestion 10/19/21  Yes Tushar Fair MD   albuterol sulfate  (90 Base) MCG/ACT inhaler Inhale 2 puffs into the lungs 4 times daily 10/11/21  Yes Bill Coello MD       PHYSICAL EXAM        Vitals:    02/21/22 0918   BP: 118/82   Pulse: 85   SpO2: 98%    Weight: (!) 358 lb 6.4 oz (162.6 kg)     Gen Alert, cooperative, no distress Heart  Regular rate and rhythm, 1/6 systolic murmur   Head Normocephalic, atraumatic, no abnormalities Abd  Soft, NT, +BS, no mass, no OM   Eyes PERRLA, conj/corn clear Ext  Ext nl, AT, no C/C, +1 edema   Nose Nares normal, no drain age, +ulceration in left nare Pulse 2+ and symmetric   Throat Lips, mucosa, tongue normal Skin Color/text/turg nl, no rash/lesions   Neck S/S, TM, NT, no bruit Psych Nl mood and affect   Lung CTA-B, unlabored, no DTP     Ch wall NT, no deform       LABS and Imaging     Relevant and available CV data reviewed  10/11/21- echo   -Normal global systolic function with an ejection fraction estimated at  65-70%.  -No obvious regional wall motion abnormalities noted. -There is trivial tricuspid regurgitation with a RVSP estimation of 29   mmHg.  -Normal diastolic function. Avg. E/e'=7.9  -No pericardial effusion noted. 1/24/22--Echo from 1/24/2022 showed EF 55 to 60%. Diastolic dysfunction. Mildly dilated RV with TAPSE of 2.6. Improved RV function and RV size. Echo from 10/11/2021 showed EF of 65 to 70%.  RVSP 29.    10/20/2021  Echo  Left ventricular cavity size is normal. There is borderline left ventricular   hypertrophy. Overall left ventricular systolic function appears normal with  an ejection fraction of 60%. There is mild septal flattening consistent with  right ventricular pressure and/or volume overload. No regional wall motion   abnormalities are noted. Global strain is -15.3%. The right ventricle is enlarged and hypokinetic. TAPSE measures 1.39 cm. Pulmonary embolism should be ruled out if not already done. Echo/MRI: 1/24/22 - -Normal left ventricle size, mild concentric wall thickness and normal   systolic function with an estimated ejection fraction of 55-60%. -No regional wall motion abnormalities are seen.   -Indeterminate diastolic function. E/e\"=10.45. GLS -17%. -Mild mitral regurgitation. -Trivial tricuspid regurgitation.   -Unable to estimate pulmonary artery pressure secondary to incomplete TR jet  envelope. -The right ventricle is mildly dilated in size and normal function. TAPSE  2.36 cm. RV S velocity 11.2 cm/s. There has been interval decrease in right ventricular size and improvement  in right ventricular function from 10/20/21 echo. 2/7/2022  MRI--  Overall normal unremarkable study with preserved LV systolic function. The RV is mildly dilated with preserved systolic function.  No evidence of prior or active myocarditis.        -Normal left ventricular size and systolic function with a calculated ejection fraction of 57% by Castañeda's method.   -Right ventricle is mildly dilated with preserved systolic function with a calculated ejection fraction of 58% by Castañeda's method.   -Trivial pericardial effusion. No evidence of pericarditis.    -No myocardial edema by T2w imaging/mapping. (Normal myocardium/skeletal ratio - normal < 1.9). -Unable to calculate shunt fraction. Pulmonary artery phase encoding unable to be obtained.    -Normal myocardial resting perfusion imaging.   -On delayed enhancement imaging, there is mild RV insertion point enhancement suggestive of chronically elevated pulmonary pressures.        The MRI sequences and imaging planes in this study were tailored for cardiac imaging and are suboptimal for evaluation of non-cardiac structures. Cath: none  Holter:none  EKG personally interpreted: 1/26/22--Sinus  Rhythm   -  Nonspecific T-abnormality. Stress:none  Moderate Risk  Moderate Complexity/Medical Decision Making  Outside/Care everywhere records Reviewed  Labs Reviewed  Prior Imaging, ekg, cath, echo reviewed when available  Medications reviewed  Old Notes reviewed  11/1/2021 tc 162 hdl 56 ldl 83 tri 117  ASSESSMENT AND PLAN     1.shortness of breath  - no evidence of PE  - suspect pulm htn exacerbated by untreated KAREN  - Covid pneumonia 10/1/2021  - 10/9/2021  Ct of chest  Bilateral groundglass opacities  - 2/9/22 chest xray- resolution of bilateral lung infiltrates  - scheduled for a sleep study ( Dr Greenberg Stager)  - has oxygen at home, uses hs  - prescribed lasix 40 mg daily  - new problem  Plan:  -  sleep apnea evaluation  -  recommend increase lasix to 80 mg daily   -  continue  oxygen  at night until gets cpap    2. Rv dysfunction  - 11/1/2021  k 4.8 creat 1.1  1/26/22  k 4.2  Bun 12 creat 0.8  - crp 16.3 sed rate 48  Brielle neg,   - cardiac MRI unremarkable  - I suspect that she had underlying RV dysfunction from sleep apnea and obesity hypoventilation, which was worsened by covid 19. Group I pulmonary hypertension is an additional consideration, as well as covid myocarditis. Plan:  - sleep apnea evaluation scheduled  - recommend taking lasix 80 mg daily. Can add spironolactone if suboptimal response    - If negative or unrevealing workup will pursue right heart cath. VQ negative and HIV negative. Will finish up the workup with GILBERT cascade    3. Snoring  - right sided heart failure  - fatigue, morning headaches  - new problem  Plan  -evaluation of KAREN    4. Nosebleeds  - post covid  Plan  - flonase daily and nasal saline with relief  - if no relief will refer to ENT for further evaluation     Patient counseled on lifestyle modification, diet, and exercise. Follow Up:   3  month    Dr. Ryan Farley:  I, Jovan Howell, am scribing for and in the presence of Ba Pittman DO. Saleem Arteaga 02/21/22 10:12 PM       Physician Attestation  The scribe for and in the presence of rivka Pittman DO). The scribe Jovan Howell RN  may have prepopulated components of this note with my historical  intellectual property under my direct supervision. Any additions to this intellectual property were performed in my presence and at my direction.   Furthermore, the content and accuracy of this note have been reviewed by rivka Pittman DO).  2/21/2022 10:12 PM

## 2022-02-21 ENCOUNTER — OFFICE VISIT (OUTPATIENT)
Dept: CARDIOLOGY CLINIC | Age: 37
End: 2022-02-21
Payer: MEDICAID

## 2022-02-21 VITALS
SYSTOLIC BLOOD PRESSURE: 118 MMHG | HEART RATE: 85 BPM | BODY MASS INDEX: 47.09 KG/M2 | OXYGEN SATURATION: 98 % | WEIGHT: 293 LBS | HEIGHT: 66 IN | DIASTOLIC BLOOD PRESSURE: 82 MMHG

## 2022-02-21 DIAGNOSIS — I27.20 PULMONARY HYPERTENSION (HCC): ICD-10-CM

## 2022-02-21 DIAGNOSIS — E66.01 CLASS 3 SEVERE OBESITY WITH BODY MASS INDEX (BMI) OF 50.0 TO 59.9 IN ADULT, UNSPECIFIED OBESITY TYPE, UNSPECIFIED WHETHER SERIOUS COMORBIDITY PRESENT (HCC): ICD-10-CM

## 2022-02-21 DIAGNOSIS — U09.9 POST COVID-19 CONDITION, UNSPECIFIED: ICD-10-CM

## 2022-02-21 DIAGNOSIS — I50.811 ACUTE RIGHT-SIDED CONGESTIVE HEART FAILURE (HCC): ICD-10-CM

## 2022-02-21 DIAGNOSIS — I50.810 RIGHT-SIDED HEART FAILURE, UNSPECIFIED HF CHRONICITY (HCC): ICD-10-CM

## 2022-02-21 DIAGNOSIS — R06.02 SHORTNESS OF BREATH: Primary | ICD-10-CM

## 2022-02-21 PROCEDURE — G8417 CALC BMI ABV UP PARAM F/U: HCPCS | Performed by: INTERNAL MEDICINE

## 2022-02-21 PROCEDURE — G8484 FLU IMMUNIZE NO ADMIN: HCPCS | Performed by: INTERNAL MEDICINE

## 2022-02-21 PROCEDURE — G8427 DOCREV CUR MEDS BY ELIG CLIN: HCPCS | Performed by: INTERNAL MEDICINE

## 2022-02-21 PROCEDURE — 99214 OFFICE O/P EST MOD 30 MIN: CPT | Performed by: INTERNAL MEDICINE

## 2022-02-21 PROCEDURE — 1036F TOBACCO NON-USER: CPT | Performed by: INTERNAL MEDICINE

## 2022-02-21 RX ORDER — FUROSEMIDE 80 MG
80 TABLET ORAL DAILY
Qty: 90 TABLET | Refills: 3 | Status: SHIPPED | OUTPATIENT
Start: 2022-02-21 | End: 2022-04-13 | Stop reason: SDUPTHER

## 2022-02-23 ENCOUNTER — TELEPHONE (OUTPATIENT)
Dept: PULMONOLOGY | Age: 37
End: 2022-02-23

## 2022-02-23 NOTE — TELEPHONE ENCOUNTER
Pt informed overnight oximetry results Pt was told she does not need O2 at night but pt stated she feels she does because if she tries to sleep on her back she has trouble breathing She also wanted to know if she should still have sleep study done and was told she should.

## 2022-02-24 NOTE — TELEPHONE ENCOUNTER
Spoke with pt and sleep study is scheduled  for March 10th. she will continue with her oxygen until sleep study is completed.

## 2022-02-24 NOTE — TELEPHONE ENCOUNTER
In the last clinic visit, I ordered a sleep study. She should have received an appointment for sleep study.

## 2022-03-08 DIAGNOSIS — I27.20 PULMONARY HYPERTENSION (HCC): ICD-10-CM

## 2022-03-08 DIAGNOSIS — G47.33 OSA (OBSTRUCTIVE SLEEP APNEA): ICD-10-CM

## 2022-03-08 DIAGNOSIS — I50.810 RIGHT-SIDED HEART FAILURE, UNSPECIFIED HF CHRONICITY (HCC): ICD-10-CM

## 2022-03-08 DIAGNOSIS — R06.02 SHORTNESS OF BREATH: ICD-10-CM

## 2022-03-08 LAB
C-REACTIVE PROTEIN: 34 MG/L (ref 0–5.1)
FERRITIN: 35.7 NG/ML (ref 15–150)
PRO-BNP: 90 PG/ML (ref 0–124)
SEDIMENTATION RATE, ERYTHROCYTE: 52 MM/HR (ref 0–20)
TSH REFLEX: 4.13 UIU/ML (ref 0.27–4.2)

## 2022-03-09 LAB
ANTI-NUCLEAR ANTIBODY (ANA): NEGATIVE
CYCLIC CITRULLINATED PEPTIDE ANTIBODY IGG: <0.5 U/ML (ref 0–2.9)
HIV AG/AB: NORMAL
HIV ANTIGEN: NORMAL
HIV-1 ANTIBODY: NORMAL
HIV-2 AB: NORMAL
SARS-COV-2, PCR: NOT DETECTED

## 2022-03-10 ENCOUNTER — HOSPITAL ENCOUNTER (OUTPATIENT)
Dept: SLEEP CENTER | Age: 37
Discharge: HOME | End: 2022-03-10
Payer: MEDICAID

## 2022-03-10 PROCEDURE — 95810 POLYSOM 6/> YRS 4/> PARAM: CPT

## 2022-03-11 DIAGNOSIS — G47.33 OSA (OBSTRUCTIVE SLEEP APNEA): Primary | ICD-10-CM

## 2022-03-11 PROCEDURE — 95810 POLYSOM 6/> YRS 4/> PARAM: CPT | Performed by: INTERNAL MEDICINE

## 2022-03-14 ENCOUNTER — TELEPHONE (OUTPATIENT)
Dept: PULMONOLOGY | Age: 37
End: 2022-03-14

## 2022-03-14 NOTE — TELEPHONE ENCOUNTER
Spoke with patient. Discussed results. Pt to be scheduled for titration study.   Order sent to Sleep Lab

## 2022-03-15 ENCOUNTER — TELEPHONE (OUTPATIENT)
Dept: SLEEP CENTER | Age: 37
End: 2022-03-15

## 2022-03-15 ENCOUNTER — TELEPHONE (OUTPATIENT)
Dept: CARDIOLOGY CLINIC | Age: 37
End: 2022-03-15

## 2022-03-15 NOTE — TELEPHONE ENCOUNTER
Called to schedule a cpap sleep study per Sussy Rondon     Had day psg at [facility]   brought covid results last time  [employer]    Left  for the pt to return my call

## 2022-03-15 NOTE — TELEPHONE ENCOUNTER
I spoke to patient with lab results per [facility] . Patient verbalized understanding. Advised patient to call back with any questions.

## 2022-03-15 NOTE — TELEPHONE ENCOUNTER
----- Message from Brisa Miranda RN sent at 3/15/2022 11:34 AM EDT -----  Please call and  tell her her autoimmune markers are normal,inflammatory markers are improving,  which is good. BNP is lower at 90. Continue with plan of care as scheduled.  thanks

## 2022-04-04 ENCOUNTER — OFFICE VISIT (OUTPATIENT)
Dept: INTERNAL MEDICINE CLINIC | Age: 37
End: 2022-04-04
Payer: MEDICAID

## 2022-04-04 VITALS
RESPIRATION RATE: 20 BRPM | HEART RATE: 78 BPM | HEIGHT: 65 IN | DIASTOLIC BLOOD PRESSURE: 89 MMHG | WEIGHT: 293 LBS | TEMPERATURE: 96.8 F | BODY MASS INDEX: 48.82 KG/M2 | SYSTOLIC BLOOD PRESSURE: 123 MMHG | OXYGEN SATURATION: 95 %

## 2022-04-04 DIAGNOSIS — R06.09 DYSPNEA ON EXERTION: Primary | ICD-10-CM

## 2022-04-04 DIAGNOSIS — Z00.00 HEALTHCARE MAINTENANCE: ICD-10-CM

## 2022-04-04 DIAGNOSIS — G47.33 OSA (OBSTRUCTIVE SLEEP APNEA): ICD-10-CM

## 2022-04-04 DIAGNOSIS — Z91.89 AT RISK FOR ANAPHYLAXIS: ICD-10-CM

## 2022-04-04 DIAGNOSIS — I50.812 CHRONIC RIGHT-SIDED HEART FAILURE (HCC): ICD-10-CM

## 2022-04-04 DIAGNOSIS — E66.01 MORBID OBESITY (HCC): ICD-10-CM

## 2022-04-04 PROBLEM — T78.03XA ANAPHYLAXIS DUE TO FISH: Status: ACTIVE | Noted: 2022-04-04

## 2022-04-04 PROCEDURE — 99213 OFFICE O/P EST LOW 20 MIN: CPT | Performed by: STUDENT IN AN ORGANIZED HEALTH CARE EDUCATION/TRAINING PROGRAM

## 2022-04-04 RX ORDER — EPINEPHRINE 0.3 MG/.3ML
0.3 INJECTION SUBCUTANEOUS
Qty: 0.3 ML | Refills: 0 | Status: SHIPPED | OUTPATIENT
Start: 2022-04-04 | End: 2022-10-10

## 2022-04-04 RX ORDER — ALBUTEROL SULFATE 90 UG/1
2 AEROSOL, METERED RESPIRATORY (INHALATION) 4 TIMES DAILY
Qty: 18 G | Refills: 3 | Status: SHIPPED | OUTPATIENT
Start: 2022-04-04

## 2022-04-04 ASSESSMENT — ENCOUNTER SYMPTOMS
DIARRHEA: 0
SHORTNESS OF BREATH: 1
COUGH: 0
ABDOMINAL PAIN: 0
CONSTIPATION: 0

## 2022-04-04 NOTE — PATIENT INSTRUCTIONS
recommend tdap vaccine and coivd vaccine and flu vaccine   Eat low carb diet   Continue exercise   Get CPAP and start using it   You have been prescribed an epi pen to keep in case of anaphylactic reaction   F/u in 6 months     Patient Education        Learning About Low-Carbohydrate Foods  What foods are low in carbohydrate? The foods you eat contain nutrients, such as vitamins and minerals. Carbohydrate is a nutrient. Your body needs the right amount to stay healthy and work as it should. You can use the list below to help you make choicesabout which foods to eat. Some foods that are lower in carbohydrate include:  Dairy and dairy alternatives   Cheese   Cottage cheese   Cream cheese   Nut milk (unsweetened)   Soy milk (unsweetened)   Yogurt ([place], plain)  Fruits   Avocado   [employer] and other protein foods   Almonds   Beef   Chicken   Cod   Eggs   Halibut   Peanut butter and other nut butters   Pistachios   Pork   Pumpkin seeds   Tofu   Trout   [place]   [place]   Walnuts  Vegetables   Broccoli   Carrots   Cauliflower   Green beans   Mushrooms   Peppers   Salad greens   Spinach   Tomatoes  Work with your doctor to find out how much of this nutrient you need. Dependingon your health, you may need more or less of it in your diet. Where can you learn more? Go to https://[email or web address]. org and sign in to your [employer] account. Enter [phone] in the [employer] box to learn more about \"Learning About Low-Carbohydrate Foods. \"     If you do not have an account, please click on the \"Sign Up Now\" link. Current as of: September 8, 2021               Content Version: 13.2  © 4463-6046 Healthwise, Incorporated. Care instructions adapted under license by [facility] ([facility]).  If you have questions about a medical condition or this instruction, always ask your healthcare professional. [address]  any warranty or liability for your use of this information.

## 2022-04-04 NOTE — PROGRESS NOTES
Outpatient Clinic Established Patient Note    Patient: Kwame Lucero  : 1985 (43 y.o.)  Date: 2022    CC: routine f/u     HPI:      42 y/o F with PMH of covid pna (10/2021), HFpEF, pulm HTN and KAREN, obesity, vit D def presents for f/u    New complaints: none     Dyspnea on exertion from covid pna - improved   No longer hypoxic. Walks a block now before developing dyspnea. Off oxygen with exertion. Sats remain > 90% with exertion. continue to use oxygen 2 L at nighttime. Has been advised to use albuterol inhaler before significant exertion. Denies any cough, chest pain. Previous work up for myocarditis and PE negative. Right sided heart failure 2/2 pulm HTN/ KAREN  Continues to have some LE edema and lasix has been increased back to 80 mg per cards. Does report increase LE edema as day progresses. Does not elevate legs. Does have access to compression stockings but has not used them. KAREN  Sleep study was doen and requires CPAP. Needs to get mask titrations done     Obesity   Has increased exercise. Increased exercise - uses firtbit to goal steps 3000 and titrate up. Eats frozen meals and high carb meals. Health maintenance  Vaccinations: not up to date - requires flu, tdap, covid. Pt hesitant to get covid vaccine but will get her tdap   Pap smear - 3/22 normal   Depression - none  Tobacco - nonsmoker   Alcohol - occasional   Obesity - as above   Abnormal glucose given obesity -  normal   Lipids -  wnl    HIV - 3/22 negative     Home Meds:  Prior to Visit Medications    Medication Sig Taking?  Authorizing Provider   albuterol sulfate  (90 Base) MCG/ACT inhaler Inhale 2 puffs into the lungs 4 times daily Yes Boni Dakins, MD   EPINEPHrine (EPIPEN 2-JORDAN) 0.3 MG/0.3ML SOAJ injection Inject 0.3 mLs into the muscle once as needed (anaphylaxis) Use as directed for allergic reaction Yes Boni Dakins, MD   furosemide (LASIX) 80 MG tablet Take 1 tablet by mouth daily  Ike Pickens DO   Handicap Placard MISC by Does not apply route From 11/2/2021 until 2/28/2022  Arlen Holter, MD   ALTAMIST SPRAY 0.65 % nasal spray 1 spray by Nasal route as needed for Congestion  Arlen Holter, MD       Allergies:    Shellfish-derived products, Strawberry (diagnostic), and Bee venom    Health Maintenance Due   Topic Date Due    Varicella vaccine (1 of 2 - 2-dose childhood series) Never done    COVID-19 Vaccine (1) Never done    Pneumococcal 0-64 years Vaccine (1 of 2 - PPSV23) Never done    DTaP/Tdap/Td vaccine (1 - Tdap) Never done    Cervical cancer screen  Never done         There is no immunization history on file for this patient. Review of Systems   Constitutional: Positive for fatigue. Negative for fever. Respiratory: Positive for shortness of breath. Negative for cough. Cardiovascular: Positive for leg swelling. Negative for chest pain and palpitations. Gastrointestinal: Negative for abdominal pain, constipation and diarrhea. Musculoskeletal: Negative for arthralgias. Neurological: Negative for headaches. Psychiatric/Behavioral: Negative for behavioral problems. A 10-organ Review Of Systems was obtained and otherwise unremarkable except as per HPI. Data: Old records have been reviewed electronically. PHYSICAL EXAM:  There were no vitals taken for this visit. Physical Exam  Constitutional:       Appearance: She is obese. HENT:      Head: Normocephalic and atraumatic. Cardiovascular:      Rate and Rhythm: Normal rate and regular rhythm. Pulses: Normal pulses. Pulmonary:      Effort: Pulmonary effort is normal.      Breath sounds: Normal breath sounds. No wheezing. Abdominal:      Palpations: Abdomen is soft. Musculoskeletal:         General: No tenderness. Right lower leg: Edema present. Left lower leg: Edema present. Comments: Mild nonpitting edema bilateral ankles    Skin:     General: Skin is warm.    Neurological:      General: No focal deficit present. Mental Status: She is alert and oriented to person, place, and time. Psychiatric:         Mood and Affect: Mood normal.         Behavior: Behavior normal.         Assessment & Plan:      1. Dyspnea on exertion  No longer hypoxic on exertion but continues to have dyspnea after a few minutes of exertion requiring pausing. Previous work up for myocarditis and PE have been negative. Does have KAREN and pulm HTN. Continues to be deconditioned. CXR 2/22 with improved airspace disease. Sat 96% today in clinic  - discussed weight loss and increasing exercise   - use of albuterol inhaler prn or as recommended by pulmonologist  - use of CPAP at nighttime   - f/u in 6 months      2. Chronic right-sided heart failure (HCC)  Hx of pulm HTN and KAREN. - cont lasix 80 mg per cards   - daily weight  - Follow with cardiology as required   - discussed and educated on compliance with CPAP once obtains mask  - compression stocking for LE edema - chronic, no concern for DVT    3. Morbid obesity ([address]  Limited ability to exercise given BUCIO and deconditioning from covid pna history. Pt now has diagnosed KAREN. Lost 2 lbs since last visit. - educated on discussed aerobic exercise  - discussed low carb diet and provided information on food     4. KAREN  Completed sleep study and requires CPAP  - cont nighttime oxygen until receives CPAP mask   - get CPAP titration done   - educated on compliance with CPAP  - discussed low carb diet and exercise      5. At risk for anaphylaxis  Previous hx of anaphylactic reactions with shellfish and strawberries. - prescribed and educated on epi-pen use in case of recurrence     6. Healthcare maintenance  Vaccinations: not up to date - requires flu, tdap, covid.  Pt hesitant to get covid vaccine but will get her tdap   Pap smear - 3/22 normal   Depression - none  Tobacco - nonsmoker   Alcohol - occasional   Obesity - as above   Abnormal glucose given obesity - 11/21 normal   Lipids - 11/21 wnl HIV - 3/22 negative     Return in about 6 months (around 10/4/2022) for dyspnea.     Dispo: Pt has been staffed with Dr. Fabiana Krause   _______________  Flakita York MD, 4/4/2022 9:59 AM   PGY-2

## 2022-04-07 ENCOUNTER — HOSPITAL ENCOUNTER (OUTPATIENT)
Dept: SLEEP CENTER | Age: 37
Discharge: HOME | End: 2022-04-07
Payer: MEDICAID

## 2022-04-07 PROCEDURE — 95811 POLYSOM 6/>YRS CPAP 4/> PARM: CPT | Performed by: INTERNAL MEDICINE

## 2022-04-07 PROCEDURE — 95811 POLYSOM 6/>YRS CPAP 4/> PARM: CPT

## 2022-04-08 ENCOUNTER — TELEPHONE (OUTPATIENT)
Dept: PULMONOLOGY | Age: 37
End: 2022-04-08

## 2022-04-13 DIAGNOSIS — I50.810 RIGHT-SIDED HEART FAILURE, UNSPECIFIED HF CHRONICITY (HCC): ICD-10-CM

## 2022-04-14 RX ORDER — FUROSEMIDE 80 MG
80 TABLET ORAL DAILY
Qty: 90 TABLET | Refills: 3 | Status: SHIPPED | OUTPATIENT
Start: 2022-04-14 | End: 2022-06-01 | Stop reason: ALTCHOICE

## 2022-05-31 ASSESSMENT — ENCOUNTER SYMPTOMS
ABDOMINAL DISTENTION: 0
COUGH: 0
CHEST TIGHTNESS: 0
ABDOMINAL PAIN: 0
NAUSEA: 0
SHORTNESS OF BREATH: 1
BLOOD IN STOOL: 0
PHOTOPHOBIA: 0

## 2022-05-31 NOTE — PROGRESS NOTES
[address]  22  Referring: Dr. Angie Rahman CONSULT/CHIEF COMPLAINT/HPI     Reason for visit/ Chief complaint  Follow up   Shortness of breath   HPI Kalyani Baez is a 43 y.o. seen as a follow up for right sided heart dilatation. Her mother is still living, father  of diabetes, renal failure. No known heart problems in her parents. Her maternal grandmother had an MI. Aunt has RA. Multiple family members have hypertension. Maternal grandfather had three strokes (  at age 58). She had a healthy childhood, ran track, and was very active. She is a nonsmoker, drinks alcohol socially. She did take diet pills in 2012 and took them less than a month. No previous history of DVT/PE. No known history of autoimmune disease. No diagnosis of sleep apnea. Today, she is here for regular follow up. She reports that she is doing well since our last visit. She recently walked a mile and continues to improve. She states that she started cpap, which she has been tolerating. She states that she is still sleeping on pillows to stay propped up. No chest pain or pressure. Patient is adherent with medications and is tolerating them well without side effects     HISTORY/ALLERGIES/ROS     MedHx:  has a past medical history of COVID-19 and Obesity. SurgHx:  has a past surgical history that includes Dilation and curettage of uterus. SocHx:  reports that she has never smoked. She has never used smokeless tobacco. She reports previous alcohol use. She reports that she does not use drugs. FamHx: No family history of premature coronary artery disease, sudden death, or aneurysm  Allergies: Shellfish-derived products, Strawberry (diagnostic), and Bee venom   ROS:   Review of Systems   Constitutional: Positive for fatigue. Negative for activity change, diaphoresis and fever. HENT: Negative for congestion, ear discharge and nosebleeds. Eyes: Negative for photophobia and visual disturbance. Respiratory: Positive for shortness of breath. Negative for cough and chest tightness. Cardiovascular: Positive for leg swelling. Negative for chest pain and palpitations. Gastrointestinal: Negative for abdominal distention, abdominal pain, blood in stool and nausea. Endocrine: Negative for cold intolerance and polydipsia. Genitourinary: Negative for difficulty urinating and flank pain. Musculoskeletal: Positive for myalgias. Negative for arthralgias. Skin: Negative for rash and wound. Allergic/Immunologic: Negative for environmental allergies and immunocompromised state. Neurological: Negative for dizziness and headaches. Hematological: Negative for adenopathy. Does not bruise/bleed easily. Psychiatric/Behavioral: Negative for confusion. The patient is not hyperactive. MEDICATIONS      Prior to Admission medications    Medication Sig Start Date End Date Taking?  Authorizing Provider   torsemide 40 MG TABS Take 40 mg by mouth daily 6/1/22  Yes Fabrizio Mcginnis,    albuterol sulfate  (90 Base) MCG/ACT inhaler Inhale 2 puffs into the lungs 4 times daily 4/4/22  Yes Wanda Pugh MD   EPINEPHrine (EPIPEN 2-JORDAN) 0.3 MG/0.3ML SOAJ injection Inject 0.3 mLs into the muscle once as needed (anaphylaxis) Use as directed for allergic reaction 4/4/22 6/1/22 Yes Wanda Pugh MD   Handicap Placard MISC by Does not apply route From 11/2/2021 until 2/28/2022 11/2/21  Yes Myra Davey MD       PHYSICAL EXAM        Vitals:    06/01/22 1025   BP: 122/84   Pulse: 88   SpO2: 99%    Weight: (!) 356 lb 6.4 oz (161.7 kg)     Gen Alert, cooperative, no distress Heart  Regular rate and rhythm, 1/6 systolic murmur   Head Normocephalic, atraumatic, no abnormalities Abd  Soft, NT, +BS, no mass, no OM   Eyes PERRLA, conj/corn clear Ext  Ext nl, AT, no C/C, +1 edema   Nose Nares normal, no drain age,  Pulse 2+ and symmetric   Throat Lips, mucosa, tongue normal Skin Color/text/turg nl, no rash/lesions   Neck S/S, TM, method.   -Right ventricle is mildly dilated with preserved systolic function with a calculated ejection fraction of 58% by Castañeda's method.   -Trivial pericardial effusion. No evidence of pericarditis.    -No myocardial edema by T2w imaging/mapping. (Normal myocardium/skeletal ratio - normal < 1.9). -Unable to calculate shunt fraction. Pulmonary artery phase encoding unable to be obtained.    -Normal myocardial resting perfusion imaging.   -On delayed enhancement imaging, there is mild RV insertion point enhancement suggestive of chronically elevated pulmonary pressures.        The MRI sequences and imaging planes in this study were tailored for cardiac imaging and are suboptimal for evaluation of non-cardiac structures. Cath: none  Holter:none  EKG personally interpreted: 1/26/22--Sinus  Rhythm   -  Nonspecific T-abnormality. Stress:none    Moderate Risk  Moderate Complexity/Medical Decision Making  Outside/Care everywhere records Reviewed  Labs Reviewed  Prior Imaging, ekg, cath, echo reviewed when available  Medications reviewed  Old Notes reviewed  11/1/2021 tc 162 hdl 56 ldl 83 tri 117  ASSESSMENT AND PLAN     1. Shortness of breath/hypoxic resp failure  - no evidence of PE  - suspect pulm htn exacerbated by untreated KAREN  - Covid pneumonia 10/1/2021  - 10/9/2021  Ct of chest  Bilateral groundglass opacities  - 2/9/22 chest xray- resolution of bilateral lung infiltrates  - BMP in 2 weeks  -stable  Plan:  - stop furosemide, start torsemide 40 mg  - also sees Dr. Shania Gleason  - continue  oxygen at night until gets cpap    2. Rv dysfunction  - 11/1/2021  k 4.8 creat 1.1  - 1/26/22  k 4.2  Bun 12 creat 0.8  - crp 16.3 sed rate 48  Brielle neg,   - I suspect that she had underlying RV dysfunction from sleep apnea and obesity hypoventilation, which was worsened by covid 19. No evidence of covid myocarditis or previous MI  -stable  Plan:  Continue diuresis and treatment of sleep apnea.  Consider SGLT2  Consider right heart cath if no improvement    3. Sleep apnea  - uses oxygen at night  - right sided heart failure  - fatigue, morning headaches  -stable  Plan  - f/w Dr. Jazmine Huerta     4. Obesity  - new problem  Plan:  - discussed referral to bariatric surgery and consideration of semaglutide     Patient counseled on lifestyle modification, diet, and exercise. Follow Up: 6 months    Dr. Ander Marie attestation: This note was scribed in the presence of Dr. Rozena Gosselin by Donavon Cabrera RN. Physician Attestation  The scribe for and in the presence of rivka Armando DO). The scribe Dylan MERRITT  may have prepopulated components of this note with my historical  intellectual property under my direct supervision. Any additions to this intellectual property were performed in my presence and at my direction.   Furthermore, the content and accuracy of this note have been reviewed by rivka Armando DO).  6/1/2022 11:25 AM

## 2022-06-01 ENCOUNTER — OFFICE VISIT (OUTPATIENT)
Dept: CARDIOLOGY CLINIC | Age: 37
End: 2022-06-01
Payer: MEDICAID

## 2022-06-01 VITALS
DIASTOLIC BLOOD PRESSURE: 84 MMHG | HEART RATE: 88 BPM | HEIGHT: 66 IN | WEIGHT: 293 LBS | BODY MASS INDEX: 47.09 KG/M2 | SYSTOLIC BLOOD PRESSURE: 122 MMHG | OXYGEN SATURATION: 99 %

## 2022-06-01 DIAGNOSIS — G47.33 OBSTRUCTIVE SLEEP APNEA SYNDROME: ICD-10-CM

## 2022-06-01 DIAGNOSIS — E66.01 CLASS 3 SEVERE OBESITY DUE TO EXCESS CALORIES WITH SERIOUS COMORBIDITY AND BODY MASS INDEX (BMI) OF 50.0 TO 59.9 IN ADULT (HCC): ICD-10-CM

## 2022-06-01 DIAGNOSIS — I50.812 CHRONIC RIGHT-SIDED HEART FAILURE (HCC): Primary | ICD-10-CM

## 2022-06-01 DIAGNOSIS — R06.02 SHORTNESS OF BREATH: ICD-10-CM

## 2022-06-01 PROCEDURE — 99214 OFFICE O/P EST MOD 30 MIN: CPT | Performed by: INTERNAL MEDICINE

## 2022-06-01 PROCEDURE — G8417 CALC BMI ABV UP PARAM F/U: HCPCS | Performed by: INTERNAL MEDICINE

## 2022-06-01 PROCEDURE — G8427 DOCREV CUR MEDS BY ELIG CLIN: HCPCS | Performed by: INTERNAL MEDICINE

## 2022-06-01 PROCEDURE — 1036F TOBACCO NON-USER: CPT | Performed by: INTERNAL MEDICINE

## 2022-07-08 ENCOUNTER — OFFICE VISIT (OUTPATIENT)
Dept: PULMONOLOGY | Age: 37
End: 2022-07-08
Payer: MEDICAID

## 2022-07-08 VITALS — BODY MASS INDEX: 56.92 KG/M2 | WEIGHT: 293 LBS | HEART RATE: 109 BPM | OXYGEN SATURATION: 96 %

## 2022-07-08 DIAGNOSIS — I27.20 PULMONARY HYPERTENSION (HCC): ICD-10-CM

## 2022-07-08 DIAGNOSIS — G47.33 OSA (OBSTRUCTIVE SLEEP APNEA): Primary | ICD-10-CM

## 2022-07-08 PROCEDURE — 1036F TOBACCO NON-USER: CPT | Performed by: INTERNAL MEDICINE

## 2022-07-08 PROCEDURE — 99214 OFFICE O/P EST MOD 30 MIN: CPT | Performed by: INTERNAL MEDICINE

## 2022-07-08 PROCEDURE — G8417 CALC BMI ABV UP PARAM F/U: HCPCS | Performed by: INTERNAL MEDICINE

## 2022-07-08 PROCEDURE — G8427 DOCREV CUR MEDS BY ELIG CLIN: HCPCS | Performed by: INTERNAL MEDICINE

## 2022-07-08 NOTE — PROGRESS NOTES
my interpretation is as follows.  No PE noted.  Bilateral groundglass opacities seen.     Echo from 1/24/2022 showed EF 55 to 60%. Diastolic dysfunction. Mildly dilated RV with TAPSE of 2.6. Improved RV function and RV size. Echo from 10/11/2021 showed EF of 65 to 70%.  RVSP 29.  Echo from 10/20/2021 showed EF of 60%.  Septal flattening.  RV pressure and volume overload.  Enlarged RV with hypokinetic RV.  TAPSE 1.39    Sleep study obtained on 3/10/2022 showed AHI of 11.5/h F with RDI of 35/h. CPAP of 11 cm of water controlled sleep apnea. Patient has auto CPAP 10 to 16 cm of water. REVIEW OF SYSTEMS:   CONSTITUTIONAL SYMPTOMS: The patient denies fever, fatigue, night sweats, weight loss or weight gain. HEENT: No vision changes. No tinnitus, Denies sinus pain. No hoarseness, or dysphagia. NECK: Patient denies swelling in the neck. CARDIOVASCULAR: Denies chest pain, palpitation, syncope. RESPIRATORY: SEE ABOVE. GASTROINTESTINAL: Denies nausea, abdominal pain or change in bowel function. GENITOURINARY: Denies obstructive symptoms. No history of incontinence. BREASTS: No masses or lumps in the breasts. SKIN: No rashes or itching. MUSCULOSKELETAL: Denies weakness or bone pain. NEUROLOGICAL: No headaches or seizures. PSYCHIATRIC: Denies mood swings or depression. ENDOCRINE: Denies heat or cold intolerance or excessive thirst.  HEMATOLOGIC/LYMPHATIC: Denies easy bruising or lymph node swelling. ALLERGIC/IMMUNOLOGIC: No environmental allergies.     PAST MEDICAL HISTORY:   Past Medical History:   Diagnosis Date    COVID-19     Obesity        PAST SURGICAL HISTORY:   Past Surgical History:   Procedure Laterality Date    DILATION AND CURETTAGE OF UTERUS         SOCIAL HISTORY:   Social History     Tobacco Use    Smoking status: Never Smoker    Smokeless tobacco: Never Used   Vaping Use    Vaping Use: Never used   Substance Use Topics    Alcohol use: Not Currently    Drug use: Never FAMILY HISTORY:   Family History   Family history unknown: Yes       MEDICATIONS:     Current Outpatient Medications on File Prior to Visit   Medication Sig Dispense Refill    torsemide 40 MG TABS Take 40 mg by mouth daily 90 tablet 3    albuterol sulfate  (90 Base) MCG/ACT inhaler Inhale 2 puffs into the lungs 4 times daily 18 g 3    Handicap Placard MISC by Does not apply route From 11/2/2021 until 2/28/2022 1 each 0    EPINEPHrine (EPIPEN 2-JORDAN) 0.3 MG/0.3ML SOAJ injection Inject 0.3 mLs into the muscle once as needed (anaphylaxis) Use as directed for allergic reaction 0.3 mL 0     No current facility-administered medications on file prior to visit. ALLERGIES:   Allergies as of 07/08/2022 - Fully Reviewed 07/08/2022   Allergen Reaction Noted    Shellfish-derived products Anaphylaxis 10/01/2021    Murdock (diagnostic) Anaphylaxis 10/01/2021    Bee venom Hives and Swelling 11/25/2015      OBJECTIVE:   weight is 350 lb (158.8 kg) (abnormal). Her pulse is 109 (abnormal). Her oxygen saturation is 96%. PHYSICAL EXAM:    CONSTITUTIONAL: She is a 42y.o.-year-old who appears her stated age. She is alert and oriented x 3 and in no acute distress. Morbid obesity  HEENT: PERRL. No scleral icterus. No thrush, atraumatic, normocephalic. NECK: Supple, without cervical or supraclavicular lymphadenopathy:  CARDIOVASCULAR: S1 S2 RRR. Without murmer  RESPIRATORY & CHEST: Lungs are clear to auscultation and percussion. No wheezing, no crackles. Good air movement  GASTROINTESTINAL & ABDOMEN: Soft, nontender, positive bowel sounds in all quadrants, non-distended, without hepatosplenomegaly. GENITOURINARY: Deferred. MUSCULOSKELETAL: No tenderness to palpation of the axial skeleton. There is no clubbing. No cyanosis. No edema of the lower extremities. SKIN OF BODY: No rash or jaundice. PSYCHIATRIC EVALUATION: Normal affect. Patient answers questions appropriately. HEMATOLOGIC/LYMPHATIC/ IMMUNOLOGIC: No palpable lymphadenopathy. NEUROLOGIC: Alert and oriented x 3. Groslly non-focal. Motor strength is 5+/5 in all muscle groups. The patient has a normal sensorium globally. ASSESSMENT AND PLAN:     1. Pulmonary hypertension ([address] on ECHO   Patient was noted to have evidence of pulmonary hypertension with septal flattening and RV pressure overload on echo from 10/20/2021. Rupal Tompkins performed 9 days prior on 10/11/2021 did not show any evidence of pulmonary hypertension and was noted to have RVSP of 29.  I believe that patient was urged to have evidence of pulmonary hypertension on echo secondary to fluid overload.  Patient improved symptomatically with diuresis.  CTPA was negative for PE.  Currently she is on Lasix 40 mg daily. Repeat echo from January 2022 showed marked improvement in RV function and size. Some evidence of pulmonary hypertension still persistent which I believe is secondary to undiagnosed sleep apnea and formulation of heart failure with preserved ejection fraction.        2. History of pneumonia due to COVID-19 virus  History of Covid pneumonia in October 2021.  Symptoms have improved. Repeat chest x-ray from today showed complete resolution of bilateral opacities.        3. Obstructive sleep apnea  Sleep study showed AHI of 11.5/h with RDI of 35/h. Patient has auto CPAP 10 to 16 cm of water. She is not liking the CPAP as nasal pillows continues to get dislodged from nostrils when she sleeps on the side. Overall compliance is 70% with more than 4 hours usage of 62%. AHI very well controlled. No large leakage noted. Patient might benefit from nasal mask. She will call her [employer] company for nasal mask.         Return in about 3 months (around 10/8/2022).        Carmen Jay MD  Pulmonary Critical Care and Sleep Medicine  Electronically signed by Carmen Jay MD on 7/8/2022 at 2:23 PM     This note was completed using dragon medical speech recognition software. Grammatical errors, random word insertions, pronoun errors and incomplete sentences are occasional consequences of this technology due to software limitations. If there are questions or concerns about the content of this note of information contained within the body of this dictation they should be addressed with the provider for clarification.

## 2022-09-22 DIAGNOSIS — I50.812 CHRONIC RIGHT-SIDED HEART FAILURE (HCC): ICD-10-CM

## 2022-10-03 ENCOUNTER — OFFICE VISIT (OUTPATIENT)
Dept: INTERNAL MEDICINE CLINIC | Age: 37
End: 2022-10-03
Payer: MEDICAID

## 2022-10-03 VITALS
WEIGHT: 293 LBS | OXYGEN SATURATION: 97 % | HEIGHT: 66 IN | HEART RATE: 83 BPM | DIASTOLIC BLOOD PRESSURE: 85 MMHG | BODY MASS INDEX: 47.09 KG/M2 | SYSTOLIC BLOOD PRESSURE: 118 MMHG | TEMPERATURE: 97.1 F

## 2022-10-03 DIAGNOSIS — I50.812 CHRONIC RIGHT-SIDED HEART FAILURE (HCC): ICD-10-CM

## 2022-10-03 DIAGNOSIS — G47.33 OSA (OBSTRUCTIVE SLEEP APNEA): ICD-10-CM

## 2022-10-03 DIAGNOSIS — Z23 NEED FOR PROPHYLACTIC VACCINATION AGAINST DIPHTHERIA-TETANUS-PERTUSSIS (DTP): Primary | ICD-10-CM

## 2022-10-03 DIAGNOSIS — Z12.31 ENCOUNTER FOR SCREENING MAMMOGRAM FOR MALIGNANT NEOPLASM OF BREAST: ICD-10-CM

## 2022-10-03 DIAGNOSIS — E66.01 MORBID OBESITY (HCC): ICD-10-CM

## 2022-10-03 PROBLEM — U07.1 PNEUMONIA DUE TO COVID-19 VIRUS: Status: RESOLVED | Noted: 2021-10-01 | Resolved: 2022-10-03

## 2022-10-03 PROBLEM — J12.82 PNEUMONIA DUE TO COVID-19 VIRUS: Status: RESOLVED | Noted: 2021-10-01 | Resolved: 2022-10-03

## 2022-10-03 PROCEDURE — 99213 OFFICE O/P EST LOW 20 MIN: CPT

## 2022-10-03 PROCEDURE — 6360000002 HC RX W HCPCS

## 2022-10-03 PROCEDURE — 90715 TDAP VACCINE 7 YRS/> IM: CPT

## 2022-10-03 PROCEDURE — 90471 IMMUNIZATION ADMIN: CPT

## 2022-10-03 RX ADMIN — TETANUS TOXOID, REDUCED DIPHTHERIA TOXOID AND ACELLULAR PERTUSSIS VACCINE, ADSORBED 0.5 ML: 5; 2.5; 8; 8; 2.5 SUSPENSION INTRAMUSCULAR at 10:15

## 2022-10-03 ASSESSMENT — ENCOUNTER SYMPTOMS: SHORTNESS OF BREATH: 1

## 2022-10-03 NOTE — PROGRESS NOTES
[facility]    Amy Baumann is a 42 y.o. female, here for evaluation of the following concerns:    Congestive Heart Failure  Presents for follow-up visit. Associated symptoms include edema, fatigue and shortness of breath. The symptoms have been improving. Compliance with total regimen is 51-75%. Compliance problems include medication side effects (Patient does not take torsemide sometimes due to her requirement to go frequently to the bathroom. She reports sometimes she has to drive for long hours and cannot take torsemide. ). Compliance with diet is 51-75%. Compliance with exercise is 51-75%. Compliance with medications is 51-75%. Side effects of treatment include urinary. KAREN  Patient has a CPAP, reports poor compliance due to inability to use it when laying to the side. Patient reports she is not able to sleep flat. Reports she uses her CPAP 3-4 hours daily and is working with the company to have the mask changed so she will be able to used when she sleeps on her side. Morbid obesity  Patient reports she did changes in her eating habits and exercise but those are not working as expected. Patient would like to ask cardiology if she qualifies for bariatric surgery    Review of Systems   Constitutional:  Positive for fatigue. Respiratory:  Positive for shortness of breath. All other systems reviewed and are negative. MEDICATIONS:  Prior to Visit Medications    Medication Sig Taking?  Authorizing Provider   Torsemide 40 MG TABS Take 40 mg by mouth daily Yes Beatriz Mo, DO   albuterol sulfate  (90 Base) MCG/ACT inhaler Inhale 2 puffs into the lungs 4 times daily Yes Rowan Schofield MD   EPINEPHrine (EPIPEN 2-JORDAN) 0.3 MG/0.3ML SOAJ injection Inject 0.3 mLs into the muscle once as needed (anaphylaxis) Use as directed for allergic reaction  Rowan Schofield MD   Handicap Placard MISC by Does not apply route From 11/2/2021 until 2/28/2022  Christi Barraza MD Vitals:    10/03/22 0930   BP: 118/85   Site: Right Upper Arm   Position: Sitting   Cuff Size: Large Adult   Pulse: 83   Temp: 97.1 °F (36.2 °C)   TempSrc: Temporal   SpO2: 97%   Weight: (!) 357 lb 9.6 oz (162.2 kg)   Height: 5' 5.75\" (1.67 m)      Estimated body mass index is 58.16 kg/m² as calculated from the following:    Height as of this encounter: 5' 5.75\" (1.67 m). Weight as of this encounter: 357 lb 9.6 oz (162.2 kg). Physical Exam  Constitutional:       Appearance: Normal appearance. She is morbidly obese. HENT:      Head: Normocephalic. Mouth/Throat:      Mouth: Mucous membranes are moist.   Eyes:      Extraocular Movements: Extraocular movements intact. Conjunctiva/sclera: Conjunctivae normal.      Pupils: Pupils are equal, round, and reactive to light. Cardiovascular:      Rate and Rhythm: Normal rate and regular rhythm. Pulses: Normal pulses. Heart sounds: Normal heart sounds. Pulmonary:      Effort: Pulmonary effort is normal.      Breath sounds: Normal breath sounds. Abdominal:      General: Abdomen is flat. Bowel sounds are normal.      Palpations: Abdomen is soft. Musculoskeletal:         General: Normal range of motion. Cervical back: Normal range of motion. Right lower le+ Pitting Edema present. Left lower le+ Pitting Edema present. Neurological:      General: No focal deficit present. Mental Status: She is alert and oriented to person, place, and time. Mental status is at baseline. Psychiatric:         Mood and Affect: Mood normal.         Behavior: Behavior normal.       ASSESSMENT/PLAN:     1. Chronic right-sided heart failure (HCC)  Assessment & Plan:   At goal, continue current medications   Patient non compliant with medication due to urinary frequency.   -Continue Torsemide 40 mg daily   -Will monitor BP and adherence in next visit   Orders:  -     Comprehensive Metabolic Panel;  Future  -     CBC with Auto Differential; Future  -     LIPID PANEL; Future  2. KAREN (obstructive sleep apnea)  Assessment & Plan:   Borderline controlled, patient is using her CPAP approximately 3-4 hours per nights, she reports her SOB is improved but is non compliant with CPAP due to the current mask does not let her lay to her side to sleep  -Patient will contact CPAP company to have her mask changed, will follow up in 3 months      Orders:  -     CBC with Auto Differential; Future  3. Morbid obesity ([address]  Assessment & Plan:   Uncontrolled, patient making changes in her lifestyle habits without significant weight loss. Patient increased 7 pounds since last visit. Patient would like to ask cardiology for bariatric surgery   -Follow up with cardiology  Orders:  -     LIPID PANEL; Future  4. Encounter for screening mammogram for malignant neoplasm of breast  -     PATEL DIGITAL SCREEN W OR WO CAD BILATERAL; Future    Return in about 3 months (around 1/3/2023). The patient was staffed with the teaching attending: Dr. Marisa Kayser. An electronic signature was used to authenticate this note.     --Maxime De Leon MD

## 2022-10-03 NOTE — PATIENT INSTRUCTIONS
Return in about 3 months (around 1/3/2023).   Please take your labs before follow up  Please continue taking torsemide 40 mg once daily

## 2022-10-03 NOTE — ASSESSMENT & PLAN NOTE
Borderline controlled, patient is using her CPAP approximately 3-4 hours per nights, she reports her SOB is improved but is non compliant with CPAP due to the current mask does not let her lay to her side to sleep  -Patient will contact CPAP company to have her mask changed, will follow up in 3 months

## 2022-10-03 NOTE — ASSESSMENT & PLAN NOTE
Uncontrolled, patient making changes in her lifestyle habits without significant weight loss. Patient increased 7 pounds since last visit.  Patient would like to ask cardiology for bariatric surgery   -Follow up with cardiology

## 2022-10-03 NOTE — ASSESSMENT & PLAN NOTE
At goal, continue current medications   Patient non compliant with medication due to urinary frequency.   -Continue Torsemide 40 mg daily   -Will monitor BP and adherence in next visit

## 2022-10-09 ASSESSMENT — ENCOUNTER SYMPTOMS
ABDOMINAL DISTENTION: 0
COUGH: 0
BLOOD IN STOOL: 0
NAUSEA: 0
SHORTNESS OF BREATH: 1
ABDOMINAL PAIN: 0
PHOTOPHOBIA: 0

## 2022-10-10 ENCOUNTER — OFFICE VISIT (OUTPATIENT)
Dept: CARDIOLOGY CLINIC | Age: 37
End: 2022-10-10
Payer: MEDICAID

## 2022-10-10 VITALS
DIASTOLIC BLOOD PRESSURE: 88 MMHG | HEIGHT: 66 IN | HEART RATE: 86 BPM | BODY MASS INDEX: 47.09 KG/M2 | OXYGEN SATURATION: 96 % | SYSTOLIC BLOOD PRESSURE: 150 MMHG | WEIGHT: 293 LBS

## 2022-10-10 DIAGNOSIS — G47.33 OBSTRUCTIVE SLEEP APNEA SYNDROME: ICD-10-CM

## 2022-10-10 DIAGNOSIS — R42 DIZZINESS: ICD-10-CM

## 2022-10-10 DIAGNOSIS — R07.9 CHEST PAIN, UNSPECIFIED TYPE: ICD-10-CM

## 2022-10-10 DIAGNOSIS — E66.01 CLASS 3 SEVERE OBESITY DUE TO EXCESS CALORIES WITH SERIOUS COMORBIDITY AND BODY MASS INDEX (BMI) OF 50.0 TO 59.9 IN ADULT (HCC): ICD-10-CM

## 2022-10-10 DIAGNOSIS — I50.812 CHRONIC RIGHT-SIDED HEART FAILURE (HCC): Primary | ICD-10-CM

## 2022-10-10 DIAGNOSIS — R06.02 SHORTNESS OF BREATH: ICD-10-CM

## 2022-10-10 PROCEDURE — 1036F TOBACCO NON-USER: CPT | Performed by: INTERNAL MEDICINE

## 2022-10-10 PROCEDURE — G8484 FLU IMMUNIZE NO ADMIN: HCPCS | Performed by: INTERNAL MEDICINE

## 2022-10-10 PROCEDURE — G8427 DOCREV CUR MEDS BY ELIG CLIN: HCPCS | Performed by: INTERNAL MEDICINE

## 2022-10-10 PROCEDURE — 93000 ELECTROCARDIOGRAM COMPLETE: CPT | Performed by: INTERNAL MEDICINE

## 2022-10-10 PROCEDURE — 99214 OFFICE O/P EST MOD 30 MIN: CPT | Performed by: INTERNAL MEDICINE

## 2022-10-10 PROCEDURE — G8417 CALC BMI ABV UP PARAM F/U: HCPCS | Performed by: INTERNAL MEDICINE

## 2022-10-10 NOTE — Clinical Note
She is doing okay, better than last year. Will evaluate with a stress test. If no improvement in shortness of breath, will do a rhc. Do you think she needs oxygen or 6mw? She's having trouble with her KAREN> I referred her for evaluation of bariatric surgery as well. Thanks! Detail Level: Detailed Detail Level: Generalized

## 2022-10-10 NOTE — PROGRESS NOTES
[address]  10/10/22  Referring: Dr. Tali oRusseau CONSULT/CHIEF COMPLAINT/HPI     Reason for visit/ Chief complaint  Follow up   Shortness of breath   HPI Corie Robertson is a 42 y.o. seen as a follow up for right sided heart dilatation. She has a history of untreated KAREN morbid obesity  Last visit she was switched from lasix to demadex for shortness of breath and suspicion of suspect pulm htn exacerbated by untreated KAREN    Today, she is here with her mother. She would like to lose weight considering bariatric surgery. She is doing much better since last visit. Still short of breath with walking. Was walking [facility], but it closed. She is trying to walk and talk at the same time to improve her stamina. Has a cough, that started when her roommate moved in, who is a smoker. The cough is associated with a little phlegm. Had chest pain while off of her antacid, she restarted it and it resolved. She had one episode of near dizziness while taking a shower, associated with palpitations, lasting 2 hours. She got out of the shower, laid down and symptoms resolved. Last episode of palpitations was two weeks ago. Intolerant to cpap tx. Bilateral ankle edema. She is requesting a referral for bariatric surgery. Patient is adherent with medications and is tolerating them well without side effects     HISTORY/ALLERGIES/ROS     MedHx:  has a past medical history of COVID-19 and Obesity. SurgHx:  has a past surgical history that includes Dilation and curettage of uterus. SocHx:  reports that she has never smoked. She has never used smokeless tobacco. She reports that she does not currently use alcohol. She reports that she does not use drugs. FamHx: No family history of premature coronary artery disease, sudden death, or aneurysm  Allergies: Shellfish-derived products, [place] (diagnostic), and Bee venom   ROS:   Review of Systems   Constitutional:  Positive for fatigue.  Negative for activity change, diaphoresis and fever. HENT:  Negative for congestion, ear discharge and nosebleeds. Eyes:  Negative for photophobia and visual disturbance. Respiratory:  Positive for chest tightness and shortness of breath. Negative for cough. Cardiovascular:  Positive for leg swelling. Negative for chest pain and palpitations. Gastrointestinal:  Negative for abdominal distention, abdominal pain, blood in stool and nausea. Endocrine: Negative for cold intolerance and polydipsia. Genitourinary:  Negative for difficulty urinating and flank pain. Musculoskeletal:  Positive for myalgias. Negative for arthralgias. Skin:  Negative for rash and wound. Allergic/Immunologic: Negative for environmental allergies and immunocompromised state. Neurological:  Negative for dizziness and headaches. Hematological:  Negative for adenopathy. Does not bruise/bleed easily. Psychiatric/Behavioral:  Negative for confusion. The patient is not hyperactive. MEDICATIONS      Prior to Admission medications    Medication Sig Start Date End Date Taking?  Authorizing Provider   Torsemide 40 MG TABS Take 40 mg by mouth daily 9/23/22  Yes Stevphen Duty, DO   albuterol sulfate  (90 Base) MCG/ACT inhaler Inhale 2 puffs into the lungs 4 times daily 4/4/22  Yes Cha Vasquez MD   EPINEPHrine (EPIPEN 2-JORDAN) 0.3 MG/0.3ML SOAJ injection Inject 0.3 mLs into the muscle once as needed (anaphylaxis) Use as directed for allergic reaction 4/4/22 10/10/22 Yes Cha Vasquez MD   Handicap Placard MISC by Does not apply route From 11/2/2021 until 2/28/2022 11/2/21  Yes Valentino Perches, MD       PHYSICAL EXAM        Vitals:    10/10/22 1126   BP: (!) 150/88   Pulse: 86   SpO2: 96%    Weight: (!) 362 lb (164.2 kg)     Gen Alert, cooperative, no distress Heart  Regular rate and rhythm, 1/6 systolic murmur   Head Normocephalic, atraumatic, no abnormalities Abd  Soft, NT, +BS, no mass, no OM   Eyes PERRLA, conj/corn clear Ext  Ext nl, AT, no C/C, +1 edema   Nose Nares normal, no drain age,  Pulse 2+ and symmetric   Throat Lips, mucosa, tongue normal Skin Color/text/turg nl, no rash/lesions   Neck S/S, TM, NT, no bruit Psych Nl mood and affect   Lung CTA-B, unlabored, no DTP     Ch wall NT, no deform       LABS and Imaging     Relevant and available CV data reviewed    ECHO 1/24/22: Normal left ventricle size, mild concentric wall thickness and normal   systolic function with an estimated ejection fraction of 55-60%. -No regional wall motion abnormalities are seen.   -Indeterminate diastolic function. E/e\"=10.45. GLS -17%. -Mild mitral regurgitation. -Trivial tricuspid regurgitation.   -Unable to estimate pulmonary artery pressure secondary to incomplete TR jet  envelope. -The right ventricle is mildly dilated in size and normal function. TAPSE  2.36 cm. RV S velocity 11.2 cm/s. There has been interval decrease in right ventricular size and improvement  in right ventricular function from 10/20/21 echo. MRI 3/2/22:  Overall normal unremarkable study with preserved LV systolic function. The RV is mildly dilated with preserved systolic function. No evidence of prior or active myocarditis.        -Normal left ventricular size and systolic function with a calculated ejection fraction of 57% by Castañeda's method.   -Right ventricle is mildly dilated with preserved systolic function with a calculated ejection fraction of 58% by Castañeda's method.   -Trivial pericardial effusion. No evidence of pericarditis.    -No myocardial edema by T2w imaging/mapping. (Normal myocardium/skeletal ratio - normal < 1.9). -Unable to calculate shunt fraction. Pulmonary artery phase encoding unable to be obtained.    -Normal myocardial resting perfusion imaging.   -On delayed enhancement imaging, there is mild RV insertion point enhancement suggestive of chronically elevated pulmonary pressures.         The MRI sequences and imaging planes in this study were tailored for cardiac imaging and are suboptimal for evaluation of non-cardiac structures. Cath: none  Holter:none  EKG personally interpreted: sinus rhythm, nonspecific st-t wave changes  Stress:none    Moderate Risk  Moderate Complexity/Medical Decision Making  Outside/Care everywhere records Reviewed  Labs Reviewed  Prior Imaging, ekg, cath, echo reviewed when available  Medications reviewed  Old Notes reviewed  11/1/2021 tc 162 hdl 56 ldl 83 tri 117  ASSESSMENT AND PLAN   Dizziness/palpitations/chest pain  -     new problem  Plan  -     event monitor  -     stress test  -     call for recurrence    2. Shortness of breath/hypoxic resp failure  -     no evidence of PE  -     suspect pulm htn exacerbated by untreated KAREN  -     Covid pneumonia 10/1/2021  Plan:  -     torsemide 40 mg  -    also sees Dr. Viktoriya Prado  -    continue  oxygen at night until gets cpap    3   Rv dysfunction  -    I suspect that she had underlying RV dysfunction from sleep apnea and obesity hypoventilation, which was worsened by covid 19. No evidence of covid myocarditis or previous MI  -    stable  Plan:-           continue diuresis and treatment of sleep apnea. Consider SGLT2        Consider right heart cath if no improvement    4. Sleep apnea  -     uses oxygen at night  -     right sided heart failure  -    fatigue, morning headaches  -    stable  Plan  -    f/w Dr. Viktoriya Prado     5. Obesity  -   Plan:  - discussed referral to bariatric surgery and consideration of semaglutide     Patient counseled on lifestyle modification, diet, and exercise. Follow Up: 6 months    Dr. Collin Carr attestation: This note was scribed in the presence of Dr. Deja Lawler by Elza Aparicio RN      Physician Attestation  The scribe for and in the presence of rivka Reyes DO).   The scribe Elza Aparicio RN    may have prepopulated components of this note with my historical  intellectual property under my direct supervision. Any additions to this intellectual property were performed in my presence and at my direction. Furthermore, the content and accuracy of this note have been reviewed by me Charlett Members, DO).   10/10/2022 11:47 AM

## 2022-10-12 ASSESSMENT — ENCOUNTER SYMPTOMS: CHEST TIGHTNESS: 1

## 2022-10-13 ENCOUNTER — HOSPITAL ENCOUNTER (OUTPATIENT)
Dept: NON INVASIVE DIAGNOSTICS | Age: 37
Discharge: HOME | End: 2022-10-13
Payer: MEDICAID

## 2022-10-13 DIAGNOSIS — R42 DIZZINESS: ICD-10-CM

## 2022-10-13 DIAGNOSIS — R06.02 SHORTNESS OF BREATH: ICD-10-CM

## 2022-10-13 LAB
LV EF: 68 %
LVEF MODALITY: NORMAL

## 2022-10-13 PROCEDURE — 3430000000 HC RX DIAGNOSTIC RADIOPHARMACEUTICAL: Performed by: INTERNAL MEDICINE

## 2022-10-13 PROCEDURE — A9502 TC99M TETROFOSMIN: HCPCS | Performed by: INTERNAL MEDICINE

## 2022-10-13 PROCEDURE — 78452 HT MUSCLE IMAGE SPECT MULT: CPT

## 2022-10-13 PROCEDURE — 93017 CV STRESS TEST TRACING ONLY: CPT | Performed by: INTERNAL MEDICINE

## 2022-10-13 RX ADMIN — TETROFOSMIN 30 MILLICURIE: 1.38 INJECTION, POWDER, LYOPHILIZED, FOR SOLUTION INTRAVENOUS at 10:49

## 2022-10-13 RX ADMIN — TETROFOSMIN 10 MILLICURIE: 1.38 INJECTION, POWDER, LYOPHILIZED, FOR SOLUTION INTRAVENOUS at 08:47

## 2022-10-14 ENCOUNTER — TELEPHONE (OUTPATIENT)
Dept: CARDIOLOGY CLINIC | Age: 37
End: 2022-10-14

## 2022-10-14 NOTE — TELEPHONE ENCOUNTER
----- Message from Riki Arguello DO sent at 10/13/2022  5:10 PM EDT -----  Please let patient know that stress test is normal.

## 2022-10-18 NOTE — PROGRESS NOTES
20:43 aPTT was 102.8. Heparin restarted, decreased infusion by 6 units, now infusing at 6 units/kg/hr, or 9.2ml/hour. Next aPTT draw at 0400, order placed per protocol. Paramedian Forehead Flap Text: A decision was made to reconstruct the defect utilizing an interpolation axial flap and a staged reconstruction.  A telfa template was made of the defect.  This telfa template was then used to outline the paramedian forehead pedicle flap.  The donor area for the pedicle flap was then injected with anesthesia.  The flap was excised through the skin and subcutaneous tissue down to the layer of the underlying musculature.  The pedicle flap was carefully excised within this deep plane to maintain its blood supply.  The edges of the donor site were undermined.   The donor site was closed in a primary fashion.  The pedicle was then rotated into position and sutured.  Once the tube was sutured into place, adequate blood supply was confirmed with blanching and refill.  The pedicle was then wrapped with xeroform gauze and dressed appropriately with a telfa and gauze bandage to ensure continued blood supply and protect the attached pedicle.

## 2022-10-25 ENCOUNTER — TELEPHONE (OUTPATIENT)
Dept: BARIATRICS/WEIGHT MGMT | Age: 37
End: 2022-10-25

## 2022-10-25 ENCOUNTER — OFFICE VISIT (OUTPATIENT)
Dept: PULMONOLOGY | Age: 37
End: 2022-10-25
Payer: MEDICAID

## 2022-10-25 VITALS
WEIGHT: 293 LBS | HEART RATE: 89 BPM | DIASTOLIC BLOOD PRESSURE: 80 MMHG | SYSTOLIC BLOOD PRESSURE: 136 MMHG | BODY MASS INDEX: 47.09 KG/M2 | HEIGHT: 66 IN | OXYGEN SATURATION: 98 %

## 2022-10-25 DIAGNOSIS — I27.20 PULMONARY HYPERTENSION (HCC): ICD-10-CM

## 2022-10-25 DIAGNOSIS — G47.33 OSA (OBSTRUCTIVE SLEEP APNEA): Primary | ICD-10-CM

## 2022-10-25 PROCEDURE — 1036F TOBACCO NON-USER: CPT | Performed by: INTERNAL MEDICINE

## 2022-10-25 PROCEDURE — G8417 CALC BMI ABV UP PARAM F/U: HCPCS | Performed by: INTERNAL MEDICINE

## 2022-10-25 PROCEDURE — G8484 FLU IMMUNIZE NO ADMIN: HCPCS | Performed by: INTERNAL MEDICINE

## 2022-10-25 PROCEDURE — G8427 DOCREV CUR MEDS BY ELIG CLIN: HCPCS | Performed by: INTERNAL MEDICINE

## 2022-10-25 PROCEDURE — 99214 OFFICE O/P EST MOD 30 MIN: CPT | Performed by: INTERNAL MEDICINE

## 2022-10-25 ASSESSMENT — SLEEP AND FATIGUE QUESTIONNAIRES
HOW LIKELY ARE YOU TO NOD OFF OR FALL ASLEEP WHILE WATCHING TV: 0
HOW LIKELY ARE YOU TO NOD OFF OR FALL ASLEEP WHILE SITTING QUIETLY AFTER LUNCH WITHOUT ALCOHOL: 3
ESS TOTAL SCORE: 6
HOW LIKELY ARE YOU TO NOD OFF OR FALL ASLEEP WHILE LYING DOWN TO REST IN THE AFTERNOON WHEN CIRCUMSTANCES PERMIT: 3
HOW LIKELY ARE YOU TO NOD OFF OR FALL ASLEEP IN A CAR, WHILE STOPPED FOR A FEW MINUTES IN TRAFFIC: 0
HOW LIKELY ARE YOU TO NOD OFF OR FALL ASLEEP WHEN YOU ARE A PASSENGER IN A CAR FOR AN HOUR WITHOUT A BREAK: 0
HOW LIKELY ARE YOU TO NOD OFF OR FALL ASLEEP WHILE SITTING AND TALKING TO SOMEONE: 0
HOW LIKELY ARE YOU TO NOD OFF OR FALL ASLEEP WHILE SITTING AND READING: 0
HOW LIKELY ARE YOU TO NOD OFF OR FALL ASLEEP WHILE SITTING INACTIVE IN A PUBLIC PLACE: 0

## 2022-10-25 NOTE — TELEPHONE ENCOUNTER
Patient was sent Dr. Jurgen Baires digital bariatric seminar. Patient DOES have BWLS coverage with Ladena Smaller (6mo consecutive diet) Bariatric benefit form scanned in media.       *Spoke with patient, Info given  No HX of WLS, BMI > 35  Pk emailed

## 2022-10-25 NOTE — PROGRESS NOTES
PULMONARY OFFICE FOLLOW UP NOTE    REASON FOR VISIT:   Chief Complaint   Patient presents with    3 Month Follow-Up           Sleep Apnea       DATE OF VISIT: 10/25/2022    HISTORY OF PRESENT ILLNESS: 42y.o. year old female is here for follow-up of obstructive sleep apnea and pulmonary hypertension on echo. Patient had COVID-pneumonia in October 2021. Is with CPAP mask. She has not received the new nasal mask. She has nasal pillows and it always gives off her face. She is not able to wear CPAP for the whole night. Patient's compliance overall is 72% with more than 4 hours residual 39%. AHI of 0.8. No large leak. Patient was hospitalized for Covid pneumonia in October 2021 and treated with steroids, Actemra. Could not get remdesivir due to acute kidney injury. She was discharged on home oxygen up to 4 L/min as well as Xarelto. Patient was feeling good for few days but then she presented at [facility] on 10/20/2021 for worsening shortness of breath. She underwent a CT chest that did not show any PE but showed bilateral groundglass opacities consistent with Covid pneumonia. Echo was performed that showed pulmonary hypertension with RV pressure overload. She had an echo performed 9 days earlier that did not show any evidence of pulmonary hypertension. Patient was also taking Xarelto before she presented to [facility] for worsening symptoms. She was noted to have fluid overload and was initiated on Lasix with which her symptoms improved. She was finally discharged on 3 L/min oxygen. She is not using oxygen now. She denies any shortness of breath or cough or wheezing or chest pain or hemoptysis. Diagnostic test reviewed:  I reviewed the chest x-ray from 2/9/2022 in my interpretation is as follows. Resolution of bilateral lung infiltrates. I reviewed the CT chest from 10/9/2021 and my interpretation is as follows. No PE noted. Bilateral groundglass opacities seen. Echo from 1/24/2022 showed EF 55 to 60%. Diastolic dysfunction. Mildly dilated RV with TAPSE of 2.6. Improved RV function and RV size. Echo from 10/11/2021 showed EF of 65 to 70%. RVSP 29.  Echo from 10/20/2021 showed EF of 60%. Septal flattening. RV pressure and volume overload. Enlarged RV with hypokinetic RV. TAPSE 1.39     Sleep study obtained on 3/10/2022 showed AHI of 11.5/h F with RDI of 35/h. CPAP of 11 cm of water controlled sleep apnea. Patient has auto CPAP 10 to 16 cm of water. REVIEW OF SYSTEMS:    CONSTITUTIONAL SYMPTOMS: The patient denies fever, fatigue, night sweats, weight loss or weight gain. HEENT: No vision changes. No tinnitus, Denies sinus pain. No hoarseness, or dysphagia. NECK: Patient denies swelling in the neck. CARDIOVASCULAR: Denies chest pain, palpitation, syncope. RESPIRATORY: Denies shortness of breath or cough. GASTROINTESTINAL: Denies nausea, abdominal pain or change in bowel function. GENITOURINARY: Denies obstructive symptoms. No history of incontinence. BREASTS: No masses or lumps in the breasts. SKIN: No rashes or itching. MUSCULOSKELETAL: Denies weakness or bone pain. NEUROLOGICAL: No headaches or seizures. PSYCHIATRIC: Denies mood swings or depression. ENDOCRINE: Denies heat or cold intolerance or excessive thirst.  HEMATOLOGIC/LYMPHATIC: Denies easy bruising or lymph node swelling. ALLERGIC/IMMUNOLOGIC: No environmental allergies. PAST MEDICAL HISTORY:   Past Medical History:   Diagnosis Date    COVID-19     Obesity        PAST SURGICAL HISTORY:   Past Surgical History:   Procedure Laterality Date    DILATION AND CURETTAGE OF UTERUS         SOCIAL HISTORY:   Social History     Tobacco Use    Smoking status: Never    Smokeless tobacco: Never   Vaping Use    Vaping Use: Never used   Substance Use Topics    Alcohol use: Not Currently    Drug use: Never       FAMILY HISTORY:   Family History   Family history unknown:  Yes MEDICATIONS:     Current Outpatient Medications on File Prior to Visit   Medication Sig Dispense Refill    Torsemide 40 MG TABS Take 40 mg by mouth daily 90 tablet 3    albuterol sulfate  (90 Base) MCG/ACT inhaler Inhale 2 puffs into the lungs 4 times daily 18 g 3    Handicap Placard MISC by Does not apply route From 11/2/2021 until 2/28/2022 1 each 0    EPINEPHrine (EPIPEN 2-JORDAN) 0.3 MG/0.3ML SOAJ injection Inject 0.3 mLs into the muscle once as needed (anaphylaxis) Use as directed for allergic reaction 0.3 mL 0     No current facility-administered medications on file prior to visit. ALLERGIES:   Allergies as of 10/25/2022 - Fully Reviewed 10/25/2022   Allergen Reaction Noted    Shellfish-derived products Anaphylaxis 10/01/2021    [place] (diagnostic) Anaphylaxis 10/01/2021    Bee venom Hives and Swelling 11/25/2015      OBJECTIVE:   height is 5' 5.75\" (1.67 m) and weight is 360 lb (163.3 kg) (abnormal). Her blood pressure is 136/80 and her pulse is 89. Her oxygen saturation is 98%. PHYSICAL EXAM:    CONSTITUTIONAL: She is a 42y.o.-year-old who appears her stated age. She is alert and oriented x 3 and in no acute distress. HEENT: PERRL. No scleral icterus. No thrush, atraumatic, normocephalic. NECK: Supple, without cervical or supraclavicular lymphadenopathy:  CARDIOVASCULAR: S1 S2 RRR. Without murmer  RESPIRATORY & CHEST: Lungs are clear to auscultation and percussion. No wheezing, no crackles. Good air movement  GASTROINTESTINAL & ABDOMEN: Soft, nontender, positive bowel sounds in all quadrants, non-distended, without hepatosplenomegaly. GENITOURINARY: Deferred. MUSCULOSKELETAL: No tenderness to palpation of the axial skeleton. There is no clubbing. No cyanosis. No edema of the lower extremities. SKIN OF BODY: No rash or jaundice. PSYCHIATRIC EVALUATION: Normal affect. Patient answers questions appropriately.    HEMATOLOGIC/LYMPHATIC/ IMMUNOLOGIC: No palpable lymphadenopathy. NEUROLOGIC: Alert and oriented x 3. Groslly non-focal. Motor strength is 5+/5 in all muscle groups. The patient has a normal sensorium globally. ASSESSMENT AND PLAN:     1. Pulmonary hypertension ([address] on ECHO   Patient was noted to have evidence of pulmonary hypertension with septal flattening and RV pressure overload on echo from 10/20/2021. Echo performed 9 days prior on 10/11/2021 did not show any evidence of pulmonary hypertension and was noted to have RVSP of 29. I believe that patient developed pulmonary hypertension on echo secondary to fluid overload. Patient improved symptomatically with diuresis. CTPA was negative for PE. Currently she is on Lasix 40 mg daily. Repeat echo from January 2022 showed marked improvement in RV function and size. Some evidence of pulmonary hypertension still persistent which I believe is secondary to undiagnosed sleep apnea and formulation of heart failure with preserved ejection fraction. 2.  History of pneumonia due to COVID-19 virus  History of Covid pneumonia in October 2021. Symptoms have improved. Repeat chest x-ray from today showed complete resolution of bilateral opacities. 3.  Obstructive sleep apnea  Sleep study showed AHI of 11.5/h with RDI of 35/h. Patient has auto CPAP 10 to 16 cm of water. She is not able to wear CPAP as nasal pillows continues to get dislodged from nostrils when she sleeps on the side. Overall compliance is 72% with more than 4 hours usage of 39%. AHI very well controlled. No large leakage noted. Patient might benefit from nasal mask. Prescription will be sent to [email or web address] for nasal mask. Return in about 3 months (around 1/25/2023). Anna Mendoza MD  Pulmonary Critical Care and Sleep Medicine  Electronically signed by Anna Mendoza MD on 10/25/2022 at 11:16 AM     This note was completed using dragon medical speech recognition software.  Grammatical errors, random word insertions, pronoun errors and incomplete sentences are occasional consequences of this technology due to software limitations. If there are questions or concerns about the content of this note of information contained within the body of this dictation they should be addressed with the provider for clarification.

## 2022-11-09 ENCOUNTER — OFFICE VISIT (OUTPATIENT)
Dept: BARIATRICS/WEIGHT MGMT | Age: 37
End: 2022-11-09
Payer: MEDICAID

## 2022-11-09 VITALS
RESPIRATION RATE: 18 BRPM | BODY MASS INDEX: 47.09 KG/M2 | OXYGEN SATURATION: 95 % | SYSTOLIC BLOOD PRESSURE: 122 MMHG | WEIGHT: 293 LBS | HEART RATE: 79 BPM | HEIGHT: 66 IN | DIASTOLIC BLOOD PRESSURE: 80 MMHG

## 2022-11-09 DIAGNOSIS — I50.812 CHRONIC RIGHT-SIDED HEART FAILURE (HCC): ICD-10-CM

## 2022-11-09 DIAGNOSIS — G47.33 OBSTRUCTIVE SLEEP APNEA: ICD-10-CM

## 2022-11-09 DIAGNOSIS — K21.9 CHRONIC GERD: ICD-10-CM

## 2022-11-09 DIAGNOSIS — E66.01 MORBID OBESITY WITH BMI OF 50.0-59.9, ADULT (HCC): Primary | ICD-10-CM

## 2022-11-09 PROCEDURE — 99205 OFFICE O/P NEW HI 60 MIN: CPT | Performed by: SURGERY

## 2022-11-09 PROCEDURE — G8417 CALC BMI ABV UP PARAM F/U: HCPCS | Performed by: SURGERY

## 2022-11-09 PROCEDURE — G8427 DOCREV CUR MEDS BY ELIG CLIN: HCPCS | Performed by: SURGERY

## 2022-11-09 PROCEDURE — 1036F TOBACCO NON-USER: CPT | Performed by: SURGERY

## 2022-11-09 PROCEDURE — G8484 FLU IMMUNIZE NO ADMIN: HCPCS | Performed by: SURGERY

## 2022-11-09 NOTE — PATIENT INSTRUCTIONS
Patient received dietary handouts and education. Goals:   -Eat 4-5 times daily  -Avoid high fat and high sugar foods  -Include protein with all meals and snacks  -Avoid carbonation and caffeine  -Avoid calorie containing beverages  -Increase physical activity as tolerated      -Plan for Laparoscopic sleeve gastrectomy      Pre-operative work up Ordered:    - F/U in 4 weeks. - Nutrition Labs. - Protein Shake Trial.  - Psych Evaluation.   - Cardiac Clearance. - CPAP Compliance. - EGD (endoscopy to check your stomach). - Support Group Attendance. - Obtain letter of medical necessity (PCP Letter). - Quit Smoking,  Alcohol, Caffeine and Carbonated Drinks  - Obtain records for Weight History 2 yrs. - Start Regular Exercise and track your activities. - Start Tracking your food Intake and follow dietary guidelines. - Avoid Pregnancy for 2 yrs from date of surgery. (for female patients in childbearing age)          Patient advised that its their responsibility to follow up for studies, referrals and/or labs ordered today.

## 2022-11-09 NOTE — PROGRESS NOTES
[facility]) Physicians   [facility] [employer]  Antoine Díaz MD, [facility], [address] [facility] [address]    [place]  [id number] . Phone: [phone]  Fax: [phone]       Chief Complaint   Patient presents with    Bariatric, Initial Visit     NP Emilia Brooks           HPI:    Sarah Lopez is a very pleasant 42 y.o. obese female ,   Body mass index is 58.26 kg/m². And multiple medical problems who is presenting for weight loss surgery evaluation and consultation by Dr. Jaqui Redman. Patient has been struggling for several years now with obesity. Patient feels the weight is an obstacle to achieve and perform things in daily living as well risk on health. Tries to diet, and exercise but can't keep the weight off. Patient tried [employer] Diet, [facility] Diet, herbalife, low carb,GoLo fiber supplement and calorie restriction. Patient has not participated in meal replacement/liquid diets. Patient has participated in weight loss medications. Abdiaziz Uriarte Adipex last does in June 2022 and other regimens, but with no sustainable weight loss. Patient  is very determined to lose weight and be healthy, and is interested in surgical weight loss for future weight loss. .    Otherwise patient denies any nausea, vomiting, fevers, chills, shortness of breath, chest pain, constipation or urinary symptoms.         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    Shortness of breath    Chronic GERD           Pain Assessment   Denies any abdominal pain     Past Medical History:   Diagnosis Date    COVID-19     Hypotension due to hypovolemia Obesity      Past Surgical History:   Procedure Laterality Date    DILATION AND CURETTAGE OF UTERUS       Family History   Problem Relation Age of Onset    Arthritis Mother     Elevated Lipids Mother     Diabetes Father     Kidney Disease Father      Social History     Tobacco Use    Smoking status: Never    Smokeless tobacco: Never   Substance Use Topics    Alcohol use: Yes     Comment: social         I counseled the patient on the risks of Smoking, ETOH or Drug use, and importance of completely avoiding them, otherwise patient risks surgery cancellation or post operative serious complications if they start using any. Adra Patella acknowledged, agreed not to use and wants to proceed. Allergies   Allergen Reactions    Shellfish-Derived Products Anaphylaxis    Strawberry (Diagnostic) Anaphylaxis    Bee Venom Hives and Swelling     Vitals:    11/09/22 0928   BP: 122/80   Pulse: 79   Resp: 18   SpO2: 95%   Weight: (!) 358 lb 3.2 oz (162.5 kg)   Height: 5' 5.75\" (1.67 m)       Body mass index is 58.26 kg/m². Current Outpatient Medications:      Torsemide 40 MG TABS, Take 40 mg by mouth daily, Disp: 90 tablet, Rfl: 3    albuterol sulfate  (90 Base) MCG/ACT inhaler, Inhale 2 puffs into the lungs 4 times daily, Disp: 18 g, Rfl: 3    Handicap Placard MISC, by Does not apply route From 11/2/2021 until 2/28/2022, Disp: 1 each, Rfl: 0    EPINEPHrine (EPIPEN 2-JORDAN) 0.3 MG/0.3ML SOAJ injection, Inject 0.3 mLs into the muscle once as needed (anaphylaxis) Use as directed for allergic reaction, Disp: 0.3 mL, Rfl: 0      Review of Systems - History obtained from the patient  General ROS: overweight   Psychological ROS: negative  Ophthalmic ROS: negative  Neurological ROS: negative  ENT ROS: negative  Allergy and Immunology ROS: negative  Hematological and Lymphatic ROS: negative  Endocrine ROS: overweight  Breast ROS: negative  Respiratory ROS: negative  Cardiovascular ROS: negative  Gastrointestinal ROS:negative  Genito-Urinary ROS: negative  Musculoskeletal ROS: weight effects on joints  Skin ROS: negative    Physical Exam   Constitutional: Patient is oriented to person, place, and time. Vital signs are normal. Patient  appears well-developed and well-nourished. Patient  is active and cooperative. Non-toxic appearance. No distress. HENT:   Head: Normocephalic and atraumatic. Head is without laceration. Right Ear: External ear normal. No lacerations. No drainage, swelling or tenderness. Left Ear: External ear normal. No lacerations. No drainage, swelling or tenderness. Nose/Mouth/Throat: Patient is wearing mask due to Covid-19 pandemic precautions, following CDC and health authorities guidelines. Eyes: Conjunctivae, EOM and lids are normal. Pupils are equal, round, and reactive to light. Right eye exhibits no discharge. No foreign body present in the right eye. Left eye exhibits no discharge. No foreign body present in the left eye. No scleral icterus. Neck: Trachea normal and normal range of motion. Neck supple. No JVD present. No tracheal tenderness present. Carotid bruit is not present. No rigidity. No tracheal deviation and no edema present. No thyromegaly present. Cardiovascular: Normal rate, regular rhythm, normal heart sounds, intact distal pulses and normal pulses. Pulmonary/Chest: Effort normal and breath sounds normal. No stridor. No respiratory distress. Patient  has no wheezes. Patient has no rales. Patient exhibits no tenderness and no crepitus. Abdominal: Soft. Normal appearance and bowel sounds are normal. Patient exhibits no distension, no abdominal bruit, no ascites and no mass. There is no hepatosplenomegaly. There is no tenderness. There is no rigidity, no rebound, no guarding and no CVA tenderness. No hernia. Hernia confirmed negative in the ventral area. Musculoskeletal: Normal range of motion. Patient exhibits no edema or tenderness.    Lymphadenopathy:        Head (right side): No submental, no submandibular, no preauricular, no posterior auricular and no occipital adenopathy present. Head (left side): No submental, no submandibular, no preauricular, no posterior auricular and no occipital adenopathy present. Patient  has no cervical adenopathy. Right: No supraclavicular adenopathy present. Left: No supraclavicular adenopathy present. Neurological: Patient is alert and oriented to person, place, and time. Patient has normal strength. Coordination and gait normal. GCS eye subscore is 4. GCS verbal subscore is 5. GCS motor subscore is 6. Skin: Skin is warm and dry. No abrasion and no rash noted. Patient  is not diaphoretic. No cyanosis or erythema. Psychiatric: Patient has a normal mood and affect. speech is normal and behavior is normal. Cognition and memory are normal.         Kerri Guthrie was seen today for bariatric, initial visit. Diagnoses and all orders for this visit:    Morbid obesity with BMI of 50.0-59.9, adult ([facility]  -     CBC with Auto Differential; Future  -     Comprehensive Metabolic Panel; Future  -     Hemoglobin A1C; Future  -     Iron and TIBC; Future  -     Lipid Panel; Future  -     TSH with Reflex; Future  -     Vitamin A; Future  -     Vitamin B1, Whole Blood; Future  -     Vitamin B12 & Folate; Future  -     Vitamin D 25 Hydroxy; Future  -     Vitamin E; Future  -     Protime-INR; Future  -     Ambulatory referral to Cardiology    Obstructive sleep apnea  -     CBC with Auto Differential; Future  -     Comprehensive Metabolic Panel; Future  -     Hemoglobin A1C; Future  -     Iron and TIBC; Future  -     Lipid Panel; Future  -     TSH with Reflex; Future  -     Vitamin A; Future  -     Vitamin B1, Whole Blood; Future  -     Vitamin B12 & Folate; Future  -     Vitamin D 25 Hydroxy; Future  -     Vitamin E; Future  -     Protime-INR;  Future  -     Ambulatory referral to Cardiology    Chronic right-sided heart failure ([facility]  - CBC with Auto Differential; Future  -     Comprehensive Metabolic Panel; Future  -     Hemoglobin A1C; Future  -     Iron and TIBC; Future  -     Lipid Panel; Future  -     TSH with Reflex; Future  -     Vitamin A; Future  -     Vitamin B1, Whole Blood; Future  -     Vitamin B12 & Folate; Future  -     Vitamin D 25 Hydroxy; Future  -     Vitamin E; Future  -     Protime-INR; Future  -     Ambulatory referral to Cardiology    Chronic GERD  -     CBC with Auto Differential; Future  -     Comprehensive Metabolic Panel; Future  -     Hemoglobin A1C; Future  -     Iron and TIBC; Future  -     Lipid Panel; Future  -     TSH with Reflex; Future  -     Vitamin A; Future  -     Vitamin B1, Whole Blood; Future  -     Vitamin B12 & Folate; Future  -     Vitamin D 25 Hydroxy; Future  -     Vitamin E; Future  -     Protime-INR; Future  -     Ambulatory referral to Cardiology          A/P  Edson Stanley is a very pleasant 42 y.o. female with Obesity,  Body mass index is 58.26 kg/m². and multiple obesity related co-morbidities. Edson Stanley is very motivated to lose weight and being more healthy. 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 I have reviewed, discussed and agree with the dietary plan. Medical weight loss and different surgical options were discussed in details with patient. Eitan Marroquin is interested in surgical weight loss for future weight loss.       Case volume and outcomes data in our program were discussed and reviewed with the patient. Questions and concerns were addressed today. Patient is interested in Laparoscopic Sleeve Gastrectomy, which I believe is an excellent option. I explained to the patient that surgery does carry a risk specially with the coexisting comorbid conditions the patient have. Surgery as well in obese patiens can carry more risk. Risks including but not limited to; Infection, bleeding, gastric leak or obstruction, persistent nausea, vomiting, or reflux, injury to surrounding structures, risks of anesthesia, stricture, delayed gastric emptying, staple line leak, incisional hernia, malnutrition , vitamin deficiency, neurological complications, paralysis, hair loss, and/ or Conversion to Open surgery may be necessary. Failure to lose or maintain weight loss, Gallstones or Kidney Stones, Deep Venous Thrombosis , pulmonary embolism and / or death. However I do believe the benefits outweighs that risk. Lisa Pretty understands the risks and wants to proceed. We will proceed with pre-operative work up labs and studies. Will also petition patient's  insurance for approval for this procedure. I advised the patient that we can't guarantee final insurance approval.      Patient received dietary handouts and education. Patient advised that its their responsibility to follow up for studies, referrals and/or labs ordered today. Also discussed in details the importance of follow up, as well following the recommendations and completing the whole program to improve outcomes when it comes to healthier lifestyle as well weight loss. Patient also advised about risks and benefits being on a strict dietary regimen as well using supplements.  Patient agrees and wants to proceed with weight loss planning     Today's encounter included any number of the following: Bariatric Pre/Post operative work up/protocols, review of labs, imaging, provider notes, outside hospital records, performing examination/evaluation, counseling patient and/or family, ordering medications/tests, placing referrals and communication with referring physicians, coordination of care; discussing dietary plan/recall with the patient as well with registered dietitian and documentation in the EHR. Of note, the above was done during same day of the actual patient encounter. Obesity as a disease is considered a high risk to patients overall health and should therefore be considered a high risk disease state. Advised the patient that not getting there weight under control (weight loss hopefully will help with resolving/improving of the comorbid conditions),  that could increase risk of complications/worsening of those conditions on the long-term. With Covid-19 pandemic, CDC and health authorities did classify obese patients as vulnerable and high risk as well. Which makes weight loss a priority for improvement of their wellbeing and overall health. CDC has issued the following statement as far Obese patients being at Increased Risk of being critically ill from SARS-Cov-2  \"Severe obesity increases the risk of a serious breathing problem called acute respiratory distress syndrome (ARDS), which is a major complication of EWTTO-02 and can cause difficulties with a doctors ability to provide respiratory support for seriously ill patients. People living with severe obesity can have multiple serious chronic diseases and underlying health conditions that can increase the risk of severe illness from COVID-19. \"      I did explain thoroughly to the patient that compliance with pre- and post op diet and other recommendations are integral part to improve the chances of successful weight loss and also not following it could end with serious health complications. Also stressed to the patient importance of taking the multivitamins as instructed, otherwise risk significant complications.            Patient Instructions   Patient received dietary handouts and education. Goals:   -Eat 4-5 times daily  -Avoid high fat and high sugar foods  -Include protein with all meals and snacks  -Avoid carbonation and caffeine  -Avoid calorie containing beverages  -Increase physical activity as tolerated      -Plan for Laparoscopic sleeve gastrectomy      Pre-operative work up Ordered:    - F/U in 4 weeks. - Nutrition Labs. - Protein Shake Trial.  - Psych Evaluation.   - Cardiac Clearance. - CPAP Compliance. - EGD (endoscopy to check your stomach). - Support Group Attendance. - Obtain letter of medical necessity (PCP Letter). - Quit Smoking,  Alcohol, Caffeine and Carbonated Drinks  - Obtain records for Weight History 2 yrs. - Start Regular Exercise and track your activities. - Start Tracking your food Intake and follow dietary guidelines. - Avoid Pregnancy for 2 yrs from date of surgery. (for female patients in childbearing age)          Patient advised that its their responsibility to follow up for studies, referrals and/or labs ordered today. Please note that some or all of this report was generated using voice recognition software. Please notify me in case of any questions about the content of this document, as some errors in transcription may have occurred .

## 2022-11-09 NOTE — PROGRESS NOTES
Angela Lentz is a 42 y.o. female with a date of birth of 1985. Vitals:    11/09/22 0928   BP: 122/80   Pulse: 79   Resp: 18   SpO2: 95%    BMI: Body mass index is 58.26 kg/m². Obesity Classification: Class III    Weight History: Wt Readings from Last 3 Encounters:   11/09/22 (!) 358 lb 3.2 oz (162.5 kg)   10/25/22 (!) 360 lb (163.3 kg)   10/10/22 (!) 362 lb (164.2 kg)     Patient's lowest adult weight was 205 lbs at age 22-23. Patient's highest adult weight was 366 lbs at age 43. Patient has participated in the following weight loss programs: Atkins Diet, [address] [place] [address], herbalife, low carb,GoLo fiber supplement and calorie restriction. Patient has not participated in meal replacement/liquid diets. Patient has participated in weight loss medications. Mickeal Shone, Adipex last does in June 2022    Patient is not lactose intolerant. Patient does have food allergies for shellfish and strawberry, may have some others but not diagnosed. Patient does not have Bahai/cultural food preferences. Patient does tolerate artificial sweeteners. 24 hour recall/food frequency chart: Works at [employer] from home. Does not have hunger sensation, may only eat 1/day. Reports portions have significantly decreased since having Covid last year. Pt's mother present for support today. Breakfast: None  Snack: None OR 2 handful candy corn   Lunch: Nathaniel Madsen friluoise + double cheese vi + Dr. Levi Gladwyne: None  Dinner: None  Snack: None  Drinks throughout the day: water, pop, juice, sweet tea   Do you drink alcohol? Yes. How often/how much alcohol do you drink: 3 Mixed drinks per week. Patient does not meet the criteria for binge eating disorder. Patient does have grazing. Patient does not have night eating. Patient does have a history of emotional eating or eating out of boredom. Surgery  Patient does feel confident in her ability to make these changes.   The patient's expectations of post-surgical eating habits realistic. Patient states she does understand the consequences of not complying with post-op food guidelines. Patient states she does understands the long term changes in food intake that will be necessary for all occasions after surgery for the rest of her life. Patient is deemed nutritionally appropriate to proceed. Goals  Weight: size 20   Health Improvement: get off CPAP, less knee pain, better mobility     Assessment  Nutritional Needs: RMR=(9.99 x 162.5) + (6.25 x 167) - (4.92 x 37 y.o.) -161= 2314 kcal x 1.3 (sedentary activity factor)= 3008 kcal - 1000 (for 2 lb weight loss/week)= 2008 kcal.    Plan  Plan/Recommendations: Start presurgical guidelines. Goals:   -Eat 4-5 times daily  -Avoid high fat and high sugar foods  -Include protein with all meals and snacks  -Avoid carbonation and caffeine  -Avoid calorie containing beverages  -Increase physical activity as tolerated    PES Statement:  Overweight/Obesity related to lack of exercise, sedentary lifestyle, unhealthy eating habits, and unsuccessful diet attempts as evidenced by BMI. Body mass index is 58.26 kg/m². Will follow up as necessary.     Cheryle Meals, RD, KAIN

## 2022-11-10 DIAGNOSIS — I50.812 CHRONIC RIGHT-SIDED HEART FAILURE (HCC): ICD-10-CM

## 2022-11-28 DIAGNOSIS — E66.01 MORBID OBESITY WITH BMI OF 50.0-59.9, ADULT (HCC): ICD-10-CM

## 2022-11-28 DIAGNOSIS — I50.812 CHRONIC RIGHT-SIDED HEART FAILURE (HCC): ICD-10-CM

## 2022-11-28 DIAGNOSIS — K21.9 CHRONIC GERD: ICD-10-CM

## 2022-11-28 DIAGNOSIS — G47.33 OBSTRUCTIVE SLEEP APNEA: ICD-10-CM

## 2022-11-28 LAB
A/G RATIO: 1.1 (ref 1.1–2.2)
ALBUMIN SERPL-MCNC: 3.4 G/DL (ref 3.4–5)
ALP BLD-CCNC: 94 U/L (ref 40–129)
ALT SERPL-CCNC: 9 U/L (ref 10–40)
ANION GAP SERPL CALCULATED.3IONS-SCNC: 13 MMOL/L (ref 3–16)
AST SERPL-CCNC: 11 U/L (ref 15–37)
BASOPHILS ABSOLUTE: 0 K/UL (ref 0–0.2)
BASOPHILS RELATIVE PERCENT: 0.4 %
BILIRUB SERPL-MCNC: 0.3 MG/DL (ref 0–1)
BUN BLDV-MCNC: 10 MG/DL (ref 7–20)
CALCIUM SERPL-MCNC: 9.1 MG/DL (ref 8.3–10.6)
CHLORIDE BLD-SCNC: 106 MMOL/L (ref 99–110)
CHOLESTEROL, TOTAL: 184 MG/DL (ref 0–199)
CO2: 22 MMOL/L (ref 21–32)
CREAT SERPL-MCNC: 0.8 MG/DL (ref 0.6–1.1)
EOSINOPHILS ABSOLUTE: 0.3 K/UL (ref 0–0.6)
EOSINOPHILS RELATIVE PERCENT: 5.1 %
FOLATE: 8.07 NG/ML (ref 4.78–24.2)
GFR SERPL CREATININE-BSD FRML MDRD: >60 ML/MIN/{1.73_M2}
GLUCOSE BLD-MCNC: 88 MG/DL (ref 70–99)
HCT VFR BLD CALC: 37.8 % (ref 36–48)
HDLC SERPL-MCNC: 40 MG/DL (ref 40–60)
HEMOGLOBIN: 12.6 G/DL (ref 12–16)
INR BLD: 1.09 (ref 0.87–1.14)
IRON SATURATION: 29 % (ref 15–50)
IRON: 86 UG/DL (ref 37–145)
LDL CHOLESTEROL CALCULATED: 125 MG/DL
LYMPHOCYTES ABSOLUTE: 1.7 K/UL (ref 1–5.1)
LYMPHOCYTES RELATIVE PERCENT: 27.7 %
MCH RBC QN AUTO: 31.9 PG (ref 26–34)
MCHC RBC AUTO-ENTMCNC: 33.3 G/DL (ref 31–36)
MCV RBC AUTO: 95.9 FL (ref 80–100)
MONOCYTES ABSOLUTE: 0.3 K/UL (ref 0–1.3)
MONOCYTES RELATIVE PERCENT: 4.7 %
NEUTROPHILS ABSOLUTE: 3.8 K/UL (ref 1.7–7.7)
NEUTROPHILS RELATIVE PERCENT: 62.1 %
PDW BLD-RTO: 13.9 % (ref 12.4–15.4)
PLATELET # BLD: 361 K/UL (ref 135–450)
PMV BLD AUTO: 8.8 FL (ref 5–10.5)
POTASSIUM SERPL-SCNC: 4 MMOL/L (ref 3.5–5.1)
PROTHROMBIN TIME: 14.1 SEC (ref 11.7–14.5)
RBC # BLD: 3.95 M/UL (ref 4–5.2)
SODIUM BLD-SCNC: 141 MMOL/L (ref 136–145)
TOTAL IRON BINDING CAPACITY: 300 UG/DL (ref 260–445)
TOTAL PROTEIN: 6.4 G/DL (ref 6.4–8.2)
TRIGL SERPL-MCNC: 93 MG/DL (ref 0–150)
TSH REFLEX: 2.72 UIU/ML (ref 0.27–4.2)
VITAMIN B-12: 615 PG/ML (ref 211–911)
VITAMIN D 25-HYDROXY: 11.9 NG/ML
VLDLC SERPL CALC-MCNC: 19 MG/DL
WBC # BLD: 6.1 K/UL (ref 4–11)

## 2022-11-29 LAB
ESTIMATED AVERAGE GLUCOSE: 105.4 MG/DL
HBA1C MFR BLD: 5.3 %

## 2022-12-01 ENCOUNTER — OFFICE VISIT (OUTPATIENT)
Dept: BARIATRICS/WEIGHT MGMT | Age: 37
End: 2022-12-01

## 2022-12-01 VITALS
BODY MASS INDEX: 47.09 KG/M2 | DIASTOLIC BLOOD PRESSURE: 64 MMHG | OXYGEN SATURATION: 98 % | SYSTOLIC BLOOD PRESSURE: 118 MMHG | HEIGHT: 66 IN | RESPIRATION RATE: 18 BRPM | HEART RATE: 83 BPM | WEIGHT: 293 LBS

## 2022-12-01 DIAGNOSIS — E66.01 MORBID OBESITY WITH BMI OF 50.0-59.9, ADULT (HCC): Primary | ICD-10-CM

## 2022-12-01 DIAGNOSIS — K21.9 CHRONIC GERD: ICD-10-CM

## 2022-12-01 DIAGNOSIS — G47.33 OBSTRUCTIVE SLEEP APNEA: ICD-10-CM

## 2022-12-01 LAB
ALPHA-TOCOPHEROL: 6.7 MG/L (ref 5.5–18)
GAMMA-TOCOPHEROL: 1.3 MG/L (ref 0–6)
RETINYL PALMITATE: <0.02 MG/L (ref 0–0.1)
VITAMIN A LEVEL: 0.39 MG/L (ref 0.3–1.2)
VITAMIN A, INTERP: NORMAL
VITAMIN B1 WHOLE BLOOD: 95 NMOL/L (ref 70–180)

## 2022-12-01 PROCEDURE — 99214 OFFICE O/P EST MOD 30 MIN: CPT | Performed by: SURGERY

## 2022-12-01 NOTE — PROGRESS NOTES
[facility]) Physicians   [employer]  Domo Erickson MD, [facility], [address], [address] [place] [id number] . Phone: [phone]  Fax: [phone]          Chief Complaint   Patient presents with    Obesity     2nd pre-surg         HPI:     Keshia Dooley is a very pleasant 42 y.o. female with Body mass index is 58.74 kg/m². / Chronic Obesity. Milagro Ramirez has been struggling for several years now with obesity. Milagro Ramirez feels the weight is an obstacle to achieve and perform things in daily living as well risk on health. Patient  is very determined to lose weight and be healthy, and is working towards  surgical weight loss to achieve this goal. Pre-operative clearance and work up pending. Working hard to keep good dietary habits as well level of activity. Patient denies any nausea, vomiting, fevers, chills, shortness of breath, chest pain, cough, constipation or difficulty urinating. Pain Assessment   Denies any abdominal pain       Past Medical History:   Diagnosis Date    COVID-19     Hypotension due to hypovolemia     Obesity      Past Surgical History:   Procedure Laterality Date    DILATION AND CURETTAGE OF UTERUS       Family History   Problem Relation Age of Onset    Arthritis Mother     Elevated Lipids Mother     Diabetes Father     Kidney Disease Father      Social History     Tobacco Use    Smoking status: Never    Smokeless tobacco: Never   Substance Use Topics    Alcohol use: Yes     Comment: social     I counseled the patient on the importance of not smoking and risks of ETOH. Allergies   Allergen Reactions    Shellfish-Derived Products Anaphylaxis    Strawberry (Diagnostic) Anaphylaxis    Bee Venom Hives and Swelling     Vitals:    12/01/22 1455   BP: 118/64   Pulse: 83   Resp: 18   SpO2: 98%   Weight: (!) 361 lb 3.2 oz (163.8 kg)   Height: 5' 5.75\" (1.67 m)       Body mass index is 58.74 kg/m².     Lab Results   Component Value Date/Time    WBC 6.1 11/28/2022 01:39 PM    RBC 3.95 11/28/2022 01:39 PM    HGB 12.6 11/28/2022 01:39 PM    HCT 37.8 11/28/2022 01:39 PM    MCV 95.9 11/28/2022 01:39 PM    MCH 31.9 11/28/2022 01:39 PM    MCHC 33.3 11/28/2022 01:39 PM    MPV 8.8 11/28/2022 01:39 PM    NEUTOPHILPCT 62.1 11/28/2022 01:39 PM    LYMPHOPCT 27.7 11/28/2022 01:39 PM    MONOPCT 4.7 11/28/2022 01:39 PM    EOSRELPCT 5.1 11/28/2022 01:39 PM    BASOPCT 0.4 11/28/2022 01:39 PM    NEUTROABS 3.8 11/28/2022 01:39 PM    LYMPHSABS 1.7 11/28/2022 01:39 PM    MONOSABS 0.3 11/28/2022 01:39 PM    EOSABS 0.3 11/28/2022 01:39 PM     Lab Results   Component Value Date/Time     11/28/2022 01:39 PM    K 4.0 11/28/2022 01:39 PM    K 3.9 10/20/2021 05:27 AM     11/28/2022 01:39 PM    CO2 22 11/28/2022 01:39 PM    ANIONGAP 13 11/28/2022 01:39 PM    GLUCOSE 88 11/28/2022 01:39 PM    BUN 10 11/28/2022 01:39 PM    CREATININE 0.8 11/28/2022 01:39 PM    LABGLOM >60 11/28/2022 01:39 PM    GFRAA >60 01/26/2022 12:11 PM    CALCIUM 9.1 11/28/2022 01:39 PM    PROT 6.4 11/28/2022 01:39 PM    LABALBU 3.4 11/28/2022 01:39 PM    AGRATIO 1.1 11/28/2022 01:39 PM    BILITOT 0.3 11/28/2022 01:39 PM    ALKPHOS 94 11/28/2022 01:39 PM    ALT 9 11/28/2022 01:39 PM    AST 11 11/28/2022 01:39 PM    GLOB 3.4 10/06/2021 06:15 AM     Lab Results   Component Value Date/Time    CHOL 184 11/28/2022 01:39 PM    TRIG 93 11/28/2022 01:39 PM    HDL 40 11/28/2022 01:39 PM    LDLCALC 125 11/28/2022 01:39 PM    LABVLDL 19 11/28/2022 01:39 PM     Lab Results   Component Value Date/Time    TSHREFLEX 2.72 11/28/2022 01:39 PM     Lab Results   Component Value Date/Time    IRON 86 11/28/2022 01:39 PM    TIBC 300 11/28/2022 01:39 PM    LABIRON 29 11/28/2022 01:39 PM     Lab Results   Component Value Date/Time    TIPRUTBJ72 615 11/28/2022 01:39 PM    FOLATE 8.07 11/28/2022 01:39 PM     Lab Results   Component Value Date/Time    VITD25 11.9 11/28/2022 01:39 PM     Lab Results   Component Value Date/Time    LABA1C 5.3 11/28/2022 01:39 PM    .4 11/28/2022 01:39 PM         Current Outpatient Medications: Torsemide 40 MG TABS, Take 40 mg by mouth daily, Disp: 90 tablet, Rfl: 1    albuterol sulfate  (90 Base) MCG/ACT inhaler, Inhale 2 puffs into the lungs 4 times daily, Disp: 18 g, Rfl: 3    Handicap Placard MISC, by Does not apply route From 11/2/2021 until 2/28/2022, Disp: 1 each, Rfl: 0    EPINEPHrine (EPIPEN 2-JORDAN) 0.3 MG/0.3ML SOAJ injection, Inject 0.3 mLs into the muscle once as needed (anaphylaxis) Use as directed for allergic reaction, Disp: 0.3 mL, Rfl: 0    Review of Systems - History obtained from the patient  General ROS: negative  Psychological ROS: negative  Ophthalmic ROS: negative  Neurological ROS: negative  ENT ROS: negative  Allergy and Immunology ROS: negative  Hematological and Lymphatic ROS: negative  Endocrine ROS: negative  Breast ROS: negative  Respiratory ROS: negative  Cardiovascular ROS: negative  Gastrointestinal ROS:negative  Genito-Urinary ROS: negative  Musculoskeletal ROS: negative   Skin ROS: negative    Physical Exam   Vitals Reviewed   Constitutional: Patient is oriented to person, place, and time. Patient appears well-developed and well-nourished. Patient is active and cooperative. Non-toxic appearance. No distress. HENT:   Head: Normocephalic and atraumatic. Head is without abrasion and without laceration. Hair is normal.   Right Ear: External ear normal. No lacerations. No drainage, swelling . Left Ear: External ear normal. No lacerations. No drainage, swelling. Nose/Mouth: face mask in place  Eyes: Conjunctivae, EOM and lids are normal. Right eye exhibits no discharge. No foreign body present in the right eye. Left eye exhibits no discharge. No foreign body present in the left eye. No scleral icterus. Neck: Trachea normal and normal range of motion. No JVD present. Pulmonary/Chest: Effort normal. No accessory muscle usage or stridor. No apnea.  No respiratory distress. Cardiovascular: Normal rate and no JVD. Abdominal: Normal appearance. Patient exhibits no distension. Abdomen is soft, obese, non tender. Musculoskeletal: Normal range of motion. Patient exhibits no edema. Neurological: Patient is alert and oriented to person, place, and time. Patient has normal strength. GCS eye subscore is 4. GCS verbal subscore is 5. GCS motor subscore is 6. Skin: Skin is warm and dry. No abrasion and no rash noted. Patient is not diaphoretic. No cyanosis or erythema. Psychiatric: Patient has a normal mood and affect. Speech is normal and behavior is normal. Cognition and memory are normal.       A/P    Lesa Loss is 42 y.o. female, Body mass index is 58.74 kg/m². pre surgery, has gained 3 lbs since last visit. The patient underwent extensive dietary counseling with registered dietician. I have reviewed, discussed and agree with the dietary plan. Patient is trying hard to keep good dietary and behavior modifications. Patient is monitoring portion sizes, food choices and liquid calories. Patient is trying to exercise regularly as much as possible. Obesity as a disease is considered a high risk to patients overall health and should therefore be considered a high risk disease state. Advised the patient that not getting there weight under control, that could increase risk of complications/worsening of those conditions on the long-term. (Goal of weight loss surgery is to alleviate/control some of those co-morbidities)    Now with Covid-19 pandemic, CDC and health authorities does classify obese patients as vulnerable and high risk as well. Which makes weight loss a priority for improvement of their wellbeing and overall health. I encouraged the patient to continue exercise and keeping healthy eating habits. Discussed pre-op labs and work up till now. Also counseled the patient extensively on Surgery.      I did explain thoroughly to the patient that compliance with pre- and post op diet and other recommendations are integral part to improve the chances of successful weight loss and also not following it could end with serious health complications. Some strategies discussed today include but not limited to : 30/30/30 minutes rule, food diary, avoid fast food and packing/planing ahead, & increasing exercise. Also stressed to the patient importance of taking the multivitamins as instructed, otherwise risk significant complications. The visit today, included any number of the following: Bariatric Preoperative work up/protocols, review of labs, imaging, provider notes, outside hospital records, performing examination/evaluation, counseling patient and/or family, ordering medications/tests, placing referrals and communication with referring physicians, coordination of care; discussing dietary plan/recall with the patient as well with registered dietitian and documentation in the EHR. Of note, the above was done during same day of the actual patient encounter. Abilio Scott is here for her second presurgical visit. Patient still working her preoperative clearances. Overall making good progress. We will see the patient next month for continued follow-up. Pre-op     We discussed how her excess weight affects her overall health and importance of weight loss, healthy diet and active lifestyle to alleviate those co morbid conditions, otherwise risk deterioration. Morbid Obesity: Body mass index is 58.74 kg/m². [x] Continue to make dietary and lifestyle modifications. [x] Plan for Future laparoscopic sleeve gastrectomy. [x] Return for follow-up next month. Chronic GERD:   [x] Continue to make dietary and lifestyle modifications. [] Continue Omeprazole. [] Continue Famotidine. [x] Plan for EGD to evaluate the stomach. Obstructive Sleep Apnea:   [x] Continue to make dietary and lifestyle modifications.   [x] Reviewed the importance of wearing / compliance with CPAP/BIPAP. [x] Continue to follow up with their sleep medicine provider for CPAP/BIPAP management and monitoring. Patient advised that its their responsibility to follow up for studies, referrals and/or labs ordered today.

## 2022-12-01 NOTE — PROGRESS NOTES
Anthony Neal gained 3 lbs over 3 weeks. Pt reports that she has been on period since July 2022 and just stopped progesterone 11/24/22. Pt reports that she has PCOS. Pt states that she got the implant 10/18/22. Pt works from home full time. Pt sleeps from 4-5 am. Till 10 am.  Pt previously worked 3rd shift. Is pt eating at least 4 times everyday? no; eats 2 times/day      Breakfast: skips  Snack: none  Lunch: 3 pm. Lee abdi (double cheeseburger with fries and a fruit punch)    Snack: none  Dinner: none  Snack: 11 pm.  Rest of vi abdi meal       Drinks:  Fruit punch-32 oz./day, water-16 oz/day, 1 pop (20 oz.)/day     Is pt eating a lean protein source with all meals and snacks? no    Has pt decreased their portions using the plate method? no    Is pt choosing low fat/sugar free options? no    Is pt drinking at least 64 oz of clear liquids everyday? no    Has pt stopped drinking carbonation, caffeinated, and sugar sweetened beverages? no    Has pt sampled Unjury and/or Nectar protein? no    Has pt attended a support group?  Not scheduled     Participating in intentional exercise? yes do stacie dorsey     Plan/Recommendations:   Sample Protein powder  Eat more frequently-work up to eating 4 times/day  Eat breakfast   Decrease fruit punch and soda  Schedule Support group           Handouts: none    Elmer Ricardo RD, LD

## 2022-12-19 ENCOUNTER — TELEPHONE (OUTPATIENT)
Dept: BARIATRICS/WEIGHT MGMT | Age: 37
End: 2022-12-19

## 2022-12-19 NOTE — TELEPHONE ENCOUNTER
Left vm- reminded of egd on 12/22> pt was told about clear liquids 24 hr prior/ NPO after midnight. Omari Morelos

## 2022-12-20 RX ORDER — SODIUM CHLORIDE 9 MG/ML
INJECTION, SOLUTION INTRAVENOUS ONCE
Status: CANCELLED | OUTPATIENT
Start: 2022-12-20

## 2022-12-22 ENCOUNTER — ANESTHESIA EVENT (OUTPATIENT)
Dept: ENDOSCOPY | Age: 37
End: 2022-12-22
Payer: MEDICAID

## 2022-12-22 ENCOUNTER — ANESTHESIA (OUTPATIENT)
Dept: ENDOSCOPY | Age: 37
End: 2022-12-22
Payer: MEDICAID

## 2022-12-22 ENCOUNTER — HOSPITAL ENCOUNTER (OUTPATIENT)
Age: 37
Setting detail: OUTPATIENT SURGERY
Discharge: HOME HEALTH | End: 2022-12-22
Attending: SURGERY | Admitting: SURGERY
Payer: MEDICAID

## 2022-12-22 VITALS
RESPIRATION RATE: 18 BRPM | TEMPERATURE: 98 F | WEIGHT: 293 LBS | DIASTOLIC BLOOD PRESSURE: 82 MMHG | HEART RATE: 82 BPM | SYSTOLIC BLOOD PRESSURE: 138 MMHG | BODY MASS INDEX: 48.82 KG/M2 | OXYGEN SATURATION: 99 % | HEIGHT: 65 IN

## 2022-12-22 DIAGNOSIS — K21.9 GASTROESOPHAGEAL REFLUX DISEASE, UNSPECIFIED WHETHER ESOPHAGITIS PRESENT: ICD-10-CM

## 2022-12-22 LAB — HCG(URINE) PREGNANCY TEST: NEGATIVE

## 2022-12-22 PROCEDURE — 3609012400 HC EGD TRANSORAL BIOPSY SINGLE/MULTIPLE: Performed by: SURGERY

## 2022-12-22 PROCEDURE — 43239 EGD BIOPSY SINGLE/MULTIPLE: CPT | Performed by: SURGERY

## 2022-12-22 PROCEDURE — 3700000001 HC ADD 15 MINUTES (ANESTHESIA): Performed by: SURGERY

## 2022-12-22 PROCEDURE — 2709999900 HC NON-CHARGEABLE SUPPLY: Performed by: SURGERY

## 2022-12-22 PROCEDURE — 7100000011 HC PHASE II RECOVERY - ADDTL 15 MIN: Performed by: SURGERY

## 2022-12-22 PROCEDURE — 7100000010 HC PHASE II RECOVERY - FIRST 15 MIN: Performed by: SURGERY

## 2022-12-22 PROCEDURE — 2580000003 HC RX 258: Performed by: NURSE ANESTHETIST, CERTIFIED REGISTERED

## 2022-12-22 PROCEDURE — 2500000003 HC RX 250 WO HCPCS: Performed by: NURSE ANESTHETIST, CERTIFIED REGISTERED

## 2022-12-22 PROCEDURE — 88305 TISSUE EXAM BY PATHOLOGIST: CPT

## 2022-12-22 PROCEDURE — 6360000002 HC RX W HCPCS: Performed by: NURSE ANESTHETIST, CERTIFIED REGISTERED

## 2022-12-22 PROCEDURE — 3700000000 HC ANESTHESIA ATTENDED CARE: Performed by: SURGERY

## 2022-12-22 PROCEDURE — 84703 CHORIONIC GONADOTROPIN ASSAY: CPT

## 2022-12-22 RX ORDER — SODIUM CHLORIDE 9 MG/ML
INJECTION, SOLUTION INTRAVENOUS CONTINUOUS PRN
Status: DISCONTINUED | OUTPATIENT
Start: 2022-12-22 | End: 2022-12-22 | Stop reason: SDUPTHER

## 2022-12-22 RX ORDER — LIDOCAINE HYDROCHLORIDE 20 MG/ML
INJECTION, SOLUTION INFILTRATION; PERINEURAL PRN
Status: DISCONTINUED | OUTPATIENT
Start: 2022-12-22 | End: 2022-12-22 | Stop reason: SDUPTHER

## 2022-12-22 RX ORDER — OMEPRAZOLE 20 MG/1
20 CAPSULE, DELAYED RELEASE ORAL
Qty: 90 CAPSULE | Refills: 1 | Status: SHIPPED | OUTPATIENT
Start: 2022-12-22

## 2022-12-22 RX ORDER — PROPOFOL 10 MG/ML
INJECTION, EMULSION INTRAVENOUS PRN
Status: DISCONTINUED | OUTPATIENT
Start: 2022-12-22 | End: 2022-12-22 | Stop reason: SDUPTHER

## 2022-12-22 RX ADMIN — PROPOFOL 150 MG: 10 INJECTION, EMULSION INTRAVENOUS at 09:45

## 2022-12-22 RX ADMIN — LIDOCAINE HYDROCHLORIDE 100 MG: 20 INJECTION, SOLUTION INFILTRATION; PERINEURAL at 09:45

## 2022-12-22 RX ADMIN — SODIUM CHLORIDE: 9 INJECTION, SOLUTION INTRAVENOUS at 09:07

## 2022-12-22 RX ADMIN — PROPOFOL 50 MG: 10 INJECTION, EMULSION INTRAVENOUS at 09:47

## 2022-12-22 RX ADMIN — PROPOFOL 50 MG: 10 INJECTION, EMULSION INTRAVENOUS at 09:48

## 2022-12-22 RX ADMIN — PROPOFOL 50 MG: 10 INJECTION, EMULSION INTRAVENOUS at 09:46

## 2022-12-22 ASSESSMENT — ENCOUNTER SYMPTOMS: SHORTNESS OF BREATH: 1

## 2022-12-22 ASSESSMENT — PAIN SCALES - GENERAL: PAINLEVEL_OUTOF10: 0

## 2022-12-22 NOTE — ANESTHESIA POSTPROCEDURE EVALUATION
Department of Anesthesiology  Postprocedure Note    Patient: Corie Robertson  MRN: [id number]  YOB: 1985  Date of evaluation: 12/22/2022      Procedure Summary     Date: 12/22/22 Room / Location: [address]    Anesthesia Start: 2492 Anesthesia Stop:     Procedure: EGD BIOPSY (Abdomen) Diagnosis:       Gastroesophageal reflux disease, unspecified whether esophagitis present      (Gastroesophageal reflux disease, unspecified whether esophagitis present [K21.9])    Surgeons: Heath Matta MD Responsible Provider: Emmie Wilson MD    Anesthesia Type: MAC, general ASA Status: 3          Anesthesia Type: No value filed.     Sanam Phase I: Sanam Score: 10    Sanam Phase II:        Anesthesia Post Evaluation    Patient location during evaluation: PACU  Patient participation: complete - patient participated  Level of consciousness: awake  Airway patency: patent  Nausea & Vomiting: no vomiting and no nausea  Complications: no  Cardiovascular status: hemodynamically stable  Respiratory status: acceptable  Hydration status: stable  Multimodal analgesia pain management approach

## 2022-12-22 NOTE — H&P
Department of [facility] [address] Physicians   [employer]  Attending Pre-operative History and Physical      DIAGNOSIS:  Obesity    INDICATION:  Pre-op    PROCEDURE:  EGD    CHIEF COMPLAINT:  Obesity    History Obtained From:  patient    HISTORY OF PRESENT ILLNESS:    The patient is a 42 y.o. 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    Shortness of breath    Chronic GERD      who presents for pre-op EGD    Past Medical History:        Diagnosis Date    COVID-19     Hypotension due to hypovolemia     Obesity      Past Surgical History:        Procedure Laterality Date    DILATION AND CURETTAGE OF UTERUS       Medications Prior to Admission:   Medications Prior to Admission: Torsemide 40 MG TABS, Take 40 mg by mouth daily  albuterol sulfate  (90 Base) MCG/ACT inhaler, Inhale 2 puffs into the lungs 4 times daily  EPINEPHrine (EPIPEN 2-JORDAN) 0.3 MG/0.3ML SOAJ injection, Inject 0.3 mLs into the muscle once as needed (anaphylaxis) Use as directed for allergic reaction  Handicap Placard MISC, by Does not apply route From 11/2/2021 until 2/28/2022    Allergies:  Shellfish-derived products, Strawberry (diagnostic), and Bee venom    Social History:   TOBACCO:   reports that she has never smoked. She has never used smokeless tobacco.  ETOH:   reports current alcohol use.   Family History:       Problem Relation Age of Onset    Arthritis Mother     Elevated Lipids Mother     Diabetes Father     Kidney Disease Father          REVIEW OF SYSTEMS:    Review of Systems - History obtained from the patient  General ROS: negative  Psychological ROS: negative  Ophthalmic ROS: negative  Neurological ROS: negative  ENT ROS: negative  Allergy and Immunology ROS: negative  Hematological and Lymphatic ROS: negative  Endocrine ROS: negative  Breast ROS: negative  Respiratory ROS: negative  Cardiovascular ROS: negative  Gastrointestinal ROS:negative  Genito-Urinary ROS: negative  Musculoskeletal ROS: negative   Skin ROS: negative      PHYSICAL EXAM:      Ht 5' 5\" (1.651 m)   Wt (!) 357 lb (161.9 kg)   BMI 59.41 kg/m²  I      Physical Exam   Vitals Reviewed   Constitutional: Patient is oriented to person, place, and time. Patient appears well-developed and well-nourished. Patient is active and cooperative. Non-toxic appearance. No distress. HENT:   Head: Normocephalic and atraumatic. Head is without abrasion and without laceration. Hair is normal.   Right Ear: External ear normal. No lacerations. No drainage, swelling . Left Ear: External ear normal. No lacerations. No drainage, swelling. Nose: Nose normal. No nose lacerations or nasal deformity. Eyes: Conjunctivae, EOM and lids are normal. Right eye exhibits no discharge. No foreign body present in the right eye. Left eye exhibits no discharge. No foreign body present in the left eye. No scleral icterus. Neck: Trachea normal and normal range of motion. No JVD present. Pulmonary/Chest: Effort normal. No accessory muscle usage or stridor. No apnea. No respiratory distress. Cardiovascular: Normal rate and no JVD. Abdominal: Normal appearance. Patient exhibits no distension. Musculoskeletal: Normal range of motion. Patient exhibits no edema. Neurological: Patient is alert and oriented to person, place, and time. Patient has normal strength. GCS eye subscore is 4. GCS verbal subscore is 5. GCS motor subscore is 6. Skin: Skin is warm and dry. No abrasion and no rash noted. Patient is not diaphoretic. No cyanosis or erythema.    Psychiatric: Patient has a normal mood and specified procedure planned EGD with deep sedation  2. Procedure options, risks and benefits reviewed with patient. Patient expresses understanding.       Electronically signed by Kesha Hung MD , FACS, FASMBS  on 12/22/2022 at 9:02 AM

## 2022-12-22 NOTE — DISCHARGE INSTRUCTIONS
ENDOSCOPY DISCHARGE INSTRUCTIONS    You may experience some lightheadedness for the next several hours. Plan on quiet relaxation for the rest of today. A responsible adult needs to stay with you today. Because of the medications you received today-do not drive,operate machinery,or sign any contractual agreement for the next 24 hours. Do not drink any alcoholic beverages or take any unprescribed medications tonight. Eat bland food and avoid anything greasy or spicy initially-progress to your normal diet gradually. Diet restrictions as instructed. If you have any of the following problems, notify your physician or return to the hospital emergency room : fever, chills, excessive bleeding, excessive vomiting, difficulty swallowing, uncontrolled pain, increased abdominal distention, shortness of breath or any other problems. If you have a sore throat, you may use lozenges or salt water gargles. Please call Dr. Ann Marie Humphreys office in 5 business days for biopsy results [phone]. ANESTHESIA DISCHARGE INSTRUCTIONS    Wear your seatbelt home. You are under the influence of drugs-do not drink alcohol,drive,operate machinery,or make any important decisions or sign any legal documentsfor 24 hours  Children should not ride [employer] or play on gym sets  for 24 hours after surgery. A responsible adult needs to be with you for 24 hours. You may experience lightheadedness,dizziness,or sleepiness following surgery. Rest at home today- increase activity as tolerated. Progress slowly to a regular diet unless your physician has instructed you otherwise. Drink plenty of water. If nausea becomes a problem call your physician. Coughing,sore throat,and muscle aches are other side effects of anesthesia,and should improve with time. Do not drive,operate machinery while taking narcotics.

## 2022-12-22 NOTE — ANESTHESIA PRE PROCEDURE
Department of Anesthesiology  Preprocedure Note       Name:  Flor Vogel   Age:  42 y.o.  :  1985                                          MRN:  [id number]         Date:  2022      Surgeon: Lisa Singh): Jassi Valdes MD    Procedure: Procedure(s):  EGD DIAGNOSTIC ONLY    Medications prior to admission:   Prior to Admission medications    Medication Sig Start Date End Date Taking? Authorizing Provider   Torsemide 40 MG TABS Take 40 mg by mouth daily 22   Donald Liu DO   albuterol sulfate  (90 Base) MCG/ACT inhaler Inhale 2 puffs into the lungs 4 times daily 22   Waqas Aldana MD   EPINEPHrine (EPIPEN 2-JORDAN) 0.3 MG/0.3ML SOAJ injection Inject 0.3 mLs into the muscle once as needed (anaphylaxis) Use as directed for allergic reaction 4/4/22 10/10/22  Waqas Aldana MD   Handicap Placard MISC by Does not apply route From 2021 until 2022   Debra Munguia MD       Current medications:    No current facility-administered medications for this encounter. Allergies:     Allergies   Allergen Reactions    Shellfish-Derived Products Anaphylaxis    Strawberry (Diagnostic) Anaphylaxis    Bee Venom Hives and Swelling       Problem List:    Patient Active Problem List   Diagnosis Code    Morbid obesity with BMI of 50.0-59.9, adult ([facility] [address] E66.01, Z68.43    Anxiety F41.9    Acute respiratory failure with hypoxia (HCC) J96.01    Vitamin D deficiency E55.9    Acute right-sided congestive heart failure (HCC) I50.811    Chronic right-sided heart failure (HCC) I50.812    Obstructive sleep apnea G47.33    At risk for anaphylaxis Z91.89    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 T78.03XA    Shortness of breath R06.02    Chronic GERD K21.9       Past Medical History:        Diagnosis Date    COVID-19     Hypotension due to hypovolemia     Obesity        Past Surgical History:        Procedure Laterality Date    DILATION AND CURETTAGE OF UTERUS         Social History:    Social History Tobacco Use    Smoking status: Never    Smokeless tobacco: Never   Substance Use Topics    Alcohol use: Yes     Comment: social                                Counseling given: Not Answered      Vital Signs (Current):   Vitals:    12/13/22 1531   Weight: (!) 357 lb (161.9 kg)   Height: 5' 5\" (1.651 m)                                              BP Readings from Last 3 Encounters:   12/01/22 118/64   11/09/22 122/80   10/25/22 136/80       NPO Status:                                                                                 BMI:   Wt Readings from Last 3 Encounters:   12/13/22 (!) 357 lb (161.9 kg)   12/01/22 (!) 361 lb 3.2 oz (163.8 kg)   11/09/22 (!) 358 lb 3.2 oz (162.5 kg)     Body mass index is 59.41 kg/m². CBC:   Lab Results   Component Value Date/Time    WBC 6.1 11/28/2022 01:39 PM    RBC 3.95 11/28/2022 01:39 PM    HGB 12.6 11/28/2022 01:39 PM    HCT 37.8 11/28/2022 01:39 PM    MCV 95.9 11/28/2022 01:39 PM    RDW 13.9 11/28/2022 01:39 PM     11/28/2022 01:39 PM       CMP:   Lab Results   Component Value Date/Time     11/28/2022 01:39 PM    K 4.0 11/28/2022 01:39 PM    K 3.9 10/20/2021 05:27 AM     11/28/2022 01:39 PM    CO2 22 11/28/2022 01:39 PM    BUN 10 11/28/2022 01:39 PM    CREATININE 0.8 11/28/2022 01:39 PM    GFRAA >60 01/26/2022 12:11 PM    AGRATIO 1.1 11/28/2022 01:39 PM    LABGLOM >60 11/28/2022 01:39 PM    GLUCOSE 88 11/28/2022 01:39 PM    PROT 6.4 11/28/2022 01:39 PM    CALCIUM 9.1 11/28/2022 01:39 PM    BILITOT 0.3 11/28/2022 01:39 PM    ALKPHOS 94 11/28/2022 01:39 PM    AST 11 11/28/2022 01:39 PM    ALT 9 11/28/2022 01:39 PM       POC Tests: No results for input(s): POCGLU, POCNA, POCK, POCCL, POCBUN, POCHEMO, POCHCT in the last 72 hours.     Coags:   Lab Results   Component Value Date/Time    PROTIME 14.1 11/28/2022 01:39 PM    INR 1.09 11/28/2022 01:39 PM    APTT 35.1 10/20/2021 11:31 AM       HCG (If Applicable): No results found for: PREGTESTUR, [address], HCG, HCGQUANT     ABGs: No results found for: PHART, PO2ART, IAV0NGM, RRH8AJZ, BEART, M7FFVQWU     Type & Screen (If Applicable):  No results found for: LABABO, LABRH    Drug/Infectious Status (If Applicable):  No results found for: HIV, HEPCAB    COVID-19 Screening (If Applicable):   Lab Results   Component Value Date/Time    COVID19 Not Detected 03/08/2022 10:26 AM           Anesthesia Evaluation  Patient summary reviewed and Nursing notes reviewed  Airway: Mallampati: III          Dental:          Pulmonary:   (+) shortness of breath:  sleep apnea:                             Cardiovascular:    (+) CHF:,                   Neuro/Psych:               GI/Hepatic/Renal:   (+) GERD:, morbid obesity          Endo/Other:                     Abdominal:             Vascular:           Other Findings:           Anesthesia Plan      MAC and general     ASA 3                                   Adonis Bartholomew MD   12/22/2022

## 2023-01-18 ENCOUNTER — TELEPHONE (OUTPATIENT)
Dept: INTERNAL MEDICINE CLINIC | Age: 38
End: 2023-01-18

## 2023-01-18 ENCOUNTER — OFFICE VISIT (OUTPATIENT)
Dept: INTERNAL MEDICINE CLINIC | Age: 38
End: 2023-01-18
Payer: MEDICAID

## 2023-01-18 VITALS
OXYGEN SATURATION: 96 % | BODY MASS INDEX: 47.09 KG/M2 | DIASTOLIC BLOOD PRESSURE: 86 MMHG | WEIGHT: 293 LBS | SYSTOLIC BLOOD PRESSURE: 144 MMHG | HEIGHT: 66 IN | HEART RATE: 90 BPM

## 2023-01-18 DIAGNOSIS — N93.8 DYSFUNCTIONAL UTERINE BLEEDING: Primary | ICD-10-CM

## 2023-01-18 PROCEDURE — 99213 OFFICE O/P EST LOW 20 MIN: CPT

## 2023-01-18 RX ORDER — ECHINACEA PURPUREA EXTRACT 125 MG
1 TABLET ORAL PRN
Qty: 1 EACH | Refills: 3 | Status: SHIPPED | OUTPATIENT
Start: 2023-01-18

## 2023-01-18 RX ORDER — ALBUTEROL SULFATE 90 UG/1
2 AEROSOL, METERED RESPIRATORY (INHALATION) 4 TIMES DAILY
Qty: 18 G | Refills: 3 | Status: SHIPPED | OUTPATIENT
Start: 2023-01-18

## 2023-01-18 RX ORDER — OXYCODONE HYDROCHLORIDE AND ACETAMINOPHEN 5; 325 MG/1; MG/1
1 TABLET ORAL EVERY 8 HOURS PRN
Qty: 28 TABLET | Refills: 0 | Status: SHIPPED | OUTPATIENT
Start: 2023-01-18 | End: 2023-01-18

## 2023-01-18 RX ORDER — OXYCODONE HYDROCHLORIDE AND ACETAMINOPHEN 5; 325 MG/1; MG/1
1 TABLET ORAL EVERY 8 HOURS PRN
Qty: 28 TABLET | Refills: 0 | Status: SHIPPED | OUTPATIENT
Start: 2023-01-18 | End: 2023-02-01

## 2023-01-18 ASSESSMENT — ENCOUNTER SYMPTOMS
WHEEZING: 0
STRIDOR: 0
SHORTNESS OF BREATH: 0
COUGH: 0
ABDOMINAL PAIN: 1
CHOKING: 0
CHEST TIGHTNESS: 0

## 2023-01-18 NOTE — PROGRESS NOTES
[facility]    Jean Allison is a 42 y.o. female, here for evaluation of the following concerns: The patient is a 68-year-old female with past medical history of dysfunctional uterine bleeding, early menarche, menorrhagia, morbid obesity BMI 58.3, history of COVID who presented to for follow-up. Patient appears to be in significant amount of pain and was noted to be standing and leaning forward on the examination bench. -After discharge, the patient mentioned that he has been having severe lower abdominal pain as well as persistent bleeding since last July. She mentions that her menarche was around the age of 4 to 7 years when she was in 4th grade. She has had regular cycles in the past but reports that they became irregular when she eventually crossed 250 pounds. She is not sexually active currently but reports she has had dyspareunia in the past.  She also mentioned that she can feel a popping sensation in her lower belly that is followed by severe stabbing pain and a gush of blood. She reports that at one point she was changing her menstrual cup every 45 minutes. On chart review the patient has a baseline hemoglobin of around 16g/L but latest check show ranged between 11 to 12 g/dL which is concerning. She also reports associated fatigue. The patient follows up with outside system gynae office and is seen by nursing staff there. She reports that she had been momentarily on progesterone for some time which really helped with flow but did not stop the \"period\". She underwent hysteroscopy as well as D&C in 2015 which did not show any fibroids or adhesions within the uterus however the uterine mucosal lining thickness was about 8 mm. They have discussed a progesterone based IUD for the next visit. Menstrual Problem  This is a chronic problem. The current episode started more than 1 year ago. The problem occurs constantly.  The problem has been gradually worsening. Associated symptoms include abdominal pain, arthralgias and fatigue. Pertinent negatives include no chest pain, chills, congestion, coughing or diaphoresis. Nothing aggravates the symptoms. She has tried NSAIDs for the symptoms. The treatment provided no relief.     Review of Systems   Constitutional:  Positive for activity change and fatigue. Negative for chills and diaphoresis.   HENT:  Negative for congestion.    Respiratory:  Negative for cough, choking, chest tightness, shortness of breath, wheezing and stridor.    Cardiovascular:  Negative for chest pain.   Gastrointestinal:  Positive for abdominal pain.   Genitourinary:  Positive for menstrual problem and vaginal bleeding.   Musculoskeletal:  Positive for arthralgias.     MEDICATIONS:  Prior to Visit Medications    Medication Sig Taking? Authorizing Provider   albuterol sulfate HFA (PROVENTIL;VENTOLIN;PROAIR) 108 (90 Base) MCG/ACT inhaler Inhale 2 puffs into the lungs 4 times daily Yes Rasta Odell MD   sodium chloride (ALTAMIST SPRAY) 0.65 % nasal spray 1 spray by Nasal route as needed for Congestion Yes Rasta Odell MD   oxyCODONE-acetaminophen (PERCOCET) 5-325 MG per tablet Take 1 tablet by mouth every 8 hours as needed for Pain for up to 14 days. Intended supply: 3 days. Take lowest dose possible to manage pain Max Daily Amount: 3 tablets Yes Rasta Odell MD   omeprazole (PRILOSEC) 20 MG delayed release capsule Take 1 capsule by mouth every morning (before breakfast) Yes Yamil Bruno MD   Torsemide 40 MG TABS Take 40 mg by mouth daily Yes Ruben Rogel,    Handicap Placard MISC by Does not apply route From 11/2/2021 until 2/28/2022 Yes Yariel Diaz MD   EPINEPHrine (EPIPEN 2-JORDAN) 0.3 MG/0.3ML SOAJ injection Inject 0.3 mLs into the muscle once as needed (anaphylaxis) Use as directed for allergic reaction  Anushka Finley MD        Vitals:    01/18/23 0920   BP: (!) 144/86   Site: Right Upper Arm   Position: Sitting   Cuff Size: Large  Adult   Pulse: 90   SpO2: 96%   Weight: (!) 358 lb 12.8 oz (162.8 kg)   Height: 5' 5.75\" (1.67 m)      Estimated body mass index is 58.35 kg/m² as calculated from the following:    Height as of this encounter: 5' 5.75\" (1.67 m). Weight as of this encounter: 358 lb 12.8 oz (162.8 kg). Physical Exam  Constitutional:       General: She is in acute distress. Appearance: She is obese. Eyes:      Pupils: Pupils are equal, round, and reactive to light. Cardiovascular:      Rate and Rhythm: Normal rate and regular rhythm. Pulmonary:      Effort: Pulmonary effort is normal.   Abdominal:      Tenderness: There is abdominal tenderness. There is no guarding. Skin:     General: Skin is warm. Neurological:      Mental Status: She is alert. Mental status is at baseline. Psychiatric:         Mood and Affect: Mood normal.         Behavior: Behavior normal.         Thought Content: Thought content normal.         Judgment: Judgment normal.       ASSESSMENT/PLAN:     1. Dysfunctional uterine bleeding      Suspicion of endometriosis. -     US PELVIS COMPLETE; Future  -     Marino Erickson MD, Gynecology, [place]  -     oxyCODONE-acetaminophen (PERCOCET) 5-325 MG per tablet; Take 1 tablet by mouth every 8 hours as needed for Pain for up to 14 days. Intended supply: 3 days. Take lowest dose possible to manage pain Max Daily Amount: 3 tablets, Disp-28 tablet, R-0Print    Patient urged to complete studies and follow up with her [facility] office as well as in-system referral.     Patient report no other complaints at this time. Refills ordered for inhaler. Return in about 2 months (around 3/18/2023), or if symptoms worsen or fail to improve. The patient was staffed with the teaching attending: Dr. Shine Crenshaw. An electronic signature was used to authenticate this note. --Raffaele Borrero MD  PGY-2  Internal Medicine.

## 2023-01-18 NOTE — PATIENT INSTRUCTIONS
Please get your pelvic USG done prior to next office visit. For scheduling call [phone]  You are encouraged to go to your gynecologist office. A referral for gynecology has also been made for a second opinion.

## 2023-01-18 NOTE — TELEPHONE ENCOUNTER
ADARSH FROM [facility] STATED FAX OVER RELEASE FORM WITH WHAT INFORMATION IS NEEDED.  [phone], [phone]

## 2023-01-18 NOTE — TELEPHONE ENCOUNTER
Dr. Taty York requested information about pt's current gyn before seeing pt. Pt provided relevant information during visit. Records no longer needed.

## 2023-01-20 ENCOUNTER — HOSPITAL ENCOUNTER (OUTPATIENT)
Dept: ULTRASOUND IMAGING | Age: 38
Discharge: HOME | End: 2023-01-20
Payer: MEDICAID

## 2023-01-20 DIAGNOSIS — N93.8 DYSFUNCTIONAL UTERINE BLEEDING: ICD-10-CM

## 2023-01-20 PROCEDURE — 76830 TRANSVAGINAL US NON-OB: CPT

## 2023-01-20 PROCEDURE — 76856 US EXAM PELVIC COMPLETE: CPT

## 2023-01-21 ASSESSMENT — ENCOUNTER SYMPTOMS
NAUSEA: 0
SHORTNESS OF BREATH: 1
ABDOMINAL PAIN: 0
PHOTOPHOBIA: 0
COUGH: 0
BLOOD IN STOOL: 0
ABDOMINAL DISTENTION: 0

## 2023-01-22 NOTE — PROGRESS NOTES
[address]  1/23/23  Referring: Dr. Cheryl Silva CONSULT/CHIEF COMPLAINT/HPI     Reason for visit/ Chief complaint  Follow up   Shortness of breath   HPI Jaquelin Hall is a 42 y.o. seen as a follow up for right sided heart dilatation. She has a history of untreated KAREN morbid obesity. She has had previous hysteroscopy and D&C in 2015  Last visit she was switched from lasix to demadex for shortness of breath and suspicion of suspect pulm htn exacerbated by untreated KAREN    Today, she states she has been on her menstrual cycle associated with abd pain since June. She has been seen 5 times since July without resolution of symptoms  She has been taking ibuprofen for abd pain/heating pad without relief. Pain is described as crampy, sharp and moves around. The pain wakes her up from sleep. She has been recommended to have an IUD, but she is not interested. She had an ultrasound last week which did not explain her pain. She will be evaluated by OB gyn on Feb 13. She is always tired and fatigued. Would like to start working out. Able to walk up a flight of stairs without stopping. No chest pain, palpitations or dizziness. No syncope. No edema. Complains of being tired because she is working two jobs, has unrelenting abd pain/ menses ( has only taking percocet twice without relief)  She has peripheral edema at the end of her work day. Patient is adherent with medications and is tolerating them well without side effects     HISTORY/ALLERGIES/ROS     MedHx:  has a past medical history of COVID-19, Hypotension due to hypovolemia, and Obesity. SurgHx:  has a past surgical history that includes Dilation and curettage of uterus and Upper gastrointestinal endoscopy (N/A, 12/22/2022). SocHx:  reports that she has never smoked. She has never used smokeless tobacco. She reports current alcohol use. She reports that she does not use drugs.    FamHx: No family history of premature coronary artery disease, sudden death, or aneurysm  Allergies: Shellfish-derived products, Strawberry (diagnostic), and Bee venom   ROS:   Review of Systems   Constitutional:  Positive for fatigue. Negative for activity change, diaphoresis and fever. HENT:  Negative for congestion, ear discharge and nosebleeds. Eyes:  Negative for photophobia and visual disturbance. Respiratory:  Positive for shortness of breath. Negative for cough and chest tightness. Cardiovascular:  Positive for leg swelling. Negative for chest pain and palpitations. Gastrointestinal:  Negative for abdominal distention, abdominal pain, blood in stool and nausea. Endocrine: Negative for cold intolerance and polydipsia. Genitourinary:  Negative for difficulty urinating and flank pain. Musculoskeletal:  Positive for myalgias. Negative for arthralgias. Skin:  Negative for rash and wound. Allergic/Immunologic: Negative for environmental allergies and immunocompromised state. Neurological:  Negative for dizziness and headaches. Hematological:  Negative for adenopathy. Does not bruise/bleed easily. Psychiatric/Behavioral:  Negative for confusion. The patient is not hyperactive. MEDICATIONS      Prior to Admission medications    Medication Sig Start Date End Date Taking? Authorizing Provider   albuterol sulfate HFA (PROVENTIL;VENTOLIN;PROAIR) 108 (90 Base) MCG/ACT inhaler Inhale 2 puffs into the lungs 4 times daily 1/18/23  Yes Gina Abdi MD   sodium chloride (ALTAMIST SPRAY) 0.65 % nasal spray 1 spray by Nasal route as needed for Congestion 1/18/23  Yes Gina Abdi MD   oxyCODONE-acetaminophen (PERCOCET) 5-325 MG per tablet Take 1 tablet by mouth every 8 hours as needed for Pain for up to 14 days. Intended supply: 3 days.  Take lowest dose possible to manage pain Max Daily Amount: 3 tablets 1/18/23 2/1/23 Yes Gina Abdi MD   omeprazole (PRILOSEC) 20 MG delayed release capsule Take 1 capsule by mouth every morning (before breakfast) 12/22/22 Yes Chrissy Conner MD   Torsemide 40 MG TABS Take 40 mg by mouth daily 11/11/22  Yes Donell Hart,    Handicap Placard MISC by Does not apply route From 11/2/2021 until 2/28/2022 11/2/21  Yes Sadaf Dennis MD   EPINEPHrine (EPIPEN 2-JORDAN) 0.3 MG/0.3ML SOAJ injection Inject 0.3 mLs into the muscle once as needed (anaphylaxis) Use as directed for allergic reaction 4/4/22 10/10/22  Georgie Hough MD       PHYSICAL EXAM        Vitals:    01/23/23 1112   BP: 134/88   Pulse:    SpO2:     Weight: (!) 369 lb (167.4 kg)     Gen Alert, cooperative, no distress Heart  Regular rate and rhythm, 1/6 systolic murmur   Head Normocephalic, atraumatic, no abnormalities Abd  Soft, NT, +BS, no mass, no OM   Eyes PERRLA, conj/corn clear Ext  Ext nl, AT, no C/C, +1 edema   Nose Nares normal, no drain age,  Pulse 2+ and symmetric   Throat Lips, mucosa, tongue normal Skin Color/text/turg nl, no rash/lesions   Neck S/S, TM, NT, no bruit Psych Nl mood and affect   Lung CTA-B, unlabored, no DTP     Ch wall NT, no deform       LABS and Imaging     Relevant and available CV data reviewed    ECHO 1/24/22: Normal left ventricle size, mild concentric wall thickness and normal   systolic function with an estimated ejection fraction of 55-60%. -No regional wall motion abnormalities are seen.   -Indeterminate diastolic function. E/e\"=10.45. GLS -17%. -Mild mitral regurgitation. -Trivial tricuspid regurgitation.   -Unable to estimate pulmonary artery pressure secondary to incomplete TR jet  envelope. -The right ventricle is mildly dilated in size and normal function. TAPSE  2.36 cm. RV S velocity 11.2 cm/s. There has been interval decrease in right ventricular size and improvement  in right ventricular function from 10/20/21 echo. MRI 3/2/22:  Overall normal unremarkable study with preserved LV systolic function. The RV is mildly dilated with preserved systolic function.  No evidence of prior or active myocarditis.        -Normal left ventricular size and systolic function with a calculated ejection fraction of 57% by Castañeda's method.   -Right ventricle is mildly dilated with preserved systolic function with a calculated ejection fraction of 58% by Castañeda's method.   -Trivial pericardial effusion. No evidence of pericarditis.    -No myocardial edema by T2w imaging/mapping. (Normal myocardium/skeletal ratio - normal < 1.9). -Unable to calculate shunt fraction. Pulmonary artery phase encoding unable to be obtained.    -Normal myocardial resting perfusion imaging.   -On delayed enhancement imaging, there is mild RV insertion point enhancement suggestive of chronically elevated pulmonary pressures. The MRI sequences and imaging planes in this study were tailored for cardiac imaging and are suboptimal for evaluation of non-cardiac structures. Cath: none  Holter:none  EKG personally interpreted: sinus rhythm, nonspecific st-t wave changes  Stress:10/13/22-  Normal LV size and systolic function. Left ventricular ejection fraction of 68%. There is a small, mild fixed anterior defect consistent with breast    attenuation artifact. There are no regional wall motion abnormalities. No evidence of reversible ischemia. Overall, findings represent a low risk scan   11/18/22  event monitor    Moderate Risk  Moderate Complexity/Medical Decision Making  Outside/Care everywhere records Reviewed  Labs Reviewed  Prior Imaging, ekg, cath, echo reviewed when available  Medications reviewed  Old Notes reviewed  11/1/2021 tc 162 hdl 56 ldl 83 tri 117  ASSESSMENT AND PLAN   Dizziness/palpitations/chest pain  - resolved  - monitor- NSR less than 1 % PAC PVC burden  - stress test- no reversible ischemia  plan  - call for recurrence  - ok to work out    2.  Shortness of breath/hypoxic resp failure  -  no evidence of PE  -  suspect pulm htn exacerbated by untreated KAREN  -  Covid pneumonia 10/1/2021  -  stable  -  anemia  Plan:  - torsemide 40 mg  - also sees Dr. Renan Daley- will see in AM  - continue cpap  - recommend iron every other day     3 Rv dysfunction  -  I suspect that she had underlying RV dysfunction from sleep apnea and obesity hypoventilation, which was worsened by covid 19. No evidence of covid myocarditis or previous MI  -  stable  Plan:-     - continue diuresis and treatment of sleep apnea. Consider SGLT2  - Consider right heart cath if no improvement    4. Sleep apnea  - uses cpap  - right sided heart failure  Plan  - f/w Dr. Renan Daley   - recommend continue cpap    5. Obesity  - new problem  Plan:  - will be seeing Dr T[place] tomorrow    6. Bariatric clearance  - new problem  Plan  - bariatric surgery with Dr T[place] Bl[place]- will call if she decides to proceed     7. Anemia  - from gyn losses  - new problem  Plan:  - start iron  - per ob/gyn  Patient counseled on lifestyle modification, diet, and exercise. Follow Up:   6 months    Dr. Markus Alexander attestation: This note was scribed in the presence of Dr. Sallie Quick by Vicky Neville RN      Physician Attestation  The scribe for and in the presence of rivka Gomez DO). The scribe Vicky Neville RN    may have prepopulated components of this note with my historical  intellectual property under my direct supervision. Any additions to this intellectual property were performed in my presence and at my direction.   Furthermore, the content and accuracy of this note have been reviewed by rivka Gomez DO).  1/23/2023 11:38 AM

## 2023-01-23 ENCOUNTER — OFFICE VISIT (OUTPATIENT)
Dept: CARDIOLOGY CLINIC | Age: 38
End: 2023-01-23
Payer: MEDICAID

## 2023-01-23 VITALS
BODY MASS INDEX: 48.82 KG/M2 | OXYGEN SATURATION: 99 % | WEIGHT: 293 LBS | DIASTOLIC BLOOD PRESSURE: 88 MMHG | SYSTOLIC BLOOD PRESSURE: 134 MMHG | HEIGHT: 65 IN | HEART RATE: 92 BPM

## 2023-01-23 DIAGNOSIS — G47.33 OBSTRUCTIVE SLEEP APNEA SYNDROME: ICD-10-CM

## 2023-01-23 DIAGNOSIS — I50.812 CHRONIC RIGHT-SIDED HEART FAILURE (HCC): Primary | ICD-10-CM

## 2023-01-23 DIAGNOSIS — E66.01 CLASS 3 SEVERE OBESITY DUE TO EXCESS CALORIES WITH SERIOUS COMORBIDITY AND BODY MASS INDEX (BMI) OF 50.0 TO 59.9 IN ADULT (HCC): ICD-10-CM

## 2023-01-23 DIAGNOSIS — R06.02 SHORTNESS OF BREATH: ICD-10-CM

## 2023-01-23 PROCEDURE — 1036F TOBACCO NON-USER: CPT | Performed by: INTERNAL MEDICINE

## 2023-01-23 PROCEDURE — G8417 CALC BMI ABV UP PARAM F/U: HCPCS | Performed by: INTERNAL MEDICINE

## 2023-01-23 PROCEDURE — G8484 FLU IMMUNIZE NO ADMIN: HCPCS | Performed by: INTERNAL MEDICINE

## 2023-01-23 PROCEDURE — G8427 DOCREV CUR MEDS BY ELIG CLIN: HCPCS | Performed by: INTERNAL MEDICINE

## 2023-01-23 PROCEDURE — 99214 OFFICE O/P EST MOD 30 MIN: CPT | Performed by: INTERNAL MEDICINE

## 2023-01-23 RX ORDER — FERROUS SULFATE 325(65) MG
325 TABLET ORAL SEE ADMIN INSTRUCTIONS
Qty: 90 TABLET | Refills: 1 | Status: SHIPPED | OUTPATIENT
Start: 2023-01-23

## 2023-01-23 ASSESSMENT — ENCOUNTER SYMPTOMS: CHEST TIGHTNESS: 0

## 2023-01-23 NOTE — LETTER
[address] [facility]  [id number] [facility] [address]  Phone: [phone]  Fax: [address], [place]    January 23, 2023     Amelie Ramos MD  [facility]    Patient: Quinton Clark   MR Number: [id number]   YOB: 1985   Date of Visit: 1/23/2023       Dear Amelie Ramos: Thank you for referring Leanne Rey to me for evaluation/treatment. Below are the relevant portions of my assessment and plan of care. If you have questions, please do not hesitate to call me. I look forward to following Shahram Moore along with you.     Sincerely,      Tami Canales, DO

## 2023-01-23 NOTE — PATIENT INSTRUCTIONS
Recommend taking iron every other day  Recommend follow up with OB-gyn as scheduled  Ok to work out  Follow up with pulmonary as scheduled  Iron tab every other day

## 2023-01-24 ENCOUNTER — HOSPITAL ENCOUNTER (EMERGENCY)
Age: 38
Discharge: HOME | End: 2023-01-24
Payer: MEDICAID

## 2023-01-24 ENCOUNTER — APPOINTMENT (OUTPATIENT)
Dept: ULTRASOUND IMAGING | Age: 38
End: 2023-01-24
Payer: MEDICAID

## 2023-01-24 ENCOUNTER — APPOINTMENT (OUTPATIENT)
Dept: CT IMAGING | Age: 38
End: 2023-01-24
Payer: MEDICAID

## 2023-01-24 ENCOUNTER — OFFICE VISIT (OUTPATIENT)
Dept: BARIATRICS/WEIGHT MGMT | Age: 38
End: 2023-01-24
Payer: MEDICAID

## 2023-01-24 ENCOUNTER — TELEPHONE (OUTPATIENT)
Dept: BARIATRICS/WEIGHT MGMT | Age: 38
End: 2023-01-24

## 2023-01-24 VITALS
HEIGHT: 66 IN | OXYGEN SATURATION: 98 % | WEIGHT: 293 LBS | RESPIRATION RATE: 18 BRPM | SYSTOLIC BLOOD PRESSURE: 130 MMHG | DIASTOLIC BLOOD PRESSURE: 87 MMHG | BODY MASS INDEX: 47.09 KG/M2 | HEART RATE: 94 BPM

## 2023-01-24 VITALS
HEART RATE: 71 BPM | OXYGEN SATURATION: 100 % | TEMPERATURE: 98.3 F | RESPIRATION RATE: 21 BRPM | SYSTOLIC BLOOD PRESSURE: 119 MMHG | DIASTOLIC BLOOD PRESSURE: 77 MMHG

## 2023-01-24 DIAGNOSIS — K21.9 CHRONIC GERD: ICD-10-CM

## 2023-01-24 DIAGNOSIS — R10.32 LEFT LOWER QUADRANT ABDOMINAL PAIN: Primary | ICD-10-CM

## 2023-01-24 DIAGNOSIS — E66.01 CLASS 3 SEVERE OBESITY DUE TO EXCESS CALORIES WITH SERIOUS COMORBIDITY AND BODY MASS INDEX (BMI) OF 50.0 TO 59.9 IN ADULT (HCC): Primary | ICD-10-CM

## 2023-01-24 DIAGNOSIS — G47.33 OBSTRUCTIVE SLEEP APNEA SYNDROME: ICD-10-CM

## 2023-01-24 LAB
A/G RATIO: 0.9 (ref 1.1–2.2)
ALBUMIN SERPL-MCNC: 3.3 G/DL (ref 3.4–5)
ALP BLD-CCNC: 94 U/L (ref 40–129)
ALT SERPL-CCNC: 13 U/L (ref 10–40)
ANION GAP SERPL CALCULATED.3IONS-SCNC: 10 MMOL/L (ref 3–16)
AST SERPL-CCNC: 16 U/L (ref 15–37)
BACTERIA: ABNORMAL /HPF
BASOPHILS ABSOLUTE: 0.1 K/UL (ref 0–0.2)
BASOPHILS RELATIVE PERCENT: 0.9 %
BILIRUB SERPL-MCNC: 0.3 MG/DL (ref 0–1)
BILIRUBIN URINE: NEGATIVE
BLOOD, URINE: ABNORMAL
BUN BLDV-MCNC: 11 MG/DL (ref 7–20)
CALCIUM SERPL-MCNC: 9.1 MG/DL (ref 8.3–10.6)
CHLORIDE BLD-SCNC: 105 MMOL/L (ref 99–110)
CLARITY: CLEAR
CO2: 23 MMOL/L (ref 21–32)
COLOR: YELLOW
CREAT SERPL-MCNC: 0.9 MG/DL (ref 0.6–1.1)
EOSINOPHILS ABSOLUTE: 0.5 K/UL (ref 0–0.6)
EOSINOPHILS RELATIVE PERCENT: 6.7 %
EPITHELIAL CELLS, UA: 5 /HPF (ref 0–5)
GFR SERPL CREATININE-BSD FRML MDRD: >60 ML/MIN/{1.73_M2}
GLUCOSE BLD-MCNC: 106 MG/DL (ref 70–99)
GLUCOSE URINE: NEGATIVE MG/DL
HCG QUALITATIVE: NEGATIVE
HCT VFR BLD CALC: 39.7 % (ref 36–48)
HEMOGLOBIN: 13 G/DL (ref 12–16)
HYALINE CASTS: 0 /LPF (ref 0–8)
KETONES, URINE: NEGATIVE MG/DL
LEUKOCYTE ESTERASE, URINE: ABNORMAL
LIPASE: 24 U/L (ref 13–60)
LYMPHOCYTES ABSOLUTE: 2.2 K/UL (ref 1–5.1)
LYMPHOCYTES RELATIVE PERCENT: 27 %
MCH RBC QN AUTO: 31.3 PG (ref 26–34)
MCHC RBC AUTO-ENTMCNC: 32.7 G/DL (ref 31–36)
MCV RBC AUTO: 95.7 FL (ref 80–100)
MICROSCOPIC EXAMINATION: YES
MONOCYTES ABSOLUTE: 0.4 K/UL (ref 0–1.3)
MONOCYTES RELATIVE PERCENT: 4.9 %
NEUTROPHILS ABSOLUTE: 4.9 K/UL (ref 1.7–7.7)
NEUTROPHILS RELATIVE PERCENT: 60.5 %
NITRITE, URINE: NEGATIVE
PDW BLD-RTO: 13.5 % (ref 12.4–15.4)
PH UA: 6 (ref 5–8)
PLATELET # BLD: 416 K/UL (ref 135–450)
PMV BLD AUTO: 7.8 FL (ref 5–10.5)
POTASSIUM REFLEX MAGNESIUM: 4.2 MMOL/L (ref 3.5–5.1)
PROTEIN UA: 30 MG/DL
RBC # BLD: 4.15 M/UL (ref 4–5.2)
RBC UA: 1 /HPF (ref 0–4)
SODIUM BLD-SCNC: 138 MMOL/L (ref 136–145)
SPECIFIC GRAVITY UA: 1.02 (ref 1–1.03)
TOTAL PROTEIN: 6.8 G/DL (ref 6.4–8.2)
URINE REFLEX TO CULTURE: ABNORMAL
URINE TYPE: ABNORMAL
UROBILINOGEN, URINE: 0.2 E.U./DL
WBC # BLD: 8.1 K/UL (ref 4–11)
WBC UA: 2 /HPF (ref 0–5)

## 2023-01-24 PROCEDURE — 96375 TX/PRO/DX INJ NEW DRUG ADDON: CPT

## 2023-01-24 PROCEDURE — 83690 ASSAY OF LIPASE: CPT

## 2023-01-24 PROCEDURE — G8484 FLU IMMUNIZE NO ADMIN: HCPCS | Performed by: SURGERY

## 2023-01-24 PROCEDURE — 6360000004 HC RX CONTRAST MEDICATION: Performed by: PHYSICIAN ASSISTANT

## 2023-01-24 PROCEDURE — 76830 TRANSVAGINAL US NON-OB: CPT

## 2023-01-24 PROCEDURE — 6360000002 HC RX W HCPCS: Performed by: PHYSICIAN ASSISTANT

## 2023-01-24 PROCEDURE — 85025 COMPLETE CBC W/AUTO DIFF WBC: CPT

## 2023-01-24 PROCEDURE — 84703 CHORIONIC GONADOTROPIN ASSAY: CPT

## 2023-01-24 PROCEDURE — G8417 CALC BMI ABV UP PARAM F/U: HCPCS | Performed by: SURGERY

## 2023-01-24 PROCEDURE — 81001 URINALYSIS AUTO W/SCOPE: CPT

## 2023-01-24 PROCEDURE — 99285 EMERGENCY DEPT VISIT HI MDM: CPT

## 2023-01-24 PROCEDURE — G8427 DOCREV CUR MEDS BY ELIG CLIN: HCPCS | Performed by: SURGERY

## 2023-01-24 PROCEDURE — 1036F TOBACCO NON-USER: CPT | Performed by: SURGERY

## 2023-01-24 PROCEDURE — 99214 OFFICE O/P EST MOD 30 MIN: CPT | Performed by: SURGERY

## 2023-01-24 PROCEDURE — 96374 THER/PROPH/DIAG INJ IV PUSH: CPT

## 2023-01-24 PROCEDURE — 36415 COLL VENOUS BLD VENIPUNCTURE: CPT

## 2023-01-24 PROCEDURE — 80053 COMPREHEN METABOLIC PANEL: CPT

## 2023-01-24 PROCEDURE — 93975 VASCULAR STUDY: CPT

## 2023-01-24 PROCEDURE — 74177 CT ABD & PELVIS W/CONTRAST: CPT

## 2023-01-24 RX ORDER — KETOROLAC TROMETHAMINE 30 MG/ML
15 INJECTION, SOLUTION INTRAMUSCULAR; INTRAVENOUS ONCE
Status: COMPLETED | OUTPATIENT
Start: 2023-01-24 | End: 2023-01-24

## 2023-01-24 RX ORDER — MORPHINE SULFATE 4 MG/ML
4 INJECTION, SOLUTION INTRAMUSCULAR; INTRAVENOUS EVERY 30 MIN PRN
Status: DISCONTINUED | OUTPATIENT
Start: 2023-01-24 | End: 2023-01-24 | Stop reason: HOSPADM

## 2023-01-24 RX ADMIN — IOPAMIDOL 75 ML: 755 INJECTION, SOLUTION INTRAVENOUS at 17:05

## 2023-01-24 RX ADMIN — MORPHINE SULFATE 4 MG: 4 INJECTION, SOLUTION INTRAMUSCULAR; INTRAVENOUS at 15:43

## 2023-01-24 RX ADMIN — KETOROLAC TROMETHAMINE 15 MG: 30 INJECTION, SOLUTION INTRAMUSCULAR at 15:42

## 2023-01-24 ASSESSMENT — PAIN DESCRIPTION - LOCATION: LOCATION: PELVIS

## 2023-01-24 ASSESSMENT — PAIN SCALES - GENERAL
PAINLEVEL_OUTOF10: 10
PAINLEVEL_OUTOF10: 7

## 2023-01-24 ASSESSMENT — PAIN - FUNCTIONAL ASSESSMENT: PAIN_FUNCTIONAL_ASSESSMENT: 0-10

## 2023-01-24 ASSESSMENT — PAIN DESCRIPTION - DESCRIPTORS: DESCRIPTORS: SHARP

## 2023-01-24 NOTE — ED PROVIDER NOTES
[facility]        Pt Name: Malcolm Sequeira  MRN: [id number]  [place] 1985  Date of evaluation: 1/24/2023  Provider: Asiya Ferrer PA-C  PCP: Toshia Alvarez MD  Note Started: 3:45 PM EST 1/24/23      LUX. I have evaluated this patient. My supervising physician was available for consultation. CHIEF COMPLAINT       Chief Complaint   Patient presents with    Pelvic Pain     Sharp pains on/off since last Friday, was given percocet by PCP w/o relief. Seen at [facility] today. HISTORY OF PRESENT ILLNESS: 1 or more Elements     History From: patient  Limitations to history : None    Malcolm Sequeira is a 42 y.o. female who presents to the emergency department with a chief complaint of left lower pelvic pain and abnormal vaginal bleeding. States she is been having vaginal bleeding since July of last year. She recently had Nexplanon placed in October for this but states she is still having bleeding. She is followed by [facility]. She had an ultrasound performed here 2 days ago that revealed good flow to the left ovary and no obvious abnormal finding. Unable to visualize the right ovary. She denies any history of abdominal surgeries. She is been having pain since October before she even had the Nexplanon but states this is been worse over the past 2.5 weeks. Has another ultrasound scheduled in her gynecologist office tomorrow. She presented here with worsening pain, decreased appetite. Denies dysuria, hematuria, bloody stool. Denies chest pain, shortness of breath or any other symptoms. Nursing Notes were all reviewed and agreed with or any disagreements were addressed in the HPI. REVIEW OF SYSTEMS :      Review of Systems    Positives and Pertinent negatives as per HPI.      SURGICAL HISTORY     Past Surgical History:   Procedure Laterality Date    DILATION AND CURETTAGE OF UTERUS      UPPER GASTROINTESTINAL ENDOSCOPY N/A 12/22/2022    EGD BIOPSY performed by Daniele Joseph MD at [facility]       Previous Medications    ALBUTEROL SULFATE HFA (PROVENTIL;VENTOLIN;PROAIR) 108 (90 BASE) MCG/ACT INHALER    Inhale 2 puffs into the lungs 4 times daily    EPINEPHRINE (EPIPEN 2-JORDAN) 0.3 MG/0.3ML SOAJ INJECTION    Inject 0.3 mLs into the muscle once as needed (anaphylaxis) Use as directed for allergic reaction    FERROUS SULFATE (IRON 325) 325 (65 FE) MG TABLET    Take 1 tablet by mouth See Admin Instructions Every other day    HANDICAP PLACARD MISC    by Does not apply route From 11/2/2021 until 2/28/2022    OMEPRAZOLE (PRILOSEC) 20 MG DELAYED RELEASE CAPSULE    Take 1 capsule by mouth every morning (before breakfast)    OXYCODONE-ACETAMINOPHEN (PERCOCET) 5-325 MG PER TABLET    Take 1 tablet by mouth every 8 hours as needed for Pain for up to 14 days. Intended supply: 3 days.  Take lowest dose possible to manage pain Max Daily Amount: 3 tablets    SODIUM CHLORIDE (ALTAMIST SPRAY) 0.65 % NASAL SPRAY    1 spray by Nasal route as needed for Congestion    TORSEMIDE 40 MG TABS    Take 40 mg by mouth daily       ALLERGIES     Shellfish-derived products, Strawberry (diagnostic), and Bee venom    FAMILYHISTORY       Family History   Problem Relation Age of Onset    Arthritis Mother     Elevated Lipids Mother     Diabetes Father     Kidney Disease Father         SOCIAL HISTORY       Social History     Tobacco Use    Smoking status: Never    Smokeless tobacco: Never   Vaping Use    Vaping Use: Never used   Substance Use Topics    Alcohol use: Yes     Comment: social    Drug use: Never       SCREENINGS        Alexia Coma Scale  Eye Opening: Spontaneous  Best Verbal Response: Oriented  Best Motor Response: Obeys commands  Alexia Coma Scale Score: 15                CIWA Assessment  BP: 119/77  Heart Rate: 71           PHYSICAL EXAM  1 or more Elements     ED Triage Vitals [01/24/23 1447] BP Temp Temp Source Heart Rate Resp SpO2 Height Weight   (!) 159/116 98.3 °F (36.8 °C) Oral 100 21 100 % -- --       Physical Exam  Vitals and nursing note reviewed. Constitutional:       Appearance: She is well-developed. She is not diaphoretic. HENT:      Head: Atraumatic. Nose: Nose normal.   Eyes:      General:         Right eye: No discharge. Left eye: No discharge. Cardiovascular:      Rate and Rhythm: Normal rate and regular rhythm. Heart sounds: No murmur heard. No friction rub. No gallop. Pulmonary:      Effort: Pulmonary effort is normal. No respiratory distress. Breath sounds: No stridor. No wheezing, rhonchi or rales. Abdominal:      General: Bowel sounds are normal. There is no distension. Palpations: Abdomen is soft. There is no mass. Tenderness: There is abdominal tenderness. There is no guarding or rebound. Hernia: No hernia is present. Comments: Tenderness to palpate in the left lower pelvis and abdomen. No guarding or rigidity. BMI 60.17     Musculoskeletal:         General: No swelling. Normal range of motion. Cervical back: Normal range of motion. Skin:     General: Skin is warm and dry. Findings: No erythema or rash. Neurological:      Mental Status: She is alert and oriented to person, place, and time. Cranial Nerves: No cranial nerve deficit.    Psychiatric:         Behavior: Behavior normal.           DIAGNOSTIC RESULTS   LABS:    Labs Reviewed   COMPREHENSIVE METABOLIC PANEL W/ REFLEX TO MG FOR LOW K - Abnormal; Notable for the following components:       Result Value    Glucose 106 (*)     Albumin 3.3 (*)     Albumin/Globulin Ratio 0.9 (*)     All other components within normal limits   URINALYSIS WITH REFLEX TO CULTURE - Abnormal; Notable for the following components:    Blood, Urine LARGE (*)     Protein, UA 30 (*)     Leukocyte Esterase, Urine TRACE (*)     All other components within normal limits MICROSCOPIC URINALYSIS - Abnormal; Notable for the following components:    Bacteria, UA 2+ (*)     All other components within normal limits   CBC WITH AUTO DIFFERENTIAL   LIPASE   HCG, SERUM, QUALITATIVE       When ordered only abnormal lab results are displayed. All other labs were within normal range or not returned as of this dictation. EKG: When ordered, EKG's are interpreted by the Emergency Department Physician in the absence of a cardiologist.  Please see their note for interpretation of EKG. RADIOLOGY:   Non-plain film images such as CT, Ultrasound and MRI are read by the radiologist. Plain radiographic images are visualized and preliminarily interpreted by the ED Provider with the below findings:        Interpretation per the Radiologist below, if available at the time of this note:    CT ABDOMEN PELVIS W IV CONTRAST Additional Contrast? None   Final Result   No obstructive uropathy. Appendix is normal.  Gallbladder is normal.      Mild wall thickening within the ascending colon. Findings can be suggestive   of mild colitis. US NON OB TRANSVAGINAL   Final Result   Unremarkable pelvic ultrasound. No evidence of ovarian torsion or other   abnormality. US DUP ABD PEL RETRO SCROT COMPLETE   Final Result   Unremarkable pelvic ultrasound. No evidence of ovarian torsion or other   abnormality. US NON OB TRANSVAGINAL    Result Date: 1/20/2023  EXAMINATION: PELVIC ULTRASOUND 1/20/2023 TECHNIQUE: Transabdominal and transvaginal pelvic duplex ultrasound using B-mode/gray scaled imaging, Doppler spectral analysis and color flow Doppler was obtained. COMPARISON: None HISTORY: ORDERING SYSTEM PROVIDED HISTORY: Dysfunctional uterine bleeding TECHNOLOGIST PROVIDED HISTORY: Reason for exam:->Dysfunctional uterine bleeding, concern for cysts and endometriosis. FINDINGS: Measurements: Uterus: 8.3 x 4.8 x 5.2 cm Endometrial stripe: 7 mm Right Ovary:Not measured.  Left Ovary: 3 x 1.4 x 1.9 cm Ultrasound Findings: Uterus: Uterus demonstrates normal myometrial echotexture. Endometrial stripe: Endometrial stripe is within normal limits. Right Ovary: Nonvisualized. Left Ovary:  Left ovary is within normal limits. There is normal Doppler flow. Free Fluid: A small amount of anechoic fluid is noted in the cul-de-sac. Unremarkable pelvic ultrasound. US PELVIS COMPLETE    Result Date: 1/20/2023  EXAMINATION: PELVIC ULTRASOUND 1/20/2023 TECHNIQUE: Transabdominal and transvaginal pelvic duplex ultrasound using B-mode/gray scaled imaging, Doppler spectral analysis and color flow Doppler was obtained. COMPARISON: None HISTORY: ORDERING SYSTEM PROVIDED HISTORY: Dysfunctional uterine bleeding TECHNOLOGIST PROVIDED HISTORY: Reason for exam:->Dysfunctional uterine bleeding, concern for cysts and endometriosis. FINDINGS: Measurements: Uterus: 8.3 x 4.8 x 5.2 cm Endometrial stripe: 7 mm Right Ovary:Not measured. Left Ovary: 3 x 1.4 x 1.9 cm Ultrasound Findings: Uterus: Uterus demonstrates normal myometrial echotexture. Endometrial stripe: Endometrial stripe is within normal limits. Right Ovary: Nonvisualized. Left Ovary:  Left ovary is within normal limits. There is normal Doppler flow. Free Fluid: A small amount of anechoic fluid is noted in the cul-de-sac. Unremarkable pelvic ultrasound. No results found. PROCEDURES   Unless otherwise noted below, none     Procedures    CRITICAL CARE TIME (.cctime)       PAST MEDICAL HISTORY      has a past medical history of COVID-19, Hypotension due to hypovolemia, and Obesity.      EMERGENCY DEPARTMENT COURSE and DIFFERENTIAL DIAGNOSIS/MDM:   Vitals:    Vitals:    01/24/23 1603 01/24/23 1800 01/24/23 1818 01/24/23 1828   BP: (!) 157/103 123/72 119/77    Pulse:    71   Resp:       Temp:       TempSrc:       SpO2: 100% 99% 100%        Patient was given the following medications:  Medications   morphine injection 4 mg (4 mg IntraVENous Given 1/24/23 1543) ketorolac (TORADOL) injection 15 mg (15 mg IntraVENous Given 1/24/23 1642)   iopamidol (ISOVUE-370) 76 % injection 75 mL (75 mLs IntraVENous Given 1/24/23 1705)             Is this patient to be included in the SEP-1 Core Measure due to severe sepsis or septic shock? No   Exclusion criteria - the patient is NOT to be included for SEP-1 Core Measure due to: Infection is not suspected    Chronic Conditions affecting care:    has a past medical history of COVID-19, Hypotension due to hypovolemia, and Obesity. CONSULTS: (Who and What was discussed)  None      Social Determinants : None      Records Reviewed (Source): Reviewed records from ultrasound performed as an outpatient 4 days ago revealed no acute abnormalities but unable to visualize right ovary. Also reviewed her PCPs note from visit on 1/18 and patient was placed on oxycodone and referred to gynecology. She has an appointment for Nexplanon removal and ultrasound with her gynecologist in 2 days. CC/HPI Summary, DDx, ED Course, and Reassessment: Patient presented with left lower abdominal pain. She is been dealing with some pelvic and abdominal pain with abnormal vaginal bleeding for multiple months. Symptoms got worse over the past 2 weeks however. She had an ultrasound done 4 days ago that revealed unremarkable findings but unable to visualize right ovary. This was repeated today given that we are unable to see the right ovary and her symptoms persisting which are unremarkable. CT imaging also reveals only possible mild colitis but patient has no GI symptoms and have low suspicion for bacterial etiology for this. She states that she was tested for STDs before her Mirena was placed in October and was negative and she has not been sexually active since June even before this was not concern for STDs. Low suspicion for ovarian torsion, tubo-ovarian abscess, PID, obstruction, perforated viscus or other emergent etiology.   She is sleeping on recheck after IV morphine and IV Toradol. Laboratory testing is unremarkable. She again has an appoint with gynecology in 2 days so has good follow-up. Already has Percocet at home. Educated on adding ibuprofen over-the-counter 400 to 600 mg every 6 hours. She will return here for any worsening of symptoms or problems at home. Disposition Considerations (tests considered but not done, Admit vs D/C, Shared Decision Making, Pt Expectation of Test or Tx.):        I am the Primary Clinician of Record. FINAL IMPRESSION      1.  Left lower quadrant abdominal pain          DISPOSITION/PLAN     DISPOSITION Decision To Discharge 01/24/2023 06:28:30 PM      PATIENT REFERRED TO:  Chaparrita Jay MD  [address] [place] [address] No. [facility]  [phone]    Schedule an appointment as soon as possible for a visit in 3 days  For re-check    [facility] Emergency Department  [address] [place] [address]  [phone]    As needed    DISCHARGE MEDICATIONS:  New Prescriptions    No medications on file       DISCONTINUED MEDICATIONS:  Discontinued Medications    No medications on file              (Please note that portions of this note were completed with a voice recognition program.  Efforts were made to edit the dictations but occasionally words are mis-transcribed.)    Earl Cruz PA-C (electronically signed)        Earl Cruz PA-C  01/24/23 [id number]
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
## 2023-01-24 NOTE — PROGRESS NOTES
[facility]) Physicians   [employer] [facility] [employer]  Nessa Torres MD, [facility], [address], [address] [facility]    [address] [id number] . Phone: [phone]  Fax: [phone]          Chief Complaint   Patient presents with    Obesity     3rd pre-surg         HPI:     Andrew Riggs is a very pleasant 42 y.o. female with Body mass index is 60.17 kg/m². / Chronic Obesity. Sabina Julian has been struggling for several years now with obesity. Sabina Julian feels the weight is an obstacle to achieve and perform things in daily living as well risk on health. Patient  is very determined to lose weight and be healthy, and is working towards  surgical weight loss to achieve this goal. Pre-operative clearance and work up pending. Working hard to keep good dietary habits as well level of activity. Pain Assessment   Intense 1 week long diffuse abdominal pain      Past Medical History:   Diagnosis Date    COVID-19     Hypotension due to hypovolemia     Obesity      Past Surgical History:   Procedure Laterality Date    DILATION AND CURETTAGE OF UTERUS      UPPER GASTROINTESTINAL ENDOSCOPY N/A 12/22/2022    EGD BIOPSY performed by Daniele Joseph MD at [address] [place] [address]     Family History   Problem Relation Age of Onset    Arthritis Mother     Elevated Lipids Mother     Diabetes Father     Kidney Disease Father      Social History     Tobacco Use    Smoking status: Never    Smokeless tobacco: Never   Substance Use Topics    Alcohol use: Yes     Comment: social     I counseled the patient on the importance of not smoking and risks of ETOH. Allergies   Allergen Reactions    Shellfish-Derived Products Anaphylaxis    Strawberry (Diagnostic) Anaphylaxis    Bee Venom Hives and Swelling     Vitals:    01/24/23 1350   BP: 130/87   Pulse: 94   Resp: 18   SpO2: 98%   Weight: (!) 370 lb (167.8 kg)   Height: 5' 5.75\" (1.67 m)       Body mass index is 60.17 kg/m².     Lab Results   Component Value Date/Time    WBC 6.1 11/28/2022 01:39 PM    RBC 3.95 11/28/2022 01:39 PM    HGB 12.6 11/28/2022 01:39 PM    HCT 37.8 11/28/2022 01:39 PM    MCV 95.9 11/28/2022 01:39 PM    MCH 31.9 11/28/2022 01:39 PM    MCHC 33.3 11/28/2022 01:39 PM    MPV 8.8 11/28/2022 01:39 PM    NEUTOPHILPCT 62.1 11/28/2022 01:39 PM    LYMPHOPCT 27.7 11/28/2022 01:39 PM    MONOPCT 4.7 11/28/2022 01:39 PM    EOSRELPCT 5.1 11/28/2022 01:39 PM    BASOPCT 0.4 11/28/2022 01:39 PM    NEUTROABS 3.8 11/28/2022 01:39 PM    LYMPHSABS 1.7 11/28/2022 01:39 PM    MONOSABS 0.3 11/28/2022 01:39 PM    EOSABS 0.3 11/28/2022 01:39 PM     Lab Results   Component Value Date/Time     11/28/2022 01:39 PM    K 4.0 11/28/2022 01:39 PM    K 3.9 10/20/2021 05:27 AM     11/28/2022 01:39 PM    CO2 22 11/28/2022 01:39 PM    ANIONGAP 13 11/28/2022 01:39 PM    GLUCOSE 88 11/28/2022 01:39 PM    BUN 10 11/28/2022 01:39 PM    CREATININE 0.8 11/28/2022 01:39 PM    LABGLOM >60 11/28/2022 01:39 PM    GFRAA >60 01/26/2022 12:11 PM    CALCIUM 9.1 11/28/2022 01:39 PM    PROT 6.4 11/28/2022 01:39 PM    LABALBU 3.4 11/28/2022 01:39 PM    AGRATIO 1.1 11/28/2022 01:39 PM    BILITOT 0.3 11/28/2022 01:39 PM    ALKPHOS 94 11/28/2022 01:39 PM    ALT 9 11/28/2022 01:39 PM    AST 11 11/28/2022 01:39 PM    GLOB 3.4 10/06/2021 06:15 AM     Lab Results   Component Value Date/Time    CHOL 184 11/28/2022 01:39 PM    TRIG 93 11/28/2022 01:39 PM    HDL 40 11/28/2022 01:39 PM    LDLCALC 125 11/28/2022 01:39 PM    LABVLDL 19 11/28/2022 01:39 PM     Lab Results   Component Value Date/Time    TSHREFLEX 2.72 11/28/2022 01:39 PM     Lab Results   Component Value Date/Time    IRON 86 11/28/2022 01:39 PM    TIBC 300 11/28/2022 01:39 PM    LABIRON 29 11/28/2022 01:39 PM     Lab Results   Component Value Date/Time    AWJGKPVE64 615 11/28/2022 01:39 PM    FOLATE 8.07 11/28/2022 01:39 PM     Lab Results   Component Value Date/Time    VITD25 11.9 11/28/2022 01:39 PM     Lab Results   Component Value Date/Time    LABA1C 5.3 11/28/2022 01:39 PM    .4 11/28/2022 01:39 PM         Current Outpatient Medications:     ferrous sulfate (IRON 325) 325 (65 Fe) MG tablet, Take 1 tablet by mouth See Admin Instructions Every other day, Disp: 90 tablet, Rfl: 1    albuterol sulfate HFA (PROVENTIL;VENTOLIN;PROAIR) 108 (90 Base) MCG/ACT inhaler, Inhale 2 puffs into the lungs 4 times daily, Disp: 18 g, Rfl: 3    sodium chloride (ALTAMIST SPRAY) 0.65 % nasal spray, 1 spray by Nasal route as needed for Congestion, Disp: 1 each, Rfl: 3    oxyCODONE-acetaminophen (PERCOCET) 5-325 MG per tablet, Take 1 tablet by mouth every 8 hours as needed for Pain for up to 14 days. Intended supply: 3 days. Take lowest dose possible to manage pain Max Daily Amount: 3 tablets, Disp: 28 tablet, Rfl: 0    omeprazole (PRILOSEC) 20 MG delayed release capsule, Take 1 capsule by mouth every morning (before breakfast), Disp: 90 capsule, Rfl: 1    Torsemide 40 MG TABS, Take 40 mg by mouth daily, Disp: 90 tablet, Rfl: 1    Handicap Placard MISC, by Does not apply route From 11/2/2021 until 2/28/2022, Disp: 1 each, Rfl: 0    EPINEPHrine (EPIPEN 2-JORDAN) 0.3 MG/0.3ML SOAJ injection, Inject 0.3 mLs into the muscle once as needed (anaphylaxis) Use as directed for allergic reaction, Disp: 0.3 mL, Rfl: 0    Review of Systems - History obtained from the patient  General ROS: negative  Psychological ROS: negative  Ophthalmic ROS: negative  Neurological ROS: negative  ENT ROS: negative  Allergy and Immunology ROS: negative  Hematological and Lymphatic ROS: negative  Endocrine ROS: negative  Breast ROS: negative  Respiratory ROS: negative  Cardiovascular ROS: negative  Gastrointestinal ROS:negative  Genito-Urinary ROS: negative  Musculoskeletal ROS: negative   Skin ROS: negative    Physical Exam   Vitals Reviewed   Constitutional: Patient is oriented to person, place, and time. Patient appears well-developed and well-nourished. Patient is active and cooperative.   Non-toxic appearance. However looks in distress. HENT:   Head: Normocephalic and atraumatic. Head is without abrasion and without laceration. Hair is normal.   Right Ear: External ear normal. No lacerations. No drainage, swelling . Left Ear: External ear normal. No lacerations. No drainage, swelling. Nose/Mouth: face mask in place  Eyes: Conjunctivae, EOM and lids are normal. Right eye exhibits no discharge. No foreign body present in the right eye. Left eye exhibits no discharge. No foreign body present in the left eye. No scleral icterus. Neck: Trachea normal and normal range of motion. No JVD present. Pulmonary/Chest: Effort normal. No accessory muscle usage or stridor. No apnea. No respiratory distress. Cardiovascular: Normal rate and no JVD. Abdominal: Normal appearance. Patient exhibits no distension. Abdomen is soft, obese, non tender. Musculoskeletal: Normal range of motion. Patient exhibits no edema. Neurological: Patient is alert and oriented to person, place, and time. Patient has normal strength. GCS eye subscore is 4. GCS verbal subscore is 5. GCS motor subscore is 6. Skin: Skin is warm and dry. No abrasion and no rash noted. Patient is not diaphoretic. No cyanosis or erythema. Psychiatric: Patient has a normal mood and affect. Speech is normal and behavior is normal. Cognition and memory are normal.       A/P    Hannah Cornejo is 42 y.o. female, Body mass index is 60.17 kg/m². pre surgery, has gained few lbs since last visit. The patient underwent extensive dietary counseling with registered dietician. I have reviewed, discussed and agree with the dietary plan. Patient is trying hard to keep good dietary and behavior modifications. Patient is monitoring portion sizes, food choices and liquid calories. Patient is trying to exercise regularly as much as possible.     Obesity as a disease is considered a high risk to patients overall health and should therefore be considered a high risk disease state. Advised the patient that not getting there weight under control, that could increase risk of complications/worsening of those conditions on the long-term. (Goal of weight loss surgery is to alleviate/control some of those co-morbidities)    Now with Covid-19 pandemic, CDC and health authorities does classify obese patients as vulnerable and high risk as well. Which makes weight loss a priority for improvement of their wellbeing and overall health. I encouraged the patient to continue exercise and keeping healthy eating habits. Discussed pre-op labs and work up till now. Also counseled the patient extensively on Surgery. I did explain thoroughly to the patient that compliance with pre- and post op diet and other recommendations are integral part to improve the chances of successful weight loss and also not following it could end with serious health complications. Some strategies discussed today include but not limited to : 30/30/30 minutes rule, food diary, avoid fast food and packing/planing ahead, & increasing exercise. Also stressed to the patient importance of taking the multivitamins as instructed, otherwise risk significant complications. The visit today, included any number of the following: Bariatric Preoperative work up/protocols, review of labs, imaging, provider notes, outside hospital records, performing examination/evaluation, counseling patient and/or family, ordering medications/tests, placing referrals and communication with referring physicians, coordination of care; discussing dietary plan/recall with the patient as well with registered dietitian and documentation in the EHR. Of note, the above was done during same day of the actual patient encounter. Remington Coronado is here for her third presurgical visit. Patient still working her presurgical clearances.   Patient today was an extensive excruciating pain that we felt it is very important for her to proceed to the emergency room for further evaluation. we will see the patient next month for continued follow-up. We discussed how her excess weight affects her overall health and importance of weight loss, healthy diet and active lifestyle to alleviate those co morbid conditions, otherwise risk deterioration. Morbid Obesity: Body mass index is 60.17 kg/m². [x] Continue to make dietary and lifestyle modifications. [x] Plan for Future laparoscopic sleeve gastrectomy. [x] Return for follow-up next month. Chronic GERD:   [x] Continue to make dietary and lifestyle modifications. [x] Continue Omeprazole. [] Continue Famotidine. [] Plan for EGD to evaluate the stomach. Obstructive Sleep Apnea:   [x] Continue to make dietary and lifestyle modifications. [x] Reviewed the importance of wearing / compliance with CPAP/BIPAP. [x] Continue to follow up with their sleep medicine provider for CPAP/BIPAP management and monitoring. Patient advised that its their responsibility to follow up for studies, referrals and/or labs ordered today.

## 2023-02-14 ENCOUNTER — INITIAL CONSULT (OUTPATIENT)
Dept: BARIATRICS/WEIGHT MGMT | Age: 38
End: 2023-02-14

## 2023-02-14 ENCOUNTER — OFFICE VISIT (OUTPATIENT)
Dept: BARIATRICS/WEIGHT MGMT | Age: 38
End: 2023-02-14
Payer: MEDICAID

## 2023-02-14 VITALS
OXYGEN SATURATION: 97 % | SYSTOLIC BLOOD PRESSURE: 116 MMHG | HEIGHT: 66 IN | BODY MASS INDEX: 47.09 KG/M2 | RESPIRATION RATE: 18 BRPM | WEIGHT: 293 LBS | HEART RATE: 69 BPM | DIASTOLIC BLOOD PRESSURE: 68 MMHG

## 2023-02-14 DIAGNOSIS — F41.9 ANXIETY: Primary | ICD-10-CM

## 2023-02-14 DIAGNOSIS — E66.01 CLASS 3 SEVERE OBESITY DUE TO EXCESS CALORIES WITH SERIOUS COMORBIDITY AND BODY MASS INDEX (BMI) OF 50.0 TO 59.9 IN ADULT (HCC): Primary | ICD-10-CM

## 2023-02-14 DIAGNOSIS — I50.812 CHRONIC RIGHT-SIDED HEART FAILURE (HCC): ICD-10-CM

## 2023-02-14 DIAGNOSIS — K21.9 CHRONIC GERD: ICD-10-CM

## 2023-02-14 DIAGNOSIS — E66.01 MORBID OBESITY WITH BMI OF 60.0-69.9, ADULT (HCC): ICD-10-CM

## 2023-02-14 PROCEDURE — G8484 FLU IMMUNIZE NO ADMIN: HCPCS | Performed by: SURGERY

## 2023-02-14 PROCEDURE — 99214 OFFICE O/P EST MOD 30 MIN: CPT | Performed by: SURGERY

## 2023-02-14 PROCEDURE — G8427 DOCREV CUR MEDS BY ELIG CLIN: HCPCS | Performed by: SURGERY

## 2023-02-14 PROCEDURE — 1036F TOBACCO NON-USER: CPT | Performed by: SURGERY

## 2023-02-14 PROCEDURE — G8417 CALC BMI ABV UP PARAM F/U: HCPCS | Performed by: SURGERY
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
## 2023-02-14 NOTE — PROGRESS NOTES
[facility] ([facility]) Physicians   [employer]  Dianne Christy MD, [facility], [address], [address] [facility] [address] . Phone: [phone]  Fax: [phone]          Chief Complaint   Patient presents with    Obesity     3rd pre-surg         HPI:     Yusuf Jorge is a very pleasant 42 y.o. female with Body mass index is 58.22 kg/m². / Chronic Obesity. José Antonio Caraballo has been struggling for several years now with obesity. José Antonio Caraballo feels the weight is an obstacle to achieve and perform things in daily living as well risk on health. Patient  is very determined to lose weight and be healthy, and is working towards  surgical weight loss to achieve this goal. Pre-operative clearance and work up pending. Working hard to keep good dietary habits as well level of activity. Patient denies any nausea, vomiting, fevers, chills, shortness of breath, chest pain, cough, constipation or difficulty urinating. Pain Assessment   Denies any abdominal pain       Past Medical History:   Diagnosis Date    COVID-19     Hypotension due to hypovolemia     Obesity      Past Surgical History:   Procedure Laterality Date    DILATION AND CURETTAGE OF UTERUS      UPPER GASTROINTESTINAL ENDOSCOPY N/A 12/22/2022    EGD BIOPSY performed by Adeola Barclay MD at [address]     Family History   Problem Relation Age of Onset    Arthritis Mother     Elevated Lipids Mother     Diabetes Father     Kidney Disease Father      Social History     Tobacco Use    Smoking status: Never    Smokeless tobacco: Never   Substance Use Topics    Alcohol use: Yes     Comment: social     I counseled the patient on the importance of not smoking and risks of ETOH.    Allergies   Allergen Reactions    Shellfish-Derived Products Anaphylaxis    Strawberry (Diagnostic) Anaphylaxis    Bee Venom Hives and Swelling     Vitals:    02/14/23 1014   BP: 116/68   Pulse: 69   Resp: 18   SpO2: 97%   Weight: (!) 358 lb (162.4 kg)   Height: 5' 5.75\" (1.67 m)       Body mass index is 58.22 kg/m².     Lab Results   Component Value Date/Time    WBC 8.1 01/24/2023 03:37 PM    RBC 4.15 01/24/2023 03:37 PM    HGB 13.0 01/24/2023 03:37 PM    HCT 39.7 01/24/2023 03:37 PM    MCV 95.7 01/24/2023 03:37 PM    MCH 31.3 01/24/2023 03:37 PM    MCHC 32.7 01/24/2023 03:37 PM    MPV 7.8 01/24/2023 03:37 PM    NEUTOPHILPCT 60.5 01/24/2023 03:37 PM    LYMPHOPCT 27.0 01/24/2023 03:37 PM    MONOPCT 4.9 01/24/2023 03:37 PM    EOSRELPCT 6.7 01/24/2023 03:37 PM    BASOPCT 0.9 01/24/2023 03:37 PM    NEUTROABS 4.9 01/24/2023 03:37 PM    LYMPHSABS 2.2 01/24/2023 03:37 PM    MONOSABS 0.4 01/24/2023 03:37 PM    EOSABS 0.5 01/24/2023 03:37 PM     Lab Results   Component Value Date/Time     01/24/2023 03:37 PM    K 4.2 01/24/2023 03:37 PM     01/24/2023 03:37 PM    CO2 23 01/24/2023 03:37 PM    ANIONGAP 10 01/24/2023 03:37 PM    GLUCOSE 106 01/24/2023 03:37 PM    BUN 11 01/24/2023 03:37 PM    CREATININE 0.9 01/24/2023 03:37 PM    LABGLOM >60 01/24/2023 03:37 PM    GFRAA >60 01/26/2022 12:11 PM    CALCIUM 9.1 01/24/2023 03:37 PM    PROT 6.8 01/24/2023 03:37 PM    LABALBU 3.3 01/24/2023 03:37 PM    AGRATIO 0.9 01/24/2023 03:37 PM    BILITOT 0.3 01/24/2023 03:37 PM    ALKPHOS 94 01/24/2023 03:37 PM    ALT 13 01/24/2023 03:37 PM    AST 16 01/24/2023 03:37 PM    GLOB 3.4 10/06/2021 06:15 AM     Lab Results   Component Value Date/Time    CHOL 184 11/28/2022 01:39 PM    TRIG 93 11/28/2022 01:39 PM    HDL 40 11/28/2022 01:39 PM    LDLCALC 125 11/28/2022 01:39 PM    LABVLDL 19 11/28/2022 01:39 PM     Lab Results   Component Value Date/Time    TSHREFLEX 2.72 11/28/2022 01:39 PM     Lab Results   Component Value Date/Time    IRON 86 11/28/2022 01:39 PM    TIBC 300 11/28/2022 01:39 PM    LABIRON 29 11/28/2022 01:39 PM     Lab Results   Component Value Date/Time    BREZQZTU08 615 11/28/2022 01:39 PM    FOLATE 8.07 11/28/2022 01:39 PM     Lab Results   Component Value Date/Time    ADXO80 11.9 11/28/2022 01:39 PM     Lab Results   Component Value Date/Time    LABA1C 5.3 11/28/2022 01:39 PM    .4 11/28/2022 01:39 PM         Current Outpatient Medications:     ferrous sulfate (IRON 325) 325 (65 Fe) MG tablet, Take 1 tablet by mouth See Admin Instructions Every other day, Disp: 90 tablet, Rfl: 1    albuterol sulfate HFA (PROVENTIL;VENTOLIN;PROAIR) 108 (90 Base) MCG/ACT inhaler, Inhale 2 puffs into the lungs 4 times daily, Disp: 18 g, Rfl: 3    sodium chloride (ALTAMIST SPRAY) 0.65 % nasal spray, 1 spray by Nasal route as needed for Congestion, Disp: 1 each, Rfl: 3    omeprazole (PRILOSEC) 20 MG delayed release capsule, Take 1 capsule by mouth every morning (before breakfast), Disp: 90 capsule, Rfl: 1    Torsemide 40 MG TABS, Take 40 mg by mouth daily, Disp: 90 tablet, Rfl: 1    Handicap Placard MISC, by Does not apply route From 11/2/2021 until 2/28/2022, Disp: 1 each, Rfl: 0    EPINEPHrine (EPIPEN 2-JORDAN) 0.3 MG/0.3ML SOAJ injection, Inject 0.3 mLs into the muscle once as needed (anaphylaxis) Use as directed for allergic reaction, Disp: 0.3 mL, Rfl: 0    Review of Systems - History obtained from the patient  General ROS: negative  Psychological ROS: negative  Ophthalmic ROS: negative  Neurological ROS: negative  ENT ROS: negative  Allergy and Immunology ROS: negative  Hematological and Lymphatic ROS: negative  Endocrine ROS: negative  Breast ROS: negative  Respiratory ROS: negative  Cardiovascular ROS: negative  Gastrointestinal ROS:negative  Genito-Urinary ROS: negative  Musculoskeletal ROS: negative   Skin ROS: negative    Physical Exam   Vitals Reviewed   Constitutional: Patient is oriented to person, place, and time. Patient appears well-developed and well-nourished. Patient is active and cooperative. Non-toxic appearance. No distress. HENT:   Head: Normocephalic and atraumatic. Head is without abrasion and without laceration.  Hair is normal.   Right Ear: External ear normal. No lacerations. No drainage, swelling . Left Ear: External ear normal. No lacerations. No drainage, swelling. Nose/Mouth: face mask in place  Eyes: Conjunctivae, EOM and lids are normal. Right eye exhibits no discharge. No foreign body present in the right eye. Left eye exhibits no discharge. No foreign body present in the left eye. No scleral icterus. Neck: Trachea normal and normal range of motion. No JVD present. Pulmonary/Chest: Effort normal. No accessory muscle usage or stridor. No apnea. No respiratory distress. Cardiovascular: Normal rate and no JVD. Abdominal: Normal appearance. Patient exhibits no distension. Abdomen is soft, obese, non tender. Musculoskeletal: Normal range of motion. Patient exhibits no edema. Neurological: Patient is alert and oriented to person, place, and time. Patient has normal strength. GCS eye subscore is 4. GCS verbal subscore is 5. GCS motor subscore is 6. Skin: Skin is warm and dry. No abrasion and no rash noted. Patient is not diaphoretic. No cyanosis or erythema. Psychiatric: Patient has a normal mood and affect. Speech is normal and behavior is normal. Cognition and memory are normal.       A/P    Lila Rota is 42 y.o. female, Body mass index is 58.22 kg/m². pre surgery, has lost 12 lbs since last visit. The patient underwent extensive dietary counseling with registered dietician. I have reviewed, discussed and agree with the dietary plan. Patient is trying hard to keep good dietary and behavior modifications. Patient is monitoring portion sizes, food choices and liquid calories. Patient is trying to exercise regularly as much as possible. Obesity as a disease is considered a high risk to patients overall health and should therefore be considered a high risk disease state. Advised the patient that not getting there weight under control, that could increase risk of complications/worsening of those conditions on the long-term.  (Goal of weight loss surgery is to alleviate/control some of those co-morbidities)    Now with Covid-19 pandemic, CDC and health authorities does classify obese patients as vulnerable and high risk as well. Which makes weight loss a priority for improvement of their wellbeing and overall health. I encouraged the patient to continue exercise and keeping healthy eating habits. Discussed pre-op labs and work up till now. Also counseled the patient extensively on Surgery. I did explain thoroughly to the patient that compliance with pre- and post op diet and other recommendations are integral part to improve the chances of successful weight loss and also not following it could end with serious health complications. Some strategies discussed today include but not limited to : 30/30/30 minutes rule, food diary, avoid fast food and packing/planing ahead, & increasing exercise. Also stressed to the patient importance of taking the multivitamins as instructed, otherwise risk significant complications. The visit today, included any number of the following: Bariatric Preoperative work up/protocols, review of labs, imaging, provider notes, outside hospital records, performing examination/evaluation, counseling patient and/or family, ordering medications/tests, placing referrals and communication with referring physicians, coordination of care; discussing dietary plan/recall with the patient as well with registered dietitian and documentation in the EHR. Of note, the above was done during same day of the actual patient encounter. Alexandria May is here for her third presurgical visit. Patient seems to have been dealing with some memory issues that was notable today on her dietary recall. The patient did actually admit to that as well.   She contributes that to a head trauma she had in the past where she had some routine CSF fluid over accumulation that needed to be aspirated however she has not had done that in a while nor had any neurology follow-up. I recommended to the patient highly that she needs to see neurology ASAP for further evaluation. Advised her that we cannot proceed with surgery until her memory issues are addressed better. Provided the patient with neurologist in town she will contact them and establish care        We discussed how her excess weight affects her overall health and importance of weight loss, healthy diet and active lifestyle to alleviate those co morbid conditions, otherwise risk deterioration. Morbid Obesity: Body mass index is 58.22 kg/m². [x] Continue to make dietary and lifestyle modifications. [x] Plan for Future laparoscopic sleeve gastrectomy. [x] Return for follow-up next month. Chronic GERD:   [x] Continue to make dietary and lifestyle modifications. [x] Continue Omeprazole. [] Continue Famotidine. [] Plan for EGD to evaluate the stomach. Patient advised that its their responsibility to follow up for studies, referrals and/or labs ordered today.

## 2023-02-14 NOTE — PATIENT INSTRUCTIONS
Patient received dietary handouts and education.     Plan/Recommendations:   - Focus on sleep hygiene and consistent routine  - Eat 4 planned meals/day including protein + plants  - Avoid juice and sweet drinks  - Continue trying protein powders      Need the following:  -Neurology Clearance   - Letter of medical necessity from PCP

## 2023-02-14 NOTE — PROGRESS NOTES
Jonah Motley lost 12 lbs over 1 month. \" I don't remember the last few weeks. 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 Pt is allergic to shellfish and strawberry. Yesterday woke up 5 AM  Breakfast: 6 AM: [employer] chaves/cheese bagel (saved this for later) + hash brown + OJ  Snack: 8:30 AM: sandwich from earlier   Sleeping 10 AM- 1 PM  Lunch: None  Snack: None  Dinner: 3-4 PM: Leftover chix/steak hibachi w/ rice  Snack: None Has break at 3 AM when working - [employer] - poptarts OR chips  Bed 4-5 AM    Is pt consuming smaller portions? no    Is pt consuming at least 64 oz of fluids per day?  32 oz bottle w/ ice water fills 3X/day    Is pt consuming carbonated, caffeinated, or sugary beverages? 20-40 oz pop, pineapple or OJ few times/month     Has pt sampled Unjury and/or Nectar protein? Likes strawberry + chocolate     Has patient attended a support group?  Completed Dec 2022    Exercise: stacie lanza 30 minutes 4X/week with friends for accountability    Plan/Recommendations:   - Focus on sleep hygiene and consistent routine  - Eat 4 planned meals/day including protein + plants  - Avoid juice and sweet drinks  - Continue trying protein powders    Handouts: 1500 kcal PSMP, bfast + frozen meals, presurg checklist    Baljeet Jolley, ESTUARDO, LD

## 2023-02-15 ENCOUNTER — OFFICE VISIT (OUTPATIENT)
Dept: PULMONOLOGY | Age: 38
End: 2023-02-15
Payer: MEDICAID

## 2023-02-15 VITALS
WEIGHT: 293 LBS | HEIGHT: 66 IN | HEART RATE: 108 BPM | DIASTOLIC BLOOD PRESSURE: 68 MMHG | SYSTOLIC BLOOD PRESSURE: 122 MMHG | OXYGEN SATURATION: 97 % | BODY MASS INDEX: 47.09 KG/M2

## 2023-02-15 DIAGNOSIS — G47.33 OSA (OBSTRUCTIVE SLEEP APNEA): Primary | ICD-10-CM

## 2023-02-15 DIAGNOSIS — J30.2 SEASONAL ALLERGIC RHINITIS, UNSPECIFIED TRIGGER: ICD-10-CM

## 2023-02-15 DIAGNOSIS — I27.20 PULMONARY HYPERTENSION (HCC): ICD-10-CM

## 2023-02-15 PROCEDURE — 1036F TOBACCO NON-USER: CPT | Performed by: INTERNAL MEDICINE

## 2023-02-15 PROCEDURE — 99214 OFFICE O/P EST MOD 30 MIN: CPT | Performed by: INTERNAL MEDICINE

## 2023-02-15 PROCEDURE — G8427 DOCREV CUR MEDS BY ELIG CLIN: HCPCS | Performed by: INTERNAL MEDICINE

## 2023-02-15 PROCEDURE — G8484 FLU IMMUNIZE NO ADMIN: HCPCS | Performed by: INTERNAL MEDICINE

## 2023-02-15 PROCEDURE — G8417 CALC BMI ABV UP PARAM F/U: HCPCS | Performed by: INTERNAL MEDICINE

## 2023-02-15 RX ORDER — AZELASTINE 1 MG/ML
1 SPRAY, METERED NASAL 2 TIMES DAILY
Qty: 60 ML | Refills: 3 | Status: SHIPPED | OUTPATIENT
Start: 2023-02-15

## 2023-02-15 ASSESSMENT — SLEEP AND FATIGUE QUESTIONNAIRES
HOW LIKELY ARE YOU TO NOD OFF OR FALL ASLEEP WHILE SITTING INACTIVE IN A PUBLIC PLACE: 0
HOW LIKELY ARE YOU TO NOD OFF OR FALL ASLEEP WHILE SITTING QUIETLY AFTER LUNCH WITHOUT ALCOHOL: 0
ESS TOTAL SCORE: 7
HOW LIKELY ARE YOU TO NOD OFF OR FALL ASLEEP WHILE WATCHING TV: 1
HOW LIKELY ARE YOU TO NOD OFF OR FALL ASLEEP IN A CAR, WHILE STOPPED FOR A FEW MINUTES IN TRAFFIC: 0
HOW LIKELY ARE YOU TO NOD OFF OR FALL ASLEEP WHILE SITTING AND READING: 1
HOW LIKELY ARE YOU TO NOD OFF OR FALL ASLEEP WHILE LYING DOWN TO REST IN THE AFTERNOON WHEN CIRCUMSTANCES PERMIT: 3
HOW LIKELY ARE YOU TO NOD OFF OR FALL ASLEEP WHILE SITTING AND TALKING TO SOMEONE: 0
HOW LIKELY ARE YOU TO NOD OFF OR FALL ASLEEP WHEN YOU ARE A PASSENGER IN A CAR FOR AN HOUR WITHOUT A BREAK: 2

## 2023-02-15 NOTE — PROGRESS NOTES
PULMONARY OFFICE FOLLOW UP NOTE    REASON FOR VISIT:   Chief Complaint   Patient presents with    Sleep Apnea       DATE OF VISIT: 2/15/2023    HISTORY OF PRESENT ILLNESS: 42y.o. year old female is here for follow-up of KAREN and pulmonary hypertension on echo. Patient has history of COVID-pneumonia in October 2021. Patient is not using her CPAP regularly. Overall compliance is only 3% with more than 4 hours usage of 7% from 11/4/2022-2/11/2023. For the time that she is using CPAP, AHI is well-controlled at 0.9 without any leakage. Patient has nasal pillows. She states that she is not able to use CPAP because of sinus issues. She has been having sinus congestion with runny nose. She is using Flonase nasal spray without much benefit. Patient was hospitalized for Covid pneumonia in October 2021 and treated with steroids, Actemra. Could not get remdesivir due to acute kidney injury. She was discharged on home oxygen up to 4 L/min as well as Xarelto. Patient was feeling good for few days but then she presented at [facility] on 10/20/2021 for worsening shortness of breath. She underwent a CT chest that did not show any PE but showed bilateral groundglass opacities consistent with Covid pneumonia. Echo was performed that showed pulmonary hypertension with RV pressure overload. She had an echo performed 9 days earlier that did not show any evidence of pulmonary hypertension. Patient was also taking Xarelto before she presented to [facility] for worsening symptoms. She was noted to have fluid overload and was initiated on Lasix with which her symptoms improved. She was finally discharged on 3 L/min oxygen. She is not using oxygen now. She denies any shortness of breath or cough or wheezing or chest pain or hemoptysis. Diagnostic test reviewed:  I reviewed the chest x-ray from 2/9/2022 in my interpretation is as follows. Resolution of bilateral lung infiltrates.   I reviewed the CT chest from 10/9/2021 and my interpretation is as follows. No PE noted. Bilateral groundglass opacities seen. Echo from 1/24/2022 showed EF 55 to 60%. Diastolic dysfunction. Mildly dilated RV with TAPSE of 2.6. Improved RV function and RV size. Echo from 10/11/2021 showed EF of 65 to 70%. RVSP 29.  Echo from 10/20/2021 showed EF of 60%. Septal flattening. RV pressure and volume overload. Enlarged RV with hypokinetic RV. TAPSE 1.39     Sleep study obtained on 3/10/2022 showed AHI of 11.5/h F with RDI of 35/h. CPAP of 11 cm of water controlled sleep apnea. Patient has auto CPAP 10 to 16 cm of water. REVIEW OF SYSTEMS: 14 point ROS is negative beside mentioned in [address] CONSTITUTIONAL SYMPTOMS: The patient denies fever, fatigue, night sweats, weight loss or weight gain. HEENT: No vision changes. No tinnitus, Denies sinus pain. No hoarseness, or dysphagia. NECK: Patient denies swelling in the neck. CARDIOVASCULAR: Denies chest pain, palpitation, syncope. RESPIRATORY: Denies shortness of breath or cough. GASTROINTESTINAL: Denies nausea, abdominal pain or change in bowel function. GENITOURINARY: Denies obstructive symptoms. No history of incontinence. BREASTS: No masses or lumps in the breasts. SKIN: No rashes or itching. MUSCULOSKELETAL: Denies weakness or bone pain. NEUROLOGICAL: No headaches or seizures. PSYCHIATRIC: Denies mood swings or depression. ENDOCRINE: Denies heat or cold intolerance or excessive thirst.  HEMATOLOGIC/LYMPHATIC: Denies easy bruising or lymph node swelling. ALLERGIC/IMMUNOLOGIC: No environmental allergies.     PAST MEDICAL HISTORY:   Past Medical History:   Diagnosis Date    COVID-19     Hypotension due to hypovolemia     Obesity        PAST SURGICAL HISTORY:   Past Surgical History:   Procedure Laterality Date    DILATION AND CURETTAGE OF UTERUS      UPPER GASTROINTESTINAL ENDOSCOPY N/A 12/22/2022    EGD BIOPSY performed by Travis Davies MD at [facility]       SOCIAL HISTORY:   Social History     Tobacco Use    Smoking status: Never    Smokeless tobacco: Never   Vaping Use    Vaping Use: Never used   Substance Use Topics    Alcohol use: Yes     Comment: social    Drug use: Never       FAMILY HISTORY:   Family History   Problem Relation Age of Onset    Arthritis Mother     Elevated Lipids Mother     Diabetes Father     Kidney Disease Father        MEDICATIONS:     Current Outpatient Medications on File Prior to Visit   Medication Sig Dispense Refill    ferrous sulfate (IRON 325) 325 (65 Fe) MG tablet Take 1 tablet by mouth See Admin Instructions Every other day 90 tablet 1    albuterol sulfate HFA (PROVENTIL;VENTOLIN;PROAIR) 108 (90 Base) MCG/ACT inhaler Inhale 2 puffs into the lungs 4 times daily 18 g 3    sodium chloride (ALTAMIST SPRAY) 0.65 % nasal spray 1 spray by Nasal route as needed for Congestion 1 each 3    omeprazole (PRILOSEC) 20 MG delayed release capsule Take 1 capsule by mouth every morning (before breakfast) 90 capsule 1    Torsemide 40 MG TABS Take 40 mg by mouth daily 90 tablet 1    Handicap Placard MISC by Does not apply route From 11/2/2021 until 2/28/2022 1 each 0    EPINEPHrine (EPIPEN 2-JORDAN) 0.3 MG/0.3ML SOAJ injection Inject 0.3 mLs into the muscle once as needed (anaphylaxis) Use as directed for allergic reaction 0.3 mL 0     No current facility-administered medications on file prior to visit. ALLERGIES:   Allergies as of 02/15/2023 - Fully Reviewed 02/15/2023   Allergen Reaction Noted    Shellfish-derived products Anaphylaxis 10/01/2021    [place] (diagnostic) Anaphylaxis 10/01/2021    Bee venom Hives and Swelling 11/25/2015      OBJECTIVE:   height is 5' 5.75\" (1.67 m) and weight is 361 lb (163.7 kg) (abnormal). Her blood pressure is 122/68 and her pulse is 108 (abnormal). Her oxygen saturation is 97%. PHYSICAL EXAM:    CONSTITUTIONAL: She is a 42y.o.-year-old who appears her stated age.  She is alert and oriented x 3 and in no acute distress. HEENT: PERRL. No scleral icterus. No thrush, atraumatic, normocephalic. NECK: Supple, without cervical or supraclavicular lymphadenopathy:  CARDIOVASCULAR: S1 S2 RRR. Without murmer  RESPIRATORY & CHEST: Lungs are clear to auscultation and percussion. No wheezing, no crackles. Good air movement  GASTROINTESTINAL & ABDOMEN: Soft, nontender, positive bowel sounds in all quadrants, non-distended, without hepatosplenomegaly. GENITOURINARY: Deferred. MUSCULOSKELETAL: No tenderness to palpation of the axial skeleton. There is no clubbing. No cyanosis. No edema of the lower extremities. SKIN OF BODY: No rash or jaundice. PSYCHIATRIC EVALUATION: Normal affect. Patient answers questions appropriately. HEMATOLOGIC/LYMPHATIC/ IMMUNOLOGIC: No palpable lymphadenopathy. NEUROLOGIC: Alert and oriented x 3. Groslly non-focal. Motor strength is 5+/5 in all muscle groups. The patient has a normal sensorium globally. ASSESSMENT AND PLAN:     1. KAREN (obstructive sleep apnea)  Sleep study showed AHI of 11.5/h with RDI of 35/h. Patient has auto CPAP 10 to 16 cm of water. Patient has poor compliance with CPAP with overall compliance of 28% with more than 4 hours usage of only 7%. AHI is well-controlled at 0.9 when she uses CPAP without any leakage. Patient has nasal pillows. Patient states that she is unable to wear CPAP because of sinus issues and sinus congestion. CPAP compliance was addressed with the patient's again in details. 2. Pulmonary hypertension ([address]  Patient was noted to have evidence of pulmonary hypertension with septal flattening and RV pressure overload on echo from 10/20/2021. Echo performed 9 days prior on 10/11/2021 did not show any evidence of pulmonary hypertension and was noted to have RVSP of 29. I believe that patient developed pulmonary hypertension on echo secondary to fluid overload. Patient improved symptomatically with diuresis.   CTPA was negative for PE. Currently she is on Lasix 40 mg daily. Repeat echo from January 2022 showed marked improvement in RV function and size. Some evidence of pulmonary hypertension still persistent which I believe is secondary to undiagnosed sleep apnea and formulation of heart failure with preserved ejection fraction    3. Seasonal allergic rhinitis, unspecified trigger  Continue Flonase nasal spray daily. Continue Claritin 1 tablet daily. Add azelastine nasal spray twice daily. - azelastine (ASTELIN) 0.1 % nasal spray; 1 spray by Nasal route 2 times daily Use in each nostril as directed  Dispense: 60 mL; Refill: 3        Return in about 6 months (around 8/15/2023). Crystal Bacon MD  Pulmonary Critical Care and Sleep Medicine  Electronically signed by Crystal Bacon MD on 2/15/2023 at 11:42 AM     This note was completed using dragon medical speech recognition software. Grammatical errors, random word insertions, pronoun errors and incomplete sentences are occasional consequences of this technology due to software limitations. If there are questions or concerns about the content of this note of information contained within the body of this dictation they should be addressed with the provider for clarification.

## 2023-03-03 ENCOUNTER — OFFICE VISIT (OUTPATIENT)
Dept: GYNECOLOGY | Age: 38
End: 2023-03-03

## 2023-03-03 VITALS
WEIGHT: 293 LBS | BODY MASS INDEX: 51.91 KG/M2 | HEIGHT: 63 IN | RESPIRATION RATE: 17 BRPM | DIASTOLIC BLOOD PRESSURE: 76 MMHG | HEART RATE: 102 BPM | OXYGEN SATURATION: 99 % | SYSTOLIC BLOOD PRESSURE: 124 MMHG

## 2023-03-03 DIAGNOSIS — E66.01 MORBID OBESITY (HCC): ICD-10-CM

## 2023-03-03 DIAGNOSIS — N92.6 IRREGULAR MENSTRUAL BLEEDING: Primary | ICD-10-CM

## 2023-03-03 DIAGNOSIS — R10.2 PELVIC PAIN IN FEMALE: ICD-10-CM

## 2023-03-03 DIAGNOSIS — I50.812 CHRONIC RIGHT-SIDED HEART FAILURE (HCC): ICD-10-CM

## 2023-03-03 DIAGNOSIS — N92.6 IRREGULAR MENSTRUAL BLEEDING: ICD-10-CM

## 2023-03-03 LAB
A/G RATIO: 1.4 (ref 1.1–2.2)
ALBUMIN SERPL-MCNC: 3.7 G/DL (ref 3.4–5)
ALP BLD-CCNC: 97 U/L (ref 40–129)
ALT SERPL-CCNC: 11 U/L (ref 10–40)
ANION GAP SERPL CALCULATED.3IONS-SCNC: 13 MMOL/L (ref 3–16)
AST SERPL-CCNC: 14 U/L (ref 15–37)
BILIRUB SERPL-MCNC: 0.3 MG/DL (ref 0–1)
BUN BLDV-MCNC: 12 MG/DL (ref 7–20)
CALCIUM SERPL-MCNC: 9.3 MG/DL (ref 8.3–10.6)
CHLORIDE BLD-SCNC: 103 MMOL/L (ref 99–110)
CHOLESTEROL, TOTAL: 212 MG/DL (ref 0–199)
CO2: 25 MMOL/L (ref 21–32)
CREAT SERPL-MCNC: 1.1 MG/DL (ref 0.6–1.1)
ESTRADIOL LEVEL: 52 PG/ML
FOLLICLE STIMULATING HORMONE: 5.3 MIU/ML
GFR SERPL CREATININE-BSD FRML MDRD: >60 ML/MIN/{1.73_M2}
GLUCOSE BLD-MCNC: 93 MG/DL (ref 70–99)
HCT VFR BLD CALC: 38.1 % (ref 36–48)
HDLC SERPL-MCNC: 65 MG/DL (ref 40–60)
HEMOGLOBIN: 13.3 G/DL (ref 12–16)
LDL CHOLESTEROL CALCULATED: 125 MG/DL
MCH RBC QN AUTO: 32.6 PG (ref 26–34)
MCHC RBC AUTO-ENTMCNC: 35 G/DL (ref 31–36)
MCV RBC AUTO: 93.1 FL (ref 80–100)
PDW BLD-RTO: 13.3 % (ref 12.4–15.4)
PLATELET # BLD: 379 K/UL (ref 135–450)
PMV BLD AUTO: 8.9 FL (ref 5–10.5)
POTASSIUM SERPL-SCNC: 4.2 MMOL/L (ref 3.5–5.1)
RBC # BLD: 4.09 M/UL (ref 4–5.2)
SODIUM BLD-SCNC: 141 MMOL/L (ref 136–145)
TOTAL PROTEIN: 6.4 G/DL (ref 6.4–8.2)
TRIGL SERPL-MCNC: 111 MG/DL (ref 0–150)
VLDLC SERPL CALC-MCNC: 22 MG/DL
WBC # BLD: 9.6 K/UL (ref 4–11)

## 2023-03-03 RX ORDER — DOXYCYCLINE 100 MG/1
100 CAPSULE ORAL 2 TIMES DAILY
Qty: 14 CAPSULE | Refills: 0 | Status: SHIPPED | OUTPATIENT
Start: 2023-03-03 | End: 2023-03-10

## 2023-03-03 RX ORDER — DROSPIRENONE 4 MG/1
1 TABLET, FILM COATED ORAL DAILY
Qty: 84 TABLET | Refills: 3 | COMMUNITY
Start: 2023-03-03

## 2023-03-03 RX ORDER — ESTRADIOL 1 MG/1
1 TABLET ORAL DAILY
Qty: 30 TABLET | Refills: 1 | Status: SHIPPED | OUTPATIENT
Start: 2023-03-03 | End: 2023-04-02

## 2023-03-05 ASSESSMENT — ENCOUNTER SYMPTOMS
RESPIRATORY NEGATIVE: 1
GASTROINTESTINAL NEGATIVE: 1
ALLERGIC/IMMUNOLOGIC NEGATIVE: 1
EYES NEGATIVE: 1

## 2023-03-05 NOTE — PROGRESS NOTES
Subjective:      Patient ID: Ramon Squires is a 42 y.o. female. Patient is here for second opinion. Patient with IUD. Having pain. Had nexplanon and then had IUD-Mirena. Bleeding still occurring but still with pain. Wants to know what to do. Vaginal Bleeding    Pelvic Pain      Review of Systems   Constitutional: Negative. HENT: Negative. Eyes: Negative. Respiratory: Negative. Cardiovascular: Negative. Gastrointestinal: Negative. Endocrine: Negative. Genitourinary:  Positive for menstrual problem, pelvic pain and vaginal bleeding. Musculoskeletal: Negative. Skin: Negative. Allergic/Immunologic: Negative. Neurological: Negative. Hematological: Negative. Psychiatric/Behavioral: Negative.        Date of Birth 1985  Past Medical History:   Diagnosis Date    Bacterial vaginosis     COVID-19     Dysmenorrhea     Hypotension due to hypovolemia     Menorrhagia     Obesity     Ovarian cyst      Past Surgical History:   Procedure Laterality Date    DILATION AND CURETTAGE OF UTERUS      UPPER GASTROINTESTINAL ENDOSCOPY N/A 2022    EGD BIOPSY performed by Lizzie Bobby MD at [address]     OB History    Para Term  AB Living   0 0 0 0 0 0   SAB IAB Ectopic Molar Multiple Live Births   0 0 0 0 0 0     Social History     Socioeconomic History    Marital status: Single     Spouse name: Not on file    Number of children: Not on file    Years of education: Not on file    Highest education level: Not on file   Occupational History    Not on file   Tobacco Use    Smoking status: Never    Smokeless tobacco: Never   Vaping Use    Vaping Use: Never used   Substance and Sexual Activity    Alcohol use: Yes     Comment: social    Drug use: Never    Sexual activity: Not Currently   Other Topics Concern    Not on file   Social History Narrative    Not on file     Social Determinants of Health     Financial Resource Strain: Not on file   Food Insecurity: Not on file   Transportation Needs: Not on file   Physical Activity: Not on file   Stress: Not on file   Social Connections: Not on file   Intimate Partner Violence: Not on file   Housing Stability: Not on file     Allergies   Allergen Reactions    Shellfish-Derived Products Anaphylaxis    Strawberry (Diagnostic) Anaphylaxis    Bee Venom Hives and Swelling     Outpatient Medications Marked as Taking for the 3/3/23 encounter (Office Visit) with Omari Proctor MD   Medication Sig Dispense Refill    estradiol (ESTRACE) 1 MG tablet Take 1 tablet by mouth daily 30 tablet 1    doxycycline monohydrate (MONODOX) 100 MG capsule Take 1 capsule by mouth 2 times daily for 7 days 14 capsule 0    Drospirenone (SLYND) 4 MG TABS Take 1 tablet by mouth daily 84 tablet 3    azelastine (ASTELIN) 0.1 % nasal spray 1 spray by Nasal route 2 times daily Use in each nostril as directed 60 mL 3    ferrous sulfate (IRON 325) 325 (65 Fe) MG tablet Take 1 tablet by mouth See Admin Instructions Every other day 90 tablet 1    albuterol sulfate HFA (PROVENTIL;VENTOLIN;PROAIR) 108 (90 Base) MCG/ACT inhaler Inhale 2 puffs into the lungs 4 times daily 18 g 3    sodium chloride (ALTAMIST SPRAY) 0.65 % nasal spray 1 spray by Nasal route as needed for Congestion 1 each 3    omeprazole (PRILOSEC) 20 MG delayed release capsule Take 1 capsule by mouth every morning (before breakfast) 90 capsule 1    Torsemide 40 MG TABS Take 40 mg by mouth daily 90 tablet 1    Handicap Placard MISC by Does not apply route From 11/2/2021 until 2/28/2022 1 each 0     Family History   Problem Relation Age of Onset    Arthritis Mother     Elevated Lipids Mother     Diabetes Father     Kidney Disease Father      /76 (Site: Right Lower Arm, Position: Sitting, Cuff Size: Large Adult)   Pulse (!) 102   Resp 17   Ht 5' 3\" (1.6 m)   Wt (!) 370 lb (167.8 kg)   SpO2 99%   BMI 65.54 kg/m²       Objective:   Physical Exam  Constitutional:       General: She is not in acute distress. Appearance: She is well-developed. She is not diaphoretic. HENT:      Head: Normocephalic. Eyes:      Extraocular Movements: Extraocular movements intact. Abdominal:      General: There is no distension. Palpations: Abdomen is soft. There is no mass. Tenderness: There is no abdominal tenderness. There is no guarding or rebound. Genitourinary:     General: Normal vulva. Exam position: Lithotomy position. Labia:         Right: No rash, tenderness, lesion or injury. Left: No rash, tenderness, lesion or injury. Urethra: No prolapse, urethral pain, urethral swelling or urethral lesion. Vagina: No signs of injury and foreign body. No vaginal discharge, erythema, tenderness, bleeding, lesions or prolapsed vaginal walls. Cervix: No cervical motion tenderness, discharge, friability, lesion, erythema, cervical bleeding or eversion. Uterus: Tender. Not deviated, not enlarged, not fixed and no uterine prolapse. Adnexa:         Right: No mass, tenderness or fullness. Left: No mass, tenderness or fullness. Rectum: No external hemorrhoid. Comments: Normal urethral meatus, nl urethra, nl bladder    IUD string seen at os. Musculoskeletal:         General: No tenderness. Normal range of motion. Cervical back: Normal range of motion. Skin:     General: Skin is warm and dry. Coloration: Skin is not pale. Findings: No erythema or rash. Neurological:      Mental Status: She is alert and oriented to person, place, and time. Psychiatric:         Behavior: Behavior normal.         Thought Content: Thought content normal.         Judgment: Judgment normal.       Assessment:      Irregular bleeding  Pelvic pain female  IUD in place      Plan:      1-3. Feel Mirena causing pain and feel it needs removed. Will proceed with removal. Try estradiol 1 mg daily for 30 days. Then begin SLYND. Doxycycline for possible endometritis. Patient concerned about PCOS-check hormones, labs.         Peyton Knott MD

## 2023-03-07 LAB — TESTOSTERONE TOTAL: 14 NG/DL (ref 20–70)

## 2023-03-08 LAB — DHEAS (DHEA SULFATE): 71 UG/DL (ref 45–270)

## 2023-03-15 PROBLEM — E78.2 MIXED HYPERLIPIDEMIA: Status: ACTIVE | Noted: 2023-03-15

## 2023-03-15 ASSESSMENT — ENCOUNTER SYMPTOMS
ALLERGIC/IMMUNOLOGIC NEGATIVE: 1
EYES NEGATIVE: 1
RESPIRATORY NEGATIVE: 1
COUGH: 0
GASTROINTESTINAL NEGATIVE: 1
SHORTNESS OF BREATH: 0

## 2023-03-20 ENCOUNTER — TELEPHONE (OUTPATIENT)
Dept: GYNECOLOGY | Age: 38
End: 2023-03-20

## 2023-03-21 ENCOUNTER — OFFICE VISIT (OUTPATIENT)
Dept: INTERNAL MEDICINE CLINIC | Age: 38
End: 2023-03-21
Payer: MEDICAID

## 2023-03-21 VITALS — BODY MASS INDEX: 48.82 KG/M2 | HEIGHT: 65 IN | TEMPERATURE: 98.6 F | WEIGHT: 293 LBS

## 2023-03-21 DIAGNOSIS — E66.01 MORBID OBESITY (HCC): Primary | ICD-10-CM

## 2023-03-21 DIAGNOSIS — J30.2 SEASONAL ALLERGIC RHINITIS, UNSPECIFIED TRIGGER: ICD-10-CM

## 2023-03-21 PROCEDURE — 99213 OFFICE O/P EST LOW 20 MIN: CPT | Performed by: STUDENT IN AN ORGANIZED HEALTH CARE EDUCATION/TRAINING PROGRAM

## 2023-03-21 RX ORDER — ECHINACEA PURPUREA EXTRACT 125 MG
1 TABLET ORAL PRN
Qty: 1 EACH | Refills: 3 | Status: SHIPPED | OUTPATIENT
Start: 2023-03-21

## 2023-03-21 RX ORDER — AZELASTINE 1 MG/ML
1 SPRAY, METERED NASAL 2 TIMES DAILY
Qty: 60 ML | Refills: 3 | Status: SHIPPED | OUTPATIENT
Start: 2023-03-21

## 2023-03-21 ASSESSMENT — ENCOUNTER SYMPTOMS
RESPIRATORY NEGATIVE: 1
ALLERGIC/IMMUNOLOGIC NEGATIVE: 1

## 2023-03-21 NOTE — PATIENT INSTRUCTIONS
Please follow up in 3 months  Please keep scheduled appointment with your bariatric specialist  If any issues, please call the clinic  See you in 3 months

## 2023-03-21 NOTE — LETTER
2023       Kortney Espinosa YOB: 1985   [place] [id number] Date of Visit:  3/21/2023       To Whom It May Concern:      I am sending this letter on behalf of Kortney Espinosa for Pre-approval to be treated by Bariatric Surgery. Kortney Espinosa is 42 y.o. (: 1985), weighs Weight: (!) 367 lb 12.8 oz (166.8 kg) and is Height: 5' 5\" (165.1 cm) tall. That gives her a Body Mass Index of Body mass index is 61.21 kg/m². Novamelania Quiñones Along with Morbid Obesity, the patient has a history of obstructive sleep apnea, Pulmonary HTN. From my medical opinion, patient would benefit from weight loss surgery to assist with improvement of medical conditions like obstructive sleep apnea. Our team is sending this letter to receive pre-approval for Leydi Patel 1277 review with Bariatric surgery and recommendations decided by bariatric surgery. Please let us know if you have any questions or require any further information.      Sincerely,     Elif Osorio MD   Internal Medicine   [facility]  [address], [address]

## 2023-03-21 NOTE — PROGRESS NOTES
Comprehensive Nutrition Assessment    Type and Reason for Visit:  Reassess    Nutrition Recommendations/Plan:   No new nutrition related recommendations at this time    Nutrition Assessment:  Pt remains in droplet plus isolation d/t COVID-19 and was unavailable to speak with this RD over the phone. Minimal PO intake has been documented in chart; however note 51-75% x 1 meal documented 10/10 and 1-25% x 1 meal 10/8. Will continue to offer Nepro BID for additional nutrition. Malnutrition Assessment:  Malnutrition Status:  No malnutrition      Estimated Daily Nutrient Needs:  Energy (kcal):  1264 - 1738; Weight Used for Energy Requirements:  Current (158 kg)     Protein (g):  118; Weight Used for Protein Requirements:  Ideal (2g/59kg)        Fluid (ml/day):   ; Method Used for Fluid Requirements:  1 ml/kcal      Nutrition Related Findings:  Generalized trace edema. Wounds:  None       Current Nutrition Therapies:    ADULT DIET; Regular; Low Potassium (Less than 3000 mg/day)  Adult Oral Nutrition Supplement;  Renal Oral Supplement    Anthropometric Measures:  · Height: 5' 6\" (167.6 cm)  · Current Body Weight: 348 lb 1.7 oz (157.9 kg)   · Admission Body Weight: 350 lb (158.8 kg)    · Ideal Body Weight: 130 lbs; % Ideal Body Weight 267.8 %   · BMI: 56.2  · BMI Categories: Obese Class 3 (BMI 40.0 or greater)       Nutrition Diagnosis:   · Inadequate oral intake related to impaired respiratory function as evidenced by  (PO intake not consistently at least 50% of meals)      Nutrition Interventions:   Food and/or Nutrient Delivery:  Continue Current Diet, Continue Oral Nutrition Supplement  Nutrition Education/Counseling:  Education not indicated   Coordination of Nutrition Care:  Continue to monitor while inpatient    Goals:  PO intake 50% or greater       Nutrition Monitoring and Evaluation:   Behavioral-Environmental Outcomes:  None Identified   Food/Nutrient Intake Outcomes:  Food and Nutrient Intake, Supplement Intake  Physical Signs/Symptoms Outcomes:  None Identified     Discharge Planning:     Too soon to determine     Electronically signed by Shameka Dowling RD, LD on 10/11/21 at 8:43 AM EDT    Contact: [phone] Medical Necessity Information: It is in your best interest to select a reason for this procedure from the list below. All of these items fulfill various CMS LCD requirements except the new and changing color options. Add 52 Modifier (Optional): no Consent: The patient's consent was obtained including but not limited to risks of crusting, scabbing, blistering, scarring, darker or lighter pigmentary change, recurrence, incomplete removal and infection. Medical Necessity Clause: This procedure was medically necessary because the lesions that were treated were: Detail Level: Detailed Treatment Number (Will Not Render If 0): 0 Anesthesia Volume In Cc: 0.5 Post-Care Instructions: I reviewed with the patient in detail post-care instructions. Patient is to wear sunprotection, and avoid picking at any of the treated lesions. Pt may apply Vaseline to crusted or scabbing areas.

## 2023-03-21 NOTE — LETTER
To Whom It May Concern:      I am sending this letter on behalf of Mirna Garcia for Pre-approval to be treated by Bariatric Surgery. Mirna Garcia is 42 y.o. (: 1985), weighs Weight: (!) 367 lb 12.8 oz (166.8 kg) and is Height: 5' 5\" (165.1 cm) tall. That gives her a Body Mass Index of Body mass index is 61.21 kg/m². Gladys Hicks Along with Morbid Obesity, the patient has a history of obstructive sleep apnea, Pulmonary HTN. From my medical opinion, patient would benefit from weight loss surgery to assist with improvement of medical conditions like obstructive sleep apnea. Our team is sending this letter to receive pre-approval for Leydi Patel 1277 review with Bariatric surgery and recommendations decided by bariatric surgery. Please let us know if you have any questions or require any further information.      Sincerely,     Josselin Moss MD   Internal Medicine   [facility]  [address], [address]  Phone: [phone]

## 2023-03-21 NOTE — LETTER
[facility]    To Whom It May Concern:      I am sending this letter on behalf of Michelle Duggan for Pre-approval to be treated by Bariatric Surgery. Michelle Duggan is 42 y.o. (: 1985), weighs Weight: (!) 367 lb 12.8 oz (166.8 kg) and is Height: 5' 5\" (165.1 cm) tall. That gives her a Body Mass Index of Body mass index is 61.21 kg/m². Romina Booth Along with Morbid Obesity, the patient has a history of obstructive sleep apnea, Pulmonary HTN. From my medical opinion, patient would benefit from weight loss surgery to assist with improvement of medical conditions like obstructive sleep apnea. Our team is sending this letter to receive pre-approval for Leydi Patel 127David review with Bariatric surgery and recommendations decided by bariatric surgery. Please let us know if you have any questions or require any further information.      Sincerely,     Kay Foy MD   Internal Medicine   [facility]  [address] [place], [address]  Phone: [phone]

## 2023-03-21 NOTE — PROGRESS NOTES
(IRON 325) 325 (65 Fe) MG tablet Take 1 tablet by mouth See Admin Instructions Every other day  Patient not taking: Reported on 3/21/2023  Brent Pollard DO   EPINEPHrine (EPIPEN 2-JORDAN) 0.3 MG/0.3ML SOAJ injection Inject 0.3 mLs into the muscle once as needed (anaphylaxis) Use as directed for allergic reaction  Yasmin Vicente MD        Vitals:    03/21/23 1520   Temp: 98.6 °F (37 °C)   TempSrc: Temporal   Weight: (!) 367 lb 12.8 oz (166.8 kg)   Height: 5' 5\" (1.651 m)      Estimated body mass index is 61.21 kg/m² as calculated from the following:    Height as of this encounter: 5' 5\" (1.651 m). Weight as of this encounter: 367 lb 12.8 oz (166.8 kg). Physical Exam  Constitutional:       Appearance: She is obese. She is not ill-appearing. HENT:      Mouth/Throat:      Mouth: Mucous membranes are moist.   Cardiovascular:      Rate and Rhythm: Normal rate. Pulmonary:      Effort: Pulmonary effort is normal.      Breath sounds: Normal breath sounds. Musculoskeletal:      Right lower leg: No edema. Left lower leg: No edema. Skin:     General: Skin is warm. Capillary Refill: Capillary refill takes less than 2 seconds. Neurological:      General: No focal deficit present. Mental Status: She is alert and oriented to person, place, and time. ASSESSMENT/PLAN:     1. Morbid obesity ([address]  BMI: 61  Has had significant weight gain  Now following diet and exercise   Being followed by Bariatric surgery Dr Tracey Wynne of necessity provided to patient   From my medical opinion, patient would benefit from weight loss surgery to assist with improvement of medical conditions like obstructive sleep apnea.       2. Seasonal allergic rhinitis, unspecified trigger  -     azelastine (ASTELIN) 0.1 % nasal spray; 1 spray by Nasal route 2 times daily Use in each nostril as directed, Disp-60 mL, R-3Normal  -     sodium chloride (ALTAMIST SPRAY) 0.65 % nasal spray; 1 spray by Nasal route as needed for

## 2023-03-23 RX ORDER — ALBUTEROL SULFATE 90 UG/1
2 AEROSOL, METERED RESPIRATORY (INHALATION) 4 TIMES DAILY
Qty: 18 G | Refills: 3 | Status: SHIPPED | OUTPATIENT
Start: 2023-03-23

## 2023-03-27 ENCOUNTER — OFFICE VISIT (OUTPATIENT)
Dept: BARIATRICS/WEIGHT MGMT | Age: 38
End: 2023-03-27
Payer: MEDICAID

## 2023-03-27 VITALS
OXYGEN SATURATION: 99 % | HEART RATE: 102 BPM | DIASTOLIC BLOOD PRESSURE: 88 MMHG | SYSTOLIC BLOOD PRESSURE: 124 MMHG | WEIGHT: 293 LBS | BODY MASS INDEX: 48.82 KG/M2 | HEIGHT: 65 IN | TEMPERATURE: 97.3 F

## 2023-03-27 DIAGNOSIS — E66.01 MORBID OBESITY WITH BMI OF 60.0-69.9, ADULT (HCC): ICD-10-CM

## 2023-03-27 DIAGNOSIS — E78.2 MIXED HYPERLIPIDEMIA: ICD-10-CM

## 2023-03-27 DIAGNOSIS — G47.33 OBSTRUCTIVE SLEEP APNEA SYNDROME: ICD-10-CM

## 2023-03-27 DIAGNOSIS — S09.90XS HEAD TRAUMA, SEQUELA: ICD-10-CM

## 2023-03-27 DIAGNOSIS — Z01.818 PREOPERATIVE CLEARANCE: ICD-10-CM

## 2023-03-27 DIAGNOSIS — K21.9 CHRONIC GERD: Primary | ICD-10-CM

## 2023-03-27 PROCEDURE — 99214 OFFICE O/P EST MOD 30 MIN: CPT | Performed by: NURSE PRACTITIONER

## 2023-03-27 PROCEDURE — G8427 DOCREV CUR MEDS BY ELIG CLIN: HCPCS | Performed by: NURSE PRACTITIONER

## 2023-03-27 PROCEDURE — G8484 FLU IMMUNIZE NO ADMIN: HCPCS | Performed by: NURSE PRACTITIONER

## 2023-03-27 PROCEDURE — G8417 CALC BMI ABV UP PARAM F/U: HCPCS | Performed by: NURSE PRACTITIONER

## 2023-03-27 PROCEDURE — 1036F TOBACCO NON-USER: CPT | Performed by: NURSE PRACTITIONER

## 2023-03-27 NOTE — PROGRESS NOTES
[facility]) Physicians   [employer]    Subjective:      Patient ID: Dayanara Dong is a 42 y.o. female    HPI    The patient is here for their bariatric surgery presurgical visit for future weight loss. She has made several attempts at weight loss in the past without success and now wishes to pursue bariatric surgery. She is working to change her dietary behaviors and lose weight to improve comorbid conditions such as obstructive sleep apnea and GERD. Dayanara Dong is a 42 y.o. female with Body mass index is 60.64 kg/m². Past Medical History:   Diagnosis Date    Bacterial vaginosis     COVID-19     Dysmenorrhea     Hypotension due to hypovolemia     Menorrhagia     Obesity     Ovarian cyst      Past Surgical History:   Procedure Laterality Date    DILATION AND CURETTAGE OF UTERUS      UPPER GASTROINTESTINAL ENDOSCOPY N/A 12/22/2022    EGD BIOPSY performed by Sophie Turcios MD at [address] [place] [address]     Family History   Problem Relation Age of Onset    Arthritis Mother     Elevated Lipids Mother     Diabetes Father     Kidney Disease Father      Social History     Tobacco Use    Smoking status: Never    Smokeless tobacco: Never   Substance Use Topics    Alcohol use: Yes     Comment: social     I counseled the patient on the importance of not smoking and risks of ETOH. Allergies   Allergen Reactions    Shellfish-Derived Products Anaphylaxis    Strawberry (Diagnostic) Anaphylaxis    Bee Venom Hives and Swelling     Vitals:    03/27/23 0913   BP: 124/88   Site: Right Upper Arm   Position: Sitting   Cuff Size: Large Adult   Pulse: (!) 102   Temp: 97.3 °F (36.3 °C)   TempSrc: Temporal   SpO2: 99%   Weight: (!) 364 lb 6.4 oz (165.3 kg)   Height: 5' 5\" (1.651 m)     Body mass index is 60.64 kg/m².     Current Outpatient Medications:     albuterol sulfate HFA (PROVENTIL;VENTOLIN;PROAIR) 108 (90 Base) MCG/ACT inhaler, Inhale 2 puffs into the lungs 4 times daily, Disp: 18 g, Rfl: 3    azelastine
sugar   Did review financial expectations with pt regarding purchasing of protein powder and vitamins for preop class and post op     Handouts: none - pt reports still has handouts from last visit she can refer to     Ke Carlisle RD, LD

## 2023-03-27 NOTE — PATIENT INSTRUCTIONS
Goals in preparing for bariatric surgery  You should be giving up all beverages that have carbonation, sugar, and caffeine (Refer to the approved liquids list provided at initial visit). You should be drinking 64 ounces of low calorie (5 calories or less per serving) fluids per day. Suggestions include:  Water (you may add fresh lemon or lime)  Crystal Light  [place] Liquid Water Enhancer  Propel Zero  Powerade Zero/Gatorade Zero  Isopure  [id number]  SOBE Lifewater Zero  Vitamin Water Zero  Sugar Free Ray-Aid  You should be eating 4-6 times per day. Three small meals plus 1-2 snacks per day is your goal. This balances your calories and nutrients evenly throughout the day and helps to boost your metabolism. Refer to the snack list provided at your initial visit. Aim for a protein at every snack, plus a fruit, vegetable or starch. You should be eating protein at every meal and snack. Protein is typically found in animal sources, i.e. chicken, lean beef, lean pork, fish, seafood and eggs. It is also found in low-fat dairy sources such as skim or 1% milk, low-fat yogurt, low-fat cheese, and low-fat cottage cheese. Plant based sources of protein include peanut butter, beans, and soy. You should be utilizing the 9-inch plate method. Eating on a smaller plate will help you control portion size, but what you put on your plate counts also. Make ¼ of your plate lean protein, ¼ carbohydrate (fruit, grain or starchy vegetables) and ½ the plate non-starchy vegetables. You should eliminate caffeine. Caffeine is dehydrating. After surgery, it's very important to stay hydrated. Giving up caffeine before surgery will help you focus on the changes necessary to be successful after surgery. There are many decaffeinated coffee and tea products available in grocery stores. You should eliminate alcohol. You must abstain from alcohol prior to surgery and for at least the first 6-9 months after surgery.  Although you may have alcohol

## 2023-04-05 ASSESSMENT — ENCOUNTER SYMPTOMS
ALLERGIC/IMMUNOLOGIC NEGATIVE: 1
COUGH: 0
RESPIRATORY NEGATIVE: 1
EYES NEGATIVE: 1
GASTROINTESTINAL NEGATIVE: 1
SHORTNESS OF BREATH: 0

## 2023-04-24 ENCOUNTER — OFFICE VISIT (OUTPATIENT)
Dept: BARIATRICS/WEIGHT MGMT | Age: 38
End: 2023-04-24
Payer: MEDICAID

## 2023-04-24 VITALS
BODY MASS INDEX: 48.82 KG/M2 | TEMPERATURE: 97.2 F | HEART RATE: 90 BPM | WEIGHT: 293 LBS | DIASTOLIC BLOOD PRESSURE: 82 MMHG | SYSTOLIC BLOOD PRESSURE: 122 MMHG | OXYGEN SATURATION: 96 % | HEIGHT: 65 IN

## 2023-04-24 DIAGNOSIS — E66.01 MORBID OBESITY WITH BMI OF 60.0-69.9, ADULT (HCC): ICD-10-CM

## 2023-04-24 DIAGNOSIS — E78.2 MIXED HYPERLIPIDEMIA: ICD-10-CM

## 2023-04-24 DIAGNOSIS — K21.9 CHRONIC GERD: Primary | ICD-10-CM

## 2023-04-24 DIAGNOSIS — G47.33 OBSTRUCTIVE SLEEP APNEA SYNDROME: ICD-10-CM

## 2023-04-24 PROCEDURE — 1036F TOBACCO NON-USER: CPT | Performed by: NURSE PRACTITIONER

## 2023-04-24 PROCEDURE — G8417 CALC BMI ABV UP PARAM F/U: HCPCS | Performed by: NURSE PRACTITIONER

## 2023-04-24 PROCEDURE — 99214 OFFICE O/P EST MOD 30 MIN: CPT | Performed by: NURSE PRACTITIONER

## 2023-04-24 PROCEDURE — G8427 DOCREV CUR MEDS BY ELIG CLIN: HCPCS | Performed by: NURSE PRACTITIONER

## 2023-04-24 NOTE — PROGRESS NOTES
Miranda Sanchez gained 5 lbs over past month, reports is having some swelling. Pt is allergic to shellfish and strawberry. 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 States she is trying to eat more often and consume less fast food. Is pt eating at least 4 times everyday? Eats 4 x day     Is pt eating a lean protein source with all meals and snacks? Mostly but not always   S - yogurt  B - leftovers OR premier protein shake   L - leftovers   D - spaghetti with meat sauce OR baked chicken wing with mashed potatoes OR baked potato     Has pt decreased their portions using the plate method? Reports these are improving     Is pt choosing low fat/sugar free options? Reports she is     Is pt drinking at least 64 oz of clear liquids everyday? Yes     Has pt stopped drinking carbonation, caffeinated, and sugar sweetened beverages? No - Drinks water with ice, tea - shanell/chamomille (pt not sure if caffeinated) with honey, 1 mountain dew per day, [place] green tea (100 calories), eliminated minute maid (regular) and switched to minute maid zero    Has pt sampled Unjury and/or Nectar protein? Yes - tried and tolerated - likes chocolate and chicken broth     Has pt attended a support group? Completed 12/22     Participating in intentional exercise?  Not recently     Plan/Recommendations:   Increase nonstarchy veggie intake  Be consistent with protein with all meals   Avoid soda / Tom Dawley green tea / omit honey in tea / check label re: caffeine     Handouts:none     Adelina Johansen, RD, LD

## 2023-04-25 DIAGNOSIS — R41.3 MEMORY DIFFICULTIES: ICD-10-CM

## 2023-04-25 DIAGNOSIS — S09.90XS HEAD TRAUMA, SEQUELA: ICD-10-CM

## 2023-04-25 DIAGNOSIS — Z01.818 PREOPERATIVE CLEARANCE: Primary | ICD-10-CM

## 2023-05-02 RX ORDER — DROSPIRENONE 4 MG/1
1 TABLET, FILM COATED ORAL DAILY
Qty: 84 TABLET | Refills: 1 | Status: SHIPPED | OUTPATIENT
Start: 2023-05-02

## 2023-05-26 ENCOUNTER — TELEPHONE (OUTPATIENT)
Dept: CARDIOLOGY CLINIC | Age: 38
End: 2023-05-26

## 2023-05-30 ENCOUNTER — OFFICE VISIT (OUTPATIENT)
Dept: BARIATRICS/WEIGHT MGMT | Age: 38
End: 2023-05-30
Payer: MEDICAID

## 2023-05-30 VITALS
BODY MASS INDEX: 47.09 KG/M2 | HEIGHT: 66 IN | WEIGHT: 293 LBS | SYSTOLIC BLOOD PRESSURE: 118 MMHG | HEART RATE: 97 BPM | RESPIRATION RATE: 18 BRPM | OXYGEN SATURATION: 98 % | DIASTOLIC BLOOD PRESSURE: 70 MMHG

## 2023-05-30 DIAGNOSIS — G47.33 OBSTRUCTIVE SLEEP APNEA SYNDROME: ICD-10-CM

## 2023-05-30 DIAGNOSIS — E78.2 MIXED HYPERLIPIDEMIA: ICD-10-CM

## 2023-05-30 DIAGNOSIS — E66.01 MORBID OBESITY WITH BMI OF 60.0-69.9, ADULT (HCC): Primary | ICD-10-CM

## 2023-05-30 DIAGNOSIS — K21.9 CHRONIC GERD: ICD-10-CM

## 2023-05-30 PROCEDURE — 99214 OFFICE O/P EST MOD 30 MIN: CPT | Performed by: SURGERY

## 2023-05-30 PROCEDURE — G8427 DOCREV CUR MEDS BY ELIG CLIN: HCPCS | Performed by: SURGERY

## 2023-05-30 PROCEDURE — G8417 CALC BMI ABV UP PARAM F/U: HCPCS | Performed by: SURGERY

## 2023-05-30 PROCEDURE — 1036F TOBACCO NON-USER: CPT | Performed by: SURGERY

## 2023-05-30 NOTE — PROGRESS NOTES
Estrellita Reveles lost 8 lbs over 1 month. Took diuretic. Continuing portion control, no pop. Pt is allergic to shellfish and strawberry. 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 Breakfast: None, sleeping    Snack: none    Lunch: Wakes up 2:30pm and eats around 3-4pm cereal (honey bunches of oats w/ 2% milk) (1 regular size bowl) OR protein shake    Snack: None    Dinner: 11-12pm grilled/baked chicken w/ potato OR stir johnson (w/ veggies and chicken and rice)    Snack: None OR some premier protein shake    Is pt consuming smaller portions? yes , she is    Is pt consuming at least 64 oz of fluids per day? yes , she is    Is pt consuming carbonated, caffeinated, or sugary beverages? no    Has pt sampled Unjury and/or Nectar protein? Yes, tried and tolerated    Has patient attended a support group?  Completed    Exercise: aiming for 6k steps daily    Plan/Recommendations:   Add protein based snack (1-2 daily)    Handouts: None    Dariel Mckenzie RD, LD
place, and time. Patient appears well-developed and well-nourished. Patient is active and cooperative. Non-toxic appearance. No distress. HENT:   Head: Normocephalic and atraumatic. Head is without abrasion and without laceration. Hair is normal.   Right Ear: External ear normal. No lacerations. No drainage, swelling . Left Ear: External ear normal. No lacerations. No drainage, swelling. Nose/Mouth: face mask in place  Eyes: Conjunctivae, EOM and lids are normal. Right eye exhibits no discharge. No foreign body present in the right eye. Left eye exhibits no discharge. No foreign body present in the left eye. No scleral icterus. Neck: Trachea normal and normal range of motion. No JVD present. Pulmonary/Chest: Effort normal. No accessory muscle usage or stridor. No apnea. No respiratory distress. Cardiovascular: Normal rate and no JVD. Abdominal: Normal appearance. Patient exhibits no distension. Abdomen is soft, obese, non tender. Musculoskeletal: Normal range of motion. Patient exhibits no edema. Neurological: Patient is alert and oriented to person, place, and time. Patient has normal strength. GCS eye subscore is 4. GCS verbal subscore is 5. GCS motor subscore is 6. Skin: Skin is warm and dry. No abrasion and no rash noted. Patient is not diaphoretic. No cyanosis or erythema. Psychiatric: Patient has a normal mood and affect. Speech is normal and behavior is normal. Cognition and memory are normal.       A/P    Rolanda Calabrese is 42 y.o. female, Body mass index is 58.78 kg/m². pre surgery, has lost 8 lbs since last visit. The patient underwent extensive dietary counseling with registered dietician. I have reviewed, discussed and agree with the dietary plan. Patient is trying hard to keep good dietary and behavior modifications. Patient is monitoring portion sizes, food choices and liquid calories. Patient is trying to exercise regularly as much as possible.     Obesity as a disease is

## 2023-06-04 DIAGNOSIS — J30.2 SEASONAL ALLERGIC RHINITIS, UNSPECIFIED TRIGGER: ICD-10-CM

## 2023-06-05 RX ORDER — AZELASTINE 1 MG/ML
1 SPRAY, METERED NASAL 2 TIMES DAILY
Qty: 60 ML | Refills: 3 | Status: SHIPPED | OUTPATIENT
Start: 2023-06-05

## 2023-06-05 NOTE — TELEPHONE ENCOUNTER
Requested Prescriptions     Pending Prescriptions Disp Refills    azelastine (ASTELIN) 0.1 % nasal spray 60 mL 3     Si spray by Nasal route 2 times daily Use in each nostril as directed       Last Clinic Visit:  3/21/2023     Next Clinic Appointment:  2023

## 2023-06-22 DIAGNOSIS — E66.01 MORBID OBESITY WITH BMI OF 60.0-69.9, ADULT (HCC): ICD-10-CM

## 2023-06-22 DIAGNOSIS — G47.33 OBSTRUCTIVE SLEEP APNEA SYNDROME: ICD-10-CM

## 2023-06-22 DIAGNOSIS — K21.9 CHRONIC GERD: ICD-10-CM

## 2023-06-22 DIAGNOSIS — E78.2 MIXED HYPERLIPIDEMIA: ICD-10-CM

## 2023-06-22 LAB
AMPHETAMINES UR QL SCN>1000 NG/ML: NORMAL
BARBITURATES UR QL SCN>200 NG/ML: NORMAL
BENZODIAZ UR QL SCN>200 NG/ML: NORMAL
CANNABINOIDS UR QL SCN>50 NG/ML: NORMAL
COCAINE UR QL SCN: NORMAL
DRUG SCREEN COMMENT UR-IMP: NORMAL
ETHANOLAMINE SERPL-MCNC: NORMAL MG/DL (ref 0–0.08)
FENTANYL SCREEN, URINE: NORMAL
HCG UR QL: NEGATIVE
METHADONE UR QL SCN>300 NG/ML: NORMAL
OPIATES UR QL SCN>300 NG/ML: NORMAL
OXYCODONE UR QL SCN: NORMAL
PCP UR QL SCN>25 NG/ML: NORMAL
PH UR STRIP: 6 [PH]

## 2023-06-26 LAB
COTININE SERPL-MCNC: <5 NG/ML
NICOTINE SERPL-MCNC: <5 NG/ML

## 2023-06-27 ENCOUNTER — OFFICE VISIT (OUTPATIENT)
Dept: BARIATRICS/WEIGHT MGMT | Age: 38
End: 2023-06-27
Payer: MEDICAID

## 2023-06-27 VITALS
RESPIRATION RATE: 18 BRPM | WEIGHT: 293 LBS | SYSTOLIC BLOOD PRESSURE: 116 MMHG | BODY MASS INDEX: 47.09 KG/M2 | OXYGEN SATURATION: 97 % | HEIGHT: 66 IN | DIASTOLIC BLOOD PRESSURE: 92 MMHG | HEART RATE: 77 BPM

## 2023-06-27 DIAGNOSIS — E66.01 MORBID OBESITY WITH BMI OF 60.0-69.9, ADULT (HCC): Primary | ICD-10-CM

## 2023-06-27 DIAGNOSIS — K21.9 CHRONIC GERD: ICD-10-CM

## 2023-06-27 DIAGNOSIS — G47.33 OBSTRUCTIVE SLEEP APNEA SYNDROME: ICD-10-CM

## 2023-06-27 DIAGNOSIS — E78.2 MIXED HYPERLIPIDEMIA: ICD-10-CM

## 2023-06-27 PROCEDURE — G8417 CALC BMI ABV UP PARAM F/U: HCPCS | Performed by: SURGERY

## 2023-06-27 PROCEDURE — 1036F TOBACCO NON-USER: CPT | Performed by: SURGERY

## 2023-06-27 PROCEDURE — G8427 DOCREV CUR MEDS BY ELIG CLIN: HCPCS | Performed by: SURGERY

## 2023-06-27 PROCEDURE — 99214 OFFICE O/P EST MOD 30 MIN: CPT | Performed by: SURGERY

## 2023-06-28 ASSESSMENT — ENCOUNTER SYMPTOMS
ABDOMINAL PAIN: 0
CHEST TIGHTNESS: 0
ABDOMINAL DISTENTION: 0
COUGH: 0
BLOOD IN STOOL: 0
NAUSEA: 0
PHOTOPHOBIA: 0

## 2023-07-11 RX ORDER — DROSPIRENONE 4 MG/1
1 TABLET, FILM COATED ORAL DAILY
Qty: 84 TABLET | Refills: 3 | Status: SHIPPED | OUTPATIENT
Start: 2023-07-11

## 2023-07-12 ENCOUNTER — OFFICE VISIT (OUTPATIENT)
Dept: CARDIOLOGY CLINIC | Age: 38
End: 2023-07-12

## 2023-07-12 VITALS
DIASTOLIC BLOOD PRESSURE: 88 MMHG | BODY MASS INDEX: 47.09 KG/M2 | WEIGHT: 293 LBS | HEIGHT: 66 IN | OXYGEN SATURATION: 99 % | HEART RATE: 90 BPM | SYSTOLIC BLOOD PRESSURE: 138 MMHG

## 2023-07-12 DIAGNOSIS — I50.812 CHRONIC RIGHT-SIDED HEART FAILURE (HCC): Primary | ICD-10-CM

## 2023-07-12 DIAGNOSIS — E66.01 CLASS 3 SEVERE OBESITY DUE TO EXCESS CALORIES WITH SERIOUS COMORBIDITY AND BODY MASS INDEX (BMI) OF 50.0 TO 59.9 IN ADULT (HCC): ICD-10-CM

## 2023-07-12 DIAGNOSIS — Z01.810 PREOPERATIVE CARDIOVASCULAR EXAMINATION: ICD-10-CM

## 2023-07-12 DIAGNOSIS — J32.9 FREQUENT SINUS INFECTIONS: ICD-10-CM

## 2023-07-12 DIAGNOSIS — G47.33 OBSTRUCTIVE SLEEP APNEA SYNDROME: ICD-10-CM

## 2023-07-12 DIAGNOSIS — R06.02 SHORTNESS OF BREATH: ICD-10-CM

## 2023-07-12 NOTE — PATIENT INSTRUCTIONS
Follow up with Dr Ziyad Mckeon in 6 months     ENT referral to discuss chronic sinus infections     Call for any questions or concerns.

## 2023-07-14 ASSESSMENT — ENCOUNTER SYMPTOMS: SHORTNESS OF BREATH: 0

## 2023-08-31 ENCOUNTER — OFFICE VISIT (OUTPATIENT)
Dept: BARIATRICS/WEIGHT MGMT | Age: 38
End: 2023-08-31
Payer: MEDICAID

## 2023-08-31 VITALS
DIASTOLIC BLOOD PRESSURE: 91 MMHG | HEART RATE: 82 BPM | SYSTOLIC BLOOD PRESSURE: 113 MMHG | RESPIRATION RATE: 18 BRPM | BODY MASS INDEX: 47.09 KG/M2 | WEIGHT: 293 LBS | HEIGHT: 66 IN

## 2023-08-31 DIAGNOSIS — G47.33 OBSTRUCTIVE SLEEP APNEA SYNDROME: ICD-10-CM

## 2023-08-31 DIAGNOSIS — K21.9 CHRONIC GERD: ICD-10-CM

## 2023-08-31 DIAGNOSIS — E78.2 MIXED HYPERLIPIDEMIA: ICD-10-CM

## 2023-08-31 DIAGNOSIS — E66.01 CLASS 3 SEVERE OBESITY DUE TO EXCESS CALORIES WITH SERIOUS COMORBIDITY AND BODY MASS INDEX (BMI) OF 50.0 TO 59.9 IN ADULT (HCC): Primary | ICD-10-CM

## 2023-08-31 PROCEDURE — 99214 OFFICE O/P EST MOD 30 MIN: CPT | Performed by: SURGERY

## 2023-08-31 PROCEDURE — G8417 CALC BMI ABV UP PARAM F/U: HCPCS | Performed by: SURGERY

## 2023-08-31 PROCEDURE — 1036F TOBACCO NON-USER: CPT | Performed by: SURGERY

## 2023-08-31 PROCEDURE — G8427 DOCREV CUR MEDS BY ELIG CLIN: HCPCS | Performed by: SURGERY

## 2023-08-31 NOTE — PATIENT INSTRUCTIONS
Patient received dietary handouts and education.     Plan/Recommendations:   - Be consistent with CPAP  - Avoid all fast food and carbonation  - Drink 4 bottles of water/day

## 2023-09-21 ENCOUNTER — OFFICE VISIT (OUTPATIENT)
Dept: PULMONOLOGY | Age: 38
End: 2023-09-21
Payer: MEDICAID

## 2023-09-21 VITALS — BODY MASS INDEX: 57.83 KG/M2 | WEIGHT: 293 LBS | HEART RATE: 108 BPM | OXYGEN SATURATION: 97 %

## 2023-09-21 DIAGNOSIS — G47.33 OSA (OBSTRUCTIVE SLEEP APNEA): Primary | ICD-10-CM

## 2023-09-21 DIAGNOSIS — I27.20 PULMONARY HYPERTENSION (HCC): ICD-10-CM

## 2023-09-21 DIAGNOSIS — J30.2 SEASONAL ALLERGIC RHINITIS, UNSPECIFIED TRIGGER: ICD-10-CM

## 2023-09-21 PROCEDURE — G8427 DOCREV CUR MEDS BY ELIG CLIN: HCPCS | Performed by: INTERNAL MEDICINE

## 2023-09-21 PROCEDURE — G8417 CALC BMI ABV UP PARAM F/U: HCPCS | Performed by: INTERNAL MEDICINE

## 2023-09-21 PROCEDURE — 99214 OFFICE O/P EST MOD 30 MIN: CPT | Performed by: INTERNAL MEDICINE

## 2023-09-21 PROCEDURE — 1036F TOBACCO NON-USER: CPT | Performed by: INTERNAL MEDICINE

## 2023-09-21 NOTE — PROGRESS NOTES
PULMONARY OFFICE FOLLOW UP NOTE    REASON FOR VISIT:   Chief Complaint   Patient presents with    Sleep Apnea       DATE OF VISIT: 9/21/2023    HISTORY OF PRESENT ILLNESS: 34y.o. year old female  is here for follow-up of KAREN and pulmonary hypertension on echo. Patient has history of COVID-pneumonia in October 2021. Patient is not using her CPAP regularly. Overall compliance is only 27% with more than 4 hours usage is only 13%. The time since he uses CPAP AHI 0.9 without any large leakage. Patient uses a nasal mask. She states that she keeps adjusting the mask overnight as the mask moves because she is a side sleeper. Previously: Patient was hospitalized for Covid pneumonia in October 2021 and treated with steroids, Actemra. Could not get remdesivir due to acute kidney injury. She was discharged on home oxygen up to 4 L/min as well as Xarelto. Patient was feeling good for few days but then she presented at [facility] on 10/20/2021 for worsening shortness of breath. She underwent a CT chest that did not show any PE but showed bilateral groundglass opacities consistent with Covid pneumonia. Echo was performed that showed pulmonary hypertension with RV pressure overload. She had an echo performed 9 days earlier that did not show any evidence of pulmonary hypertension. Patient was also taking Xarelto before she presented to [facility] for worsening symptoms. She was noted to have fluid overload and was initiated on Lasix with which her symptoms improved. She was finally discharged on 3 L/min oxygen. She is not using oxygen now. She denies any shortness of breath or cough or wheezing or chest pain or hemoptysis. Diagnostic test reviewed:  I reviewed the chest x-ray from 2/9/2022 in my interpretation is as follows. Resolution of bilateral lung infiltrates. I reviewed the CT chest from 10/9/2021 and my interpretation is as follows. No PE noted.   Bilateral groundglass

## 2023-09-26 ENCOUNTER — OFFICE VISIT (OUTPATIENT)
Dept: BARIATRICS/WEIGHT MGMT | Age: 38
End: 2023-09-26
Payer: MEDICAID

## 2023-09-26 VITALS
SYSTOLIC BLOOD PRESSURE: 108 MMHG | DIASTOLIC BLOOD PRESSURE: 77 MMHG | HEIGHT: 66 IN | WEIGHT: 293 LBS | OXYGEN SATURATION: 92 % | HEART RATE: 76 BPM | BODY MASS INDEX: 47.09 KG/M2

## 2023-09-26 DIAGNOSIS — E78.2 MIXED HYPERLIPIDEMIA: ICD-10-CM

## 2023-09-26 DIAGNOSIS — E66.01 CLASS 3 SEVERE OBESITY DUE TO EXCESS CALORIES WITH SERIOUS COMORBIDITY AND BODY MASS INDEX (BMI) OF 50.0 TO 59.9 IN ADULT (HCC): Primary | ICD-10-CM

## 2023-09-26 DIAGNOSIS — K21.9 CHRONIC GERD: ICD-10-CM

## 2023-09-26 DIAGNOSIS — G47.33 OBSTRUCTIVE SLEEP APNEA SYNDROME: ICD-10-CM

## 2023-09-26 PROCEDURE — G8417 CALC BMI ABV UP PARAM F/U: HCPCS | Performed by: SURGERY

## 2023-09-26 PROCEDURE — 99214 OFFICE O/P EST MOD 30 MIN: CPT | Performed by: SURGERY

## 2023-09-26 PROCEDURE — 1036F TOBACCO NON-USER: CPT | Performed by: SURGERY

## 2023-09-26 PROCEDURE — G8427 DOCREV CUR MEDS BY ELIG CLIN: HCPCS | Performed by: SURGERY

## 2023-09-26 NOTE — PATIENT INSTRUCTIONS
Patient received dietary handouts and education.       Plan/Recommendations:   - Continue current eating patterns including protein and plants with all meals and snacks-  - Continue walking

## 2023-09-26 NOTE — PROGRESS NOTES
Maday Varghese lost 0.6 lbs over 1 month. Pt is allergic to shellfish and strawberry. Wake up 11:30 PM, [facility]  Breakfast: 12:30-1 PM: Wrap w/ turkey + chips or none  Snack: 5 PM: Cereal w/ 1% milk  Lunch: 8-9 PM: Baked chix + rice/potato  OR tacos w/ cheese/rock/GB   Snack: None  Dinner: 11-12: Chix from dinner  Snack: None    Is pt consuming smaller portions? Generally taking less    Is pt consuming at least 64 oz of fluids per day? 64 oz water bottle finishes 3/4 + 1-2 bottles water      Is pt consuming carbonated, caffeinated, or sugary beverages? No pop this month    Has pt sampled Unjury and/or Nectar protein? Likes strawberry + chocolate + rootbeer    Has patient attended a support group?  Completed December 2022    Exercise: Walking in AM few X/week    Plan/Recommendations:   - Continue current eating patterns including protein and plants with all meals and snacks-  - Continue walking     Handouts: none    Sushant Beasley, ESTUARDO, LD

## 2023-10-03 ASSESSMENT — ENCOUNTER SYMPTOMS
COUGH: 0
RESPIRATORY NEGATIVE: 1
EYES NEGATIVE: 1
ALLERGIC/IMMUNOLOGIC NEGATIVE: 1
GASTROINTESTINAL NEGATIVE: 1
SHORTNESS OF BREATH: 0

## 2023-10-04 NOTE — PROGRESS NOTES
Patient Name:   Reese Ruiz       Type of Surgery:  Sleeve         Date of Surgery:  10/30/23       Start Pre-Op Diet On:  10/17/23       Start Clear Liquids On:  10/29/23         NPO after midnight the night before your surgery! Take morning medications with a small amount of water.     NOTES:_______________________________________  ______________________________________________

## 2023-10-10 ENCOUNTER — OFFICE VISIT (OUTPATIENT)
Dept: BARIATRICS/WEIGHT MGMT | Age: 38
End: 2023-10-10
Payer: MEDICAID

## 2023-10-10 ENCOUNTER — TELEPHONE (OUTPATIENT)
Dept: BARIATRICS/WEIGHT MGMT | Age: 38
End: 2023-10-10

## 2023-10-10 VITALS — HEIGHT: 66 IN | WEIGHT: 293 LBS | BODY MASS INDEX: 47.09 KG/M2

## 2023-10-10 DIAGNOSIS — G47.33 OBSTRUCTIVE SLEEP APNEA SYNDROME: ICD-10-CM

## 2023-10-10 DIAGNOSIS — K21.9 CHRONIC GERD: Primary | ICD-10-CM

## 2023-10-10 DIAGNOSIS — E66.01 MORBID OBESITY WITH BMI OF 50.0-59.9, ADULT (HCC): ICD-10-CM

## 2023-10-10 DIAGNOSIS — E78.2 MIXED HYPERLIPIDEMIA: ICD-10-CM

## 2023-10-10 PROCEDURE — G8417 CALC BMI ABV UP PARAM F/U: HCPCS | Performed by: NURSE PRACTITIONER

## 2023-10-10 PROCEDURE — 1036F TOBACCO NON-USER: CPT | Performed by: NURSE PRACTITIONER

## 2023-10-10 PROCEDURE — G8427 DOCREV CUR MEDS BY ELIG CLIN: HCPCS | Performed by: NURSE PRACTITIONER

## 2023-10-10 PROCEDURE — G8484 FLU IMMUNIZE NO ADMIN: HCPCS | Performed by: NURSE PRACTITIONER

## 2023-10-10 PROCEDURE — 99214 OFFICE O/P EST MOD 30 MIN: CPT | Performed by: NURSE PRACTITIONER

## 2023-10-12 DIAGNOSIS — Z01.818 PREOP TESTING: ICD-10-CM

## 2023-10-12 LAB
ALBUMIN SERPL-MCNC: 4.1 G/DL (ref 3.4–5)
ALBUMIN/GLOB SERPL: 1.3 {RATIO} (ref 1.1–2.2)
ALP SERPL-CCNC: 107 U/L (ref 40–129)
ALT SERPL-CCNC: 10 U/L (ref 10–40)
ANION GAP SERPL CALCULATED.3IONS-SCNC: 13 MMOL/L (ref 3–16)
AST SERPL-CCNC: 13 U/L (ref 15–37)
BILIRUB SERPL-MCNC: 0.3 MG/DL (ref 0–1)
BUN SERPL-MCNC: 14 MG/DL (ref 7–20)
CALCIUM SERPL-MCNC: 9.3 MG/DL (ref 8.3–10.6)
CHLORIDE SERPL-SCNC: 99 MMOL/L (ref 99–110)
CO2 SERPL-SCNC: 27 MMOL/L (ref 21–32)
CREAT SERPL-MCNC: 1 MG/DL (ref 0.6–1.1)
DEPRECATED RDW RBC AUTO: 13.4 % (ref 12.4–15.4)
GFR SERPLBLD CREATININE-BSD FMLA CKD-EPI: >60 ML/MIN/{1.73_M2}
GLUCOSE SERPL-MCNC: 122 MG/DL (ref 70–99)
HCT VFR BLD AUTO: 41.5 % (ref 36–48)
HGB BLD-MCNC: 13.8 G/DL (ref 12–16)
MCH RBC QN AUTO: 32.3 PG (ref 26–34)
MCHC RBC AUTO-ENTMCNC: 33.3 G/DL (ref 31–36)
MCV RBC AUTO: 97.1 FL (ref 80–100)
PLATELET # BLD AUTO: 348 K/UL (ref 135–450)
PMV BLD AUTO: 8.8 FL (ref 5–10.5)
POTASSIUM SERPL-SCNC: 4 MMOL/L (ref 3.5–5.1)
PROT SERPL-MCNC: 7.2 G/DL (ref 6.4–8.2)
RBC # BLD AUTO: 4.28 M/UL (ref 4–5.2)
SODIUM SERPL-SCNC: 139 MMOL/L (ref 136–145)
WBC # BLD AUTO: 7.7 K/UL (ref 4–11)

## 2023-10-19 ENCOUNTER — OFFICE VISIT (OUTPATIENT)
Dept: INTERNAL MEDICINE CLINIC | Age: 38
End: 2023-10-19
Payer: MEDICAID

## 2023-10-19 VITALS
RESPIRATION RATE: 20 BRPM | SYSTOLIC BLOOD PRESSURE: 131 MMHG | DIASTOLIC BLOOD PRESSURE: 91 MMHG | HEIGHT: 66 IN | WEIGHT: 293 LBS | TEMPERATURE: 97.7 F | HEART RATE: 71 BPM | OXYGEN SATURATION: 97 % | BODY MASS INDEX: 47.09 KG/M2

## 2023-10-19 DIAGNOSIS — G47.33 OBSTRUCTIVE SLEEP APNEA SYNDROME: ICD-10-CM

## 2023-10-19 DIAGNOSIS — Z01.818 PRE-OP EVALUATION: Primary | ICD-10-CM

## 2023-10-19 DIAGNOSIS — I50.812 CHRONIC RIGHT-SIDED HEART FAILURE (HCC): ICD-10-CM

## 2023-10-19 DIAGNOSIS — E66.01 MORBID OBESITY WITH BMI OF 50.0-59.9, ADULT (HCC): ICD-10-CM

## 2023-10-19 DIAGNOSIS — E78.2 MIXED HYPERLIPIDEMIA: ICD-10-CM

## 2023-10-19 PROCEDURE — 99213 OFFICE O/P EST LOW 20 MIN: CPT

## 2023-10-19 PROCEDURE — 93005 ELECTROCARDIOGRAM TRACING: CPT

## 2023-10-19 RX ORDER — EPINEPHRINE 0.3 MG/.3ML
0.3 INJECTION SUBCUTANEOUS
Qty: 0.3 ML | Refills: 0 | Status: SHIPPED | OUTPATIENT
Start: 2023-10-19 | End: 2023-10-19

## 2023-10-19 ASSESSMENT — ENCOUNTER SYMPTOMS
ABDOMINAL DISTENTION: 0
WHEEZING: 0
COUGH: 0
VOMITING: 0
NAUSEA: 0
DIARRHEA: 0
CHEST TIGHTNESS: 0
BACK PAIN: 0
SHORTNESS OF BREATH: 0
ABDOMINAL PAIN: 0

## 2023-10-19 NOTE — PROGRESS NOTES
tenderness. Musculoskeletal:      Cervical back: Normal range of motion and neck supple. No rigidity. Right lower leg: Edema present. Left lower leg: Edema present. Comments: Trace edema noted in bilateral ankles. Does not extend to shins   Skin:     General: Skin is warm and dry. Findings: No bruising. Neurological:      General: No focal deficit present. Mental Status: She is alert and oriented to person, place, and time. Psychiatric:         Mood and Affect: Mood normal.         Behavior: Behavior normal.         ASSESSMENT/PLAN:     1. Pre-op evaluation  -     EKG 12 Lead - Clinic Performed  2. Obstructive sleep apnea syndrome  3. Morbid obesity with BMI of 50.0-59.9, adult ([address])  4. Mixed hyperlipidemia  5. Chronic right-sided heart failure (HCC)    Pre operative Evaluation for LAPAROSCOPIC SLEEVE GASTRECTOMY   Obesity with BMI of 59  Patient with no acute cardiopulmonary complaints at this time. Last had a stress test and ECHO in 10/13/2022. At that time normal LV function. No signs of ischemia. Low risk scan. EKG at that time also with no signs of ischemia. Patient seen in July by cardiology with clearance for this procedure. Class 2 Revised Cardiac Risk Index. Will repeat EKG in office today to have updated evaluation. EKG shows normal sinus rhythm with no signs of ischemia or T wave abnormalities. Patient with normal QTc. No signs of Brugada or Ranjit-Parkinson-White. Patient with no history of complications with anesthesia. No family history of this either. Patient's range of motion in neck is intact. No false teeth. Patient is cleared for procedure: Laparoscopic sleeve gastrectomy. Obstructive sleep apnea  Patient continues to struggle with compliance with CPAP. She states that here recently she has been dealing with congestion overnight that has made it uncomfortable to wear the mask.   Drainage from blowing her nose at this time is clear in coloration

## 2023-10-19 NOTE — PATIENT INSTRUCTIONS
You are cleared for your procedure. Please continue to take all medications as prescribed. Please contact the office if any issues arise. We will see you back in the clinic in 3 months.

## 2023-10-23 ENCOUNTER — TELEPHONE (OUTPATIENT)
Dept: PULMONOLOGY | Age: 38
End: 2023-10-23

## 2023-10-23 NOTE — TELEPHONE ENCOUNTER
Patient left  and said she is having surgery on 10/30 and said her surgery was denied and wanted to know if she can get a clearance letter.        Last OV: 9/21/23  PFT: None       PH: [phone]

## 2023-10-24 NOTE — TELEPHONE ENCOUNTER
Luda,     This is Barbi Greek from Dr. Miracle Rivas office. Karrie Iyer is having bariatric surgery on Monday 10/30/23 and Artem Twan has denied due to needing a Pulmonary clearance letter.       Thank you

## 2023-10-25 NOTE — TELEPHONE ENCOUNTER
Patient is noncompliant with CPAP. She has a risk of postoperative hypoxia, atelectasis and respiratory failure because of uncontrolled obstructive sleep apnea and noncompliance with CPAP. Patient is cleared to undergo bariatric surgery under pulmonary perspective as long as patient understands the risk associated with the surgery under general anesthesia.   I highly recommend that patient takes her CPAP with her to use and postop while she is recovering from anesthesia as well as to use in the hospital.

## 2023-10-26 NOTE — TELEPHONE ENCOUNTER
Yanelis Lucero approval # [id number]  CPT 60810  DOS 10/30/23  23 hr observation   Valid  10/30-1/28/24

## 2023-10-30 ENCOUNTER — ANESTHESIA (OUTPATIENT)
Dept: OPERATING ROOM | Age: 38
DRG: 403 | End: 2023-10-30
Payer: MEDICAID

## 2023-10-30 ENCOUNTER — HOSPITAL ENCOUNTER (INPATIENT)
Age: 38
LOS: 1 days | Discharge: HOME | DRG: 403 | End: 2023-10-31
Attending: SURGERY | Admitting: SURGERY
Payer: MEDICAID

## 2023-10-30 ENCOUNTER — ANESTHESIA EVENT (OUTPATIENT)
Dept: OPERATING ROOM | Age: 38
DRG: 403 | End: 2023-10-30
Payer: MEDICAID

## 2023-10-30 DIAGNOSIS — Z98.84 S/P LAPAROSCOPIC SLEEVE GASTRECTOMY: ICD-10-CM

## 2023-10-30 DIAGNOSIS — Z01.818 PREOP TESTING: Primary | ICD-10-CM

## 2023-10-30 LAB
ABO + RH BLD: NORMAL
BLD GP AB SCN SERPL QL: NORMAL
GLUCOSE BLD-MCNC: 86 MG/DL (ref 70–99)
HCG UR QL: NEGATIVE
PERFORMED ON: NORMAL

## 2023-10-30 PROCEDURE — 6360000002 HC RX W HCPCS: Performed by: ANESTHESIOLOGY

## 2023-10-30 PROCEDURE — 7100000001 HC PACU RECOVERY - ADDTL 15 MIN: Performed by: SURGERY

## 2023-10-30 PROCEDURE — 6360000002 HC RX W HCPCS: Performed by: SURGERY

## 2023-10-30 PROCEDURE — 96372 THER/PROPH/DIAG INJ SC/IM: CPT

## 2023-10-30 PROCEDURE — 2700000000 HC OXYGEN THERAPY PER DAY

## 2023-10-30 PROCEDURE — 6370000000 HC RX 637 (ALT 250 FOR IP): Performed by: SURGERY

## 2023-10-30 PROCEDURE — C9145 HC RX W HCPCS: HCPCS | Performed by: SURGERY

## 2023-10-30 PROCEDURE — 43775 LAP SLEEVE GASTRECTOMY: CPT | Performed by: SURGERY

## 2023-10-30 PROCEDURE — 84703 CHORIONIC GONADOTROPIN ASSAY: CPT

## 2023-10-30 PROCEDURE — 2580000003 HC RX 258: Performed by: SURGERY

## 2023-10-30 PROCEDURE — G0378 HOSPITAL OBSERVATION PER HR: HCPCS

## 2023-10-30 PROCEDURE — A4217 STERILE WATER/SALINE, 500 ML: HCPCS | Performed by: SURGERY

## 2023-10-30 PROCEDURE — 2709999900 HC NON-CHARGEABLE SUPPLY: Performed by: SURGERY

## 2023-10-30 PROCEDURE — C9113 INJ PANTOPRAZOLE SODIUM, VIA: HCPCS | Performed by: SURGERY

## 2023-10-30 PROCEDURE — 0DB64Z3 EXCISION OF STOMACH, PERCUTANEOUS ENDOSCOPIC APPROACH, VERTICAL: ICD-10-PCS | Performed by: SURGERY

## 2023-10-30 PROCEDURE — 6360000002 HC RX W HCPCS

## 2023-10-30 PROCEDURE — 36415 COLL VENOUS BLD VENIPUNCTURE: CPT

## 2023-10-30 PROCEDURE — 3600000005 HC SURGERY LEVEL 5 BASE: Performed by: SURGERY

## 2023-10-30 PROCEDURE — 1200000000 HC SEMI PRIVATE

## 2023-10-30 PROCEDURE — A4216 STERILE WATER/SALINE, 10 ML: HCPCS | Performed by: SURGERY

## 2023-10-30 PROCEDURE — 86900 BLOOD TYPING SEROLOGIC ABO: CPT

## 2023-10-30 PROCEDURE — 7100000000 HC PACU RECOVERY - FIRST 15 MIN: Performed by: SURGERY

## 2023-10-30 PROCEDURE — 3600000015 HC SURGERY LEVEL 5 ADDTL 15MIN: Performed by: SURGERY

## 2023-10-30 PROCEDURE — 3700000001 HC ADD 15 MINUTES (ANESTHESIA): Performed by: SURGERY

## 2023-10-30 PROCEDURE — 88307 TISSUE EXAM BY PATHOLOGIST: CPT

## 2023-10-30 PROCEDURE — 94640 AIRWAY INHALATION TREATMENT: CPT

## 2023-10-30 PROCEDURE — 86850 RBC ANTIBODY SCREEN: CPT

## 2023-10-30 PROCEDURE — 86901 BLOOD TYPING SEROLOGIC RH(D): CPT

## 2023-10-30 PROCEDURE — 2500000003 HC RX 250 WO HCPCS

## 2023-10-30 PROCEDURE — 3700000000 HC ANESTHESIA ATTENDED CARE: Performed by: SURGERY

## 2023-10-30 PROCEDURE — 2720000010 HC SURG SUPPLY STERILE: Performed by: SURGERY

## 2023-10-30 PROCEDURE — 94761 N-INVAS EAR/PLS OXIMETRY MLT: CPT

## 2023-10-30 RX ORDER — FENTANYL CITRATE 50 UG/ML
INJECTION, SOLUTION INTRAMUSCULAR; INTRAVENOUS PRN
Status: DISCONTINUED | OUTPATIENT
Start: 2023-10-30 | End: 2023-10-30 | Stop reason: SDUPTHER

## 2023-10-30 RX ORDER — ROCURONIUM BROMIDE 10 MG/ML
INJECTION, SOLUTION INTRAVENOUS PRN
Status: DISCONTINUED | OUTPATIENT
Start: 2023-10-30 | End: 2023-10-30 | Stop reason: SDUPTHER

## 2023-10-30 RX ORDER — SODIUM CHLORIDE 0.9 % (FLUSH) 0.9 %
5-40 SYRINGE (ML) INJECTION PRN
Status: DISCONTINUED | OUTPATIENT
Start: 2023-10-30 | End: 2023-10-30 | Stop reason: HOSPADM

## 2023-10-30 RX ORDER — SODIUM CHLORIDE 9 MG/ML
INJECTION, SOLUTION INTRAVENOUS PRN
Status: DISCONTINUED | OUTPATIENT
Start: 2023-10-30 | End: 2023-10-30

## 2023-10-30 RX ORDER — GLYCOPYRROLATE 0.2 MG/ML
INJECTION INTRAMUSCULAR; INTRAVENOUS PRN
Status: DISCONTINUED | OUTPATIENT
Start: 2023-10-30 | End: 2023-10-30 | Stop reason: SDUPTHER

## 2023-10-30 RX ORDER — SUCCINYLCHOLINE/SOD CL,ISO/PF 200MG/10ML
SYRINGE (ML) INTRAVENOUS PRN
Status: DISCONTINUED | OUTPATIENT
Start: 2023-10-30 | End: 2023-10-30 | Stop reason: SDUPTHER

## 2023-10-30 RX ORDER — ACETAMINOPHEN 160 MG/5ML
650 LIQUID ORAL ONCE
Status: COMPLETED | OUTPATIENT
Start: 2023-10-30 | End: 2023-10-30

## 2023-10-30 RX ORDER — MAGNESIUM SULFATE HEPTAHYDRATE 500 MG/ML
INJECTION, SOLUTION INTRAMUSCULAR; INTRAVENOUS PRN
Status: DISCONTINUED | OUTPATIENT
Start: 2023-10-30 | End: 2023-10-30 | Stop reason: SDUPTHER

## 2023-10-30 RX ORDER — ENOXAPARIN SODIUM 100 MG/ML
40 INJECTION SUBCUTANEOUS EVERY 12 HOURS SCHEDULED
Status: DISCONTINUED | OUTPATIENT
Start: 2023-10-30 | End: 2023-10-31 | Stop reason: HOSPADM

## 2023-10-30 RX ORDER — ONDANSETRON 2 MG/ML
4 INJECTION INTRAMUSCULAR; INTRAVENOUS EVERY 6 HOURS PRN
Status: DISCONTINUED | OUTPATIENT
Start: 2023-10-30 | End: 2023-10-31 | Stop reason: HOSPADM

## 2023-10-30 RX ORDER — MAGNESIUM HYDROXIDE 1200 MG/15ML
LIQUID ORAL CONTINUOUS PRN
Status: COMPLETED | OUTPATIENT
Start: 2023-10-30 | End: 2023-10-30

## 2023-10-30 RX ORDER — DIPHENHYDRAMINE HYDROCHLORIDE 50 MG/ML
12.5 INJECTION INTRAMUSCULAR; INTRAVENOUS EVERY 6 HOURS PRN
Status: DISCONTINUED | OUTPATIENT
Start: 2023-10-30 | End: 2023-10-31 | Stop reason: HOSPADM

## 2023-10-30 RX ORDER — METOCLOPRAMIDE HYDROCHLORIDE 5 MG/ML
10 INJECTION INTRAMUSCULAR; INTRAVENOUS EVERY 6 HOURS PRN
Status: DISCONTINUED | OUTPATIENT
Start: 2023-10-30 | End: 2023-10-31 | Stop reason: HOSPADM

## 2023-10-30 RX ORDER — ALBUTEROL SULFATE 90 UG/1
2 AEROSOL, METERED RESPIRATORY (INHALATION)
Status: DISCONTINUED | OUTPATIENT
Start: 2023-10-30 | End: 2023-10-31 | Stop reason: HOSPADM

## 2023-10-30 RX ORDER — SODIUM CHLORIDE 0.9 % (FLUSH) 0.9 %
5-40 SYRINGE (ML) INJECTION EVERY 12 HOURS SCHEDULED
Status: DISCONTINUED | OUTPATIENT
Start: 2023-10-30 | End: 2023-10-31 | Stop reason: HOSPADM

## 2023-10-30 RX ORDER — SCOLOPAMINE TRANSDERMAL SYSTEM 1 MG/1
1 PATCH, EXTENDED RELEASE TRANSDERMAL ONCE
Status: DISCONTINUED | OUTPATIENT
Start: 2023-10-30 | End: 2023-10-30

## 2023-10-30 RX ORDER — SODIUM CHLORIDE 9 MG/ML
INJECTION, SOLUTION INTRAVENOUS PRN
Status: DISCONTINUED | OUTPATIENT
Start: 2023-10-30 | End: 2023-10-31 | Stop reason: HOSPADM

## 2023-10-30 RX ORDER — NALOXONE HYDROCHLORIDE 0.4 MG/ML
INJECTION, SOLUTION INTRAMUSCULAR; INTRAVENOUS; SUBCUTANEOUS PRN
Status: DISCONTINUED | OUTPATIENT
Start: 2023-10-30 | End: 2023-10-31

## 2023-10-30 RX ORDER — SODIUM CHLORIDE 0.9 % (FLUSH) 0.9 %
5-40 SYRINGE (ML) INJECTION EVERY 12 HOURS SCHEDULED
Status: DISCONTINUED | OUTPATIENT
Start: 2023-10-30 | End: 2023-10-30 | Stop reason: HOSPADM

## 2023-10-30 RX ORDER — ONDANSETRON 2 MG/ML
INJECTION INTRAMUSCULAR; INTRAVENOUS PRN
Status: DISCONTINUED | OUTPATIENT
Start: 2023-10-30 | End: 2023-10-30 | Stop reason: SDUPTHER

## 2023-10-30 RX ORDER — BUPIVACAINE HYDROCHLORIDE 2.5 MG/ML
INJECTION, SOLUTION EPIDURAL; INFILTRATION; INTRACAUDAL
Status: COMPLETED | OUTPATIENT
Start: 2023-10-30 | End: 2023-10-30

## 2023-10-30 RX ORDER — LIDOCAINE HYDROCHLORIDE 20 MG/ML
INJECTION, SOLUTION EPIDURAL; INFILTRATION; INTRACAUDAL; PERINEURAL PRN
Status: DISCONTINUED | OUTPATIENT
Start: 2023-10-30 | End: 2023-10-30 | Stop reason: SDUPTHER

## 2023-10-30 RX ORDER — SODIUM CHLORIDE 0.9 % (FLUSH) 0.9 %
5-40 SYRINGE (ML) INJECTION PRN
Status: DISCONTINUED | OUTPATIENT
Start: 2023-10-30 | End: 2023-10-31 | Stop reason: HOSPADM

## 2023-10-30 RX ORDER — MIDAZOLAM HYDROCHLORIDE 1 MG/ML
INJECTION INTRAMUSCULAR; INTRAVENOUS PRN
Status: DISCONTINUED | OUTPATIENT
Start: 2023-10-30 | End: 2023-10-30 | Stop reason: SDUPTHER

## 2023-10-30 RX ORDER — HYDROMORPHONE HYDROCHLORIDE 2 MG/ML
0.5 INJECTION, SOLUTION INTRAMUSCULAR; INTRAVENOUS; SUBCUTANEOUS EVERY 5 MIN PRN
Status: DISCONTINUED | OUTPATIENT
Start: 2023-10-30 | End: 2023-10-30 | Stop reason: HOSPADM

## 2023-10-30 RX ORDER — SODIUM CHLORIDE, SODIUM LACTATE, POTASSIUM CHLORIDE, CALCIUM CHLORIDE 600; 310; 30; 20 MG/100ML; MG/100ML; MG/100ML; MG/100ML
INJECTION, SOLUTION INTRAVENOUS CONTINUOUS
Status: DISCONTINUED | OUTPATIENT
Start: 2023-10-30 | End: 2023-10-30 | Stop reason: HOSPADM

## 2023-10-30 RX ORDER — HYOSCYAMINE SULFATE 0.5 MG/ML
250 INJECTION, SOLUTION SUBCUTANEOUS EVERY 4 HOURS PRN
Status: DISCONTINUED | OUTPATIENT
Start: 2023-10-30 | End: 2023-10-31 | Stop reason: HOSPADM

## 2023-10-30 RX ORDER — LABETALOL HYDROCHLORIDE 5 MG/ML
10 INJECTION, SOLUTION INTRAVENOUS
Status: DISCONTINUED | OUTPATIENT
Start: 2023-10-30 | End: 2023-10-31 | Stop reason: HOSPADM

## 2023-10-30 RX ORDER — ONDANSETRON 2 MG/ML
4 INJECTION INTRAMUSCULAR; INTRAVENOUS
Status: DISCONTINUED | OUTPATIENT
Start: 2023-10-30 | End: 2023-10-30 | Stop reason: HOSPADM

## 2023-10-30 RX ORDER — LIDOCAINE 4 G/G
1 PATCH TOPICAL DAILY
Status: DISCONTINUED | OUTPATIENT
Start: 2023-10-30 | End: 2023-10-31 | Stop reason: HOSPADM

## 2023-10-30 RX ORDER — MAGNESIUM HYDROXIDE 1200 MG/15ML
LIQUID ORAL
Status: COMPLETED | OUTPATIENT
Start: 2023-10-30 | End: 2023-10-30

## 2023-10-30 RX ORDER — ONDANSETRON 4 MG/1
4 TABLET, ORALLY DISINTEGRATING ORAL EVERY 8 HOURS PRN
Status: DISCONTINUED | OUTPATIENT
Start: 2023-10-30 | End: 2023-10-31 | Stop reason: HOSPADM

## 2023-10-30 RX ORDER — MEPERIDINE HYDROCHLORIDE 25 MG/ML
12.5 INJECTION INTRAMUSCULAR; INTRAVENOUS; SUBCUTANEOUS EVERY 5 MIN PRN
Status: DISCONTINUED | OUTPATIENT
Start: 2023-10-30 | End: 2023-10-30 | Stop reason: HOSPADM

## 2023-10-30 RX ORDER — SODIUM CHLORIDE, SODIUM LACTATE, POTASSIUM CHLORIDE, CALCIUM CHLORIDE 600; 310; 30; 20 MG/100ML; MG/100ML; MG/100ML; MG/100ML
INJECTION, SOLUTION INTRAVENOUS CONTINUOUS
Status: DISCONTINUED | OUTPATIENT
Start: 2023-10-30 | End: 2023-10-31 | Stop reason: HOSPADM

## 2023-10-30 RX ORDER — SCOLOPAMINE TRANSDERMAL SYSTEM 1 MG/1
1 PATCH, EXTENDED RELEASE TRANSDERMAL
Status: DISCONTINUED | OUTPATIENT
Start: 2023-10-30 | End: 2023-10-31 | Stop reason: HOSPADM

## 2023-10-30 RX ORDER — LIDOCAINE HYDROCHLORIDE 10 MG/ML
0.5 INJECTION, SOLUTION EPIDURAL; INFILTRATION; INTRACAUDAL; PERINEURAL ONCE
Status: DISCONTINUED | OUTPATIENT
Start: 2023-10-30 | End: 2023-10-30 | Stop reason: HOSPADM

## 2023-10-30 RX ORDER — HYDROMORPHONE HYDROCHLORIDE 1 MG/ML
0.5 INJECTION, SOLUTION INTRAMUSCULAR; INTRAVENOUS; SUBCUTANEOUS
Status: DISCONTINUED | OUTPATIENT
Start: 2023-10-30 | End: 2023-10-31 | Stop reason: HOSPADM

## 2023-10-30 RX ORDER — PROPOFOL 10 MG/ML
INJECTION, EMULSION INTRAVENOUS PRN
Status: DISCONTINUED | OUTPATIENT
Start: 2023-10-30 | End: 2023-10-30 | Stop reason: SDUPTHER

## 2023-10-30 RX ORDER — HYDRALAZINE HYDROCHLORIDE 20 MG/ML
10 INJECTION INTRAMUSCULAR; INTRAVENOUS
Status: DISCONTINUED | OUTPATIENT
Start: 2023-10-30 | End: 2023-10-31 | Stop reason: HOSPADM

## 2023-10-30 RX ORDER — DEXAMETHASONE SODIUM PHOSPHATE 4 MG/ML
INJECTION, SOLUTION INTRA-ARTICULAR; INTRALESIONAL; INTRAMUSCULAR; INTRAVENOUS; SOFT TISSUE PRN
Status: DISCONTINUED | OUTPATIENT
Start: 2023-10-30 | End: 2023-10-30 | Stop reason: SDUPTHER

## 2023-10-30 RX ORDER — KETAMINE HCL IN NACL, ISO-OSM 100MG/10ML
SYRINGE (ML) INJECTION PRN
Status: DISCONTINUED | OUTPATIENT
Start: 2023-10-30 | End: 2023-10-30 | Stop reason: SDUPTHER

## 2023-10-30 RX ORDER — PHENYLEPHRINE HCL IN 0.9% NACL 1 MG/10 ML
SYRINGE (ML) INTRAVENOUS PRN
Status: DISCONTINUED | OUTPATIENT
Start: 2023-10-30 | End: 2023-10-30 | Stop reason: SDUPTHER

## 2023-10-30 RX ORDER — ENOXAPARIN SODIUM 100 MG/ML
40 INJECTION SUBCUTANEOUS ONCE
Status: COMPLETED | OUTPATIENT
Start: 2023-10-30 | End: 2023-10-30

## 2023-10-30 RX ORDER — METHYLENE BLUE 10 MG/ML
INJECTION INTRAVENOUS
Status: COMPLETED | OUTPATIENT
Start: 2023-10-30 | End: 2023-10-30

## 2023-10-30 RX ADMIN — FENTANYL CITRATE 50 MCG: 50 INJECTION, SOLUTION INTRAMUSCULAR; INTRAVENOUS at 09:11

## 2023-10-30 RX ADMIN — Medication 50 MCG: at 07:40

## 2023-10-30 RX ADMIN — MIDAZOLAM 1 MG: 1 INJECTION INTRAMUSCULAR; INTRAVENOUS at 07:39

## 2023-10-30 RX ADMIN — HYDROMORPHONE HYDROCHLORIDE 0.5 MG: 2 INJECTION, SOLUTION INTRAMUSCULAR; INTRAVENOUS; SUBCUTANEOUS at 09:53

## 2023-10-30 RX ADMIN — SUGAMMADEX 200 MG: 100 INJECTION, SOLUTION INTRAVENOUS at 09:01

## 2023-10-30 RX ADMIN — Medication 200 MCG: at 08:13

## 2023-10-30 RX ADMIN — FENTANYL CITRATE 50 MCG: 50 INJECTION, SOLUTION INTRAMUSCULAR; INTRAVENOUS at 09:04

## 2023-10-30 RX ADMIN — FENTANYL CITRATE 50 MCG: 50 INJECTION, SOLUTION INTRAMUSCULAR; INTRAVENOUS at 07:40

## 2023-10-30 RX ADMIN — HYDRALAZINE HYDROCHLORIDE 10 MG: 20 INJECTION, SOLUTION INTRAMUSCULAR; INTRAVENOUS at 11:11

## 2023-10-30 RX ADMIN — SODIUM CHLORIDE, POTASSIUM CHLORIDE, SODIUM LACTATE AND CALCIUM CHLORIDE: 600; 310; 30; 20 INJECTION, SOLUTION INTRAVENOUS at 06:49

## 2023-10-30 RX ADMIN — ROCURONIUM BROMIDE 30 MG: 10 INJECTION, SOLUTION INTRAVENOUS at 07:53

## 2023-10-30 RX ADMIN — ONDANSETRON 4 MG: 2 INJECTION INTRAMUSCULAR; INTRAVENOUS at 08:01

## 2023-10-30 RX ADMIN — ACETAMINOPHEN 650 MG: 650 SOLUTION ORAL at 06:47

## 2023-10-30 RX ADMIN — SODIUM CHLORIDE, POTASSIUM CHLORIDE, SODIUM LACTATE AND CALCIUM CHLORIDE: 600; 310; 30; 20 INJECTION, SOLUTION INTRAVENOUS at 21:26

## 2023-10-30 RX ADMIN — LIDOCAINE HYDROCHLORIDE 100 MG: 20 INJECTION, SOLUTION EPIDURAL; INFILTRATION; INTRACAUDAL; PERINEURAL at 07:41

## 2023-10-30 RX ADMIN — Medication 200 MCG: at 08:16

## 2023-10-30 RX ADMIN — HYDROMORPHONE HYDROCHLORIDE 0.5 MG: 2 INJECTION, SOLUTION INTRAMUSCULAR; INTRAVENOUS; SUBCUTANEOUS at 09:28

## 2023-10-30 RX ADMIN — SODIUM CHLORIDE: 9 INJECTION, SOLUTION INTRAVENOUS at 10:10

## 2023-10-30 RX ADMIN — MIDAZOLAM 1 MG: 1 INJECTION INTRAMUSCULAR; INTRAVENOUS at 07:36

## 2023-10-30 RX ADMIN — SODIUM CHLORIDE 3000 MG: 900 INJECTION INTRAVENOUS at 07:24

## 2023-10-30 RX ADMIN — Medication 200 MCG: at 08:33

## 2023-10-30 RX ADMIN — DEXAMETHASONE SODIUM PHOSPHATE 8 MG: 4 INJECTION, SOLUTION INTRAMUSCULAR; INTRAVENOUS at 08:01

## 2023-10-30 RX ADMIN — ROCURONIUM BROMIDE 20 MG: 10 INJECTION, SOLUTION INTRAVENOUS at 07:41

## 2023-10-30 RX ADMIN — APREPITANT 32 MG: 32 EMULSION INTRAVENOUS at 06:48

## 2023-10-30 RX ADMIN — Medication: at 10:10

## 2023-10-30 RX ADMIN — MAGNESIUM SULFATE HEPTAHYDRATE 1 G: 500 INJECTION, SOLUTION INTRAMUSCULAR; INTRAVENOUS at 07:51

## 2023-10-30 RX ADMIN — PROPOFOL 150 MG: 10 INJECTION, EMULSION INTRAVENOUS at 07:41

## 2023-10-30 RX ADMIN — Medication 25 MG: at 07:50

## 2023-10-30 RX ADMIN — Medication 200 MCG: at 08:27

## 2023-10-30 RX ADMIN — Medication 160 MG: at 07:41

## 2023-10-30 RX ADMIN — SODIUM CHLORIDE, PRESERVATIVE FREE 10 ML: 5 INJECTION INTRAVENOUS at 11:27

## 2023-10-30 RX ADMIN — Medication 2 PUFF: at 22:21

## 2023-10-30 RX ADMIN — Medication 100 MCG: at 08:35

## 2023-10-30 RX ADMIN — GLYCOPYRROLATE 0.2 MG: 0.2 INJECTION INTRAMUSCULAR; INTRAVENOUS at 07:54

## 2023-10-30 RX ADMIN — Medication 150 MCG: at 08:40

## 2023-10-30 RX ADMIN — SODIUM CHLORIDE, POTASSIUM CHLORIDE, SODIUM LACTATE AND CALCIUM CHLORIDE: 600; 310; 30; 20 INJECTION, SOLUTION INTRAVENOUS at 11:39

## 2023-10-30 RX ADMIN — Medication 2 PUFF: at 15:44

## 2023-10-30 RX ADMIN — Medication 100 MCG: at 08:15

## 2023-10-30 RX ADMIN — GLYCOPYRROLATE 0.2 MG: 0.2 INJECTION INTRAMUSCULAR; INTRAVENOUS at 08:07

## 2023-10-30 RX ADMIN — ENOXAPARIN SODIUM 40 MG: 100 INJECTION SUBCUTANEOUS at 21:18

## 2023-10-30 RX ADMIN — SODIUM CHLORIDE, PRESERVATIVE FREE 40 MG: 5 INJECTION INTRAVENOUS at 11:24

## 2023-10-30 RX ADMIN — ROCURONIUM BROMIDE 10 MG: 10 INJECTION, SOLUTION INTRAVENOUS at 08:04

## 2023-10-30 RX ADMIN — ENOXAPARIN SODIUM 40 MG: 100 INJECTION SUBCUTANEOUS at 06:48

## 2023-10-30 ASSESSMENT — PAIN SCALES - GENERAL
PAINLEVEL_OUTOF10: 3
PAINLEVEL_OUTOF10: 8
PAINLEVEL_OUTOF10: 0
PAINLEVEL_OUTOF10: 8
PAINLEVEL_OUTOF10: 0
PAINLEVEL_OUTOF10: 10
PAINLEVEL_OUTOF10: 5
PAINLEVEL_OUTOF10: 0

## 2023-10-30 ASSESSMENT — PAIN DESCRIPTION - LOCATION
LOCATION: ABDOMEN
LOCATION: ABDOMEN;BACK
LOCATION: ABDOMEN
LOCATION: ABDOMEN;BACK
LOCATION: ABDOMEN;BACK
LOCATION: ABDOMEN
LOCATION: ABDOMEN;BACK
LOCATION: ABDOMEN
LOCATION: ABDOMEN;BACK
LOCATION: ABDOMEN;BACK
LOCATION: BACK;ABDOMEN
LOCATION: ABDOMEN;BACK

## 2023-10-30 ASSESSMENT — PAIN DESCRIPTION - DESCRIPTORS
DESCRIPTORS: ACHING
DESCRIPTORS: DISCOMFORT

## 2023-10-30 ASSESSMENT — PAIN - FUNCTIONAL ASSESSMENT: PAIN_FUNCTIONAL_ASSESSMENT: NONE - DENIES PAIN

## 2023-10-30 ASSESSMENT — PAIN DESCRIPTION - ORIENTATION: ORIENTATION: MID

## 2023-10-30 ASSESSMENT — ENCOUNTER SYMPTOMS: SHORTNESS OF BREATH: 1

## 2023-10-30 NOTE — ANESTHESIA POSTPROCEDURE EVALUATION
Department of Anesthesiology  Postprocedure Note    Patient: Paul Ewing  MRN: [id number]  YOB: 1985  Date of evaluation: 10/30/2023      Procedure Summary     Date: 10/30/23 Room / Location: [address]    Anesthesia Start: 8452 Anesthesia Stop: 0919    Procedure: LAPAROSCOPIC SLEEVE GASTRECTOMY (Abdomen) Diagnosis:       Morbid obesity ([address])      (Morbid obesity ([address]) [E66.01])    Surgeons: Lonna Cockayne, MD Responsible Provider: Laure Cosme MD    Anesthesia Type: General ASA Status: 3          Anesthesia Type: General    Sanam Phase I: Sanam Score: 7    Sanam Phase II:        Anesthesia Post Evaluation    Patient location during evaluation: PACU  Patient participation: complete - patient participated  Level of consciousness: awake  Airway patency: patent  Nausea & Vomiting: no vomiting and no nausea  Complications: no  Cardiovascular status: hemodynamically stable  Respiratory status: acceptable  Hydration status: stable  Multimodal analgesia pain management approach  Pain management: adequate

## 2023-10-30 NOTE — DISCHARGE INSTRUCTIONS
Unless otherwise noted you will crush all your medications for the first 2 weeks post op and mix them with clears for the first four days and then with your pureed food for the remainder of the 2 weeks. If you do not tolerate your medications crushed you may quarter or cut in half and take one piece at a time. You may take your omeprazole (Prilosec) and birth control (Slynd) whole as they cannot be opened or crushed. After two weeks, you may start taking all your pills whole. Please make sure when taking whole pills, you do not take them all at once. Take them one at a time and allow a minute or so between each one. Medications    As discussed during your pre-surgical visits there may need to be changes made to your home medications following surgery. A benefit of weight loss and healthy dietary habits can be an improvement in your blood pressure, blood sugars and edema (swelling). Edema (Swelling)    Please monitor your swelling following surgery. Activity  You are encouraged to walk through the entire postoperative period as this will help with preventing clots, pneumonia and constipation. You are restricted from lifting anything greater than 10 lbs for the first 6 weeks after surgery. You may shower starting on postoperative day #2 (second day after surgery), but should not get into baths, pools and/or hot tubs until the provider releases you to do so. Driving  You should not drive for at least the first week after your surgery and/or if you are still taking pain medication. Most patients generally drive to their 2-week postoperative appointment. Constipation  Constipation can occur following surgery. This is due to anesthesia, decreased fluids and fiber in your diet, drinking protein shakes and taking pain medication. Make sure you are getting 48-64 ounces of fluids per day and walking.  If you develop constipation you may use docusate sodium (Colace) or Dulcolax which are over-the-counter stool

## 2023-10-30 NOTE — PLAN OF CARE
Problem: Chronic Conditions and Co-morbidities  Goal: Patient's chronic conditions and co-morbidity symptoms are monitored and maintained or improved  Outcome: Progressing     Problem: Discharge Planning  Goal: Discharge to home or other facility with appropriate resources  Outcome: Progressing     Problem: Pain  Goal: Verbalizes/displays adequate comfort level or baseline comfort level  Outcome: Progressing  Flowsheets (Taken 10/30/2023 1030)  Verbalizes/displays adequate comfort level or baseline comfort level: Encourage patient to monitor pain and request assistance     Problem: ABCDS Injury Assessment  Goal: Absence of physical injury  Outcome: Progressing     Problem: Safety - Adult  Goal: Free from fall injury  Outcome: Progressing

## 2023-10-31 ENCOUNTER — APPOINTMENT (OUTPATIENT)
Dept: GENERAL RADIOLOGY | Age: 38
DRG: 403 | End: 2023-10-31
Attending: SURGERY
Payer: MEDICAID

## 2023-10-31 VITALS
SYSTOLIC BLOOD PRESSURE: 123 MMHG | RESPIRATION RATE: 15 BRPM | HEART RATE: 75 BPM | WEIGHT: 293 LBS | TEMPERATURE: 98.6 F | DIASTOLIC BLOOD PRESSURE: 78 MMHG | BODY MASS INDEX: 47.09 KG/M2 | OXYGEN SATURATION: 96 % | HEIGHT: 66 IN

## 2023-10-31 LAB
ANION GAP SERPL CALCULATED.3IONS-SCNC: 11 MMOL/L (ref 3–16)
BUN SERPL-MCNC: 9 MG/DL (ref 7–20)
CALCIUM SERPL-MCNC: 9 MG/DL (ref 8.3–10.6)
CHLORIDE SERPL-SCNC: 103 MMOL/L (ref 99–110)
CO2 SERPL-SCNC: 27 MMOL/L (ref 21–32)
CREAT SERPL-MCNC: 0.8 MG/DL (ref 0.6–1.1)
DEPRECATED RDW RBC AUTO: 13.2 % (ref 12.4–15.4)
GFR SERPLBLD CREATININE-BSD FMLA CKD-EPI: >60 ML/MIN/{1.73_M2}
GLUCOSE SERPL-MCNC: 87 MG/DL (ref 70–99)
HCT VFR BLD AUTO: 36.9 % (ref 36–48)
HGB BLD-MCNC: 12.4 G/DL (ref 12–16)
MCH RBC QN AUTO: 31.5 PG (ref 26–34)
MCHC RBC AUTO-ENTMCNC: 33.6 G/DL (ref 31–36)
MCV RBC AUTO: 93.9 FL (ref 80–100)
PLATELET # BLD AUTO: 357 K/UL (ref 135–450)
PMV BLD AUTO: 8.7 FL (ref 5–10.5)
POTASSIUM SERPL-SCNC: 3.8 MMOL/L (ref 3.5–5.1)
RBC # BLD AUTO: 3.93 M/UL (ref 4–5.2)
SODIUM SERPL-SCNC: 141 MMOL/L (ref 136–145)
WBC # BLD AUTO: 11.2 K/UL (ref 4–11)

## 2023-10-31 PROCEDURE — 85027 COMPLETE CBC AUTOMATED: CPT

## 2023-10-31 PROCEDURE — C9113 INJ PANTOPRAZOLE SODIUM, VIA: HCPCS | Performed by: SURGERY

## 2023-10-31 PROCEDURE — APPSS180 APP SPLIT SHARED TIME > 60 MINUTES: Performed by: NURSE PRACTITIONER

## 2023-10-31 PROCEDURE — 96372 THER/PROPH/DIAG INJ SC/IM: CPT

## 2023-10-31 PROCEDURE — 6370000000 HC RX 637 (ALT 250 FOR IP): Performed by: NURSE PRACTITIONER

## 2023-10-31 PROCEDURE — 80048 BASIC METABOLIC PNL TOTAL CA: CPT

## 2023-10-31 PROCEDURE — 94640 AIRWAY INHALATION TREATMENT: CPT

## 2023-10-31 PROCEDURE — G0378 HOSPITAL OBSERVATION PER HR: HCPCS

## 2023-10-31 PROCEDURE — 94660 CPAP INITIATION&MGMT: CPT

## 2023-10-31 PROCEDURE — 94761 N-INVAS EAR/PLS OXIMETRY MLT: CPT

## 2023-10-31 PROCEDURE — 96374 THER/PROPH/DIAG INJ IV PUSH: CPT

## 2023-10-31 PROCEDURE — 96375 TX/PRO/DX INJ NEW DRUG ADDON: CPT

## 2023-10-31 PROCEDURE — 6360000002 HC RX W HCPCS: Performed by: SURGERY

## 2023-10-31 PROCEDURE — 6360000002 HC RX W HCPCS: Performed by: NURSE PRACTITIONER

## 2023-10-31 PROCEDURE — 6370000000 HC RX 637 (ALT 250 FOR IP): Performed by: SURGERY

## 2023-10-31 PROCEDURE — 6360000004 HC RX CONTRAST MEDICATION

## 2023-10-31 PROCEDURE — 2580000003 HC RX 258: Performed by: SURGERY

## 2023-10-31 PROCEDURE — 36415 COLL VENOUS BLD VENIPUNCTURE: CPT

## 2023-10-31 PROCEDURE — 74240 X-RAY XM UPR GI TRC 1CNTRST: CPT

## 2023-10-31 PROCEDURE — 99024 POSTOP FOLLOW-UP VISIT: CPT | Performed by: NURSE PRACTITIONER

## 2023-10-31 RX ORDER — KETOROLAC TROMETHAMINE 30 MG/ML
15 INJECTION, SOLUTION INTRAMUSCULAR; INTRAVENOUS EVERY 6 HOURS
Status: COMPLETED | OUTPATIENT
Start: 2023-10-31 | End: 2023-10-31

## 2023-10-31 RX ORDER — ONDANSETRON 8 MG/1
8 TABLET, ORALLY DISINTEGRATING ORAL EVERY 8 HOURS PRN
Qty: 30 TABLET | Refills: 1 | Status: SHIPPED | OUTPATIENT
Start: 2023-10-31

## 2023-10-31 RX ORDER — HYDROCODONE BITARTRATE AND ACETAMINOPHEN 5; 325 MG/1; MG/1
1 TABLET ORAL EVERY 4 HOURS PRN
Status: DISCONTINUED | OUTPATIENT
Start: 2023-10-31 | End: 2023-10-31 | Stop reason: HOSPADM

## 2023-10-31 RX ORDER — ONDANSETRON 8 MG/1
8 TABLET, ORALLY DISINTEGRATING ORAL EVERY 8 HOURS PRN
Qty: 30 TABLET | Refills: 0 | Status: SHIPPED | OUTPATIENT
Start: 2023-10-31

## 2023-10-31 RX ORDER — PROMETHAZINE HYDROCHLORIDE 6.25 MG/5ML
12.5 SYRUP ORAL EVERY 6 HOURS PRN
Status: DISCONTINUED | OUTPATIENT
Start: 2023-10-31 | End: 2023-10-31 | Stop reason: HOSPADM

## 2023-10-31 RX ORDER — HYDROCODONE BITARTRATE AND ACETAMINOPHEN 5; 325 MG/1; MG/1
1 TABLET ORAL EVERY 4 HOURS PRN
Qty: 42 TABLET | Refills: 0 | Status: SHIPPED | OUTPATIENT
Start: 2023-10-31 | End: 2023-11-07

## 2023-10-31 RX ORDER — HYDROCODONE BITARTRATE AND ACETAMINOPHEN 5; 325 MG/1; MG/1
2 TABLET ORAL EVERY 4 HOURS PRN
Status: DISCONTINUED | OUTPATIENT
Start: 2023-10-31 | End: 2023-10-31 | Stop reason: HOSPADM

## 2023-10-31 RX ADMIN — SODIUM CHLORIDE, PRESERVATIVE FREE 10 ML: 5 INJECTION INTRAVENOUS at 10:45

## 2023-10-31 RX ADMIN — ONDANSETRON 4 MG: 2 INJECTION INTRAMUSCULAR; INTRAVENOUS at 10:44

## 2023-10-31 RX ADMIN — HYDROCODONE BITARTRATE AND ACETAMINOPHEN 1 TABLET: 5; 325 TABLET ORAL at 13:48

## 2023-10-31 RX ADMIN — Medication 2 PUFF: at 13:26

## 2023-10-31 RX ADMIN — IOHEXOL 50 ML: 350 INJECTION, SOLUTION INTRAVENOUS at 09:24

## 2023-10-31 RX ADMIN — ENOXAPARIN SODIUM 40 MG: 100 INJECTION SUBCUTANEOUS at 10:43

## 2023-10-31 RX ADMIN — KETOROLAC TROMETHAMINE 15 MG: 30 INJECTION, SOLUTION INTRAMUSCULAR; INTRAVENOUS at 10:47

## 2023-10-31 RX ADMIN — SODIUM CHLORIDE, PRESERVATIVE FREE 40 MG: 5 INJECTION INTRAVENOUS at 10:43

## 2023-10-31 NOTE — PLAN OF CARE
Problem: Chronic Conditions and Co-morbidities  Goal: Patient's chronic conditions and co-morbidity symptoms are monitored and maintained or improved  10/31/2023 1204 by Nery Crane RN  Outcome: Completed  10/31/2023 0138 by Elba Nicole RN  Outcome: Progressing     Problem: Discharge Planning  Goal: Discharge to home or other facility with appropriate resources  10/31/2023 1204 by Nery Crane RN  Outcome: Completed  10/31/2023 0138 by Elba Nicole RN  Outcome: Progressing     Problem: Pain  Goal: Verbalizes/displays adequate comfort level or baseline comfort level  10/31/2023 1204 by Nery Crane RN  Outcome: Completed  10/31/2023 0138 by Elba Nicole RN  Outcome: Progressing     Problem: ABCDS Injury Assessment  Goal: Absence of physical injury  10/31/2023 1204 by Nery Crane RN  Outcome: Completed  10/31/2023 0138 by Elba Nicole RN  Outcome: Progressing     Problem: Safety - Adult  Goal: Free from fall injury  10/31/2023 1204 by Nery Crane RN  Outcome: Completed  10/31/2023 0138 by Elba Nicole RN  Outcome: Progressing

## 2023-10-31 NOTE — PLAN OF CARE
Problem: Chronic Conditions and Co-morbidities  Goal: Patient's chronic conditions and co-morbidity symptoms are monitored and maintained or improved  10/31/2023 0138 by Olesya Connell RN  Outcome: Progressing     Problem: Discharge Planning  Goal: Discharge to home or other facility with appropriate resources  10/31/2023 0138 by Olesya Connell RN  Outcome: Progressing     Problem: Pain  Goal: Verbalizes/displays adequate comfort level or baseline comfort level  10/31/2023 0138 by Olesya Connell RN  Outcome: Progressing  Verbalizes/displays adequate comfort level or baseline comfort level: Encourage patient to monitor pain and request assistance     Problem: ABCDS Injury Assessment  Goal: Absence of physical injury  10/31/2023 0138 by Olesya Connell RN  Outcome: Progressing     Problem: Safety - Adult  Goal: Free from fall injury  10/31/2023 0138 by Olesya Connell RN  Outcome: Progressing

## 2023-10-31 NOTE — PROGRESS NOTES
2100: Patient s/p sleeve gastrectomy. VSS. Abdomen soft, appropriately tender, incision stable with no erythema. Patient ambulating in hallway, with binder in place, every 60-90 minutes. SCD's in place while in bed. I/O being monitored. Patient instructed on proper use and frequency of incentive spirometer. Patient is for UGI tomorrow morning. PCA ongoing. Will continue to monitor pain control. The care plan and education has been reviewed and mutually agreed upon with the patient.
CLINICAL PHARMACY NOTE: MEDS TO BEDS    Total # of Prescriptions Filled: 2   The following medications were delivered to the patient:  Ondansetron   percocet    Additional Documentation:  Mother picked up in op pharmacy
Discharge instructions given, all questions answered. Medications reviewed and patient verbalizes understanding.  No further questions  Katie Jordan RN
Incentive Spirometry education and demonstration completed by Respiratory Therapy Yes      Response to education: Very Good     Teaching Time: 5 minutes    Minimum Predicted Vital Capacity - 570 mL. Patient's Actual Vital Capacity - 700 mL. Turning over to Nursing for routine follow-up Yes.      Electronically signed by Sona Stoner RCP on 10/30/2023 at 4:05 PM
Name_______________________________________Printed:____________________  Date and time of surgery___10/30  730_____________________Arrival Time:____0600____________   1. The instructions given regarding when and if a patient needs to stop oral intake prior to surgery varies. Follow the specific instructions you were given                  _x__Nothing to eat or to drink after Midnight the night before.                   ____Carbo loading or instructions will be given to select patients-if you have been given those instructions -please do the following                           The evening before your surgery after dinner before midnight drink 40 ounces of gatorade. If you are diabetic use sugar free. The morning of surgery drink 40 ounces of water. This needs to be finished 3 hours prior to your surgery start time. 2. Take the following pills with a small sip of water on the morning of surgery____none inhaler as needed_______________________________________________                  Do not take blood pressure medications ending in pril or sartan the pam prior to surgery or the morning of surgery. Dr Olmos patient are not to take any medications the AM of surgery. 3. Aspirin, Ibuprofen, Advil, Naproxen, Vitamin E and other Anti-inflammatory products and supplements should be stopped for 5 -7days before surgery or as directed by your physician. 4. Check with your Doctor regarding stopping Plavix, Coumadin,Eliquis, Lovenox,Effient,Pradaxa,Xarelto, Fragmin or other blood thinners and follow their instructions. 5. Do not smoke, and do not drink any alcoholic beverages 24 hours prior to surgery. This includes NA Beer. Refrain from the usage of any recreational drugs. 6. You may brush your teeth and gargle the morning of surgery. DO NOT SWALLOW WATER   7. You MUST make arrangements for a responsible adult to stay on site while you are here and take you home after your surgery.  You will not be allowed to
Nursing care provided to prep patient for surgery. Patient lost 10 lbs. on pre-op diet, informed surgeon. Pre-op orders completed. Bilateral foot pneumatic sleeves applied. Teaching / education initiated regarding perioperative experience, post-op expectations and plan of care, and pain management during hospital stay. Patient verbalized understanding. The care plan and education has been reviewed and mutually agreed upon with the patient.
PT pain is controlled to a tolerable level. PT is awake, alert and oriented. Denies any nausea. Scope sites are clean dry and intake. Pt on room air. Vital signs are WDL PT meets criteria to transport to floor.
PT to [facility] 11. PT on simple mask at 5 L of O2. PT sleepy and arousable to name. PT bp slightly elevated. PT has 5 scope sites that are clean dry and intact. Pt resting comfortably at this time. No grimacing or guarding.
with BMI of 50.0-59.9, adult [facility])        A/P  Archie Connell is a 34 y.o. female pod#1, s/p laparoscopic sleeve gastrectomy. Stable overall. UGI with no leak/obstruction, will start on Phase I diet. Patient has voided postoperatively and urine output is WNL. Will continue to monitor. Will discontinue PCA and start oral pain medications. Will order one dose of IV Toradol. Patient will continue to wear abdominal binder when walking for support. Patient needs to ambulate in the gu every 60-90 minutes. Patient needs to utilize incentive spirometer 10 times per hour while awake. Patient will continue icing incisions. Plan for discharge today if nausea remains controlled, patient continues to void and is tolerating Phase I diet. Discussed with Dr. Janet Crews and Nursing Staff.     Anurag Jarrell, BERTA-C

## 2023-10-31 NOTE — PLAN OF CARE
Problem: Chronic Conditions and Co-morbidities  Goal: Patient's chronic conditions and co-morbidity symptoms are monitored and maintained or improved  10/31/2023 1204 by Peter Boas, RN  Outcome: Completed  10/31/2023 0138 by Oliver Watson RN  Outcome: Progressing

## 2023-11-06 ENCOUNTER — TELEPHONE (OUTPATIENT)
Dept: BARIATRICS/WEIGHT MGMT | Age: 38
End: 2023-11-06

## 2023-11-07 NOTE — TELEPHONE ENCOUNTER
Looks like patient is almost there. Just needs to be more consistent with getting in at least 48 oz per day. Agree with recommendations.   Thank you,  TEODORO BenitezC
Tried Miralax          [x] Multivitamin capsule started    All questions and concerns addressed including not consistently reaching fluid goals. Patient reports she was just drinking water, purchased Crystal Light and feels this will help reach her fluid goals. Other low julius.(5 calories or less per 8 oz serving), carbonation and caffeine free drinks reviewed to help reach goals. Instructed pt on importance of reaching her goals of #1 is fluid goal of 48-64 oz per day, then #2 goal of protein of 60-80 grams per day from shakes, once she reaches these goals consistently then she can move on to pureed foods. Pt has started pureed foods, instructed on importance of stopping until fluid and protein goal consistently reached. Pt states Rx pain medication (Norco) is not helping. Instructions on use of Tylenol 500 mg x2, crushed or liquid, every 6 hours as needed if she wanted to try this instead of the [address] Patient instructed on importance of using binder, ice, and how to use LIdocaine patches to help receive maximum relief. Patient states she is ambulating but not as much as 10 min every hour. Instructed on importance of ambulating every hour for 10 min and may split into 5 min increments if she would like. Verbalized understanding. Patient was asked to please fill out hospital survey if she receives one in the mail.

## 2023-11-14 ENCOUNTER — OFFICE VISIT (OUTPATIENT)
Dept: BARIATRICS/WEIGHT MGMT | Age: 38
End: 2023-11-14

## 2023-11-14 VITALS
BODY MASS INDEX: 47.09 KG/M2 | DIASTOLIC BLOOD PRESSURE: 87 MMHG | HEART RATE: 101 BPM | OXYGEN SATURATION: 98 % | SYSTOLIC BLOOD PRESSURE: 118 MMHG | WEIGHT: 293 LBS | HEIGHT: 66 IN | RESPIRATION RATE: 18 BRPM

## 2023-11-14 DIAGNOSIS — Z98.84 S/P LAPAROSCOPIC SLEEVE GASTRECTOMY: Primary | ICD-10-CM

## 2023-11-14 PROCEDURE — 99024 POSTOP FOLLOW-UP VISIT: CPT | Performed by: SURGERY

## 2023-11-14 NOTE — PROGRESS NOTES
[facility]) Physicians   [employer] [facility] [employer]  Anette Head MD, [facility], [address] [place] [address], [address] [place] [id number] . Phone: [phone]  Fax: [phone]       The patient is a 34 y.o. female who returns today for follow up.    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    Shortness of breath    Chronic GERD    Mixed hyperlipidemia    Memory difficulties    Head trauma, sequela    Pre-op evaluation    S/P laparoscopic sleeve gastrectomy        Vitals:    11/14/23 0954   BP: 118/87   Site: Right Upper Arm   Position: Sitting   Pulse: (!) 101   Resp: 18   SpO2: 98%   Weight: (!) 155.6 kg (343 lb)   Height: 1.67 m (5' 5.75\")       Lab Results   Component Value Date/Time    WBC 11.2 10/31/2023 05:22 AM    RBC 3.93 10/31/2023 05:22 AM    HGB 12.4 10/31/2023 05:22 AM    HCT 36.9 10/31/2023 05:22 AM    MCV 93.9 10/31/2023 05:22 AM    MCH 31.5 10/31/2023 05:22 AM    MCHC 33.6 10/31/2023 05:22 AM    MPV 8.7 10/31/2023 05:22 AM    NEUTOPHILPCT 60.5 01/24/2023 03:37 PM    LYMPHOPCT 27.0 01/24/2023 03:37 PM    MONOPCT 4.9 01/24/2023 03:37 PM    EOSRELPCT 6.7 01/24/2023 03:37 PM    BASOPCT 0.9 01/24/2023 03:37 PM    NEUTROABS 4.9 01/24/2023 03:37 PM    LYMPHSABS 2.2 01/24/2023 03:37 PM    MONOSABS 0.4 01/24/2023 03:37 PM    EOSABS 0.5 01/24/2023 03:37 PM     Lab Results   Component Value Date/Time     10/31/2023 05:22 AM    K 3.8 10/31/2023 05:22 AM    K 4.2 01/24/2023 03:37 PM     10/31/2023 05:22 AM    CO2 27 10/31/2023 05:22 AM    ANIONGAP 11

## 2023-11-14 NOTE — PROGRESS NOTES
Dietary Assessment Note      Vitals:   Vitals:    23 0954   BP: 118/87   Site: Right Upper Arm   Position: Sitting   Pulse: (!) 101   Resp: 18   SpO2: 98%   Weight: 110.2 kg (243 lb)   Height: 1.67 m (5' 5.75\")    Patient lost 17 lbs over 5 weeks. Total Weight Loss: 26.4 lbs    Labs reviewed: labs are reviewed, up to date and normal    Protein intake: 60-80 grams/day     Fluid intake: 48-64 oz/day water/ammon/ minute maid zero     Multivitamin/mineral intake: Fusion capsule MVI w/ Iron    Calcium intake: no    Other: no    Exercise:  not walking  states struggling with back pain and sleeping d/t medication      Nutrition Assessment: 2 weeks post-op visit. 2 Protein shakes made w/ 8 oz 1% milk  Eating pureed foods 1-2 times/day: egg, cream of wheat/grits, buffalo chix dip, chix salad    Amount able to eat per sittin oz    Following 30/30/30 rule: Eating over 30 minutes. Waits 30 minutes between eating and drinking. Food Intolerances/issues:  Premier protein shake. Strawberry & shellfish allergy    Client Concerns: none    Goals:   - Advance to phase 3 diet at 3 weeks post op.    - Walk 10 minutes out of each hour and limit naps   - Take zofran on schedule     Plan: Follow up at 6 weeks post op and as needed    Hayley Bertrand RD, LD

## 2023-11-14 NOTE — PATIENT INSTRUCTIONS
Patient received dietary handouts and education. Goals:   - Advance to phase 3 diet at 3 weeks post op.    - Walk 10 minutes out of each hour and limit naps   - Take zofran on schedule

## 2023-11-18 PROBLEM — Z01.818 PRE-OP EVALUATION: Status: RESOLVED | Noted: 2023-10-19 | Resolved: 2023-11-18

## 2023-11-20 ENCOUNTER — TELEPHONE (OUTPATIENT)
Dept: BARIATRICS/WEIGHT MGMT | Age: 38
End: 2023-11-20

## 2023-11-20 DIAGNOSIS — Z98.84 S/P LAPAROSCOPIC SLEEVE GASTRECTOMY: Primary | ICD-10-CM

## 2023-11-20 DIAGNOSIS — G89.18 POSTOPERATIVE PAIN: ICD-10-CM

## 2023-11-20 RX ORDER — CELECOXIB 200 MG/1
200 CAPSULE ORAL DAILY
Qty: 7 CAPSULE | Refills: 0 | Status: SHIPPED | OUTPATIENT
Start: 2023-11-20 | End: 2023-11-27

## 2023-11-20 NOTE — TELEPHONE ENCOUNTER
Called and spoke with patient and she is not lifting >10 lbs, but thinks she may have over done it going outside more as she was tired of being in the house. She is wearing her binder and I discussed trying heat or ice at this point post-op. Also sending in a prescription for Celebrex that she will take daily for one week. Also discussed utilizing patches and how to place them. Patient living in [place] now so prescription sent to [facility] in [place].    Thank you,  Sheela Auguste, BERTA-C

## 2023-11-20 NOTE — TELEPHONE ENCOUNTER
Patient calling in regards to stomach pain describes it as sharp, stabbing pain. Onset 4 days ago. I did recommend Salonpas patches, lidocaine patches and tylenol for pain. S/p sleeve 10/30/2023    Please advise, thank you!

## 2023-11-29 ASSESSMENT — ENCOUNTER SYMPTOMS
ROS SKIN COMMENTS: ABDOMINAL SURGICAL INCISIONS.
EYES NEGATIVE: 1
GASTROINTESTINAL NEGATIVE: 1
RESPIRATORY NEGATIVE: 1
ALLERGIC/IMMUNOLOGIC NEGATIVE: 1

## 2023-12-11 ENCOUNTER — OFFICE VISIT (OUTPATIENT)
Dept: BARIATRICS/WEIGHT MGMT | Age: 38
End: 2023-12-11

## 2023-12-11 VITALS
HEIGHT: 66 IN | WEIGHT: 293 LBS | SYSTOLIC BLOOD PRESSURE: 118 MMHG | BODY MASS INDEX: 47.09 KG/M2 | HEART RATE: 74 BPM | DIASTOLIC BLOOD PRESSURE: 84 MMHG | OXYGEN SATURATION: 99 % | RESPIRATION RATE: 18 BRPM

## 2023-12-11 DIAGNOSIS — E66.01 MORBID OBESITY WITH BMI OF 50.0-59.9, ADULT (HCC): ICD-10-CM

## 2023-12-11 DIAGNOSIS — K21.9 CHRONIC GERD: Primary | ICD-10-CM

## 2023-12-11 DIAGNOSIS — Z98.84 S/P LAPAROSCOPIC SLEEVE GASTRECTOMY: ICD-10-CM

## 2023-12-11 DIAGNOSIS — E78.2 MIXED HYPERLIPIDEMIA: ICD-10-CM

## 2023-12-11 DIAGNOSIS — G47.33 OBSTRUCTIVE SLEEP APNEA SYNDROME: ICD-10-CM

## 2023-12-11 PROCEDURE — 99024 POSTOP FOLLOW-UP VISIT: CPT | Performed by: NURSE PRACTITIONER

## 2023-12-11 RX ORDER — OMEPRAZOLE 20 MG/1
20 CAPSULE, DELAYED RELEASE ORAL
Qty: 90 CAPSULE | Refills: 1 | Status: SHIPPED | OUTPATIENT
Start: 2023-12-11

## 2023-12-11 NOTE — PROGRESS NOTES
Dietary Assessment Note  Vitals:   Vitals:    12/11/23 1403   BP: 118/84   Site: Left Upper Arm   Position: Sitting   Pulse: 74   Resp: 18   SpO2: 99%   Weight: (!) 152.4 kg (336 lb)   Height: 1.67 m (5' 5.75\")   Patient lost 7 lbs over past ~4 weeks. Total Weight Loss: 33.4 lbs    Labs reviewed: no new labs    Protein intake: 60-80 grams/day - 2 protein shakes daily (w/ milk 8oz 1% OR water)    Fluid intake: <48 oz/day - ~56oz water / ice    Multivitamin/mineral intake: yes - 1 fusion capsule w/ iron daily    Calcium intake: to start 2 soft chews    Other: none    Exercise:  up moving around, no strenuous exercise    Nutrition Assessment: 6 week post-op visit. Pt is eating soft/mushy foods 2x/day. Pt is mostly just eating deli roll-ups w/ 4 crackers.      Amount able to eat per sitting: ~1/2 cup    Following 30/30/30 rule: yes    Food Intolerances/issues: none    Client Concerns: sometimes eating too fast    Goals:   - Move to phase 4 guidelines  - Start 2 calcium soft chews    Plan: f/u as directed    Rocio Bergeron, RD, LD

## 2024-01-08 ASSESSMENT — ENCOUNTER SYMPTOMS
BLOOD IN STOOL: 0
ABDOMINAL DISTENTION: 0
CHEST TIGHTNESS: 0
COUGH: 0
SHORTNESS OF BREATH: 0
ABDOMINAL PAIN: 0
PHOTOPHOBIA: 0
NAUSEA: 0

## 2024-01-08 NOTE — PROGRESS NOTES
[facility]  1/10/24  Referring: Dr. Diaz    REASON FOR CONSULT/CHIEF COMPLAINT/HPI     Reason for visit/ Chief complaint  Follow up   Shortness of breath   HPI Maday Varghese is a 38 y.o. seen as a follow up for right sided heart dilatation. She has a history of untreated KAREN and obesity. She has had previous hysteroscopy and D&C in 2015.  On 10/30/23 he had sleeve gastrectomy, she has lost 30 pounds  since  In the interval since her last visit her brother was incarcerated, now being evaluated for schizophrenia  Today she states she feels well. She feels as if her sinuses are bothering her, preventing her from using her cpap. She has no chest pain shortness of breath palpitations or dizziness.     Her brother is struggling with mental health so she has that stress.     Patient is adherent with medications and is tolerating them well without side effects     HISTORY/ALLERGIES/ROS     MedHx:  has a past medical history of Bacterial vaginosis, COVID-19, Dysmenorrhea, Hypotension due to hypovolemia, Menorrhagia, Obesity, Ovarian cyst, and Sleep apnea.  SurgHx:  has a past surgical history that includes Dilation and curettage of uterus; Upper gastrointestinal endoscopy (N/A, 12/22/2022); and Sleeve Gastrectomy (N/A, 10/30/2023).   SocHx:  reports that she has never smoked. She has never used smokeless tobacco. She reports that she does not currently use alcohol. She reports that she does not use drugs.   FamHx: No family history of premature coronary artery disease, sudden death, or aneurysm  Allergies: Shellfish-derived products, Strawberry (diagnostic), Bee venom, and Percocet [oxycodone-acetaminophen]   ROS:   Review of Systems   Constitutional:  Positive for fatigue. Negative for activity change, diaphoresis and fever.   HENT:  Negative for congestion, ear discharge and nosebleeds.    Eyes:  Negative for photophobia and visual disturbance.   Respiratory:  Negative for cough, chest tightness and shortness

## 2024-01-10 ENCOUNTER — OFFICE VISIT (OUTPATIENT)
Dept: CARDIOLOGY CLINIC | Age: 39
End: 2024-01-10
Payer: MEDICAID

## 2024-01-10 VITALS
HEIGHT: 65 IN | SYSTOLIC BLOOD PRESSURE: 102 MMHG | HEART RATE: 102 BPM | OXYGEN SATURATION: 97 % | WEIGHT: 293 LBS | BODY MASS INDEX: 48.82 KG/M2 | DIASTOLIC BLOOD PRESSURE: 80 MMHG

## 2024-01-10 DIAGNOSIS — J32.9 FREQUENT SINUS INFECTIONS: ICD-10-CM

## 2024-01-10 DIAGNOSIS — E66.01 CLASS 3 SEVERE OBESITY DUE TO EXCESS CALORIES WITH SERIOUS COMORBIDITY AND BODY MASS INDEX (BMI) OF 50.0 TO 59.9 IN ADULT (HCC): ICD-10-CM

## 2024-01-10 DIAGNOSIS — R06.02 SHORTNESS OF BREATH: ICD-10-CM

## 2024-01-10 DIAGNOSIS — G47.33 OBSTRUCTIVE SLEEP APNEA SYNDROME: Primary | ICD-10-CM

## 2024-01-10 DIAGNOSIS — I50.32 CHRONIC HEART FAILURE WITH PRESERVED EJECTION FRACTION (HCC): ICD-10-CM

## 2024-01-10 DIAGNOSIS — I50.810 RIGHT-SIDED HEART FAILURE, UNSPECIFIED HF CHRONICITY (HCC): ICD-10-CM

## 2024-01-10 PROBLEM — J96.01 ACUTE RESPIRATORY FAILURE WITH HYPOXIA (HCC): Status: RESOLVED | Noted: 2021-10-19 | Resolved: 2024-01-10

## 2024-01-10 PROCEDURE — G8427 DOCREV CUR MEDS BY ELIG CLIN: HCPCS | Performed by: INTERNAL MEDICINE

## 2024-01-10 PROCEDURE — G8484 FLU IMMUNIZE NO ADMIN: HCPCS | Performed by: INTERNAL MEDICINE

## 2024-01-10 PROCEDURE — 1036F TOBACCO NON-USER: CPT | Performed by: INTERNAL MEDICINE

## 2024-01-10 PROCEDURE — G8417 CALC BMI ABV UP PARAM F/U: HCPCS | Performed by: INTERNAL MEDICINE

## 2024-01-10 PROCEDURE — 99214 OFFICE O/P EST MOD 30 MIN: CPT | Performed by: INTERNAL MEDICINE

## 2024-01-22 RX ORDER — DROSPIRENONE 4 MG/1
1 TABLET, FILM COATED ORAL DAILY
Qty: 84 TABLET | Refills: 1 | Status: SHIPPED | OUTPATIENT
Start: 2024-01-22

## 2024-01-24 ENCOUNTER — OFFICE VISIT (OUTPATIENT)
Dept: INTERNAL MEDICINE CLINIC | Age: 39
End: 2024-01-24
Payer: MEDICAID

## 2024-01-24 VITALS
TEMPERATURE: 97.3 F | SYSTOLIC BLOOD PRESSURE: 114 MMHG | RESPIRATION RATE: 20 BRPM | HEIGHT: 65 IN | DIASTOLIC BLOOD PRESSURE: 89 MMHG | HEART RATE: 84 BPM | BODY MASS INDEX: 48.82 KG/M2 | WEIGHT: 293 LBS | OXYGEN SATURATION: 96 %

## 2024-01-24 DIAGNOSIS — E66.01 MORBID OBESITY WITH BMI OF 50.0-59.9, ADULT (HCC): Primary | ICD-10-CM

## 2024-01-24 DIAGNOSIS — J01.90 ACUTE NON-RECURRENT SINUSITIS, UNSPECIFIED LOCATION: ICD-10-CM

## 2024-01-24 DIAGNOSIS — Z90.3 S/P GASTRIC SLEEVE PROCEDURE: ICD-10-CM

## 2024-01-24 DIAGNOSIS — J30.2 SEASONAL ALLERGIC RHINITIS, UNSPECIFIED TRIGGER: ICD-10-CM

## 2024-01-24 PROCEDURE — 99213 OFFICE O/P EST LOW 20 MIN: CPT

## 2024-01-24 RX ORDER — FLUTICASONE PROPIONATE 50 MCG
2 SPRAY, SUSPENSION (ML) NASAL DAILY
Qty: 16 G | Refills: 0 | Status: SHIPPED | OUTPATIENT
Start: 2024-01-24

## 2024-01-24 RX ORDER — AZELASTINE 1 MG/ML
1 SPRAY, METERED NASAL 2 TIMES DAILY
Qty: 60 ML | Refills: 3 | Status: SHIPPED | OUTPATIENT
Start: 2024-01-24

## 2024-01-24 ASSESSMENT — PATIENT HEALTH QUESTIONNAIRE - PHQ9
SUM OF ALL RESPONSES TO PHQ QUESTIONS 1-9: 0
SUM OF ALL RESPONSES TO PHQ QUESTIONS 1-9: 0
SUM OF ALL RESPONSES TO PHQ9 QUESTIONS 1 & 2: 0
2. FEELING DOWN, DEPRESSED OR HOPELESS: 0
SUM OF ALL RESPONSES TO PHQ QUESTIONS 1-9: 0
SUM OF ALL RESPONSES TO PHQ QUESTIONS 1-9: 0
1. LITTLE INTEREST OR PLEASURE IN DOING THINGS: 0

## 2024-01-24 ASSESSMENT — ENCOUNTER SYMPTOMS
CHOKING: 0
COUGH: 0
NAUSEA: 0
CHEST TIGHTNESS: 0
SINUS PAIN: 1
SHORTNESS OF BREATH: 0
DIARRHEA: 0
ABDOMINAL PAIN: 0
TROUBLE SWALLOWING: 0
CONSTIPATION: 0
ABDOMINAL DISTENTION: 0
EYE REDNESS: 0

## 2024-01-24 NOTE — PROGRESS NOTES
Folate; Future  -     Vitamin D 25 Hydroxy; Future  -     Iron and TIBC; Future  4. Acute non-recurrent sinusitis, unspecified location      Return in about 15 weeks (around 5/8/2024).    The patient was staffed with teaching attending: Dr. Oz Garcia.    An electronic signature was used to authenticate this note.    --Madhu Maya MD

## 2024-01-24 NOTE — ASSESSMENT & PLAN NOTE
Patient improved significantly has lost around 40 pounds in the last 6 months.  Will follow-up with the patient in 3 months.

## 2024-01-24 NOTE — ASSESSMENT & PLAN NOTE
Will follow-up with bariatric doctor on March.  Meanwhile we will check vitamin levels and replace if needed.  Will follow-up with the patient in 3 months.

## 2024-01-24 NOTE — ASSESSMENT & PLAN NOTE
Controlled.  Will continue management.WentPatient reports congestion almost daily.  Will start the patient on Flonase.  Will follow-up with the patient in 3 months.  Patient will have ENT visit in February.

## 2024-02-07 NOTE — PATIENT INSTRUCTIONS
Diet tips to help make you successful postoperatively    Eating habits after surgery will need to be a long-term change. Eating habits are so ingrained that it can be difficult to change so having made these changes for surgery is a great accomplishment. It is important to maintain these new eating habits after surgery. Also, remember that overall health, age, and genetics make each person's weight loss progress different.  Do not compare your progress, the amount you eat, or exercise to other patients.    Protein first at every meal- Eat the protein portion of your meal first. Eating protein helps the body feel full and sends a signal to stop eating. Protein is very important in building tissue in the body.  Eat at least 4 times per day- This includes protein supplements and small meals with a high amount of protein  Chewing your food thoroughly- Eating too quickly and improper chewing can cause pain and vomiting after surgery.    Slowing down the speed at which you eat- Refill your fork only after you swallow. Adopt a new pattern of eating by taking a bite of food and putting your utensil down between bites. This will help to reduce the feeling of food being “stuck.”  Drink water and other fluids slowly- Drink at least 48 ounces per day minimum. Sip fluids as if they were hot beverages. If you find it difficult to stop gulping liquids, try using a sippy cup or a sport top water bottle.    Make sure you are eating meals without drinking fluids- After surgery you will not be allowed to drink fluids 30 minutes before, during, or 30 minutes after your meal (30/30/30 rule).   This will be a life-long behavior change. The reason for the rule is to keep food from passing through your smaller stomach more rapidly. This will cause you to feel hungry again shortly after your meal.  Continue to avoid caffeine and carbonated beverages- Caffeine acts as a diuretic and can be dehydrating as well as irritating to the lining of

## 2024-02-07 NOTE — PROGRESS NOTES
[facility]   [facility]    Subjective:      Patient ID: Maday Varghese is a 38 y.o. female    HPI    4 months s/p sleeve gastrectomy    Maday Varghese is a 38 y.o. obese female , Body mass index is 52.86 kg/m².     Patient denies any nausea, vomiting, fevers, chills, shortness of breath, chest pain, constipation or urinary symptoms. Denies any heartburn nor dysphagia.    Past Medical History:   Diagnosis Date    Bacterial vaginosis     COVID-19     2021    Dysmenorrhea     Hypotension due to hypovolemia     Menorrhagia     Obesity     Ovarian cyst     Sleep apnea     with cpap     Past Surgical History:   Procedure Laterality Date    DILATION AND CURETTAGE OF UTERUS      SLEEVE GASTRECTOMY N/A 10/30/2023    LAPAROSCOPIC SLEEVE GASTRECTOMY performed by Yamil Bruno MD at [facility] OR    UPPER GASTROINTESTINAL ENDOSCOPY N/A 12/22/2022    EGD BIOPSY performed by Yamil Bruno MD at [facility] ASC ENDOSCOPY     Family History   Problem Relation Age of Onset    Arthritis Mother     Elevated Lipids Mother     Diabetes Father     Kidney Disease Father      Social History     Tobacco Use    Smoking status: Never    Smokeless tobacco: Never   Substance Use Topics    Alcohol use: Not Currently     Comment: social     I counseled the patient on the importance of not smoking and risks of ETOH.   Allergies   Allergen Reactions    Shellfish-Derived Products Anaphylaxis    Strawberry (Diagnostic) Anaphylaxis    Bee Venom Hives and Swelling    Percocet [Oxycodone-Acetaminophen] Other (See Comments)     Patient has nightmares     Vitals:    02/26/24 1407   BP: 121/89   Site: Right Upper Arm   Position: Sitting   Pulse: 98   Resp: 16   SpO2: 94%   Weight: (!) 147.4 kg (325 lb)   Height: 1.67 m (5' 5.75\")     Body mass index is 52.86 kg/m².    Current Outpatient Medications:     cetirizine (ZYRTEC) 10 MG tablet, Take 1 tablet by mouth daily, Disp: 90 tablet, Rfl: 1    azelastine (ASTELIN) 0.1 % nasal spray, 1

## 2024-02-12 ENCOUNTER — OFFICE VISIT (OUTPATIENT)
Dept: ENT CLINIC | Age: 39
End: 2024-02-12
Payer: MEDICAID

## 2024-02-12 VITALS
TEMPERATURE: 97.7 F | OXYGEN SATURATION: 99 % | SYSTOLIC BLOOD PRESSURE: 132 MMHG | DIASTOLIC BLOOD PRESSURE: 84 MMHG | HEIGHT: 65 IN | HEART RATE: 73 BPM | BODY MASS INDEX: 48.82 KG/M2 | WEIGHT: 293 LBS

## 2024-02-12 DIAGNOSIS — J32.9 RECURRENT SINUSITIS: Primary | ICD-10-CM

## 2024-02-12 DIAGNOSIS — J32.9 CHRONIC SINUSITIS, UNSPECIFIED LOCATION: Chronic | ICD-10-CM

## 2024-02-12 DIAGNOSIS — J30.1 SEASONAL ALLERGIC RHINITIS DUE TO POLLEN: Chronic | ICD-10-CM

## 2024-02-12 PROCEDURE — G8417 CALC BMI ABV UP PARAM F/U: HCPCS | Performed by: OTOLARYNGOLOGY

## 2024-02-12 PROCEDURE — 99203 OFFICE O/P NEW LOW 30 MIN: CPT | Performed by: OTOLARYNGOLOGY

## 2024-02-12 PROCEDURE — 1036F TOBACCO NON-USER: CPT | Performed by: OTOLARYNGOLOGY

## 2024-02-12 PROCEDURE — G8427 DOCREV CUR MEDS BY ELIG CLIN: HCPCS | Performed by: OTOLARYNGOLOGY

## 2024-02-12 PROCEDURE — G8484 FLU IMMUNIZE NO ADMIN: HCPCS | Performed by: OTOLARYNGOLOGY

## 2024-02-12 RX ORDER — CETIRIZINE HYDROCHLORIDE 10 MG/1
10 TABLET ORAL DAILY
Qty: 90 TABLET | Refills: 1 | Status: SHIPPED | OUTPATIENT
Start: 2024-02-12

## 2024-02-12 NOTE — PROGRESS NOTES
[facility]   DIVISION OF OTOLARYNGOLOGY- HEAD & NECK SURGERY  [facility]           NEW PATIENT VISIT      PCP:  Madhu Maya MD      REFERRED BY:  Ruben Rogel DO      CHIEF COMPLAINT    Chief Complaint   Patient presents with    Sinusitis        HISTORY OF PRESENT ILLNESS    Maday Varghese is a 38 y.o. female who was referred for evaluation and treatment of \"Frequent sinus infections.\"  Sinus infection once a month, diagnosed at urgent care, treat with Z-jude, October to April, since 2020.  No sinus infections in the rest of the year.  Had COVID-19 in 2021.   PCP prescribed 01/24/24.  Has environmental/seasonal allergies.  Used OTC Flonase and azelastine.  No allergy meds prior to that.       REVIEW OF SYSTEMS   Review of Systems   Constitutional:  Negative for chills and fever.   HENT:  Positive for congestion (for past two days.). Negative for ear discharge, ear pain, hearing loss, rhinorrhea, sinus pain, sore throat and tinnitus.          PAST MEDICAL HISTORY    Past Medical History:   Diagnosis Date    Bacterial vaginosis     COVID-19     2021    Dysmenorrhea     Hypotension due to hypovolemia     Menorrhagia     Obesity     Ovarian cyst     Sleep apnea     with cpap       Past Surgical History:   Procedure Laterality Date    DILATION AND CURETTAGE OF UTERUS      SLEEVE GASTRECTOMY N/A 10/30/2023    LAPAROSCOPIC SLEEVE GASTRECTOMY performed by Yamil Bruno MD at [facility] OR    UPPER GASTROINTESTINAL ENDOSCOPY N/A 12/22/2022    EGD BIOPSY performed by Yamil Bruno MD at [facility] ASC ENDOSCOPY         Current Outpatient Medications   Medication Sig Dispense Refill    cetirizine (ZYRTEC) 10 MG tablet Take 1 tablet by mouth daily 90 tablet 1    azelastine (ASTELIN) 0.1 % nasal spray 1 spray by Nasal route 2 times daily Use in each nostril as directed 60 mL 3    fluticasone (FLONASE) 50 MCG/ACT nasal spray 2 sprays by Each Nostril route daily 16 g 0    Drospirenone (SLYND) 4 MG TABS

## 2024-02-26 ENCOUNTER — OFFICE VISIT (OUTPATIENT)
Dept: BARIATRICS/WEIGHT MGMT | Age: 39
End: 2024-02-26
Payer: MEDICAID

## 2024-02-26 VITALS
WEIGHT: 293 LBS | RESPIRATION RATE: 16 BRPM | DIASTOLIC BLOOD PRESSURE: 89 MMHG | OXYGEN SATURATION: 94 % | SYSTOLIC BLOOD PRESSURE: 121 MMHG | HEART RATE: 98 BPM | BODY MASS INDEX: 47.09 KG/M2 | HEIGHT: 66 IN

## 2024-02-26 DIAGNOSIS — E66.01 MORBID OBESITY WITH BMI OF 50.0-59.9, ADULT (HCC): ICD-10-CM

## 2024-02-26 DIAGNOSIS — K21.9 CHRONIC GERD: Primary | ICD-10-CM

## 2024-02-26 DIAGNOSIS — Z90.3 S/P GASTRIC SLEEVE PROCEDURE: ICD-10-CM

## 2024-02-26 DIAGNOSIS — G47.33 OBSTRUCTIVE SLEEP APNEA SYNDROME: ICD-10-CM

## 2024-02-26 DIAGNOSIS — E78.2 MIXED HYPERLIPIDEMIA: ICD-10-CM

## 2024-02-26 LAB
25(OH)D3 SERPL-MCNC: 23 NG/ML
ALBUMIN SERPL-MCNC: 3.9 G/DL (ref 3.4–5)
ALBUMIN/GLOB SERPL: 1.3 {RATIO} (ref 1.1–2.2)
ALP SERPL-CCNC: 90 U/L (ref 40–129)
ALT SERPL-CCNC: <5 U/L (ref 10–40)
ANION GAP SERPL CALCULATED.3IONS-SCNC: 11 MMOL/L (ref 3–16)
AST SERPL-CCNC: 14 U/L (ref 15–37)
BASOPHILS # BLD: 0.1 K/UL (ref 0–0.2)
BASOPHILS NFR BLD: 1.1 %
BILIRUB SERPL-MCNC: 0.4 MG/DL (ref 0–1)
BUN SERPL-MCNC: 9 MG/DL (ref 7–20)
CALCIUM SERPL-MCNC: 9.4 MG/DL (ref 8.3–10.6)
CHLORIDE SERPL-SCNC: 107 MMOL/L (ref 99–110)
CO2 SERPL-SCNC: 24 MMOL/L (ref 21–32)
CREAT SERPL-MCNC: 0.9 MG/DL (ref 0.6–1.1)
DEPRECATED RDW RBC AUTO: 12.9 % (ref 12.4–15.4)
EOSINOPHIL # BLD: 0.2 K/UL (ref 0–0.6)
EOSINOPHIL NFR BLD: 3 %
FOLATE SERPL-MCNC: 8.64 NG/ML (ref 4.78–24.2)
GFR SERPLBLD CREATININE-BSD FMLA CKD-EPI: >60 ML/MIN/{1.73_M2}
GLUCOSE SERPL-MCNC: 63 MG/DL (ref 70–99)
HCT VFR BLD AUTO: 40 % (ref 36–48)
HGB BLD-MCNC: 13.6 G/DL (ref 12–16)
IRON SATN MFR SERPL: 30 % (ref 15–50)
IRON SERPL-MCNC: 89 UG/DL (ref 37–145)
LYMPHOCYTES # BLD: 2 K/UL (ref 1–5.1)
LYMPHOCYTES NFR BLD: 30.4 %
MCH RBC QN AUTO: 32.8 PG (ref 26–34)
MCHC RBC AUTO-ENTMCNC: 34 G/DL (ref 31–36)
MCV RBC AUTO: 96.4 FL (ref 80–100)
MONOCYTES # BLD: 0.3 K/UL (ref 0–1.3)
MONOCYTES NFR BLD: 5.2 %
NEUTROPHILS # BLD: 4 K/UL (ref 1.7–7.7)
NEUTROPHILS NFR BLD: 60.3 %
PLATELET # BLD AUTO: 340 K/UL (ref 135–450)
PMV BLD AUTO: 9.4 FL (ref 5–10.5)
POTASSIUM SERPL-SCNC: 3.9 MMOL/L (ref 3.5–5.1)
PROT SERPL-MCNC: 7 G/DL (ref 6.4–8.2)
RBC # BLD AUTO: 4.15 M/UL (ref 4–5.2)
SODIUM SERPL-SCNC: 142 MMOL/L (ref 136–145)
TIBC SERPL-MCNC: 298 UG/DL (ref 260–445)
VIT B12 SERPL-MCNC: 756 PG/ML (ref 211–911)
WBC # BLD AUTO: 6.7 K/UL (ref 4–11)

## 2024-02-26 PROCEDURE — G8417 CALC BMI ABV UP PARAM F/U: HCPCS | Performed by: NURSE PRACTITIONER

## 2024-02-26 PROCEDURE — G8427 DOCREV CUR MEDS BY ELIG CLIN: HCPCS | Performed by: NURSE PRACTITIONER

## 2024-02-26 PROCEDURE — 99214 OFFICE O/P EST MOD 30 MIN: CPT | Performed by: NURSE PRACTITIONER

## 2024-02-26 PROCEDURE — G8484 FLU IMMUNIZE NO ADMIN: HCPCS | Performed by: NURSE PRACTITIONER

## 2024-02-26 PROCEDURE — 1036F TOBACCO NON-USER: CPT | Performed by: NURSE PRACTITIONER

## 2024-02-26 NOTE — PROGRESS NOTES
Dietary Assessment Note      Vitals:   Vitals:    24 1407   BP: 121/89   Site: Right Upper Arm   Position: Sitting   Pulse: 98   Resp: 16   SpO2: 94%   Weight: (!) 147.4 kg (325 lb)   Height: 1.67 m (5' 5.75\")    Patient lost 11 lbs over ~2.5 mos.    Total Weight Loss: 44.4 lbs    Labs reviewed: no lab studies available for review at time of visit    Protein intake: 60-80 grams/day     Fluid intake: 48-64 oz    Multivitamin/mineral intake: yes - 1 fusion capsule w/ iron daily     Calcium intake: 2 soft chews, taking all separate from each other      Other: none     Ex[facility]: [facility] to [facility] in March     Nutrition Assessment: 17 wk post-op visit.   Breakfast: eggs + cheese   Snack: pepperoni/turkey + cheese   Lunch: water   Snack: none OR popcorn  Dinner: meat + salad   Snack: focus on fluids     Amount able to eat per sittin/2-3/4 cup    Following  rule: yes    Food Intolerances/issues:  popcorn - one time     Client Concerns: none     Goals:   - Continue diet   - Restart exercise 2-3X days per week     Plan: f/u per provider     MARIELENA EDWARD, MS, RD, LD

## 2024-02-29 ENCOUNTER — HOSPITAL ENCOUNTER (OUTPATIENT)
Dept: CT IMAGING | Age: 39
Discharge: HOME | End: 2024-02-29
Attending: OTOLARYNGOLOGY
Payer: MEDICAID

## 2024-02-29 DIAGNOSIS — J32.9 CHRONIC SINUSITIS, UNSPECIFIED LOCATION: Chronic | ICD-10-CM

## 2024-02-29 DIAGNOSIS — J32.9 RECURRENT SINUSITIS: ICD-10-CM

## 2024-02-29 PROCEDURE — 70486 CT MAXILLOFACIAL W/O DYE: CPT

## 2024-03-05 ENCOUNTER — OFFICE VISIT (OUTPATIENT)
Dept: GYNECOLOGY | Age: 39
End: 2024-03-05
Payer: MEDICAID

## 2024-03-05 VITALS
WEIGHT: 293 LBS | BODY MASS INDEX: 48.82 KG/M2 | HEIGHT: 65 IN | DIASTOLIC BLOOD PRESSURE: 78 MMHG | OXYGEN SATURATION: 97 % | SYSTOLIC BLOOD PRESSURE: 114 MMHG | RESPIRATION RATE: 17 BRPM | HEART RATE: 84 BPM

## 2024-03-05 DIAGNOSIS — Z01.419 WELL WOMAN EXAM WITH ROUTINE GYNECOLOGICAL EXAM: Primary | ICD-10-CM

## 2024-03-05 PROCEDURE — 99386 PREV VISIT NEW AGE 40-64: CPT | Performed by: OBSTETRICS & GYNECOLOGY

## 2024-03-05 PROCEDURE — G8484 FLU IMMUNIZE NO ADMIN: HCPCS | Performed by: OBSTETRICS & GYNECOLOGY

## 2024-03-06 ASSESSMENT — ENCOUNTER SYMPTOMS
RESPIRATORY NEGATIVE: 1
ALLERGIC/IMMUNOLOGIC NEGATIVE: 1
EYES NEGATIVE: 1
GASTROINTESTINAL NEGATIVE: 1

## 2024-03-06 NOTE — PROGRESS NOTES
Subjective:      Patient ID: Maday Varghese is a 38 y.o. female.    Patient is here for annual. Patient with BTB on SLYND.         Review of Systems   Constitutional: Negative.    HENT: Negative.     Eyes: Negative.    Respiratory: Negative.     Cardiovascular: Negative.    Gastrointestinal: Negative.    Endocrine: Negative.    Genitourinary:  Positive for menstrual problem.   Musculoskeletal: Negative.    Skin: Negative.    Allergic/Immunologic: Negative.    Neurological: Negative.    Hematological: Negative.    Psychiatric/Behavioral: Negative.       Date of Birth 1985  Past Medical History:   Diagnosis Date    Bacterial vaginosis     COVID-2021    Dysmenorrhea     Hypotension due to hypovolemia     Menorrhagia     Obesity     Ovarian cyst     Sleep apnea     with cpap     Past Surgical History:   Procedure Laterality Date    DILATION AND CURETTAGE OF UTERUS      SLEEVE GASTRECTOMY N/A 10/30/2023    LAPAROSCOPIC SLEEVE GASTRECTOMY performed by Yamil Bruno MD at [facility] OR    UPPER GASTROINTESTINAL ENDOSCOPY N/A 2022    EGD BIOPSY performed by Yamil Bruno MD at [facility] ASC ENDOSCOPY     OB History    Para Term  AB Living   0 0 0 0 0 0   SAB IAB Ectopic Molar Multiple Live Births   0 0 0 0 0 0     Social History     Socioeconomic History    Marital status: Single     Spouse name: Not on file    Number of children: Not on file    Years of education: Not on file    Highest education level: Not on file   Occupational History    Not on file   Tobacco Use    Smoking status: Never    Smokeless tobacco: Never   Vaping Use    Vaping Use: Never used   Substance and Sexual Activity    Alcohol use: Not Currently     Comment: social    Drug use: Never    Sexual activity: Not Currently   Other Topics Concern    Not on file   Social History Narrative    Not on file     Social Determinants of Health     Financial Resource Strain: Not on file   Food Insecurity: Not on file   Transportation Needs:

## 2024-03-14 ENCOUNTER — OFFICE VISIT (OUTPATIENT)
Dept: ENT CLINIC | Age: 39
End: 2024-03-14
Payer: MEDICAID

## 2024-03-14 VITALS
DIASTOLIC BLOOD PRESSURE: 90 MMHG | SYSTOLIC BLOOD PRESSURE: 139 MMHG | OXYGEN SATURATION: 98 % | TEMPERATURE: 98.6 F | HEART RATE: 90 BPM | HEIGHT: 65 IN | BODY MASS INDEX: 48.82 KG/M2 | WEIGHT: 293 LBS

## 2024-03-14 DIAGNOSIS — J30.1 SEASONAL ALLERGIC RHINITIS DUE TO POLLEN: Chronic | ICD-10-CM

## 2024-03-14 DIAGNOSIS — J32.9 RECURRENT SINUSITIS: Primary | Chronic | ICD-10-CM

## 2024-03-14 PROCEDURE — 99213 OFFICE O/P EST LOW 20 MIN: CPT | Performed by: OTOLARYNGOLOGY

## 2024-03-14 PROCEDURE — 1036F TOBACCO NON-USER: CPT | Performed by: OTOLARYNGOLOGY

## 2024-03-14 PROCEDURE — 95028 IQ TSTS ALLERGY DELAYED RXN: CPT | Performed by: OTOLARYNGOLOGY

## 2024-03-14 PROCEDURE — G8427 DOCREV CUR MEDS BY ELIG CLIN: HCPCS | Performed by: OTOLARYNGOLOGY

## 2024-03-14 PROCEDURE — 95024 IQ TESTS W/ALLERGENIC XTRCS: CPT | Performed by: OTOLARYNGOLOGY

## 2024-03-14 PROCEDURE — G8484 FLU IMMUNIZE NO ADMIN: HCPCS | Performed by: OTOLARYNGOLOGY

## 2024-03-14 PROCEDURE — G8417 CALC BMI ABV UP PARAM F/U: HCPCS | Performed by: OTOLARYNGOLOGY

## 2024-03-14 NOTE — PROGRESS NOTES
CHIEF COMPLAINT  Chief Complaint   Patient presents with    Sinusitis    Allergic Rhinitis        HISTORY OF PRESENT ILLNESS     Maday Varghese is a 38 y.o. female here for recheck and follow up of recurrent sinusitis and allergic rhinitis.  No current problems.        REVIEW OF SYSTEMS  Constitutional:  Denied fever and chills.  ENT/sinus:  Denied otalgia, otorrhea, nasal pain, rhinorrhea, sore throat, and sinus/facial pain.        EXAMINATION    WDWN, NAD  Face:  Normal skin with no lesions detected.    Voice:  Normal, with no hoarseness, breathiness, or hot potato quality.  Ears:  TMs, EACs, mastoids and pinnae were normal.    Nose:  Normal with no lesions.  Sinuses: Nontender x 4   Throat,  OC/OP:  Normal with no lesions.  Neck:  NT, No masses.  Trachea midline.  Nodes:  No lymphadenopathy.     I performed this head and neck physical exam personally.      REVIEW OF IMAGES:         I  independently interpreted the images of the CT scan of the sinuses, showing normal sinuses.      CT OF THE SINUS WITHOUT CONTRAST 2/29/2024      FINDINGS:  LEFT OMC: The left ostiomeatal complex and frontal recess are patent. Left  maxillary, anterior ethmoid, and frontal sinuses are clear.     RIGHT OMC: The right ostiomeatal complex and frontal recess are patent. Right  maxillary, anterior ethmoid, and frontal sinuses are clear.     SPHENOETHMOID: The sphenoethmoidal recesses are patent, and the bilateral  posterior ethmoid and sphenoid sinuses are clear. Sphenoid pneumatization  does not extend into the anterior clinoid processes.  There is extensive  pneumatization of the left sphenoid sinus with severe thinning of the  posterior wall overlying the carotid artery and middle cranial fossa.  The  ethmoid roofs are asymmetric with the left higher than the right.     NASAL CAVITY: The nasal cavity is clear.  There is paradoxical curvature of  the middle turbinates.     OTHER: The sella turcica is enlarged and has an empty

## 2024-03-18 ENCOUNTER — TELEPHONE (OUTPATIENT)
Dept: ENT CLINIC | Age: 39
End: 2024-03-18

## 2024-03-18 NOTE — TELEPHONE ENCOUNTER
Spoke with patient regarding Allergy Testing referral. Reviewed medications to hold and and not use prior to testing (Zyrtec & Astelin NS) and reviewed some over the counter medications to avoid and to call if she has any concerns. She will contact her insurance company regarding copay or out of pocket costs as well as any questions the has. Patient has the Welcome packet and will complete the Allergic History and bring with her the day of testing     Allergy Testing scheduled 3/27/24 @ 1 pm

## 2024-03-27 ENCOUNTER — NURSE ONLY (OUTPATIENT)
Dept: ENT CLINIC | Age: 39
End: 2024-03-27
Payer: MEDICAID

## 2024-03-27 DIAGNOSIS — J30.1 SEASONAL ALLERGIC RHINITIS DUE TO POLLEN: Primary | ICD-10-CM

## 2024-03-27 PROCEDURE — 95024 IQ TESTS W/ALLERGENIC XTRCS: CPT | Performed by: OTOLARYNGOLOGY

## 2024-03-27 PROCEDURE — 95004 PERQ TESTS W/ALRGNC XTRCS: CPT | Performed by: OTOLARYNGOLOGY

## 2024-03-27 NOTE — PROGRESS NOTES
: Not tested  Stemphylium Solani: Not tested  Trichoderma Viride: Not tested  Trichophyton Mentagrophytes: Not tested  Cephalothecium Roseum: Not tested  Acremonium Strictum: Not tested  Aspergillus Flavus: Not tested  Aspergillus Fumigatus: 3+  Aspergillus Niger: Not tested  Aureobasidium Pullulans: 0  Bipolaris Sorokiniana: 0  Candida Albicans: 4+  Chaetomium Glosbosum: Not tested  Cladosporium Cladosporioides: 0  Gibberella Pulicaris: 4+  Mucor Plumbeus:  (EP=6)  Penniccillum Notatum: 0    Maday  instructed to remain in clinic for 30 minutes post testing and to report any adverse reactions immediately. No changes noted in patient status post testing.    Testing was completed today.  Please view results in the media tab.  Patient has many allergies noted.  Recommend Allergy Immunotherapy.  Patient is scheduled for allergy testing f/u appt with GERARDO Castillo MD on April 1st, @ 8:20.

## 2024-04-08 ENCOUNTER — TELEPHONE (OUTPATIENT)
Dept: ENT CLINIC | Age: 39
End: 2024-04-08

## 2024-04-08 NOTE — TELEPHONE ENCOUNTER
Pt needs earlier appt. Got her days mixed up and missed her last appt. Please call patient if Dr. Castillo has a cancellation -- pt is currently scheduled for first available on 5/20

## 2024-04-11 NOTE — TELEPHONE ENCOUNTER
Please call patient and see if she can come in Tuesday, April 16.  You may double book her in any single booked on the hour appointment slot except not 11 AM or 4 PM.

## 2024-04-18 ENCOUNTER — TELEPHONE (OUTPATIENT)
Dept: ENT CLINIC | Age: 39
End: 2024-04-18

## 2024-04-18 ENCOUNTER — OFFICE VISIT (OUTPATIENT)
Dept: ENT CLINIC | Age: 39
End: 2024-04-18
Payer: MEDICAID

## 2024-04-18 VITALS
OXYGEN SATURATION: 100 % | TEMPERATURE: 98 F | WEIGHT: 293 LBS | HEART RATE: 99 BPM | BODY MASS INDEX: 48.82 KG/M2 | SYSTOLIC BLOOD PRESSURE: 137 MMHG | HEIGHT: 65 IN | DIASTOLIC BLOOD PRESSURE: 81 MMHG

## 2024-04-18 DIAGNOSIS — J32.9 RECURRENT SINUSITIS: ICD-10-CM

## 2024-04-18 DIAGNOSIS — J30.1 SEASONAL ALLERGIC RHINITIS DUE TO POLLEN: Primary | ICD-10-CM

## 2024-04-18 PROCEDURE — G8427 DOCREV CUR MEDS BY ELIG CLIN: HCPCS | Performed by: OTOLARYNGOLOGY

## 2024-04-18 PROCEDURE — 1036F TOBACCO NON-USER: CPT | Performed by: OTOLARYNGOLOGY

## 2024-04-18 PROCEDURE — 99213 OFFICE O/P EST LOW 20 MIN: CPT | Performed by: OTOLARYNGOLOGY

## 2024-04-18 PROCEDURE — 95024 IQ TESTS W/ALLERGENIC XTRCS: CPT | Performed by: OTOLARYNGOLOGY

## 2024-04-18 PROCEDURE — 95028 IQ TSTS ALLERGY DELAYED RXN: CPT | Performed by: OTOLARYNGOLOGY

## 2024-04-18 PROCEDURE — G8417 CALC BMI ABV UP PARAM F/U: HCPCS | Performed by: OTOLARYNGOLOGY

## 2024-04-18 NOTE — PROGRESS NOTES
CHIEF COMPLAINT  Chief Complaint   Patient presents with    Allergic Rhinitis        HISTORY OF PRESENT ILLNESS    Maday Varghese is a 38 y.o. female here for recheck and follow up of allergic rhinitis.  Patient stated that she is having no major allergy flare since the last visit.  Allergy testing was positive for multiple allergens.  She stated she desires to proceed with immunotherapy and has her EpiPen available.       REVIEW OF SYSTEMS  Constitutional:  Denied fever.  ENT/sinus:  Denied otalgia, otorrhea, nasal pain, rhinorrhea, sore throat, and sinus/facial pain.          EXAMINATION    WDWN, NAD  Face:  Normal skin with no lesions detected.    Voice:  Normal, with no hoarseness, breathiness, or hot potato quality.  Ears:  the TMs and EACs were normal.  Nose:  Normal with no lesions.  Sinuses:  Nontender x 4   Throat,  OC/OP:  Normal with no lesions.  Neck:  NT, No masses.  Trachea midline.  Nodes:  No lymphadenopathy.     I performed this head and neck physical exam personally.        ALLERGY SKINTESTING:   Positive for multiple allergens including spring summer and fall pollens/allergens, as well as cat, dog, dust mite, cockroach, and molds.          IMPRESSION / DIAGNOSES / ORDERS:   Maday was seen today for allergic rhinitis .    Diagnoses and all orders for this visit:    Seasonal allergic rhinitis due to pollen    Recurrent sinusitis         RECOMMENDATIONS / PLAN:   Proceed with immunotherapy.    Patient was advised to always bring her epi pen when coming in for an allergy shot.  Continue fluticasone nasal spray and cetirizine.    Prescription drug management:  Since it has been effective treatment, I am continuing azelastine.      Return in about 6 months (around 10/18/2024) for recheck/follow-up, and sooner if condition worsens.         Patient Instructions   Use cetirizine, fluticasone/Flonase, and azelastine/Astelin nasal spray daily from Feb 1 to the first ground freeze in the fall.  Use as

## 2024-04-18 NOTE — TELEPHONE ENCOUNTER
Patient is ready for Immunotherapy please enter orders for Immunotherapy and I will call her and make her prescriptions

## 2024-04-18 NOTE — PATIENT INSTRUCTIONS
Use cetirizine, fluticasone/Flonase, and azelastine/Astelin nasal spray daily from Feb 1 to the first ground freeze in the fall.  Use as needed for allergy symptoms from the ground freeze until the first week of February.

## 2024-04-18 NOTE — TELEPHONE ENCOUNTER
Maday had her allergy f/u with Dr. Castillo today  She is ready to proceed with immunotherapy.   Please call to schedule     Thank you

## 2024-04-19 NOTE — TELEPHONE ENCOUNTER
Patient already has had her testing completed. She needs order for her Immunotherapy Allergy Injections.     Please place order for Allergy Shots then I can make her prescription so she can start.     Please advise

## 2024-04-26 ENCOUNTER — TELEPHONE (OUTPATIENT)
Dept: ENT CLINIC | Age: 39
End: 2024-04-26

## 2024-04-26 DIAGNOSIS — J30.2 SEASONAL ALLERGIC RHINITIS, UNSPECIFIED TRIGGER: ICD-10-CM

## 2024-04-26 RX ORDER — AZELASTINE 1 MG/ML
1 SPRAY, METERED NASAL 2 TIMES DAILY
Qty: 60 ML | Refills: 3 | Status: SHIPPED | OUTPATIENT
Start: 2024-04-26

## 2024-04-26 RX ORDER — FLUTICASONE PROPIONATE 50 MCG
2 SPRAY, SUSPENSION (ML) NASAL DAILY
Qty: 16 G | Refills: 0 | Status: SHIPPED | OUTPATIENT
Start: 2024-04-26

## 2024-04-26 NOTE — TELEPHONE ENCOUNTER
Called and LM for Ms. Varghese to discuss starting Allergy Injections. Informed I cannot make the prescription till I know she has her Epi Pen and a date to start her injections. Requested call back on Monday 4/29/24 to discuss and contact number at [facility]

## 2024-04-26 NOTE — TELEPHONE ENCOUNTER
Requested Prescriptions     Pending Prescriptions Disp Refills    azelastine (ASTELIN) 0.1 % nasal spray 60 mL 3     Si spray by Nasal route 2 times daily Use in each nostril as directed    fluticasone (FLONASE) 50 MCG/ACT nasal spray 16 g 0     Si sprays by Each Nostril route daily       Last Clinic Visit:  2024     Next Clinic Appointment:  Visit date not found

## 2024-05-01 ENCOUNTER — TELEPHONE (OUTPATIENT)
Dept: ENT CLINIC | Age: 39
End: 2024-05-01

## 2024-05-01 NOTE — TELEPHONE ENCOUNTER
LM for patient to call regarding starting Allergy Injections and to see if she has her Epi Pen (she has one for Bees but want to be sure of the expiration date)    Instructed to call this week and left contact number. Informed I am only here this week.If she needs to call next week at  to ask for Jessie and she will be happy to schedule as needed

## 2024-05-06 ENCOUNTER — TELEPHONE (OUTPATIENT)
Dept: ENT CLINIC | Age: 39
End: 2024-05-06

## 2024-05-06 NOTE — TELEPHONE ENCOUNTER
Patient called and is ready to begin Allergy inj.  She has her Epi Pen    Please call  [phone]

## 2024-05-07 ENCOUNTER — NURSE ONLY (OUTPATIENT)
Dept: ENT CLINIC | Age: 39
End: 2024-05-07
Payer: MEDICAID

## 2024-05-07 DIAGNOSIS — J30.1 SEASONAL ALLERGIC RHINITIS DUE TO POLLEN: Primary | ICD-10-CM

## 2024-05-07 PROCEDURE — 95165 ANTIGEN THERAPY SERVICES: CPT | Performed by: STUDENT IN AN ORGANIZED HEALTH CARE EDUCATION/TRAINING PROGRAM

## 2024-05-07 NOTE — PROGRESS NOTES
Total vials prepared: 2. First vial contains Pollens with 6 doses and second vial contains Mites, Molds, Epithelia, and Insects (M,M,Epi,I) with 6 doses.  Allergy extract was prepared according to written prescription by provider.  Provider onsite during allergy extract preparation.  Patient vials labeled with name, date of birth, vial number, Pollen or M M Epi I, and expiration date.  Injections are given weekly according to provider orders and injections are built according to patient tolerance to achieve a goal of maintenance.  See media scan for Prescription Treatment Set. See allergy flowsheets for injection documentation from each visit.

## 2024-05-14 ENCOUNTER — TELEPHONE (OUTPATIENT)
Dept: GYNECOLOGY | Age: 39
End: 2024-05-14

## 2024-05-14 RX ORDER — NORGESTREL AND ETHINYL ESTRADIOL 0.3-0.03MG
1 KIT ORAL DAILY
Qty: 3 PACKET | Refills: 1 | Status: SHIPPED | OUTPATIENT
Start: 2024-05-14

## 2024-05-14 NOTE — TELEPHONE ENCOUNTER
Patient called to let Dr Layne know that the current birth control pills isn't working. She wanted to wait awhile to see if they would kick in at some time but she has been bleeding for 5 months. Daily. Patient says she's been having sever cramps the pas 3 days. The cramps are so intense that she can't get out of the bed. Please advise.       Patient can be reached at [phone]

## 2024-05-14 NOTE — TELEPHONE ENCOUNTER
Patient called office wanting to let Dr. Layne know she went to the ER and they did a US and it showed everything was normal and they gave her some Tyenol and sent her home.

## 2024-05-14 NOTE — TELEPHONE ENCOUNTER
Called and spoke to patient she says she is now bleeding blood clots she is bleeding an pad per hour. She says she is laying in bed with a towel in between her legs. Advised patient that she needs to go to the ER. She says the ER wont do anything but okay.

## 2024-05-14 NOTE — TELEPHONE ENCOUNTER
Call patient that we can change her birth control to loovral. I will call it in for her. Please make her an appointment for 5/29/24 at 10:30 am. Tell patient that her blood count was normal.

## 2024-05-20 ENCOUNTER — OFFICE VISIT (OUTPATIENT)
Dept: BARIATRICS/WEIGHT MGMT | Age: 39
End: 2024-05-20
Payer: MEDICAID

## 2024-05-20 ENCOUNTER — NURSE ONLY (OUTPATIENT)
Dept: ENT CLINIC | Age: 39
End: 2024-05-20
Payer: MEDICAID

## 2024-05-20 VITALS
HEART RATE: 84 BPM | BODY MASS INDEX: 47.09 KG/M2 | HEIGHT: 66 IN | WEIGHT: 293 LBS | RESPIRATION RATE: 14 BRPM | DIASTOLIC BLOOD PRESSURE: 89 MMHG | SYSTOLIC BLOOD PRESSURE: 126 MMHG | OXYGEN SATURATION: 97 %

## 2024-05-20 DIAGNOSIS — E66.01 MORBID OBESITY WITH BMI OF 50.0-59.9, ADULT (HCC): ICD-10-CM

## 2024-05-20 DIAGNOSIS — E53.8 VITAMIN B12 DEFICIENCY: ICD-10-CM

## 2024-05-20 DIAGNOSIS — G47.33 OBSTRUCTIVE SLEEP APNEA SYNDROME: ICD-10-CM

## 2024-05-20 DIAGNOSIS — K21.9 CHRONIC GERD: Primary | ICD-10-CM

## 2024-05-20 DIAGNOSIS — E78.2 MIXED HYPERLIPIDEMIA: ICD-10-CM

## 2024-05-20 DIAGNOSIS — E55.9 VITAMIN D DEFICIENCY: ICD-10-CM

## 2024-05-20 DIAGNOSIS — Z90.3 S/P GASTRIC SLEEVE PROCEDURE: ICD-10-CM

## 2024-05-20 DIAGNOSIS — Z00.8 NUTRITIONAL ASSESSMENT: ICD-10-CM

## 2024-05-20 DIAGNOSIS — J30.1 SEASONAL ALLERGIC RHINITIS DUE TO POLLEN: Primary | ICD-10-CM

## 2024-05-20 PROCEDURE — 95117 IMMUNOTHERAPY INJECTIONS: CPT | Performed by: OTOLARYNGOLOGY

## 2024-05-20 PROCEDURE — G8417 CALC BMI ABV UP PARAM F/U: HCPCS | Performed by: NURSE PRACTITIONER

## 2024-05-20 PROCEDURE — G8427 DOCREV CUR MEDS BY ELIG CLIN: HCPCS | Performed by: NURSE PRACTITIONER

## 2024-05-20 PROCEDURE — 1036F TOBACCO NON-USER: CPT | Performed by: NURSE PRACTITIONER

## 2024-05-20 PROCEDURE — 99214 OFFICE O/P EST MOD 30 MIN: CPT | Performed by: NURSE PRACTITIONER

## 2024-05-20 RX ORDER — PSEUDOEPHED/ACETAMINOPH/DIPHEN 30MG-500MG
2 TABLET ORAL EVERY 6 HOURS PRN
COMMUNITY

## 2024-05-20 NOTE — PROGRESS NOTES
Correct serum vials verified by patient and nurse. Consent obtained and SVT completed.  SVT P=9mm wheal, M=9mm wheal.  After obtaining consent, and per orders of Dr Castillo, injections of allergy serum given by ELISHA Marquez. Patient instructed to remain in clinic for 30 minutes after injection, and to report any adverse reactions to me immediately. No change in patient status post injection.

## 2024-05-20 NOTE — PROGRESS NOTES
Dietary Assessment Note      Vitals:   Vitals:    05/20/24 1303   BP: 126/89   Site: Left Upper Arm   Position: Sitting   Pulse: 84   Resp: 14   SpO2: 97%   Weight: (!) 146.5 kg (323 lb)   Height: 1.67 m (5' 5.75\")    Patient lost 2 lbs over past ~ 3 months.    Total Weight Loss: 46.4 lbs    Labs reviewed:   BMP:   Lab Results   Component Value Date/Time     02/26/2024 11:42 AM    K 3.9 02/26/2024 11:42 AM    K 4.2 01/24/2023 03:37 PM     02/26/2024 11:42 AM    CO2 24 02/26/2024 11:42 AM    BUN 9 02/26/2024 11:42 AM    CREATININE 0.9 02/26/2024 11:42 AM    GLUCOSE 63 02/26/2024 11:42 AM    CALCIUM 9.4 02/26/2024 11:42 AM      HgBA1c:   Lab Results   Component Value Date/Time    LABA1C 5.3 11/28/2022 01:39 PM     Vitamin D:   Lab Results   Component Value Date/Time    VITD25 23.0 02/26/2024 11:42 AM     Vitamin E:   Lab Results   Component Value Date/Time    VITEALPH 6.7 11/28/2022 01:39 PM    GAMTOC 1.3 11/28/2022 01:39 PM     Vitamin A:   Lab Results   Component Value Date/Time    JANNA 0.39 11/28/2022 01:39 PM     Vitamin B1:   Lab Results   Component Value Date/Time    VNRB8XFSDVP 95 11/28/2022 01:39 PM   4  VitB12/Folate:   Lab Results   Component Value Date/Time    KYEZKLSV17 756 02/26/2024 11:42 AM    FOLATE 8.64 02/26/2024 11:42 AM     Iron:   Lab Results   Component Value Date/Time    IRON 89 02/26/2024 11:42 AM    TIBC 298 02/26/2024 11:42 AM     Lipid:   Lab Results   Component Value Date/Time    TRIG 111 03/03/2023 09:34 AM    HDL 65 03/03/2023 09:34 AM     Protein intake: >80 grams/day     Fluid intake: ~ 32 oz per day     Multivitamin/mineral intake: Equate MVI 1 x day     Calcium intake: not currently     Other: biotin     Exercise: Not currently but 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     Nutrition Assessment: 6 mo s/p sleeve post-op visit. Pt was working 3rd shift (but is no longer) and also a day job. Pt reports has a limited veggie intake of salad and celery.     Breakfast: 
lifestyle modifications per our recommendations.  [x] Weight loss recommended.  [] Continue to follow up with their PCP for medication management and monitoring.  [] Follow up labs ordered today.    Obstructive Sleep Apnea:   [x] Continue to make dietary and lifestyle modifications per our recommendations.  [x] Weight loss recommended.  [x] Reviewed the importance of wearing your CPAP/BIPAP.  [x] Continue to follow up with their sleep medicine provider for CPAP/BIPAP management and monitoring.    Chronic GERD:   [x] Continue to make dietary and lifestyle modifications per our recommendations.  [x] Weight loss recommended.  [x] Continue PPI (Prilosec).  [] Continue H2 Blocker  [] Wean PPI. Take every other day for two weeks. If no issues with heartburn/reflux you may decrease to every third day for two weeks. If no issues with heartburn/reflux you may stop the Prilosec. Recommend that you get OTC Pepcid to take should you have occasional heartburn/reflux.    Obesity:  [x] Continue to make dietary and lifestyle modifications per our recommendations.  [x] Weight loss recommended.  [x] Return for follow-up 2 months.    Total encounter time: 32 minutes, including any number of the following: Bariatric Postoperative work up/protocols, review of labs, imaging, provider notes, outside hospital records, performing examination/evaluation, counseling patient and/or family, ordering medications/tests, placing referrals and communication with referring physicians, coordination of care; discussing exercise and physical activity; discussing dietary plan/recall with the patient as well with registered dietitian and documentation in the EHR. Of note, the above was done during same day of the actual patient encounter.

## 2024-05-29 ENCOUNTER — NURSE ONLY (OUTPATIENT)
Dept: ENT CLINIC | Age: 39
End: 2024-05-29
Payer: MEDICAID

## 2024-05-29 ENCOUNTER — OFFICE VISIT (OUTPATIENT)
Dept: GYNECOLOGY | Age: 39
End: 2024-05-29
Payer: MEDICAID

## 2024-05-29 VITALS
OXYGEN SATURATION: 98 % | SYSTOLIC BLOOD PRESSURE: 118 MMHG | BODY MASS INDEX: 48.82 KG/M2 | HEART RATE: 60 BPM | RESPIRATION RATE: 17 BRPM | WEIGHT: 293 LBS | DIASTOLIC BLOOD PRESSURE: 72 MMHG | HEIGHT: 65 IN

## 2024-05-29 DIAGNOSIS — Z12.31 SCREENING MAMMOGRAM FOR HIGH-RISK PATIENT: Primary | ICD-10-CM

## 2024-05-29 DIAGNOSIS — J30.1 ALLERGIC RHINITIS DUE TO POLLEN, UNSPECIFIED SEASONALITY: Primary | ICD-10-CM

## 2024-05-29 PROCEDURE — 99213 OFFICE O/P EST LOW 20 MIN: CPT | Performed by: OBSTETRICS & GYNECOLOGY

## 2024-05-29 PROCEDURE — G8417 CALC BMI ABV UP PARAM F/U: HCPCS | Performed by: OBSTETRICS & GYNECOLOGY

## 2024-05-29 PROCEDURE — 1036F TOBACCO NON-USER: CPT | Performed by: OBSTETRICS & GYNECOLOGY

## 2024-05-29 PROCEDURE — G8427 DOCREV CUR MEDS BY ELIG CLIN: HCPCS | Performed by: OBSTETRICS & GYNECOLOGY

## 2024-05-29 PROCEDURE — 95117 IMMUNOTHERAPY INJECTIONS: CPT | Performed by: OTOLARYNGOLOGY

## 2024-05-29 RX ORDER — NORGESTREL AND ETHINYL ESTRADIOL 0.3-0.03MG
1 KIT ORAL DAILY
Qty: 3 PACKET | Refills: 1 | Status: SHIPPED | OUTPATIENT
Start: 2024-05-29

## 2024-05-29 RX ORDER — METFORMIN HYDROCHLORIDE 500 MG/1
500 TABLET, EXTENDED RELEASE ORAL
Qty: 90 TABLET | Refills: 1 | Status: SHIPPED | OUTPATIENT
Start: 2024-05-29

## 2024-05-29 NOTE — PROGRESS NOTES
Correct serum vials verified by patient and nurse. After obtaining consent, and per orders of Dr Castillo, injections of allergy serum given by ELVIN Birmingham LPN. Patient instructed to remain in clinic for 30 minutes after injection, and to report any adverse reactions to me immediately. No change in patient status post injection.

## 2024-05-30 ASSESSMENT — ENCOUNTER SYMPTOMS
ALLERGIC/IMMUNOLOGIC NEGATIVE: 1
EYES NEGATIVE: 1
GASTROINTESTINAL NEGATIVE: 1
RESPIRATORY NEGATIVE: 1

## 2024-05-30 NOTE — PROGRESS NOTES
Use    Vaping Use: Never used   Substance and Sexual Activity    Alcohol use: Not Currently     Comment: social    Drug use: Never    Sexual activity: Not Currently   Other Topics Concern    Not on file   Social History Narrative    Not on file     Social Determinants of Health     Financial Resource Strain: Not on file   Food Insecurity: Not on file   Transportation Needs: Not on file   Physical Activity: Not on file   Stress: Not on file   Social Connections: Not on file   Intimate Partner Violence: Not on file   Housing Stability: Not on file     Allergies   Allergen Reactions    Shellfish-Derived Products Anaphylaxis    Strawberry (Diagnostic) Anaphylaxis    Bee Venom Hives and Swelling    Percocet [Oxycodone-Acetaminophen] Other (See Comments)     Patient has nightmares     Outpatient Medications Marked as Taking for the 5/29/24 encounter (Office Visit) with Apolonia Layne MD   Medication Sig Dispense Refill    norgestrel-ethinyl estradiol (LOW-OGESTREL) 0.3-30 MG-MCG per tablet Take 1 tablet by mouth daily 3 packet 1    metFORMIN (GLUCOPHAGE-XR) 500 MG extended release tablet Take 1 tablet by mouth Daily with supper 90 tablet 1    Acetaminophen Extra Strength 500 MG TABS Take 2 tablets by mouth every 6 hours as needed      norethindrone-ethinyl estradiol-Fe (LO LOESTRIN FE) 1 MG-10 MCG / 10 MCG tablet Take 1 tablet by mouth daily (Patient taking differently: Take 1 tablet by mouth daily Will start on/around 6/9) 3 packet 3    azelastine (ASTELIN) 0.1 % nasal spray 1 spray by Nasal route 2 times daily Use in each nostril as directed 60 mL 3    fluticasone (FLONASE) 50 MCG/ACT nasal spray 2 sprays by Each Nostril route daily 16 g 0    cetirizine (ZYRTEC) 10 MG tablet Take 1 tablet by mouth daily 90 tablet 1    omeprazole (PRILOSEC) 20 MG delayed release capsule Take 1 capsule by mouth every morning (before breakfast) 90 capsule 1    albuterol sulfate HFA (PROVENTIL;VENTOLIN;PROAIR) 108 (90 Base) MCG/ACT

## 2024-06-03 ENCOUNTER — NURSE ONLY (OUTPATIENT)
Dept: ENT CLINIC | Age: 39
End: 2024-06-03
Payer: MEDICAID

## 2024-06-03 DIAGNOSIS — J30.1 SEASONAL ALLERGIC RHINITIS DUE TO POLLEN: Primary | ICD-10-CM

## 2024-06-03 PROCEDURE — 95117 IMMUNOTHERAPY INJECTIONS: CPT | Performed by: STUDENT IN AN ORGANIZED HEALTH CARE EDUCATION/TRAINING PROGRAM

## 2024-06-03 NOTE — PROGRESS NOTES
Correct serum vials verified by patient and nurse. After obtaining consent, and per orders of Dr Castillo, injections of allergy serum given by ELISHA Marquez. Patient instructed to remain in clinic for 30 minutes after injection, and to report any adverse reactions to me immediately. No change in patient status post injection.

## 2024-06-10 ENCOUNTER — TELEPHONE (OUTPATIENT)
Dept: ENT CLINIC | Age: 39
End: 2024-06-10

## 2024-06-10 ENCOUNTER — NURSE ONLY (OUTPATIENT)
Dept: ENT CLINIC | Age: 39
End: 2024-06-10
Payer: MEDICAID

## 2024-06-10 DIAGNOSIS — J30.1 SEASONAL ALLERGIC RHINITIS DUE TO POLLEN: Primary | ICD-10-CM

## 2024-06-10 PROCEDURE — 95117 IMMUNOTHERAPY INJECTIONS: CPT | Performed by: OTOLARYNGOLOGY

## 2024-06-14 ENCOUNTER — NURSE ONLY (OUTPATIENT)
Dept: ENT CLINIC | Age: 39
End: 2024-06-14
Payer: MEDICAID

## 2024-06-14 DIAGNOSIS — J30.1 SEASONAL ALLERGIC RHINITIS DUE TO POLLEN: Primary | ICD-10-CM

## 2024-06-14 PROCEDURE — 95165 ANTIGEN THERAPY SERVICES: CPT | Performed by: OTOLARYNGOLOGY

## 2024-06-24 ENCOUNTER — NURSE ONLY (OUTPATIENT)
Dept: ENT CLINIC | Age: 39
End: 2024-06-24
Payer: MEDICAID

## 2024-06-24 DIAGNOSIS — J30.1 SEASONAL ALLERGIC RHINITIS DUE TO POLLEN: Primary | ICD-10-CM

## 2024-06-24 PROCEDURE — 95117 IMMUNOTHERAPY INJECTIONS: CPT | Performed by: STUDENT IN AN ORGANIZED HEALTH CARE EDUCATION/TRAINING PROGRAM

## 2024-07-01 ENCOUNTER — NURSE ONLY (OUTPATIENT)
Dept: ENT CLINIC | Age: 39
End: 2024-07-01
Payer: MEDICAID

## 2024-07-01 DIAGNOSIS — J30.1 SEASONAL ALLERGIC RHINITIS DUE TO POLLEN: Primary | ICD-10-CM

## 2024-07-01 PROCEDURE — 95117 IMMUNOTHERAPY INJECTIONS: CPT | Performed by: STUDENT IN AN ORGANIZED HEALTH CARE EDUCATION/TRAINING PROGRAM

## 2024-07-08 ENCOUNTER — NURSE ONLY (OUTPATIENT)
Dept: ENT CLINIC | Age: 39
End: 2024-07-08
Payer: MEDICAID

## 2024-07-08 DIAGNOSIS — J30.1 SEASONAL ALLERGIC RHINITIS DUE TO POLLEN: Primary | ICD-10-CM

## 2024-07-08 PROCEDURE — 95117 IMMUNOTHERAPY INJECTIONS: CPT | Performed by: STUDENT IN AN ORGANIZED HEALTH CARE EDUCATION/TRAINING PROGRAM

## 2024-07-08 NOTE — PROGRESS NOTES
Correct serum vials verified by patient and nurse. Consent obtained and SVT completed.  SVT P=8mm wheal, M=11mm wheal.  After obtaining consent, and per orders of Dr Castillo, injections of allergy serum given by ELISHA Marquez. Patient instructed to remain in clinic for 30 minutes after injection, and to report any adverse reactions to me immediately. No change in patient status post injection.

## 2024-07-15 ENCOUNTER — NURSE ONLY (OUTPATIENT)
Dept: ENT CLINIC | Age: 39
End: 2024-07-15
Payer: MEDICAID

## 2024-07-15 DIAGNOSIS — J30.1 SEASONAL ALLERGIC RHINITIS DUE TO POLLEN: Primary | ICD-10-CM

## 2024-07-15 PROCEDURE — 95117 IMMUNOTHERAPY INJECTIONS: CPT | Performed by: OTOLARYNGOLOGY

## 2024-07-15 NOTE — PROGRESS NOTES
"9/18/2020      Rachel Miller MD  [address] [facility] [address]      RE: Teri Beltran       Dear Colleague,    I had the pleasure of seeing Teri Beltran in the [facility].    Service Date: 09/18/2020      CARDIOLOGY CLINIC PROGRESS NOTE      CONSULT INDICATION:  Preoperative cardiac risk assessment.      HISTORY OF PRESENT ILLNESS:      I had the opportunity to see patient Teri Beltran today in Cardiology Clinic for followup.  As you know, she is a 73-year-old female with a past medical history significant for a first-degree AV block, RBBB, paroxysmal SVT, GERD, hypertension, hyperlipidemia, chronic venous insufficiency, and known aortic sclerosis, who presents for further evaluation prior to a planned hip surgery.       The patient is followed by my colleague, Dr. Pavon as well as Dr. Villareal with Cardiology EP.  She was last seen in General Cardiology Clinic with Dr. Pavon on 07/10/2020.  At that time she was complaining of some ankle swelling, varicose veins, and it was recommended that she use compression stockings as well as undergo a venous competency study.        Since then, she has been scheduled for hip surgery and was seen by Dr. Miller on 08/31/2020 for a preoperative evaluation.  Apparently, the patient was unable to have the surgery due to concerns from Anesthesiology regarding potential cardiovascular complications related to the procedure as well as a cardiac murmur on exam.  The patient reports that she was told that, \"if we were to move forward with surgery, you may need CPR.\"      The patient was extremely concerned by this, and has been somewhat anxious since then.  She does note that she has had worsening bilateral leg swelling over the past month, worse since she was seen by Dr. Pavon as well as Dr. Miller for a preoperative evaluation.        She denies any change in her baseline shortness of breath.  " Correct serum vials verified by patient and nurse. After obtaining consent, and per orders of Dr Castillo, injections of allergy serum given by ELISHA Marquez. Patient instructed to remain in clinic for 30 minutes after injection, and to report any adverse reactions to me immediately. No change in patient status post injection.   She does note occasional chest pain; however, this has been relatively stable over the past several months, no recent increase in severity or frequency.  She denies symptoms of orthopnea/PND.      Prior cardiac evaluation is notable for a stress echocardiogram done on 01/06/2020 that was negative for evidence of ischemia; however, did show a trileaflet aortic valve sclerosis with trace aortic insufficiency.        Zio Patch 06/24/2020 to 07/08/2020 showed normal sinus rhythm with first-degree AV block, bundle branch block, 471 runs of paroxysmal SVT, the longest lasting 26 seconds at a rate of 99 beats per minute.      ASSESSMENT, PLAN AND RECOMMENDATIONS:       1.  Concern for perioperative cardiovascular complications, cardiac murmur auscultated on physical exam, resulting in cancellation of a planned hip surgery.   2.  Conduction abnormalities, known first-degree AV block, right bundle branch block, paroxysmal SVT, followed by my colleague, Dr. Villareal.   3.  Hypertension.   4.  Chronic lower extremity swelling due to venous insufficiency, history of bilateral great saphenous vein radiofrequency ablation.   5.  Mild aortic dilatation noted on chart review; however size was normal on TTE 03/27/2018.      -- Overall, clinical presentation does not seem to be consistent with an acute coronary syndrome, decompensated heart failure, or suggestive of myocardial ischemia.   -- Dobutamine stress echocardiogram 01/2020 showed aortic sclerosis with trace aortic insufficiency.  This likely accounts for the cardiac murmur appreciated on physical exam.   -- Lower extremity swelling seems more likely related to chronic lower extremity venous insufficiency for which she has received bilateral great saphenous vein ablation procedures.  Does not have symptoms otherwise concerning for decompensated heart failure.   -- Will obtain a TTE for further assessment of valves, ascending aorta, overall cardiac function.   -- Increase  hydrochlorothiazide from 12.5 mg daily to 25 mg daily.  Reviewed prior labs.  Potassium has always been within goal.  Will hold off on empiric supplementation at this time.   -- Follow up with Cardiology [facility] in about 2 weeks with a BMP at that time or sooner as needed.      Thank you for allowing our team to participate in the care of patient, Teri Beltran.  Please do not hesitate to page or call with any questions or concerns.      Quincy Anglin MD, [facility]  Cardiology  Pager:  [phone]  Text Page   2020        D: 2020   T: 2020   MT: ELISABETH      Name:     TERI BELTRAN   MRN:      [id number]        Account:      [id number]   :      1946           Service Date: 2020      Document: [id number]           Outpatient Encounter Medications as of 2020   Medication Sig Dispense Refill     acetaminophen (TYLENOL) 325 MG tablet Take 325-650 mg by mouth every 6 hours as needed for mild pain       alendronate (FOSAMAX) 70 MG tablet TAKE 1 TABLET(70 MG) BY MOUTH EVERY 7 DAYS 12 tablet 1     dorzolamide-timolol (COSOPT) 2-0.5 % ophthalmic solution Place 1 drop into both eyes 2 times daily       famotidine (PEPCID) 10 MG tablet Take 1 tablet (10 mg) by mouth 2 times daily 180 tablet 1     fluticasone (FLONASE) 50 MCG/ACT nasal spray Spray 1-2 sprays into both nostrils daily 48 g 1     hydrochlorothiazide (HYDRODIURIL) 25 MG tablet Take 1 tablet (25 mg) by mouth daily 90 tablet 3     latanoprost (XALATAN) 0.005 % ophthalmic solution Place 1 drop into the right eye At Bedtime        levothyroxine (SYNTHROID/LEVOTHROID) 88 MCG tablet Take 1 tablet (88 mcg) by mouth daily 90 tablet 1     metoprolol tartrate (LOPRESSOR) 25 MG tablet Take 1 tablet (25 mg) by mouth 2 times daily 180 tablet 0     Multiple Vitamins-Minerals (MULTIVITAMIN ADULT PO) Take 1 tablet by mouth daily       pravastatin (PRAVACHOL) 40 MG tablet Take 1 tablet (40 mg) by mouth At Bedtime 90 tablet 1      traMADol (ULTRAM) 50 MG tablet Take 1 tablet (50 mg) by mouth 3 times daily as needed for severe pain 90 tablet 0     hydrOXYzine (ATARAX) 25 MG tablet Take 1 tablet (25 mg) by mouth 3 times daily as needed for anxiety (Patient not taking: Reported on 9/18/2020) 30 tablet 1     [DISCONTINUED] hydrochlorothiazide (HYDRODIURIL) 12.5 MG tablet Take 1 tablet (12.5 mg) by mouth daily 30 tablet 1     No facility-administered encounter medications on file as of 9/18/2020.        Again, thank you for allowing me to participate in the care of your patient.      Sincerely,    Quincy Anglin MD     [facility]

## 2024-07-24 RX ORDER — NORGESTREL AND ETHINYL ESTRADIOL 0.3-0.03MG
1 KIT ORAL DAILY
Qty: 3 PACKET | Refills: 3 | Status: SHIPPED | OUTPATIENT
Start: 2024-07-24

## 2024-07-29 ENCOUNTER — NURSE ONLY (OUTPATIENT)
Dept: ENT CLINIC | Age: 39
End: 2024-07-29
Payer: MEDICAID

## 2024-07-29 DIAGNOSIS — J30.1 SEASONAL ALLERGIC RHINITIS DUE TO POLLEN: Primary | ICD-10-CM

## 2024-07-29 PROCEDURE — 95117 IMMUNOTHERAPY INJECTIONS: CPT | Performed by: OTOLARYNGOLOGY

## 2024-07-29 NOTE — PROGRESS NOTES
Correct serum vials verified by patient and nurse. After obtaining consent, and per orders of Dr Castillo, injections of allergy serum given by SIMBA Haji. Patient instructed to remain in clinic for 30 minutes after injection, and to report any adverse reactions to me immediately. No change in patient status post injection.

## 2024-08-12 ENCOUNTER — NURSE ONLY (OUTPATIENT)
Dept: ENT CLINIC | Age: 39
End: 2024-08-12
Payer: MEDICAID

## 2024-08-12 DIAGNOSIS — J30.1 SEASONAL ALLERGIC RHINITIS DUE TO POLLEN: Primary | ICD-10-CM

## 2024-08-12 PROCEDURE — 95117 IMMUNOTHERAPY INJECTIONS: CPT | Performed by: OTOLARYNGOLOGY

## 2024-08-19 ENCOUNTER — NURSE ONLY (OUTPATIENT)
Dept: ENT CLINIC | Age: 39
End: 2024-08-19
Payer: MEDICAID

## 2024-08-19 DIAGNOSIS — J30.1 SEASONAL ALLERGIC RHINITIS DUE TO POLLEN: Primary | ICD-10-CM

## 2024-08-19 PROCEDURE — 95117 IMMUNOTHERAPY INJECTIONS: CPT | Performed by: OTOLARYNGOLOGY

## 2024-08-26 ENCOUNTER — NURSE ONLY (OUTPATIENT)
Dept: ENT CLINIC | Age: 39
End: 2024-08-26
Payer: MEDICAID

## 2024-08-26 ENCOUNTER — TELEPHONE (OUTPATIENT)
Dept: ENT CLINIC | Age: 39
End: 2024-08-26

## 2024-08-26 DIAGNOSIS — J30.1 SEASONAL ALLERGIC RHINITIS DUE TO POLLEN: Primary | ICD-10-CM

## 2024-08-26 PROCEDURE — 95117 IMMUNOTHERAPY INJECTIONS: CPT | Performed by: OTOLARYNGOLOGY

## 2024-08-26 NOTE — TELEPHONE ENCOUNTER
Patient has been tolerating allergy immunotherapy injections without incident.  Recommend advancing Prescription Treatment Set to the next stronger concentration.  Recommend continuing on the Escalated Build Up.  Please advise.

## 2024-08-26 NOTE — PROGRESS NOTES
Correct serum vials verified by patient and nurse. After obtaining consent, and per orders of Dr Dale, injections of allergy serum given by SIMBA Haji. Patient instructed to remain in clinic for 30 minutes after injection, and to report any adverse reactions to me immediately. No change in patient status post injection.

## 2024-09-04 ENCOUNTER — NURSE ONLY (OUTPATIENT)
Dept: ENT CLINIC | Age: 39
End: 2024-09-04
Payer: MEDICAID

## 2024-09-04 DIAGNOSIS — J30.1 SEASONAL ALLERGIC RHINITIS DUE TO POLLEN: Primary | ICD-10-CM

## 2024-09-04 PROCEDURE — 95165 ANTIGEN THERAPY SERVICES: CPT | Performed by: OTOLARYNGOLOGY

## 2024-09-09 ENCOUNTER — TELEPHONE (OUTPATIENT)
Dept: ENT CLINIC | Age: 39
End: 2024-09-09

## 2024-09-16 ENCOUNTER — NURSE ONLY (OUTPATIENT)
Dept: ENT CLINIC | Age: 39
End: 2024-09-16
Payer: MEDICAID

## 2024-09-16 DIAGNOSIS — J30.1 SEASONAL ALLERGIC RHINITIS DUE TO POLLEN: Primary | ICD-10-CM

## 2024-09-16 PROCEDURE — 95117 IMMUNOTHERAPY INJECTIONS: CPT | Performed by: OTOLARYNGOLOGY

## 2024-09-18 ENCOUNTER — OFFICE VISIT (OUTPATIENT)
Dept: GYNECOLOGY | Age: 39
End: 2024-09-18
Payer: MEDICAID

## 2024-09-18 VITALS
HEART RATE: 78 BPM | RESPIRATION RATE: 17 BRPM | BODY MASS INDEX: 48.82 KG/M2 | SYSTOLIC BLOOD PRESSURE: 116 MMHG | OXYGEN SATURATION: 98 % | DIASTOLIC BLOOD PRESSURE: 70 MMHG | HEIGHT: 65 IN | WEIGHT: 293 LBS

## 2024-09-18 DIAGNOSIS — N92.6 IRREGULAR MENSTRUAL BLEEDING: ICD-10-CM

## 2024-09-18 DIAGNOSIS — E66.01 MORBID OBESITY WITH BMI OF 50.0-59.9, ADULT: ICD-10-CM

## 2024-09-18 DIAGNOSIS — E53.8 VITAMIN B12 DEFICIENCY: ICD-10-CM

## 2024-09-18 DIAGNOSIS — Z90.3 S/P GASTRIC SLEEVE PROCEDURE: ICD-10-CM

## 2024-09-18 DIAGNOSIS — Z00.8 NUTRITIONAL ASSESSMENT: ICD-10-CM

## 2024-09-18 DIAGNOSIS — E78.2 MIXED HYPERLIPIDEMIA: ICD-10-CM

## 2024-09-18 DIAGNOSIS — Z12.31 SCREENING MAMMOGRAM FOR BREAST CANCER: Primary | ICD-10-CM

## 2024-09-18 DIAGNOSIS — E55.9 VITAMIN D DEFICIENCY: ICD-10-CM

## 2024-09-18 LAB
25(OH)D3 SERPL-MCNC: 17.7 NG/ML
FOLATE SERPL-MCNC: 5.24 NG/ML (ref 4.78–24.2)
VIT B12 SERPL-MCNC: 381 PG/ML (ref 211–911)

## 2024-09-18 PROCEDURE — 99213 OFFICE O/P EST LOW 20 MIN: CPT | Performed by: OBSTETRICS & GYNECOLOGY

## 2024-09-18 PROCEDURE — G8417 CALC BMI ABV UP PARAM F/U: HCPCS | Performed by: OBSTETRICS & GYNECOLOGY

## 2024-09-18 PROCEDURE — G8427 DOCREV CUR MEDS BY ELIG CLIN: HCPCS | Performed by: OBSTETRICS & GYNECOLOGY

## 2024-09-18 PROCEDURE — 1036F TOBACCO NON-USER: CPT | Performed by: OBSTETRICS & GYNECOLOGY

## 2024-09-18 RX ORDER — NORGESTREL AND ETHINYL ESTRADIOL 0.3-0.03MG
1 KIT ORAL DAILY
Qty: 3 PACKET | Refills: 3 | Status: SHIPPED | OUTPATIENT
Start: 2024-09-18

## 2024-09-18 ASSESSMENT — ENCOUNTER SYMPTOMS
RESPIRATORY NEGATIVE: 1
GASTROINTESTINAL NEGATIVE: 1
EYES NEGATIVE: 1
ALLERGIC/IMMUNOLOGIC NEGATIVE: 1

## 2024-09-19 LAB
ALBUMIN SERPL-MCNC: 3.8 G/DL (ref 3.4–5)
ALP SERPL-CCNC: 78 U/L (ref 40–129)
ALT SERPL-CCNC: 10 U/L (ref 10–40)
AST SERPL-CCNC: 16 U/L (ref 15–37)
BILIRUB DIRECT SERPL-MCNC: <0.1 MG/DL (ref 0–0.3)
BILIRUB INDIRECT SERPL-MCNC: NORMAL MG/DL (ref 0–1)
BILIRUB SERPL-MCNC: <0.2 MG/DL (ref 0–1)
CHOLEST SERPL-MCNC: 228 MG/DL (ref 0–199)
EST. AVERAGE GLUCOSE BLD GHB EST-MCNC: 93.9 MG/DL
HBA1C MFR BLD: 4.9 %
HDLC SERPL-MCNC: 51 MG/DL (ref 40–60)
IRON SATN MFR SERPL: 24 % (ref 15–50)
IRON SERPL-MCNC: 94 UG/DL (ref 37–145)
LDLC SERPL CALC-MCNC: 160 MG/DL
PROT SERPL-MCNC: 6.5 G/DL (ref 6.4–8.2)
TIBC SERPL-MCNC: 386 UG/DL (ref 260–445)
TRIGL SERPL-MCNC: 84 MG/DL (ref 0–150)
TSH SERPL DL<=0.005 MIU/L-ACNC: 1.49 UIU/ML (ref 0.27–4.2)
VLDLC SERPL CALC-MCNC: 17 MG/DL

## 2024-09-21 LAB
A-TOCOPHEROL VIT E SERPL-MCNC: 8.5 MG/L (ref 5.5–18)
ANNOTATION COMMENT IMP: NORMAL
BETA+GAMMA TOCOPHEROL SERPL-MCNC: 1 MG/L (ref 0–6)
RETINYL PALMITATE SERPL-MCNC: <0.02 MG/L (ref 0–0.1)
VIT A SERPL-MCNC: 0.4 MG/L (ref 0.3–1.2)

## 2024-09-23 ENCOUNTER — NURSE ONLY (OUTPATIENT)
Dept: ENT CLINIC | Age: 39
End: 2024-09-23
Payer: MEDICAID

## 2024-09-23 DIAGNOSIS — J30.1 SEASONAL ALLERGIC RHINITIS DUE TO POLLEN: Primary | ICD-10-CM

## 2024-09-23 PROCEDURE — 95117 IMMUNOTHERAPY INJECTIONS: CPT | Performed by: OTOLARYNGOLOGY

## 2024-09-24 DIAGNOSIS — E55.9 VITAMIN D DEFICIENCY: Primary | ICD-10-CM

## 2024-09-24 LAB — VIT B1 BLD-MCNC: 111 NMOL/L (ref 70–180)

## 2024-09-24 RX ORDER — ERGOCALCIFEROL 1.25 MG/1
50000 CAPSULE, LIQUID FILLED ORAL WEEKLY
Qty: 4 CAPSULE | Refills: 2 | Status: SHIPPED | OUTPATIENT
Start: 2024-09-24

## 2024-09-26 DIAGNOSIS — K21.9 CHRONIC GERD: ICD-10-CM

## 2024-09-26 DIAGNOSIS — Z98.84 S/P LAPAROSCOPIC SLEEVE GASTRECTOMY: ICD-10-CM

## 2024-09-30 ENCOUNTER — OFFICE VISIT (OUTPATIENT)
Dept: INTERNAL MEDICINE CLINIC | Age: 39
End: 2024-09-30
Payer: MEDICAID

## 2024-09-30 ENCOUNTER — NURSE ONLY (OUTPATIENT)
Dept: ENT CLINIC | Age: 39
End: 2024-09-30
Payer: MEDICAID

## 2024-09-30 VITALS
TEMPERATURE: 97.8 F | HEIGHT: 65 IN | BODY MASS INDEX: 48.82 KG/M2 | WEIGHT: 293 LBS | DIASTOLIC BLOOD PRESSURE: 94 MMHG | RESPIRATION RATE: 16 BRPM | SYSTOLIC BLOOD PRESSURE: 135 MMHG | OXYGEN SATURATION: 100 % | HEART RATE: 80 BPM

## 2024-09-30 DIAGNOSIS — J30.81 ALLERGY TO ANIMAL DANDER: ICD-10-CM

## 2024-09-30 DIAGNOSIS — J30.1 SEASONAL ALLERGIC RHINITIS DUE TO POLLEN: Primary | ICD-10-CM

## 2024-09-30 DIAGNOSIS — G47.33 OBSTRUCTIVE SLEEP APNEA SYNDROME: ICD-10-CM

## 2024-09-30 DIAGNOSIS — I10 HYPERTENSION, UNSPECIFIED TYPE: ICD-10-CM

## 2024-09-30 DIAGNOSIS — J45.20 MILD INTERMITTENT ASTHMA WITHOUT COMPLICATION: Primary | ICD-10-CM

## 2024-09-30 DIAGNOSIS — Z90.3 S/P GASTRIC SLEEVE PROCEDURE: ICD-10-CM

## 2024-09-30 DIAGNOSIS — E78.2 MIXED HYPERLIPIDEMIA: ICD-10-CM

## 2024-09-30 DIAGNOSIS — E55.9 VITAMIN D DEFICIENCY: ICD-10-CM

## 2024-09-30 DIAGNOSIS — J30.2 SEASONAL ALLERGIC RHINITIS, UNSPECIFIED TRIGGER: ICD-10-CM

## 2024-09-30 PROCEDURE — 95117 IMMUNOTHERAPY INJECTIONS: CPT | Performed by: OTOLARYNGOLOGY

## 2024-09-30 PROCEDURE — 99213 OFFICE O/P EST LOW 20 MIN: CPT

## 2024-09-30 RX ORDER — ROSUVASTATIN CALCIUM 20 MG/1
20 TABLET, COATED ORAL NIGHTLY
Qty: 30 TABLET | Refills: 3 | Status: SHIPPED | OUTPATIENT
Start: 2024-09-30

## 2024-09-30 RX ORDER — ALBUTEROL SULFATE 90 UG/1
2 INHALANT RESPIRATORY (INHALATION)
Qty: 18 G | Refills: 3 | Status: SHIPPED | OUTPATIENT
Start: 2024-09-30

## 2024-09-30 RX ORDER — MONTELUKAST SODIUM 10 MG/1
10 TABLET ORAL DAILY
Qty: 30 TABLET | Refills: 3 | Status: SHIPPED | OUTPATIENT
Start: 2024-09-30

## 2024-09-30 ASSESSMENT — ENCOUNTER SYMPTOMS
SINUS PRESSURE: 1
NAUSEA: 0
WHEEZING: 0
COUGH: 0
DIARRHEA: 0
SORE THROAT: 0
SHORTNESS OF BREATH: 0
VOMITING: 0
CHEST TIGHTNESS: 1
ABDOMINAL PAIN: 0

## 2024-09-30 NOTE — ASSESSMENT & PLAN NOTE
Stable, but poorly compliant with CPAP, follows with Pulmonology (no recent visit though)  - pt reports she is attempting to become more compliant with CPAP  - recommended she follow up with Pulmonology as directed

## 2024-09-30 NOTE — ASSESSMENT & PLAN NOTE
Improving. Allergic to dog and cat dander. Follows with ENT  - in process of allergy desensitization therapy with weekly injections  - carries EpiPen everywhere, counseled to continue this habit, instructed on proper use.

## 2024-09-30 NOTE — ASSESSMENT & PLAN NOTE
Uncontrolled, not on meds, poor diet with no salt limitation (prev poor living situation). SBPs 150s, then 130s then 140s on serial checks this visit.  - instructed to keep log, check BP daily  - if remains elevated next visit, plan to start BP med, likely ACE-HCTZ combo pill?

## 2024-09-30 NOTE — ASSESSMENT & PLAN NOTE
Stable, follows with bariatric doctor. Labs every few months, all stable aside from low Vitamin D, on appropriate supplements  - continue serial labs and regular bariatric visits  - continue Citracal and D2 50,000 IU, and other supplements  - recheck Vit D 25-OH next visit.

## 2024-09-30 NOTE — PATIENT INSTRUCTIONS
Nice to meet you Ms. Varghese. Happy to hear your living and life situation is improved.    Please continue using your inhaler. Please EXHALE all the way, START inhale, then puff inhaler, continue deep inhale and hold breath in full inspiration (exhale, start inhale, puff, continue deep inhale)    Please start taking Singulair (for allergy and asthma) and Crestor (for cholesterol). Please continue taking your other medications including Vitamin D supplementation.     Please continue working on your diet, please check out the \"mediterranean diet,\" please adapt a low salt diet as well.    Please check blood pressure daily, record values (in phone, by picture, etc). If it remains elevated (> 130-140/80s) in a few months, we should start you on a blood pressure medication.

## 2024-09-30 NOTE — ASSESSMENT & PLAN NOTE
Slighlty uncontrolled. On Flonase, Azelastine and PRN albuterol. Follows with ENT.   - refilled PRN albuterol, counseled and demonstrated how to properly use.  - start Singulair for benefit of both seasonal allergies and asthma

## 2024-09-30 NOTE — ASSESSMENT & PLAN NOTE
Uncontrolled, noncompliant with Vit D supplements due to recent difficutly with life situation. 25-OH at 17 recent lab  - pt now taking supplements (Citracal Bid - each tab ~1000 IU) and 50k IU D2 tablet  - recheck 25-OH in few months

## 2024-09-30 NOTE — PROGRESS NOTES
Apolonia Layne MD   Handicap Placard MISC by Does not apply route From 11/2/2021 until 2/28/2022  Patient not taking: Reported on 9/30/2024  Yariel Diaz MD        Vitals:    09/30/24 0936 09/30/24 0946   BP: (!) 154/121 (!) 135/94   Site: Right Upper Arm Left Upper Arm   Position: Sitting Sitting   Cuff Size: Large Adult Large Adult   Pulse: 80    Resp: 16    Temp: 97.8 °F (36.6 °C)    TempSrc: Temporal    SpO2: 100%    Weight: (!) 147.8 kg (325 lb 14.4 oz)    Height: 1.651 m (5' 5\")       Estimated body mass index is 54.23 kg/m² as calculated from the following:    Height as of this encounter: 1.651 m (5' 5\").    Weight as of this encounter: 147.8 kg (325 lb 14.4 oz).  Physical Exam  Constitutional:       General: She is not in acute distress.     Appearance: She is obese. She is not ill-appearing.   Eyes:      Extraocular Movements: Extraocular movements intact.      Pupils: Pupils are equal, round, and reactive to light.   Cardiovascular:      Rate and Rhythm: Normal rate and regular rhythm.      Pulses: Normal pulses.      Heart sounds: Normal heart sounds.   Pulmonary:      Comments: Non-labored, no wheezing, no rales/ronchi. Moving air well and speaking without requiring breaks.   Abdominal:      Palpations: Abdomen is soft.      Tenderness: There is no abdominal tenderness.   Musculoskeletal:      Right lower leg: Edema (trace ankle) present.      Left lower leg: Edema (trace ankle) present.   Neurological:      General: No focal deficit present.      Mental Status: She is oriented to person, place, and time.         ASSESSMENT/PLAN:     1. Mild intermittent asthma without complication  Assessment & Plan:  Uncontrolled, compliant with meds but improperly using PRN albuterol.   - refilled albuterol, pt was instructed and given demonstrated proper use  - start Singulair, continue flonase and azelastine  Orders:  -     montelukast (SINGULAIR) 10 MG tablet; Take 1 tablet by mouth daily, Disp-30 tablet,

## 2024-09-30 NOTE — ASSESSMENT & PLAN NOTE
Uncontrolled. Not on meds, poor diet slowly improving. LDL 9/2024.  - start Crestor  - encouraged improvement of diet, encouraged mediterranean diet

## 2024-09-30 NOTE — ASSESSMENT & PLAN NOTE
Uncontrolled, compliant with meds but improperly using PRN albuterol.   - refilled albuterol, pt was instructed and given demonstrated proper use  - start Singulair, continue flonase and azelastine

## 2024-10-14 ENCOUNTER — NURSE ONLY (OUTPATIENT)
Dept: ENT CLINIC | Age: 39
End: 2024-10-14
Payer: MEDICAID

## 2024-10-14 DIAGNOSIS — J30.1 SEASONAL ALLERGIC RHINITIS DUE TO POLLEN: Primary | ICD-10-CM

## 2024-10-14 PROCEDURE — 95117 IMMUNOTHERAPY INJECTIONS: CPT | Performed by: STUDENT IN AN ORGANIZED HEALTH CARE EDUCATION/TRAINING PROGRAM

## 2024-10-14 NOTE — PROGRESS NOTES
Correct serum vials verified by patient and nurse. After obtaining consent, and per orders of Dr Castillo, injections of allergy serum given by SIMAB Haji. Patient instructed to remain in clinic for 30 minutes after injection, and to report any adverse reactions to me immediately. No change in patient status post injection.

## 2024-10-21 ENCOUNTER — NURSE ONLY (OUTPATIENT)
Dept: ENT CLINIC | Age: 39
End: 2024-10-21
Payer: MEDICAID

## 2024-10-21 ENCOUNTER — OFFICE VISIT (OUTPATIENT)
Dept: ENT CLINIC | Age: 39
End: 2024-10-21
Payer: MEDICAID

## 2024-10-21 VITALS
TEMPERATURE: 98.1 F | DIASTOLIC BLOOD PRESSURE: 93 MMHG | BODY MASS INDEX: 48.82 KG/M2 | WEIGHT: 293 LBS | SYSTOLIC BLOOD PRESSURE: 145 MMHG | OXYGEN SATURATION: 98 % | HEIGHT: 65 IN | HEART RATE: 84 BPM

## 2024-10-21 DIAGNOSIS — J32.9 RECURRENT SINUSITIS: Chronic | ICD-10-CM

## 2024-10-21 DIAGNOSIS — J30.1 SEASONAL ALLERGIC RHINITIS DUE TO POLLEN: Primary | Chronic | ICD-10-CM

## 2024-10-21 DIAGNOSIS — J30.1 SEASONAL ALLERGIC RHINITIS DUE TO POLLEN: Primary | ICD-10-CM

## 2024-10-21 PROCEDURE — 1036F TOBACCO NON-USER: CPT | Performed by: OTOLARYNGOLOGY

## 2024-10-21 PROCEDURE — 3077F SYST BP >= 140 MM HG: CPT | Performed by: OTOLARYNGOLOGY

## 2024-10-21 PROCEDURE — 95117 IMMUNOTHERAPY INJECTIONS: CPT | Performed by: OTOLARYNGOLOGY

## 2024-10-21 PROCEDURE — G8417 CALC BMI ABV UP PARAM F/U: HCPCS | Performed by: OTOLARYNGOLOGY

## 2024-10-21 PROCEDURE — G8427 DOCREV CUR MEDS BY ELIG CLIN: HCPCS | Performed by: OTOLARYNGOLOGY

## 2024-10-21 PROCEDURE — 99214 OFFICE O/P EST MOD 30 MIN: CPT | Performed by: OTOLARYNGOLOGY

## 2024-10-21 PROCEDURE — G8484 FLU IMMUNIZE NO ADMIN: HCPCS | Performed by: OTOLARYNGOLOGY

## 2024-10-21 PROCEDURE — 3080F DIAST BP >= 90 MM HG: CPT | Performed by: OTOLARYNGOLOGY

## 2024-10-21 NOTE — PROGRESS NOTES
CHIEF COMPLAINT  Chief Complaint   Patient presents with    Allergic Rhinitis        HISTORY OF PRESENT ILLNESS     Maday Varghese is a 39 y.o. female who presented today for recheck and follow up of the above chief complaint.  No allergy flare ups.  No adverse reactions to shote.        REVIEW OF SYSTEMS  Constitutional:  Denied fever and chills.  ENT/sinus:  Denied otalgia, otorrhea, nasal pain, rhinorrhea, sore throat, and sinus/facial pain.        EXAMINATION    Vitals:    10/21/24 0851   BP: (!) 145/93   Site: Right Upper Arm   Position: Sitting   Cuff Size: Large Adult   Pulse: 84   Temp: 98.1 °F (36.7 °C)   TempSrc: Infrared   SpO2: 98%   Weight: (!) 147.4 kg (325 lb)   Height: 1.651 m (5' 5\")     (+)   WDWN, NAD  Face:  Normal skin.    Voice:  Normal, with no hoarseness, breathiness, or hot potato quality.  Ears:   TMs and EACs were normal.  The mastoids and pinnae were normal.      Nose:  Normal.    Sinuses: Nontender x 4   Throat,  OC/OP:  Normal.    Neck:  NT, No masses.  Trachea midline.    Nodes:  No lymphadenopathy.     Thyroid:  Normal       I performed this physical exam personally.        IMPRESSION / DIAGNOSES / ORDERS:   Maday was seen today for allergic rhinitis .    Diagnoses and all orders for this visit:    Seasonal allergic rhinitis due to pollen    Recurrent sinusitis           RECOMMENDATIONS / PLAN:   Prescription drug management: Since it has been effective treatment, I am continuing azelastine/Astelin.  (medication management without a new or revised prescription i.e. evaluation of condition addressed with continuation of prescription medication).   Continue fluticasone and cetirizine.    Patient was advised to read all information given by the pharmacist regarding medications, particularly regarding possible side effects and adverse effects.    Return in about 6 months (around 4/21/2025) for recheck/follow-up, and sooner if condition worsens.           Boris Castillo MD    [facility]

## 2024-10-28 ENCOUNTER — NURSE ONLY (OUTPATIENT)
Dept: ENT CLINIC | Age: 39
End: 2024-10-28
Payer: MEDICAID

## 2024-10-28 DIAGNOSIS — J30.1 SEASONAL ALLERGIC RHINITIS DUE TO POLLEN: Primary | ICD-10-CM

## 2024-10-28 PROCEDURE — 95117 IMMUNOTHERAPY INJECTIONS: CPT | Performed by: OTOLARYNGOLOGY

## 2024-11-04 ENCOUNTER — TELEPHONE (OUTPATIENT)
Dept: ENT CLINIC | Age: 39
End: 2024-11-04

## 2024-11-04 ENCOUNTER — NURSE ONLY (OUTPATIENT)
Dept: ENT CLINIC | Age: 39
End: 2024-11-04
Payer: MEDICAID

## 2024-11-04 DIAGNOSIS — J30.1 SEASONAL ALLERGIC RHINITIS DUE TO POLLEN: Primary | ICD-10-CM

## 2024-11-04 PROCEDURE — 95117 IMMUNOTHERAPY INJECTIONS: CPT | Performed by: OTOLARYNGOLOGY

## 2024-11-07 ENCOUNTER — NURSE ONLY (OUTPATIENT)
Dept: ENT CLINIC | Age: 39
End: 2024-11-07
Payer: MEDICAID

## 2024-11-07 DIAGNOSIS — J30.1 SEASONAL ALLERGIC RHINITIS DUE TO POLLEN: Primary | ICD-10-CM

## 2024-11-07 PROCEDURE — 95165 ANTIGEN THERAPY SERVICES: CPT | Performed by: OTOLARYNGOLOGY

## 2024-11-11 ENCOUNTER — NURSE ONLY (OUTPATIENT)
Dept: ENT CLINIC | Age: 39
End: 2024-11-11
Payer: MEDICAID

## 2024-11-11 DIAGNOSIS — J30.1 SEASONAL ALLERGIC RHINITIS DUE TO POLLEN: Primary | ICD-10-CM

## 2024-11-11 PROCEDURE — 95117 IMMUNOTHERAPY INJECTIONS: CPT | Performed by: OTOLARYNGOLOGY

## 2024-11-11 NOTE — PROGRESS NOTES
Correct serum vials verified by patient and nurse. After obtaining consent, and per orders of Dr Castillo, injections of allergy serum given by SIMBA Haji. Patient instructed to remain in clinic for 30 minutes after injection, and to report any adverse reactions to me immediately. No change in patient status post injection.   (M6) obeys commands

## 2024-11-25 ENCOUNTER — NURSE ONLY (OUTPATIENT)
Dept: ENT CLINIC | Age: 39
End: 2024-11-25
Payer: MEDICAID

## 2024-11-25 DIAGNOSIS — J30.1 SEASONAL ALLERGIC RHINITIS DUE TO POLLEN: Primary | ICD-10-CM

## 2024-11-25 PROCEDURE — 95117 IMMUNOTHERAPY INJECTIONS: CPT | Performed by: OTOLARYNGOLOGY

## 2024-11-25 NOTE — PROGRESS NOTES
Correct serum vials verified by patient and nurse. Consent obtained and SVT completed.  SVT P=9mm wheal, M=7mm wheal.  After obtaining consent, and per orders of Dr Castillo, injections of allergy serum given by SIMBA Haji. Patient instructed to remain in clinic for 30 minutes after injection, and to report any adverse reactions to me immediately. No change in patient status post injection.

## 2024-12-02 ENCOUNTER — NURSE ONLY (OUTPATIENT)
Dept: ENT CLINIC | Age: 39
End: 2024-12-02
Payer: MEDICAID

## 2024-12-02 DIAGNOSIS — J30.1 SEASONAL ALLERGIC RHINITIS DUE TO POLLEN: Primary | ICD-10-CM

## 2024-12-02 PROCEDURE — 95117 IMMUNOTHERAPY INJECTIONS: CPT | Performed by: OTOLARYNGOLOGY

## 2024-12-09 ENCOUNTER — OFFICE VISIT (OUTPATIENT)
Dept: INTERNAL MEDICINE CLINIC | Age: 39
End: 2024-12-09
Payer: MEDICAID

## 2024-12-09 ENCOUNTER — TELEPHONE (OUTPATIENT)
Dept: ENT CLINIC | Age: 39
End: 2024-12-09

## 2024-12-09 ENCOUNTER — NURSE ONLY (OUTPATIENT)
Dept: ENT CLINIC | Age: 39
End: 2024-12-09
Payer: MEDICAID

## 2024-12-09 VITALS
BODY MASS INDEX: 52.57 KG/M2 | RESPIRATION RATE: 16 BRPM | OXYGEN SATURATION: 95 % | HEART RATE: 75 BPM | DIASTOLIC BLOOD PRESSURE: 89 MMHG | SYSTOLIC BLOOD PRESSURE: 122 MMHG | WEIGHT: 293 LBS | TEMPERATURE: 97.3 F

## 2024-12-09 DIAGNOSIS — J30.1 SEASONAL ALLERGIC RHINITIS DUE TO POLLEN: Chronic | ICD-10-CM

## 2024-12-09 DIAGNOSIS — E55.9 VITAMIN D DEFICIENCY: ICD-10-CM

## 2024-12-09 DIAGNOSIS — J30.2 SEASONAL ALLERGIC RHINITIS, UNSPECIFIED TRIGGER: ICD-10-CM

## 2024-12-09 DIAGNOSIS — J45.20 MILD INTERMITTENT ASTHMA WITHOUT COMPLICATION: ICD-10-CM

## 2024-12-09 DIAGNOSIS — I10 HYPERTENSION, UNSPECIFIED TYPE: Primary | ICD-10-CM

## 2024-12-09 DIAGNOSIS — E78.2 MIXED HYPERLIPIDEMIA: ICD-10-CM

## 2024-12-09 DIAGNOSIS — J30.1 SEASONAL ALLERGIC RHINITIS DUE TO POLLEN: Primary | ICD-10-CM

## 2024-12-09 PROCEDURE — 99213 OFFICE O/P EST LOW 20 MIN: CPT

## 2024-12-09 PROCEDURE — 95117 IMMUNOTHERAPY INJECTIONS: CPT | Performed by: OTOLARYNGOLOGY

## 2024-12-09 RX ORDER — CETIRIZINE HYDROCHLORIDE 10 MG/1
10 TABLET ORAL DAILY
Qty: 90 TABLET | Refills: 1 | Status: SHIPPED | OUTPATIENT
Start: 2024-12-09 | End: 2024-12-09

## 2024-12-09 RX ORDER — ALBUTEROL SULFATE 90 UG/1
2 INHALANT RESPIRATORY (INHALATION)
Qty: 18 G | Refills: 3 | Status: SHIPPED | OUTPATIENT
Start: 2024-12-09 | End: 2024-12-09

## 2024-12-09 RX ORDER — ROSUVASTATIN CALCIUM 20 MG/1
20 TABLET, COATED ORAL NIGHTLY
Qty: 30 TABLET | Refills: 3 | Status: SHIPPED | OUTPATIENT
Start: 2024-12-09 | End: 2024-12-09

## 2024-12-09 RX ORDER — CROMOLYN SODIUM 5.2 MG
1 AEROSOL, SPRAY WITH PUMP (ML) NASAL 3 TIMES DAILY
Qty: 13 ML | Refills: 3 | Status: SHIPPED | OUTPATIENT
Start: 2024-12-09

## 2024-12-09 RX ORDER — CETIRIZINE HYDROCHLORIDE 10 MG/1
10 TABLET ORAL DAILY
Qty: 90 TABLET | Refills: 1 | Status: SHIPPED | OUTPATIENT
Start: 2024-12-09

## 2024-12-09 RX ORDER — FLUTICASONE PROPIONATE 50 MCG
2 SPRAY, SUSPENSION (ML) NASAL DAILY
Qty: 16 G | Refills: 0 | Status: SHIPPED | OUTPATIENT
Start: 2024-12-09

## 2024-12-09 RX ORDER — ERGOCALCIFEROL 1.25 MG/1
50000 CAPSULE, LIQUID FILLED ORAL WEEKLY
Qty: 4 CAPSULE | Refills: 2 | Status: SHIPPED | OUTPATIENT
Start: 2024-12-09

## 2024-12-09 RX ORDER — ALBUTEROL SULFATE 90 UG/1
2 INHALANT RESPIRATORY (INHALATION)
Qty: 18 G | Refills: 3 | Status: SHIPPED | OUTPATIENT
Start: 2024-12-09

## 2024-12-09 RX ORDER — FLUTICASONE PROPIONATE 50 MCG
2 SPRAY, SUSPENSION (ML) NASAL DAILY
Qty: 16 G | Refills: 0 | Status: SHIPPED | OUTPATIENT
Start: 2024-12-09 | End: 2024-12-09

## 2024-12-09 RX ORDER — AZELASTINE 1 MG/ML
1 SPRAY, METERED NASAL 2 TIMES DAILY
Qty: 60 ML | Refills: 3 | Status: SHIPPED | OUTPATIENT
Start: 2024-12-09

## 2024-12-09 RX ORDER — ERGOCALCIFEROL 1.25 MG/1
50000 CAPSULE, LIQUID FILLED ORAL WEEKLY
Qty: 4 CAPSULE | Refills: 2 | Status: SHIPPED | OUTPATIENT
Start: 2024-12-09 | End: 2024-12-09

## 2024-12-09 RX ORDER — AZELASTINE 1 MG/ML
1 SPRAY, METERED NASAL 2 TIMES DAILY
Qty: 60 ML | Refills: 3 | Status: SHIPPED | OUTPATIENT
Start: 2024-12-09 | End: 2024-12-09

## 2024-12-09 RX ORDER — ROSUVASTATIN CALCIUM 20 MG/1
20 TABLET, COATED ORAL NIGHTLY
Qty: 30 TABLET | Refills: 3 | Status: SHIPPED | OUTPATIENT
Start: 2024-12-09

## 2024-12-09 RX ORDER — CROMOLYN SODIUM 5.2 MG
1 AEROSOL, SPRAY WITH PUMP (ML) NASAL 3 TIMES DAILY
Qty: 13 ML | Refills: 3 | Status: SHIPPED | OUTPATIENT
Start: 2024-12-09 | End: 2024-12-09

## 2024-12-09 ASSESSMENT — ENCOUNTER SYMPTOMS
GASTROINTESTINAL NEGATIVE: 1
CHEST TIGHTNESS: 0
RESPIRATORY NEGATIVE: 1
SHORTNESS OF BREATH: 0
EYES NEGATIVE: 1
COUGH: 0
RHINORRHEA: 1
TROUBLE SWALLOWING: 0
WHEEZING: 0
SORE THROAT: 0
SINUS PRESSURE: 0
SINUS PAIN: 0

## 2024-12-09 NOTE — PATIENT INSTRUCTIONS
-Continue taking your medications as prescribed. You were sent in a new medication called Nasalcrom to help with your allergies  -Lab work was done for you to get done before your next visit  -Follow up in 3 months

## 2024-12-09 NOTE — PROGRESS NOTES
Pneumococcal 0-64 years Vaccine (1 of 2 - PCV) Never done    Varicella vaccine (1 of 2 - 13+ 2-dose series) Never done    Hepatitis B vaccine (1 of 3 - 19+ 3-dose series) Never done    Breast cancer screen  Never done    COVID-19 Vaccine (1 - 2023-24 season) Never done       Immunization History   Administered Date(s) Administered    TDaP, ADACEL (age 10y-64y), BOOSTRIX (age 10y+), IM, 0.5mL 10/03/2022       Review of Systems   Constitutional: Negative.  Negative for chills, diaphoresis, fatigue and fever.   HENT:  Positive for congestion, postnasal drip and rhinorrhea. Negative for sinus pressure, sinus pain, sneezing, sore throat and trouble swallowing.    Eyes: Negative.    Respiratory: Negative.  Negative for cough, chest tightness, shortness of breath and wheezing.    Cardiovascular: Negative.    Gastrointestinal: Negative.    Genitourinary: Negative.    Musculoskeletal: Negative.    Skin: Negative.    Neurological: Negative.    Psychiatric/Behavioral: Negative.         Data: Old records have been reviewed electronically.    PHYSICAL EXAM:  /89 (Site: Right Upper Arm, Position: Sitting, Cuff Size: Large Adult)   Pulse 75   Temp 97.3 °F (36.3 °C) (Temporal)   Resp 16   Wt (!) 143.3 kg (315 lb 14.4 oz)   SpO2 95%   BMI 52.57 kg/m²   Physical Exam  Vitals reviewed.   Constitutional:       Appearance: Normal appearance. She is normal weight.   HENT:      Head: Normocephalic and atraumatic.      Right Ear: Tympanic membrane and ear canal normal.      Left Ear: Tympanic membrane and ear canal normal.      Nose: Congestion and rhinorrhea present.      Mouth/Throat:      Mouth: Mucous membranes are moist.      Pharynx: Oropharynx is clear. No oropharyngeal exudate or posterior oropharyngeal erythema.   Eyes:      Extraocular Movements: Extraocular movements intact.      Conjunctiva/sclera: Conjunctivae normal.      Pupils: Pupils are equal, round, and reactive to light.   Cardiovascular:      Rate and

## 2024-12-16 ENCOUNTER — NURSE ONLY (OUTPATIENT)
Dept: ENT CLINIC | Age: 39
End: 2024-12-16
Payer: MEDICAID

## 2024-12-16 ENCOUNTER — NURSE ONLY (OUTPATIENT)
Dept: ENT CLINIC | Age: 39
End: 2024-12-16

## 2024-12-16 ENCOUNTER — TELEPHONE (OUTPATIENT)
Dept: ENT CLINIC | Age: 39
End: 2024-12-16

## 2024-12-16 DIAGNOSIS — J30.1 SEASONAL ALLERGIC RHINITIS DUE TO POLLEN: Primary | ICD-10-CM

## 2024-12-16 PROCEDURE — 95165 ANTIGEN THERAPY SERVICES: CPT | Performed by: OTOLARYNGOLOGY

## 2024-12-16 PROCEDURE — 95117 IMMUNOTHERAPY INJECTIONS: CPT | Performed by: OTOLARYNGOLOGY

## 2024-12-23 ENCOUNTER — NURSE ONLY (OUTPATIENT)
Dept: ENT CLINIC | Age: 39
End: 2024-12-23
Payer: MEDICAID

## 2024-12-23 DIAGNOSIS — J30.1 SEASONAL ALLERGIC RHINITIS DUE TO POLLEN: Primary | ICD-10-CM

## 2024-12-23 PROCEDURE — 95117 IMMUNOTHERAPY INJECTIONS: CPT | Performed by: OTOLARYNGOLOGY

## 2024-12-30 ENCOUNTER — NURSE ONLY (OUTPATIENT)
Dept: ENT CLINIC | Age: 39
End: 2024-12-30
Payer: MEDICAID

## 2024-12-30 DIAGNOSIS — J30.1 SEASONAL ALLERGIC RHINITIS DUE TO POLLEN: Primary | ICD-10-CM

## 2024-12-30 PROCEDURE — 95117 IMMUNOTHERAPY INJECTIONS: CPT | Performed by: OTOLARYNGOLOGY

## 2025-01-13 ENCOUNTER — NURSE ONLY (OUTPATIENT)
Dept: ENT CLINIC | Age: 40
End: 2025-01-13
Payer: MEDICAID

## 2025-01-13 DIAGNOSIS — J30.1 SEASONAL ALLERGIC RHINITIS DUE TO POLLEN: Primary | ICD-10-CM

## 2025-01-13 PROCEDURE — 95117 IMMUNOTHERAPY INJECTIONS: CPT | Performed by: STUDENT IN AN ORGANIZED HEALTH CARE EDUCATION/TRAINING PROGRAM

## 2025-01-13 NOTE — PROGRESS NOTES
Correct serum vials verified by patient and nurse. Consent obtained and SVT completed.  SVT P=9mm wheal, M=9mm wheal.  After obtaining consent, and per orders of Dr Castillo, injections of allergy serum given by SIMBA Haji. Patient instructed to remain in clinic for 30 minutes after injection, and to report any adverse reactions to me immediately. No change in patient status post injection.

## 2025-01-17 ENCOUNTER — TELEPHONE (OUTPATIENT)
Dept: ENT CLINIC | Age: 40
End: 2025-01-17

## 2025-01-23 DIAGNOSIS — I10 HYPERTENSION, UNSPECIFIED TYPE: ICD-10-CM

## 2025-01-23 DIAGNOSIS — E55.9 VITAMIN D DEFICIENCY: ICD-10-CM

## 2025-01-23 DIAGNOSIS — E78.2 MIXED HYPERLIPIDEMIA: ICD-10-CM

## 2025-01-24 LAB
25(OH)D3 SERPL-MCNC: 25.2 NG/ML
ALBUMIN SERPL-MCNC: 3.9 G/DL (ref 3.4–5)
ALBUMIN/GLOB SERPL: 1.4 {RATIO} (ref 1.1–2.2)
ALP SERPL-CCNC: 91 U/L (ref 40–129)
ALT SERPL-CCNC: 10 U/L (ref 10–40)
ANION GAP SERPL CALCULATED.3IONS-SCNC: 10 MMOL/L (ref 3–16)
AST SERPL-CCNC: 17 U/L (ref 15–37)
BASOPHILS # BLD: 0 K/UL (ref 0–0.2)
BASOPHILS NFR BLD: 0.5 %
BILIRUB SERPL-MCNC: <0.2 MG/DL (ref 0–1)
BUN SERPL-MCNC: 7 MG/DL (ref 7–20)
CALCIUM SERPL-MCNC: 9.3 MG/DL (ref 8.3–10.6)
CHLORIDE SERPL-SCNC: 104 MMOL/L (ref 99–110)
CHOLEST SERPL-MCNC: 234 MG/DL (ref 0–199)
CO2 SERPL-SCNC: 28 MMOL/L (ref 21–32)
CREAT SERPL-MCNC: 0.8 MG/DL (ref 0.6–1.1)
DEPRECATED RDW RBC AUTO: 14.1 % (ref 12.4–15.4)
EOSINOPHIL # BLD: 0.2 K/UL (ref 0–0.6)
EOSINOPHIL NFR BLD: 3.7 %
GFR SERPLBLD CREATININE-BSD FMLA CKD-EPI: >90 ML/MIN/{1.73_M2}
GLUCOSE SERPL-MCNC: 75 MG/DL (ref 70–99)
HCT VFR BLD AUTO: 38.8 % (ref 36–48)
HDLC SERPL-MCNC: 81 MG/DL (ref 40–60)
HGB BLD-MCNC: 12.9 G/DL (ref 12–16)
LDLC SERPL CALC-MCNC: 131 MG/DL
LYMPHOCYTES # BLD: 2.1 K/UL (ref 1–5.1)
LYMPHOCYTES NFR BLD: 41.7 %
MCH RBC QN AUTO: 32.1 PG (ref 26–34)
MCHC RBC AUTO-ENTMCNC: 33.1 G/DL (ref 31–36)
MCV RBC AUTO: 96.9 FL (ref 80–100)
MONOCYTES # BLD: 0.3 K/UL (ref 0–1.3)
MONOCYTES NFR BLD: 5.2 %
NEUTROPHILS # BLD: 2.4 K/UL (ref 1.7–7.7)
NEUTROPHILS NFR BLD: 48.9 %
PLATELET # BLD AUTO: 413 K/UL (ref 135–450)
PMV BLD AUTO: 8.6 FL (ref 5–10.5)
POTASSIUM SERPL-SCNC: 4.5 MMOL/L (ref 3.5–5.1)
PROT SERPL-MCNC: 6.6 G/DL (ref 6.4–8.2)
RBC # BLD AUTO: 4.01 M/UL (ref 4–5.2)
SODIUM SERPL-SCNC: 142 MMOL/L (ref 136–145)
TRIGL SERPL-MCNC: 112 MG/DL (ref 0–150)
VLDLC SERPL CALC-MCNC: 22 MG/DL
WBC # BLD AUTO: 5 K/UL (ref 4–11)

## 2025-01-27 ENCOUNTER — NURSE ONLY (OUTPATIENT)
Dept: ENT CLINIC | Age: 40
End: 2025-01-27
Payer: MEDICAID

## 2025-01-27 DIAGNOSIS — J30.1 SEASONAL ALLERGIC RHINITIS DUE TO POLLEN: Primary | ICD-10-CM

## 2025-01-27 PROCEDURE — 95117 IMMUNOTHERAPY INJECTIONS: CPT | Performed by: STUDENT IN AN ORGANIZED HEALTH CARE EDUCATION/TRAINING PROGRAM

## 2025-01-27 ASSESSMENT — ENCOUNTER SYMPTOMS
BLOOD IN STOOL: 0
ABDOMINAL DISTENTION: 0
SHORTNESS OF BREATH: 0
COUGH: 0
PHOTOPHOBIA: 0
NAUSEA: 0
CHEST TIGHTNESS: 0
ABDOMINAL PAIN: 0

## 2025-01-27 NOTE — PROGRESS NOTES
[facility]  1/27/25  Referring: Dr. Diaz    REASON FOR CONSULT/CHIEF COMPLAINT/HPI     Reason for visit/ Chief complaint  Follow up   Shortness of breath   HPI Maday Varghese is a 39 y.o. seen as a follow up for right sided heart dilatation. She has a history of untreated KAREN and obesity. She has had previous hysteroscopy and D&C in 2015.  On 10/30/23 he had sleeve gastrectomy, she has lost 30 pounds  since  Last summer, her brother was incarcerated, was being evaluated for schizophrenia.    1/10/24> Today she states she feels well. She feels as if her sinuses are bothering her, preventing her from using her cpap. She has no chest pain shortness of breath palpitations or dizziness.     Today, she HAD AN ECHO.    Patient is adherent with medications and is tolerating them well without side effects     HISTORY/ALLERGIES/ROS     MedHx:  has a past medical history of Bacterial vaginosis, COVID-19, Dysmenorrhea, Hypotension due to hypovolemia, Menorrhagia, Obesity, Ovarian cyst, and Sleep apnea.  SurgHx:  has a past surgical history that includes Dilation and curettage of uterus; Upper gastrointestinal endoscopy (N/A, 12/22/2022); and Sleeve Gastrectomy (N/A, 10/30/2023).   SocHx:  reports that she has never smoked. She has never used smokeless tobacco. She reports current alcohol use. She reports that she does not use drugs.   FamHx: No family history of premature coronary artery disease, sudden death, or aneurysm  Allergies: Shellfish-derived products, Strawberry (diagnostic), Bee venom, Cat dander, and Percocet [oxycodone-acetaminophen]   ROS:   Review of Systems   Constitutional:  Positive for fatigue. Negative for activity change, diaphoresis and fever.   HENT:  Negative for congestion, ear discharge and nosebleeds.    Eyes:  Negative for photophobia and visual disturbance.   Respiratory:  Negative for cough, chest tightness and shortness of breath.    Cardiovascular:  Positive for leg swelling. Negative

## 2025-01-27 NOTE — PROGRESS NOTES
Correct serum vials verified by patient and nurse. After obtaining consent, and per orders of Dr Castillo, injections of allergy serum given by SIMBA Haji. Last injection dose repeated.  It has been 2 weeks since patient has had an injection.  Patient instructed to remain in clinic for 30 minutes after injection, and to report any adverse reactions to me immediately. No change in patient status post injection.

## 2025-01-28 ENCOUNTER — OFFICE VISIT (OUTPATIENT)
Dept: CARDIOLOGY CLINIC | Age: 40
End: 2025-01-28
Payer: MEDICAID

## 2025-01-28 VITALS
SYSTOLIC BLOOD PRESSURE: 142 MMHG | HEIGHT: 65 IN | HEART RATE: 80 BPM | OXYGEN SATURATION: 99 % | WEIGHT: 293 LBS | DIASTOLIC BLOOD PRESSURE: 98 MMHG | BODY MASS INDEX: 48.82 KG/M2

## 2025-01-28 DIAGNOSIS — I51.7 RIGHT HEART ENLARGEMENT: Primary | ICD-10-CM

## 2025-01-28 DIAGNOSIS — I50.810 RIGHT-SIDED HEART FAILURE, UNSPECIFIED HF CHRONICITY (HCC): ICD-10-CM

## 2025-01-28 DIAGNOSIS — G47.33 OBSTRUCTIVE SLEEP APNEA SYNDROME: ICD-10-CM

## 2025-01-28 DIAGNOSIS — E66.01 MORBID OBESITY WITH BMI OF 50.0-59.9, ADULT: ICD-10-CM

## 2025-01-28 DIAGNOSIS — R06.02 SHORTNESS OF BREATH: ICD-10-CM

## 2025-01-28 DIAGNOSIS — E78.2 MIXED HYPERLIPIDEMIA: ICD-10-CM

## 2025-01-28 PROCEDURE — 99214 OFFICE O/P EST MOD 30 MIN: CPT | Performed by: INTERNAL MEDICINE

## 2025-01-28 PROCEDURE — 1036F TOBACCO NON-USER: CPT | Performed by: INTERNAL MEDICINE

## 2025-01-28 PROCEDURE — G8417 CALC BMI ABV UP PARAM F/U: HCPCS | Performed by: INTERNAL MEDICINE

## 2025-01-28 PROCEDURE — 3077F SYST BP >= 140 MM HG: CPT | Performed by: INTERNAL MEDICINE

## 2025-01-28 PROCEDURE — G8427 DOCREV CUR MEDS BY ELIG CLIN: HCPCS | Performed by: INTERNAL MEDICINE

## 2025-01-28 PROCEDURE — 3080F DIAST BP >= 90 MM HG: CPT | Performed by: INTERNAL MEDICINE

## 2025-01-28 RX ORDER — SPIRONOLACTONE 25 MG/1
12.5 TABLET ORAL DAILY
Qty: 45 TABLET | Refills: 3 | Status: SHIPPED | OUTPATIENT
Start: 2025-01-28

## 2025-01-28 NOTE — PATIENT INSTRUCTIONS
Please call sleep care doctor regarding getting refitted for cpap  Wash all bedding weekly  Increase exercise- goal is 150 minutes a week  Start spironolactone 12.5 mg daily  Renal and bnp in 2 weeks

## 2025-01-28 NOTE — PROGRESS NOTES
[facility]  1/28/25  Referring: Dr. Diaz    REASON FOR CONSULT/CHIEF COMPLAINT/HPI     Reason for visit/ Chief complaint  Follow up   Shortness of breath   HPI Maday Varghese is a 39 y.o. seen as a follow up for right sided heart dilatation. She has a history of untreated KAREN and obesity. She has had previous hysteroscopy and D&C in 2015.  On 10/30/23 he had sleeve gastrectomy,   Last summer, her brother was incarcerated, was being evaluated for schizophrenia.  He has since gotten  out of group home    Today, she had an echo that showed decrease in the right ventricular size, back to normal. She is not using her cpap due to inability to breath through her nose.   Does not have a mask. Now lives with mother who has cats that she is allergic to.  She did see an allergist, getting shots. She is not exercising. 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 She has less exertional shortness of breath. No chest pain, palpitations dizziness or edema. Feels as if she is carrying a lot of water. Typically takes her demadex on Saturday and gets down to 320 ( from 333) . Notices she gains weight when she eats salt.    Patient is adherent with medications and is tolerating them well without side effects     HISTORY/ALLERGIES/ROS     MedHx:  has a past medical history of Acute asthma, Bacterial vaginosis, COVID-19, Dysmenorrhea, Hypotension due to hypovolemia, Menorrhagia, Obesity, Ovarian cyst, and Sleep apnea.  SurgHx:  has a past surgical history that includes Dilation and curettage of uterus; Upper gastrointestinal endoscopy (N/A, 12/22/2022); and Sleeve Gastrectomy (N/A, 10/30/2023).   SocHx:  reports that she has never smoked. She has never used smokeless tobacco. She reports current alcohol use. She reports that she does not use drugs.   FamHx: No family history of premature coronary artery disease, sudden death, or

## 2025-01-29 ENCOUNTER — TELEPHONE (OUTPATIENT)
Dept: ENT CLINIC | Age: 40
End: 2025-01-29

## 2025-01-29 DIAGNOSIS — J30.1 SEASONAL ALLERGIC RHINITIS DUE TO POLLEN: Primary | ICD-10-CM

## 2025-01-29 DIAGNOSIS — T88.6XXA ANAPHYLAXIS DUE TO ALLERGEN IMMUNOTHERAPY: ICD-10-CM

## 2025-01-29 DIAGNOSIS — T45.0X5A ANAPHYLAXIS DUE TO ALLERGEN IMMUNOTHERAPY: ICD-10-CM

## 2025-01-29 DIAGNOSIS — Z91.89 AT RISK FOR ANAPHYLAXIS: ICD-10-CM

## 2025-01-30 RX ORDER — EPINEPHRINE 0.3 MG/.3ML
0.3 INJECTION SUBCUTANEOUS
Qty: 0.3 ML | Refills: 0 | Status: SHIPPED | OUTPATIENT
Start: 2025-01-30 | End: 2025-01-30

## 2025-01-30 NOTE — TELEPHONE ENCOUNTER
I have never prescribed an EpiPen for this patient.  She was prescribed an EpiPen on 4/4/2024 by Malia Reveles MD for \"at risk of anaphylaxis\" for allergies to foods, \"shellfish and strawberry.\"  Also noted to be allergic to \"bee venom.\"  Was refilled on 10/19/2023 and reason was not given/documented.  Currently needs epi-pen.due to ongoing immunotherapy, in case of anaphylaxis due to antigen extract injections.  Refill posted.

## 2025-02-10 ENCOUNTER — NURSE ONLY (OUTPATIENT)
Dept: ENT CLINIC | Age: 40
End: 2025-02-10
Payer: MEDICAID

## 2025-02-10 DIAGNOSIS — J30.1 SEASONAL ALLERGIC RHINITIS DUE TO POLLEN: Primary | ICD-10-CM

## 2025-02-10 PROCEDURE — 95117 IMMUNOTHERAPY INJECTIONS: CPT | Performed by: STUDENT IN AN ORGANIZED HEALTH CARE EDUCATION/TRAINING PROGRAM

## 2025-02-17 ENCOUNTER — NURSE ONLY (OUTPATIENT)
Dept: ENT CLINIC | Age: 40
End: 2025-02-17
Payer: MEDICAID

## 2025-02-17 DIAGNOSIS — J30.1 SEASONAL ALLERGIC RHINITIS DUE TO POLLEN: Primary | ICD-10-CM

## 2025-02-17 PROCEDURE — 95117 IMMUNOTHERAPY INJECTIONS: CPT | Performed by: STUDENT IN AN ORGANIZED HEALTH CARE EDUCATION/TRAINING PROGRAM

## 2025-02-17 RX ORDER — NORGESTREL AND ETHINYL ESTRADIOL 0.3-0.03MG
1 KIT ORAL DAILY
Qty: 3 PACKET | Refills: 3 | Status: SHIPPED | OUTPATIENT
Start: 2025-02-17

## 2025-02-17 NOTE — PROGRESS NOTES
Correct serum vials verified by patient and nurse. After obtaining consent, and per orders of Dr Castillo, injections of allergy serum given by Lois MERRITT. Patient instructed to remain in clinic for 30 minutes after injection, and to report any adverse reactions to me immediately. No change in patient status post injection.

## 2025-02-24 ENCOUNTER — OFFICE VISIT (OUTPATIENT)
Dept: ENT CLINIC | Age: 40
End: 2025-02-24
Payer: MEDICAID

## 2025-02-24 ENCOUNTER — NURSE ONLY (OUTPATIENT)
Dept: ENT CLINIC | Age: 40
End: 2025-02-24
Payer: MEDICAID

## 2025-02-24 VITALS
DIASTOLIC BLOOD PRESSURE: 102 MMHG | HEIGHT: 66 IN | HEART RATE: 56 BPM | SYSTOLIC BLOOD PRESSURE: 150 MMHG | WEIGHT: 293 LBS | TEMPERATURE: 97 F | OXYGEN SATURATION: 96 % | BODY MASS INDEX: 47.09 KG/M2

## 2025-02-24 DIAGNOSIS — J30.1 SEASONAL ALLERGIC RHINITIS DUE TO POLLEN: Primary | Chronic | ICD-10-CM

## 2025-02-24 DIAGNOSIS — J32.9 RECURRENT SINUSITIS: Chronic | ICD-10-CM

## 2025-02-24 DIAGNOSIS — J30.1 SEASONAL ALLERGIC RHINITIS DUE TO POLLEN: Primary | ICD-10-CM

## 2025-02-24 DIAGNOSIS — J30.81 ALLERGIC RHINITIS DUE TO ANIMAL HAIR AND DANDER: Chronic | ICD-10-CM

## 2025-02-24 PROCEDURE — G8417 CALC BMI ABV UP PARAM F/U: HCPCS | Performed by: OTOLARYNGOLOGY

## 2025-02-24 PROCEDURE — G8427 DOCREV CUR MEDS BY ELIG CLIN: HCPCS | Performed by: OTOLARYNGOLOGY

## 2025-02-24 PROCEDURE — 99214 OFFICE O/P EST MOD 30 MIN: CPT | Performed by: OTOLARYNGOLOGY

## 2025-02-24 PROCEDURE — 1036F TOBACCO NON-USER: CPT | Performed by: OTOLARYNGOLOGY

## 2025-02-24 PROCEDURE — 3080F DIAST BP >= 90 MM HG: CPT | Performed by: OTOLARYNGOLOGY

## 2025-02-24 PROCEDURE — 3077F SYST BP >= 140 MM HG: CPT | Performed by: OTOLARYNGOLOGY

## 2025-02-24 PROCEDURE — 95117 IMMUNOTHERAPY INJECTIONS: CPT | Performed by: STUDENT IN AN ORGANIZED HEALTH CARE EDUCATION/TRAINING PROGRAM

## 2025-02-24 NOTE — PROGRESS NOTES
CHIEF COMPLAINT  Chief Complaint   Patient presents with    Allergic Rhinitis        HISTORY OF PRESENT ILLNESS    Maday Varghese is a 39 y.o. female here for recheck and follow up of allergic rhinitis.  Patient stated that her allergies are well-controlled with the medications and allergy shots at this point.  She does have a dog and a cat in her family home where she lives with her mother.  However she plans to move out of her mother's home by June and will be in a home with no pets.  She is tolerated her allergy immunotherapy well with no adverse reactions.  She was started on cromolyn by her primary care physician in addition to her other medications and this seems to help.  She uses it about 3 days out of the week.  She continues to use the azelastine and fluticasone nasal sprays and cetirizine oral antihistamine.      REVIEW OF SYSTEMS  Constitutional:  Denied fever and chills.  ENT/sinus:  Denied otalgia, otorrhea, nasal pain, rhinorrhea, sore throat, and sinus/facial pain.        EXAMINATION    Vitals:    02/24/25 0901 02/24/25 0934   BP: (!) 153/109 (!) 150/102   Site: Left Lower Arm Right Lower Arm   Position: Sitting Sitting   Cuff Size: Medium Adult Medium Adult   Pulse: 70 56   Temp: 97 °F (36.1 °C)    TempSrc: Infrared    SpO2: 96%    Weight: (!) 151 kg (333 lb)    Height: 1.67 m (5' 5.75\")      WDWN, NAD  Face: Normal skin with no lesions.    Voice: Normal, with no hoarseness, breathiness, or hot potato quality.    Ears:  The mastoids, pinnae, tympanic membranes, and external auditory canals appeared to be normal bilaterally.  TMs and EACs were normal.  The mastoids and pinnae were normal.    Nose: Normal mucosa and secretions.  Nasal septum appeared to be midline.  Inferior turbinates appear to be normal.    Throat,  OC/OP:  Normal oral cavity and oropharynx with no mucosal lesions and with normal secretions.    Neck: Nontender with no masses.  Trachea was midline.    Nodes: There was no

## 2025-02-24 NOTE — PROGRESS NOTES
Correct serum vials verified by patient and nurse. After obtaining consent, and per orders of Dr Castillo, injections of allergy serum given by SIMBA aHji. Patient instructed to remain in clinic for 30 minutes after injection, and to report any adverse reactions to me immediately. No change in patient status post injection.

## 2025-03-04 ENCOUNTER — NURSE ONLY (OUTPATIENT)
Dept: ENT CLINIC | Age: 40
End: 2025-03-04
Payer: MEDICAID

## 2025-03-04 DIAGNOSIS — J30.1 SEASONAL ALLERGIC RHINITIS DUE TO POLLEN: Primary | ICD-10-CM

## 2025-03-04 PROCEDURE — 95117 IMMUNOTHERAPY INJECTIONS: CPT | Performed by: OTOLARYNGOLOGY

## 2025-03-11 ENCOUNTER — OFFICE VISIT (OUTPATIENT)
Dept: GYNECOLOGY | Age: 40
End: 2025-03-11
Payer: MEDICAID

## 2025-03-11 VITALS
DIASTOLIC BLOOD PRESSURE: 68 MMHG | WEIGHT: 293 LBS | SYSTOLIC BLOOD PRESSURE: 138 MMHG | HEART RATE: 56 BPM | BODY MASS INDEX: 53.6 KG/M2 | OXYGEN SATURATION: 99 % | RESPIRATION RATE: 17 BRPM

## 2025-03-11 DIAGNOSIS — Z01.419 WELL WOMAN EXAM WITH ROUTINE GYNECOLOGICAL EXAM: Primary | ICD-10-CM

## 2025-03-11 PROCEDURE — 3075F SYST BP GE 130 - 139MM HG: CPT | Performed by: OBSTETRICS & GYNECOLOGY

## 2025-03-11 PROCEDURE — 99396 PREV VISIT EST AGE 40-64: CPT | Performed by: OBSTETRICS & GYNECOLOGY

## 2025-03-11 PROCEDURE — 3078F DIAST BP <80 MM HG: CPT | Performed by: OBSTETRICS & GYNECOLOGY

## 2025-03-11 RX ORDER — NORGESTREL AND ETHINYL ESTRADIOL 0.3-0.03MG
1 KIT ORAL DAILY
Qty: 3 PACKET | Refills: 3 | Status: SHIPPED | OUTPATIENT
Start: 2025-03-11

## 2025-03-13 ENCOUNTER — RESULTS FOLLOW-UP (OUTPATIENT)
Dept: GYNECOLOGY | Age: 40
End: 2025-03-13

## 2025-03-17 ENCOUNTER — NURSE ONLY (OUTPATIENT)
Dept: ENT CLINIC | Age: 40
End: 2025-03-17
Payer: MEDICAID

## 2025-03-17 DIAGNOSIS — J30.1 SEASONAL ALLERGIC RHINITIS DUE TO POLLEN: Primary | ICD-10-CM

## 2025-03-17 PROCEDURE — 95117 IMMUNOTHERAPY INJECTIONS: CPT | Performed by: OTOLARYNGOLOGY

## 2025-03-20 ASSESSMENT — ENCOUNTER SYMPTOMS
EYES NEGATIVE: 1
RESPIRATORY NEGATIVE: 1
GASTROINTESTINAL NEGATIVE: 1
ALLERGIC/IMMUNOLOGIC NEGATIVE: 1

## 2025-03-24 ENCOUNTER — CLINICAL SUPPORT (OUTPATIENT)
Dept: ENT CLINIC | Age: 40
End: 2025-03-24
Payer: MEDICAID

## 2025-03-24 DIAGNOSIS — J30.1 SEASONAL ALLERGIC RHINITIS DUE TO POLLEN: Primary | ICD-10-CM

## 2025-03-24 PROCEDURE — 95117 IMMUNOTHERAPY INJECTIONS: CPT | Performed by: OTOLARYNGOLOGY

## 2025-03-28 ENCOUNTER — TELEPHONE (OUTPATIENT)
Dept: GYNECOLOGY | Age: 40
End: 2025-03-28

## 2025-03-28 NOTE — TELEPHONE ENCOUNTER
Does she have a more specific question about her birth control? I just saw her. Ask her if there is something she wants to switch to or if she is having a problem?

## 2025-03-28 NOTE — TELEPHONE ENCOUNTER
Called and spoke to patient she says that she has been having heavty bleeding for the last 3 weeks since she has been on the current birth control. She wants to go back to     norgestrel-ethinyl estradiol (LOW-OGESTREL) 0.3-30 MG-MCG per tablet [[id number]]

## 2025-03-28 NOTE — TELEPHONE ENCOUNTER
Patient is calling office to discuss switching birth control    Patient can be reached at [phone]

## 2025-03-28 NOTE — TELEPHONE ENCOUNTER
Tell patient that is what I called in for her 3/2025. She should be able to get that from the pharmacy. Did they not give her that?

## 2025-03-31 ENCOUNTER — CLINICAL SUPPORT (OUTPATIENT)
Dept: ENT CLINIC | Age: 40
End: 2025-03-31
Payer: MEDICAID

## 2025-03-31 DIAGNOSIS — J30.1 SEASONAL ALLERGIC RHINITIS DUE TO POLLEN: Primary | ICD-10-CM

## 2025-03-31 PROCEDURE — 95117 IMMUNOTHERAPY INJECTIONS: CPT | Performed by: OTOLARYNGOLOGY

## 2025-03-31 RX ORDER — NORGESTREL AND ETHINYL ESTRADIOL 0.3-0.03MG
1 KIT ORAL DAILY
Qty: 3 PACKET | Refills: 3 | Status: SHIPPED | OUTPATIENT
Start: 2025-03-31

## 2025-03-31 NOTE — TELEPHONE ENCOUNTER
Patient stated that they never gave her anything.     [id number] - [place], [place] - [address] [phone] - F [phone] [[id number]]

## 2025-04-07 ENCOUNTER — CLINICAL SUPPORT (OUTPATIENT)
Dept: ENT CLINIC | Age: 40
End: 2025-04-07
Payer: MEDICAID

## 2025-04-07 DIAGNOSIS — J30.1 SEASONAL ALLERGIC RHINITIS DUE TO POLLEN: Primary | ICD-10-CM

## 2025-04-07 PROCEDURE — 95117 IMMUNOTHERAPY INJECTIONS: CPT | Performed by: OTOLARYNGOLOGY

## 2025-04-14 ENCOUNTER — CLINICAL SUPPORT (OUTPATIENT)
Dept: ENT CLINIC | Age: 40
End: 2025-04-14
Payer: MEDICAID

## 2025-04-14 DIAGNOSIS — J30.1 SEASONAL ALLERGIC RHINITIS DUE TO POLLEN: Primary | ICD-10-CM

## 2025-04-14 PROCEDURE — 95117 IMMUNOTHERAPY INJECTIONS: CPT | Performed by: OTOLARYNGOLOGY

## 2025-04-17 ENCOUNTER — TELEPHONE (OUTPATIENT)
Dept: ENT CLINIC | Age: 40
End: 2025-04-17

## 2025-04-17 NOTE — TELEPHONE ENCOUNTER
Spoke to pt about switching back to FF for allergy shots. She does want to switch back when Dr. Last returns, she prefers Monday Mornings.

## 2025-05-06 ENCOUNTER — CLINICAL SUPPORT (OUTPATIENT)
Dept: ENT CLINIC | Age: 40
End: 2025-05-06
Payer: MEDICAID

## 2025-05-06 DIAGNOSIS — J30.1 SEASONAL ALLERGIC RHINITIS DUE TO POLLEN: Primary | ICD-10-CM

## 2025-05-06 PROCEDURE — 95117 IMMUNOTHERAPY INJECTIONS: CPT | Performed by: STUDENT IN AN ORGANIZED HEALTH CARE EDUCATION/TRAINING PROGRAM

## 2025-05-07 ENCOUNTER — TELEPHONE (OUTPATIENT)
Dept: ENT CLINIC | Age: 40
End: 2025-05-07

## 2025-05-07 NOTE — TELEPHONE ENCOUNTER
Patient is now on maintenance dosing on both Pollens and M,M,Epi,I.     Patient has been tolerating allergy immunotherapy injections without incident.  Recommend continuing on the Maintenance dosing.  Will mix refill of Maintenance Prescription Treatment Sets.  Please advise.

## 2025-05-07 NOTE — TELEPHONE ENCOUNTER
Patient has been tolerating allergy desensitization injections without issue.  Under maintenance dosing.  Agree with continuing maintenance dosing.    Dr. Esvin Last DO  [facility]   Otolaryngology - Head & Neck [facility]

## 2025-05-13 ENCOUNTER — NURSE ONLY (OUTPATIENT)
Dept: ENT CLINIC | Age: 40
End: 2025-05-13

## 2025-05-13 ENCOUNTER — CLINICAL SUPPORT (OUTPATIENT)
Dept: ENT CLINIC | Age: 40
End: 2025-05-13
Payer: MEDICAID

## 2025-05-13 DIAGNOSIS — J30.1 SEASONAL ALLERGIC RHINITIS DUE TO POLLEN: Primary | ICD-10-CM

## 2025-05-13 PROCEDURE — 95117 IMMUNOTHERAPY INJECTIONS: CPT | Performed by: STUDENT IN AN ORGANIZED HEALTH CARE EDUCATION/TRAINING PROGRAM

## 2025-05-13 PROCEDURE — 95165 ANTIGEN THERAPY SERVICES: CPT | Performed by: STUDENT IN AN ORGANIZED HEALTH CARE EDUCATION/TRAINING PROGRAM

## 2025-05-20 ENCOUNTER — CLINICAL SUPPORT (OUTPATIENT)
Dept: ENT CLINIC | Age: 40
End: 2025-05-20
Payer: MEDICAID

## 2025-05-20 ENCOUNTER — OFFICE VISIT (OUTPATIENT)
Dept: INTERNAL MEDICINE CLINIC | Age: 40
End: 2025-05-20
Payer: MEDICAID

## 2025-05-20 VITALS
HEART RATE: 86 BPM | SYSTOLIC BLOOD PRESSURE: 129 MMHG | WEIGHT: 293 LBS | DIASTOLIC BLOOD PRESSURE: 84 MMHG | HEIGHT: 65 IN | OXYGEN SATURATION: 100 % | RESPIRATION RATE: 18 BRPM | BODY MASS INDEX: 48.82 KG/M2 | TEMPERATURE: 97.5 F

## 2025-05-20 DIAGNOSIS — I10 HYPERTENSION, UNSPECIFIED TYPE: ICD-10-CM

## 2025-05-20 DIAGNOSIS — J30.1 SEASONAL ALLERGIC RHINITIS DUE TO POLLEN: Primary | ICD-10-CM

## 2025-05-20 DIAGNOSIS — E78.2 MIXED HYPERLIPIDEMIA: Primary | ICD-10-CM

## 2025-05-20 DIAGNOSIS — J45.20 MILD INTERMITTENT ASTHMA WITHOUT COMPLICATION: ICD-10-CM

## 2025-05-20 DIAGNOSIS — E55.9 VITAMIN D DEFICIENCY: ICD-10-CM

## 2025-05-20 DIAGNOSIS — I50.812 CHRONIC RIGHT-SIDED HEART FAILURE (HCC): ICD-10-CM

## 2025-05-20 DIAGNOSIS — J30.2 SEASONAL ALLERGIC RHINITIS, UNSPECIFIED TRIGGER: ICD-10-CM

## 2025-05-20 DIAGNOSIS — G47.33 OBSTRUCTIVE SLEEP APNEA SYNDROME: ICD-10-CM

## 2025-05-20 PROCEDURE — 99213 OFFICE O/P EST LOW 20 MIN: CPT

## 2025-05-20 PROCEDURE — 95117 IMMUNOTHERAPY INJECTIONS: CPT | Performed by: STUDENT IN AN ORGANIZED HEALTH CARE EDUCATION/TRAINING PROGRAM

## 2025-05-20 RX ORDER — FLUTICASONE PROPIONATE 50 MCG
2 SPRAY, SUSPENSION (ML) NASAL DAILY
Qty: 16 G | Refills: 0 | Status: SHIPPED | OUTPATIENT
Start: 2025-05-20

## 2025-05-20 RX ORDER — CROMOLYN SODIUM 5.2 MG
1 AEROSOL, SPRAY WITH PUMP (ML) NASAL 3 TIMES DAILY
Qty: 13 ML | Refills: 3 | Status: SHIPPED | OUTPATIENT
Start: 2025-05-20

## 2025-05-20 RX ORDER — ERGOCALCIFEROL 1.25 MG/1
50000 CAPSULE, LIQUID FILLED ORAL WEEKLY
Qty: 4 CAPSULE | Refills: 2 | Status: SHIPPED | OUTPATIENT
Start: 2025-05-20

## 2025-05-20 RX ORDER — AZELASTINE 1 MG/ML
1 SPRAY, METERED NASAL 2 TIMES DAILY
Qty: 60 ML | Refills: 3 | Status: SHIPPED | OUTPATIENT
Start: 2025-05-20

## 2025-05-20 SDOH — ECONOMIC STABILITY: FOOD INSECURITY: WITHIN THE PAST 12 MONTHS, THE FOOD YOU BOUGHT JUST DIDN'T LAST AND YOU DIDN'T HAVE MONEY TO GET MORE.: PATIENT DECLINED

## 2025-05-20 SDOH — ECONOMIC STABILITY: FOOD INSECURITY: WITHIN THE PAST 12 MONTHS, YOU WORRIED THAT YOUR FOOD WOULD RUN OUT BEFORE YOU GOT MONEY TO BUY MORE.: PATIENT DECLINED

## 2025-05-20 ASSESSMENT — ENCOUNTER SYMPTOMS
RESPIRATORY NEGATIVE: 1
PHOTOPHOBIA: 0
EYES NEGATIVE: 1
NAUSEA: 0
VOMITING: 0
DIARRHEA: 0
COUGH: 0
ABDOMINAL PAIN: 0
SHORTNESS OF BREATH: 0
ALLERGIC/IMMUNOLOGIC NEGATIVE: 1
GASTROINTESTINAL NEGATIVE: 1
CHEST TIGHTNESS: 0
ABDOMINAL DISTENTION: 0
CONSTIPATION: 0

## 2025-05-20 ASSESSMENT — PATIENT HEALTH QUESTIONNAIRE - PHQ9
1. LITTLE INTEREST OR PLEASURE IN DOING THINGS: NOT AT ALL
SUM OF ALL RESPONSES TO PHQ QUESTIONS 1-9: 0
SUM OF ALL RESPONSES TO PHQ QUESTIONS 1-9: 0
2. FEELING DOWN, DEPRESSED OR HOPELESS: NOT AT ALL
SUM OF ALL RESPONSES TO PHQ QUESTIONS 1-9: 0
SUM OF ALL RESPONSES TO PHQ QUESTIONS 1-9: 0

## 2025-05-20 NOTE — PATIENT INSTRUCTIONS
Please comeback for the regular follow-up visit in 4 month  Please get your following blood work before your next visit.   -Lipid Profile  Please continue to take your medications as prescribed.  Please eat green vegetable and avoid fast-food.   Please exercise 30 mins at least 3 times every week  Please follow-up with your ENT as per your scheduled appointment.  Please call the office if you have any questions  Please go to ED if you have any symptoms like shortness of breath, chest pain, severe abdominal pain, or altered mentation or anything that needs immediate attention.
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
## 2025-05-27 ENCOUNTER — CLINICAL SUPPORT (OUTPATIENT)
Dept: ENT CLINIC | Age: 40
End: 2025-05-27
Payer: MEDICAID

## 2025-05-27 DIAGNOSIS — J30.1 SEASONAL ALLERGIC RHINITIS DUE TO POLLEN: Primary | ICD-10-CM

## 2025-05-27 PROCEDURE — 95117 IMMUNOTHERAPY INJECTIONS: CPT | Performed by: STUDENT IN AN ORGANIZED HEALTH CARE EDUCATION/TRAINING PROGRAM

## 2025-06-10 ENCOUNTER — CLINICAL SUPPORT (OUTPATIENT)
Dept: ENT CLINIC | Age: 40
End: 2025-06-10
Payer: MEDICAID

## 2025-06-10 DIAGNOSIS — J30.1 SEASONAL ALLERGIC RHINITIS DUE TO POLLEN: Primary | ICD-10-CM

## 2025-06-10 PROCEDURE — 95117 IMMUNOTHERAPY INJECTIONS: CPT | Performed by: STUDENT IN AN ORGANIZED HEALTH CARE EDUCATION/TRAINING PROGRAM

## 2025-06-10 NOTE — PROGRESS NOTES
Correct serum vials verified by patient and nurse. Consent obtained and SVT completed.  SVT P=10mm wheal, M=9mm wheal.  After obtaining consent, and per orders of Dr Castillo, injections of allergy serum given by ELISHA Marquez. Patient instructed to remain in clinic for 30 minutes after injection, and to report any adverse reactions to me immediately. No change in patient status post injection.

## 2025-06-17 ENCOUNTER — CLINICAL SUPPORT (OUTPATIENT)
Dept: ENT CLINIC | Age: 40
End: 2025-06-17
Payer: MEDICAID

## 2025-06-17 DIAGNOSIS — J30.1 SEASONAL ALLERGIC RHINITIS DUE TO POLLEN: Primary | ICD-10-CM

## 2025-06-17 PROCEDURE — 95117 IMMUNOTHERAPY INJECTIONS: CPT | Performed by: STUDENT IN AN ORGANIZED HEALTH CARE EDUCATION/TRAINING PROGRAM

## 2025-06-24 ENCOUNTER — CLINICAL SUPPORT (OUTPATIENT)
Dept: ENT CLINIC | Age: 40
End: 2025-06-24
Payer: MEDICAID

## 2025-06-24 DIAGNOSIS — J30.1 SEASONAL ALLERGIC RHINITIS DUE TO POLLEN: Primary | ICD-10-CM

## 2025-06-24 PROCEDURE — 95117 IMMUNOTHERAPY INJECTIONS: CPT | Performed by: STUDENT IN AN ORGANIZED HEALTH CARE EDUCATION/TRAINING PROGRAM

## 2025-07-01 ENCOUNTER — CLINICAL SUPPORT (OUTPATIENT)
Dept: ENT CLINIC | Age: 40
End: 2025-07-01
Payer: MEDICAID

## 2025-07-01 DIAGNOSIS — J30.1 SEASONAL ALLERGIC RHINITIS DUE TO POLLEN: Primary | ICD-10-CM

## 2025-07-01 PROCEDURE — 95117 IMMUNOTHERAPY INJECTIONS: CPT | Performed by: STUDENT IN AN ORGANIZED HEALTH CARE EDUCATION/TRAINING PROGRAM

## 2025-07-08 ENCOUNTER — CLINICAL SUPPORT (OUTPATIENT)
Dept: ENT CLINIC | Age: 40
End: 2025-07-08
Payer: MEDICAID

## 2025-07-08 DIAGNOSIS — J30.1 SEASONAL ALLERGIC RHINITIS DUE TO POLLEN: Primary | ICD-10-CM

## 2025-07-08 PROCEDURE — 95117 IMMUNOTHERAPY INJECTIONS: CPT | Performed by: STUDENT IN AN ORGANIZED HEALTH CARE EDUCATION/TRAINING PROGRAM

## 2025-07-08 NOTE — PROGRESS NOTES
Correct serum vials verified by patient and nurse. After obtaining consent, and per orders of Dr Last, injections of allergy serum given by ELISHA Marquez. Patient instructed to remain in clinic for 30 minutes after injection, and to report any adverse reactions to me immediately. No change in patient status post injection.

## 2025-07-22 ENCOUNTER — CLINICAL SUPPORT (OUTPATIENT)
Dept: ENT CLINIC | Age: 40
End: 2025-07-22
Payer: MEDICAID

## 2025-07-22 DIAGNOSIS — I51.7 RIGHT HEART ENLARGEMENT: ICD-10-CM

## 2025-07-22 DIAGNOSIS — J30.1 SEASONAL ALLERGIC RHINITIS DUE TO POLLEN: Primary | ICD-10-CM

## 2025-07-22 DIAGNOSIS — E78.2 MIXED HYPERLIPIDEMIA: ICD-10-CM

## 2025-07-22 DIAGNOSIS — R06.02 SHORTNESS OF BREATH: ICD-10-CM

## 2025-07-22 PROCEDURE — 95117 IMMUNOTHERAPY INJECTIONS: CPT | Performed by: STUDENT IN AN ORGANIZED HEALTH CARE EDUCATION/TRAINING PROGRAM

## 2025-07-23 LAB
ALBUMIN SERPL-MCNC: 3.8 G/DL (ref 3.4–5)
ANION GAP SERPL CALCULATED.3IONS-SCNC: 12 MMOL/L (ref 3–16)
BUN SERPL-MCNC: 12 MG/DL (ref 7–20)
CALCIUM SERPL-MCNC: 9.2 MG/DL (ref 8.3–10.6)
CHLORIDE SERPL-SCNC: 103 MMOL/L (ref 99–110)
CHOLEST SERPL-MCNC: 218 MG/DL (ref 0–199)
CO2 SERPL-SCNC: 25 MMOL/L (ref 21–32)
CREAT SERPL-MCNC: 0.9 MG/DL (ref 0.6–1.1)
GFR SERPLBLD CREATININE-BSD FMLA CKD-EPI: 82 ML/MIN/{1.73_M2}
GLUCOSE SERPL-MCNC: 76 MG/DL (ref 70–99)
HDLC SERPL-MCNC: 63 MG/DL (ref 40–60)
LDLC SERPL CALC-MCNC: 141 MG/DL
NT-PROBNP SERPL-MCNC: 59 PG/ML (ref 0–124)
PHOSPHATE SERPL-MCNC: 3.2 MG/DL (ref 2.5–4.9)
POTASSIUM SERPL-SCNC: 4.1 MMOL/L (ref 3.5–5.1)
SODIUM SERPL-SCNC: 140 MMOL/L (ref 136–145)
TRIGL SERPL-MCNC: 72 MG/DL (ref 0–150)
VLDLC SERPL CALC-MCNC: 14 MG/DL

## 2025-07-24 ENCOUNTER — RESULTS FOLLOW-UP (OUTPATIENT)
Dept: CARDIOLOGY CLINIC | Age: 40
End: 2025-07-24

## 2025-07-24 NOTE — PROGRESS NOTES
[facility]  7/24/25  Referring: Dr. Diaz    REASON FOR CONSULT/CHIEF COMPLAINT/HPI     Reason for visit/ Chief complaint  Follow up   Shortness of breath   HPI Maday Varghese is a 40 y.o. seen as a follow up for right sided heart dilatation. She has a history of untreated KAREN and obesity. She has had previous hysteroscopy and D&C in 2015.  On 10/30/23 he had sleeve gastrectomy,   Last summer, her brother was incarcerated, was being evaluated for schizophrenia.  He has since gotten  out of [facility]    OV 1/18/25***Today, she had an echo that showed decrease in the right ventricular size, back to normal. She is not using her cpap due to inability to breath through her nose.   Does not have a mask. Now lives with mother who has cats that she is allergic to.  She did see an allergist, getting shots. She is not exercising. 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 No chest pain, palpitations dizziness or edema. Feels as if she is carrying a lot of water. Typically takes her demadex on Saturday and gets down to 320 ( from 333) . Notices she gains weight when she eats salt.***    Today, patient is here for 6 month follow up.     Patient is adherent with medications and is tolerating them well without side effects     HISTORY/ALLERGIES/ROS     MedHx:  has a past medical history of Acute asthma, Bacterial vaginosis, COVID-19, Dysmenorrhea, Hypotension due to hypovolemia, Menorrhagia, Obesity, Ovarian cyst, and Sleep apnea.  SurgHx:  has a past surgical history that includes Dilation and curettage of uterus; Upper gastrointestinal endoscopy (N/A, 12/22/2022); and Sleeve Gastrectomy (N/A, 10/30/2023).   SocHx:  reports that she has never smoked. She has never used smokeless tobacco. She reports current alcohol use. She reports that she does not use drugs.   FamHx: No family history of premature

## 2025-07-25 NOTE — PROGRESS NOTES
[facility]  7/28/25  Referring: Dr. Diaz    REASON FOR CONSULT/CHIEF COMPLAINT/HPI     Reason for visit/ Chief complaint  Follow up   Shortness of breath   HPI Maday Varghese is a 40 y.o. seen as a follow up for right sided heart dilatation. She has a history of untreated KAREN and obesity. She has had previous hysteroscopy and D&C in 2015.  On 10/30/23 he had sleeve gastrectomy    Today, patient is here for 6 month follow up. She has lost 4 pounds since last visit, and has been wearing cpap nightly. She recently traveled to [place] and was able to walk for several hours without stopping. Able to walk up a  flight of stairs. She is feeling well. No chest pain shortness of breath palpitations dizziness No syncope. No edema.  No orthopnea.    Patient is mostly adherent with medications and is tolerating them well without side effects     HISTORY/ALLERGIES/ROS     MedHx:  has a past medical history of Acute asthma, Bacterial vaginosis, COVID-19, Dysmenorrhea, Hypotension due to hypovolemia, Menorrhagia, Obesity, Ovarian cyst, and Sleep apnea.  SurgHx:  has a past surgical history that includes Dilation and curettage of uterus; Upper gastrointestinal endoscopy (N/A, 12/22/2022); and Sleeve Gastrectomy (N/A, 10/30/2023).   SocHx:  reports that she has never smoked. She has never used smokeless tobacco. She reports current alcohol use. She reports that she does not use drugs.   FamHx: No family history of premature coronary artery disease, sudden death, or aneurysm  Allergies: Shellfish-derived products, Strawberry (diagnostic), Bee venom, Cat dander, and Percocet [oxycodone-acetaminophen]   ROS:   Review of Systems   Constitutional:  Positive for fatigue. Negative for activity change, diaphoresis and fever.   HENT:  Negative for congestion, ear discharge and nosebleeds.    Eyes:  Negative for photophobia and visual disturbance.   Respiratory:  Negative for cough, chest tightness and shortness of breath.

## 2025-07-28 ENCOUNTER — OFFICE VISIT (OUTPATIENT)
Dept: CARDIOLOGY CLINIC | Age: 40
End: 2025-07-28
Payer: MEDICAID

## 2025-07-28 VITALS
HEIGHT: 65 IN | BODY MASS INDEX: 48.82 KG/M2 | DIASTOLIC BLOOD PRESSURE: 72 MMHG | WEIGHT: 293 LBS | SYSTOLIC BLOOD PRESSURE: 126 MMHG | HEART RATE: 88 BPM

## 2025-07-28 DIAGNOSIS — J32.9 FREQUENT SINUS INFECTIONS: ICD-10-CM

## 2025-07-28 DIAGNOSIS — I50.810 RIGHT-SIDED HEART FAILURE, UNSPECIFIED HF CHRONICITY (HCC): ICD-10-CM

## 2025-07-28 DIAGNOSIS — I50.812 CHRONIC RIGHT-SIDED HEART FAILURE (HCC): ICD-10-CM

## 2025-07-28 DIAGNOSIS — I50.32 CHRONIC HEART FAILURE WITH PRESERVED EJECTION FRACTION (HCC): ICD-10-CM

## 2025-07-28 DIAGNOSIS — G47.33 OBSTRUCTIVE SLEEP APNEA SYNDROME: Primary | ICD-10-CM

## 2025-07-28 DIAGNOSIS — R06.02 SHORTNESS OF BREATH: ICD-10-CM

## 2025-07-28 DIAGNOSIS — E78.2 MIXED HYPERLIPIDEMIA: ICD-10-CM

## 2025-07-28 DIAGNOSIS — E66.813 CLASS 3 SEVERE OBESITY DUE TO EXCESS CALORIES WITH SERIOUS COMORBIDITY AND BODY MASS INDEX (BMI) OF 50.0 TO 59.9 IN ADULT (HCC): ICD-10-CM

## 2025-07-28 PROCEDURE — 99214 OFFICE O/P EST MOD 30 MIN: CPT | Performed by: INTERNAL MEDICINE

## 2025-07-28 PROCEDURE — 3078F DIAST BP <80 MM HG: CPT | Performed by: INTERNAL MEDICINE

## 2025-07-28 PROCEDURE — G8427 DOCREV CUR MEDS BY ELIG CLIN: HCPCS | Performed by: INTERNAL MEDICINE

## 2025-07-28 PROCEDURE — 3074F SYST BP LT 130 MM HG: CPT | Performed by: INTERNAL MEDICINE

## 2025-07-28 PROCEDURE — 1036F TOBACCO NON-USER: CPT | Performed by: INTERNAL MEDICINE

## 2025-07-28 PROCEDURE — G8417 CALC BMI ABV UP PARAM F/U: HCPCS | Performed by: INTERNAL MEDICINE

## 2025-07-28 RX ORDER — ROSUVASTATIN CALCIUM 20 MG/1
20 TABLET, COATED ORAL NIGHTLY
Qty: 90 TABLET | Refills: 3 | Status: SHIPPED | OUTPATIENT
Start: 2025-07-28

## 2025-07-28 RX ORDER — SPIRONOLACTONE 25 MG/1
12.5 TABLET ORAL SEE ADMIN INSTRUCTIONS
Qty: 45 TABLET | Refills: 3 | Status: SHIPPED | OUTPATIENT
Start: 2025-07-28

## 2025-07-28 NOTE — PATIENT INSTRUCTIONS
Continue all medications  Follow up in one year  Renal panel in 6 months  Be more consistent with crestor

## 2025-07-29 ENCOUNTER — CLINICAL SUPPORT (OUTPATIENT)
Dept: ENT CLINIC | Age: 40
End: 2025-07-29
Payer: MEDICAID

## 2025-07-29 DIAGNOSIS — J30.1 SEASONAL ALLERGIC RHINITIS DUE TO POLLEN: Primary | ICD-10-CM

## 2025-07-29 PROCEDURE — 95117 IMMUNOTHERAPY INJECTIONS: CPT | Performed by: STUDENT IN AN ORGANIZED HEALTH CARE EDUCATION/TRAINING PROGRAM

## 2025-07-31 ENCOUNTER — NURSE ONLY (OUTPATIENT)
Dept: ENT CLINIC | Age: 40
End: 2025-07-31
Payer: MEDICAID

## 2025-07-31 DIAGNOSIS — J30.1 SEASONAL ALLERGIC RHINITIS DUE TO POLLEN: Primary | ICD-10-CM

## 2025-07-31 PROCEDURE — 95165 ANTIGEN THERAPY SERVICES: CPT | Performed by: STUDENT IN AN ORGANIZED HEALTH CARE EDUCATION/TRAINING PROGRAM

## 2025-07-31 NOTE — PROGRESS NOTES
Problem: Ineffective Coping  Goal: Demonstrates healthy coping skills  Outcome: Progressing     Problem: Risk for Self Injury/Neglect  Goal: Treatment Goal: Remain safe during length of stay, learn and adopt new coping skills, and be free of self-injurious ideation, impulses and acts at the time of discharge  Outcome: Progressing     Problem: Depression  Goal: Verbalize thoughts and feelings  Description: Interventions:  - Assess and re-assess patient's level of risk   - Engage patient in 1:1 interactions, daily, for a minimum of 15 minutes   - Encourage patient to express feelings, fears, frustrations, hopes   Outcome: Progressing     Problem: Anxiety  Goal: Anxiety is at manageable level  Description: Interventions:  - Assess and monitor patient's anxiety level. - Monitor for signs and symptoms (heart palpitations, chest pain, shortness of breath, headaches, nausea, feeling jumpy, restlessness, irritable, apprehensive). - Collaborate with interdisciplinary team and initiate plan and interventions as ordered.   - [place]ss[place] patient to unit/surroundings  - Explain treatment plan  - Encourage participation in care  - Encourage verbalization of concerns/fears  - Identify coping mechanisms  - Assist in developing anxiety-reducing skills  - Administer/offer alternative therapies  - Limit or eliminate stimulants  Outcome: Not Progressing Total vials prepared: 2. First vial contains Pollens with 10 doses and second vial contains Mites, Molds, Epithelia, and Insects (M,M,Epi,I) with 10 doses.  Allergy extract was prepared according to written prescription by provider.  Provider onsite during allergy extract preparation.  Patient vials labeled with name, date of birth, vial number, Pollen or M M Epi I, and expiration date.  Injections are given weekly according to provider orders and injections are built according to patient tolerance to achieve a goal of maintenance.  See media scan for Prescription Treatment Set. See allergy flowsheets for injection documentation from each visit.

## 2025-08-05 ENCOUNTER — CLINICAL SUPPORT (OUTPATIENT)
Dept: ENT CLINIC | Age: 40
End: 2025-08-05
Payer: MEDICAID

## 2025-08-05 DIAGNOSIS — J30.1 SEASONAL ALLERGIC RHINITIS DUE TO POLLEN: Primary | ICD-10-CM

## 2025-08-05 PROCEDURE — 95117 IMMUNOTHERAPY INJECTIONS: CPT | Performed by: STUDENT IN AN ORGANIZED HEALTH CARE EDUCATION/TRAINING PROGRAM

## 2025-08-19 ENCOUNTER — CLINICAL SUPPORT (OUTPATIENT)
Dept: ENT CLINIC | Age: 40
End: 2025-08-19
Payer: MEDICAID

## 2025-08-19 DIAGNOSIS — J30.1 SEASONAL ALLERGIC RHINITIS DUE TO POLLEN: Primary | ICD-10-CM

## 2025-08-19 PROCEDURE — 95117 IMMUNOTHERAPY INJECTIONS: CPT | Performed by: STUDENT IN AN ORGANIZED HEALTH CARE EDUCATION/TRAINING PROGRAM

## 2025-08-26 ENCOUNTER — OFFICE VISIT (OUTPATIENT)
Dept: ENT CLINIC | Age: 40
End: 2025-08-26
Payer: MEDICAID

## 2025-08-26 ENCOUNTER — CLINICAL SUPPORT (OUTPATIENT)
Dept: ENT CLINIC | Age: 40
End: 2025-08-26
Payer: MEDICAID

## 2025-08-26 VITALS
HEART RATE: 69 BPM | BODY MASS INDEX: 54.63 KG/M2 | SYSTOLIC BLOOD PRESSURE: 139 MMHG | DIASTOLIC BLOOD PRESSURE: 95 MMHG | HEIGHT: 65 IN | TEMPERATURE: 97.5 F | OXYGEN SATURATION: 97 %

## 2025-08-26 DIAGNOSIS — J30.1 SEASONAL ALLERGIC RHINITIS DUE TO POLLEN: Primary | ICD-10-CM

## 2025-08-26 DIAGNOSIS — J34.3 HYPERTROPHY OF BOTH INFERIOR NASAL TURBINATES: ICD-10-CM

## 2025-08-26 PROCEDURE — G8417 CALC BMI ABV UP PARAM F/U: HCPCS | Performed by: STUDENT IN AN ORGANIZED HEALTH CARE EDUCATION/TRAINING PROGRAM

## 2025-08-26 PROCEDURE — G8427 DOCREV CUR MEDS BY ELIG CLIN: HCPCS | Performed by: STUDENT IN AN ORGANIZED HEALTH CARE EDUCATION/TRAINING PROGRAM

## 2025-08-26 PROCEDURE — 3080F DIAST BP >= 90 MM HG: CPT | Performed by: STUDENT IN AN ORGANIZED HEALTH CARE EDUCATION/TRAINING PROGRAM

## 2025-08-26 PROCEDURE — 3075F SYST BP GE 130 - 139MM HG: CPT | Performed by: STUDENT IN AN ORGANIZED HEALTH CARE EDUCATION/TRAINING PROGRAM

## 2025-08-26 PROCEDURE — 95117 IMMUNOTHERAPY INJECTIONS: CPT | Performed by: STUDENT IN AN ORGANIZED HEALTH CARE EDUCATION/TRAINING PROGRAM

## 2025-08-26 PROCEDURE — 1036F TOBACCO NON-USER: CPT | Performed by: STUDENT IN AN ORGANIZED HEALTH CARE EDUCATION/TRAINING PROGRAM

## 2025-08-26 PROCEDURE — 99213 OFFICE O/P EST LOW 20 MIN: CPT | Performed by: STUDENT IN AN ORGANIZED HEALTH CARE EDUCATION/TRAINING PROGRAM

## 2025-08-26 RX ORDER — NORGESTREL AND ETHINYL ESTRADIOL 0.3-0.03MG
1 KIT ORAL DAILY
Qty: 3 PACKET | Refills: 3 | Status: SHIPPED | OUTPATIENT
Start: 2025-08-26

## (undated) DEVICE — MERCY FAIRFIELD TURNOVER KIT: Brand: MEDLINE INDUSTRIES, INC.

## (undated) DEVICE — SOLUTION IV IRRIG WATER 500ML POUR BRL ST 2F7113

## (undated) DEVICE — RELOAD STPL L60MM H1-2.6MM MESENTERY THN TISS WHT 6 ROW

## (undated) DEVICE — SUTURE MCRYL + SZ 4-0 L18IN ABSRB UD L19MM PS-2 3/8 CIR MCP496G

## (undated) DEVICE — SUTURE VCRL PLUS SZ 0 54IN TIE VCP608H

## (undated) DEVICE — BOWL MED L 32OZ PLAS W/ MOLD GRAD EZ OPN PEEL PCH

## (undated) DEVICE — AIR/WATER CLEANING ADAPTER FOR OLYMPUS® GI ENDOSCOPE: Brand: BULLDOG®

## (undated) DEVICE — VALVE SUCTION AIR H2O SET ORCA POD + DISP

## (undated) DEVICE — STAPLER 60MM POWERED ECHELON 3000 LONG 440MM

## (undated) DEVICE — NEEDLE INSUF L150MM DIA2MM DISP FOR PNEUMOPERI ENDOPATH

## (undated) DEVICE — DEVICE SUT SHFT L34CM DIA 10MM 2 JAW LD UNIT ENDOSTCH

## (undated) DEVICE — SYRINGE MED 10ML TRNSLUC BRL PLUNG BLK MRK POLYPR CTRL

## (undated) DEVICE — GLOVE ORANGE PI 8 1/2   MSG9085

## (undated) DEVICE — NEEDLE,22GX1.5",REG,BEVEL: Brand: MEDLINE

## (undated) DEVICE — TROCAR: Brand: KII FIOS FIRST ENTRY

## (undated) DEVICE — TROCAR: Brand: KII OPTICAL ACCESS SYSTEM

## (undated) DEVICE — ENDOSCOPIC KIT 6X3/16 FT COLON W/ 1.1 OZ 2 GWN W/O BRSH

## (undated) DEVICE — TRUE CONTENT TO BE POPULATED AS PART OF REBRANDING: Brand: ARGYLE

## (undated) DEVICE — ARM CRADLE: Brand: DEVON

## (undated) DEVICE — TUBING, SUCTION, 1/4" X 10', STRAIGHT: Brand: MEDLINE

## (undated) DEVICE — SHEET,DRAPE,53X77,STERILE: Brand: MEDLINE

## (undated) DEVICE — BLANKET WRM W29.9XL79.1IN UP BODY FORC AIR MISTRAL-AIR

## (undated) DEVICE — AIR SHEET,LAT,COMFORT GLIDE, BLEND 40X80: Brand: MEDLINE

## (undated) DEVICE — SHEARS ENDOSCP HARM 36CM ULTRASONIC CRV TIP UPGRD

## (undated) DEVICE — FORCEPS BX L240CM WRK CHN 2.8MM STD CAP W/ NDL MIC MESH

## (undated) DEVICE — CRADLE ANK AND FT ELEV FLAT END POLY FOAM W/ VENT H NEUT

## (undated) DEVICE — LIQUIBAND RAPID ADHESIVE 36/CS 0.8ML: Brand: MEDLINE

## (undated) DEVICE — SOLUTION ANTIFOG VIS SYS CLEARIFY LAPSCP

## (undated) DEVICE — LAPAROSCOPY PACK: Brand: MEDLINE INDUSTRIES, INC.

## (undated) DEVICE — TROCARS: Brand: KII® OPTICAL ACCESS SYSTEM

## (undated) DEVICE — DEVICE SUT W/ SZ 0 L48IN VLT POLYSRB SUT DISP ES-9 ENDO

## (undated) DEVICE — SHEET,DRAPE,40X58,STERILE: Brand: MEDLINE

## (undated) DEVICE — MOUTHPIECE ENDOSCP L CTRL OPN AND SIDE PORTS DISP

## (undated) DEVICE — BW-412T DISP COMBO CLEANING BRUSH: Brand: SINGLE USE COMBINATION CLEANING BRUSH

## (undated) DEVICE — LAPAROSCOPIC SCISSORS: Brand: EPIX LAPAROSCOPIC SCISSORS

## (undated) DEVICE — RELOAD STPL L60MM H1.5-3.6MM REG TISS BLU GRIPPING SURF B

## (undated) DEVICE — APPLICATOR MEDICATED 26 CC SOLUTION HI LT ORNG CHLORAPREP

## (undated) DEVICE — DRAPE,LAP,CHOLE,W/TROUGHS,STERILE: Brand: MEDLINE

## (undated) DEVICE — GOWN,AURORA,NONREINF,RAGLAN,XXL,STERILE: Brand: MEDLINE

## (undated) DEVICE — SPONGE,LAP,4"X18",XR,ST,5/PK,40PK/CS: Brand: MEDLINE INDUSTRIES, INC.

## (undated) DEVICE — PASSIVE LAPSCP FLTR W/ 1/4INX24 TBNG AND M LUER LCK FIT - U

## (undated) DEVICE — SOLUTION IRRIG 1000ML 0.9% SOD CHL USP POUR PLAS BTL